# Patient Record
Sex: FEMALE | Race: WHITE | NOT HISPANIC OR LATINO | Employment: UNEMPLOYED
[De-identification: names, ages, dates, MRNs, and addresses within clinical notes are randomized per-mention and may not be internally consistent; named-entity substitution may affect disease eponyms.]

---

## 2017-01-03 ENCOUNTER — SURGERY (OUTPATIENT)
Age: 37
End: 2017-01-03

## 2017-01-03 ENCOUNTER — ANESTHESIA (OUTPATIENT)
Dept: SURGERY | Facility: HOSPITAL | Age: 37
End: 2017-01-03
Payer: OTHER GOVERNMENT

## 2017-01-03 ENCOUNTER — TELEPHONE (OUTPATIENT)
Dept: OBSTETRICS AND GYNECOLOGY | Facility: CLINIC | Age: 37
End: 2017-01-03

## 2017-01-03 PROBLEM — N92.0 MENORRHAGIA: Status: ACTIVE | Noted: 2017-01-03

## 2017-01-03 PROCEDURE — D9220A PRA ANESTHESIA: Mod: CRNA,,, | Performed by: NURSE ANESTHETIST, CERTIFIED REGISTERED

## 2017-01-03 PROCEDURE — 63600175 PHARM REV CODE 636 W HCPCS: Mod: PO | Performed by: NURSE ANESTHETIST, CERTIFIED REGISTERED

## 2017-01-03 PROCEDURE — 25000003 PHARM REV CODE 250: Mod: PO | Performed by: NURSE ANESTHETIST, CERTIFIED REGISTERED

## 2017-01-03 PROCEDURE — D9220A PRA ANESTHESIA: Mod: ANES,,, | Performed by: ANESTHESIOLOGY

## 2017-01-03 PROCEDURE — 63600175 PHARM REV CODE 636 W HCPCS: Mod: PO | Performed by: OBSTETRICS & GYNECOLOGY

## 2017-01-03 RX ORDER — MIDAZOLAM HYDROCHLORIDE 1 MG/ML
INJECTION, SOLUTION INTRAMUSCULAR; INTRAVENOUS
Status: DISCONTINUED | OUTPATIENT
Start: 2017-01-03 | End: 2017-01-03

## 2017-01-03 RX ORDER — PROPOFOL 10 MG/ML
VIAL (ML) INTRAVENOUS
Status: DISCONTINUED | OUTPATIENT
Start: 2017-01-03 | End: 2017-01-03

## 2017-01-03 RX ORDER — KETAMINE HYDROCHLORIDE 100 MG/ML
INJECTION, SOLUTION INTRAMUSCULAR; INTRAVENOUS
Status: DISCONTINUED | OUTPATIENT
Start: 2017-01-03 | End: 2017-01-03

## 2017-01-03 RX ORDER — ACETAMINOPHEN 10 MG/ML
INJECTION, SOLUTION INTRAVENOUS
Status: DISCONTINUED | OUTPATIENT
Start: 2017-01-03 | End: 2017-01-03

## 2017-01-03 RX ORDER — FENTANYL CITRATE 50 UG/ML
INJECTION, SOLUTION INTRAMUSCULAR; INTRAVENOUS
Status: DISCONTINUED | OUTPATIENT
Start: 2017-01-03 | End: 2017-01-03

## 2017-01-03 RX ORDER — ONDANSETRON 2 MG/ML
INJECTION INTRAMUSCULAR; INTRAVENOUS
Status: DISCONTINUED | OUTPATIENT
Start: 2017-01-03 | End: 2017-01-03

## 2017-01-03 RX ORDER — LIDOCAINE HCL/PF 100 MG/5ML
SYRINGE (ML) INTRAVENOUS
Status: DISCONTINUED | OUTPATIENT
Start: 2017-01-03 | End: 2017-01-03

## 2017-01-03 RX ADMIN — CEFAZOLIN SODIUM 2 G: 2 SOLUTION INTRAVENOUS at 07:01

## 2017-01-03 RX ADMIN — FENTANYL CITRATE 50 MCG: 50 INJECTION, SOLUTION INTRAMUSCULAR; INTRAVENOUS at 07:01

## 2017-01-03 RX ADMIN — LIDOCAINE HYDROCHLORIDE 50 MG: 20 INJECTION PARENTERAL at 07:01

## 2017-01-03 RX ADMIN — MIDAZOLAM 2 MG: 1 INJECTION INTRAMUSCULAR; INTRAVENOUS at 07:01

## 2017-01-03 RX ADMIN — KETAMINE HYDROCHLORIDE 25 MG: 100 INJECTION, SOLUTION, CONCENTRATE INTRAMUSCULAR; INTRAVENOUS at 07:01

## 2017-01-03 RX ADMIN — ONDANSETRON 4 MG: 2 INJECTION, SOLUTION INTRAMUSCULAR; INTRAVENOUS at 07:01

## 2017-01-03 RX ADMIN — ACETAMINOPHEN 1000 MG: 10 INJECTION, SOLUTION INTRAVENOUS at 07:01

## 2017-01-03 RX ADMIN — PROPOFOL 200 MG: 10 INJECTION, EMULSION INTRAVENOUS at 07:01

## 2017-01-03 NOTE — TELEPHONE ENCOUNTER
Patient was given a rx for hydrocodone today after surgery. She states she needs oxycodone because it works better for her. Please advise.

## 2017-01-03 NOTE — TELEPHONE ENCOUNTER
----- Message from Anuradha Frazier sent at 1/3/2017  2:32 PM CST -----  Contact: [facility] [phone]  Surgery Center needs to speak to office concerning this patient that he just operated on this morning. Office called in a prescription to [facility] for Hydrocodone. Patient needs a prescription for Oxycodone which she states works better.Please remit to:    [id number], [place] - [address] [address]  Phone: [phone] Fax: [phone]      . Placed calls to the pod. Please call back.

## 2017-01-17 ENCOUNTER — OFFICE VISIT (OUTPATIENT)
Dept: OBSTETRICS AND GYNECOLOGY | Facility: CLINIC | Age: 37
End: 2017-01-17
Payer: OTHER GOVERNMENT

## 2017-01-17 VITALS
DIASTOLIC BLOOD PRESSURE: 60 MMHG | SYSTOLIC BLOOD PRESSURE: 110 MMHG | HEIGHT: 62 IN | WEIGHT: 101.88 LBS | BODY MASS INDEX: 18.75 KG/M2

## 2017-01-17 DIAGNOSIS — Z09 POSTOP CHECK: Primary | ICD-10-CM

## 2017-01-17 PROCEDURE — 99999 PR PBB SHADOW E&M-EST. PATIENT-LVL II: CPT | Mod: PBBFAC,,, | Performed by: OBSTETRICS & GYNECOLOGY

## 2017-01-17 PROCEDURE — 99212 OFFICE O/P EST SF 10 MIN: CPT | Mod: PBBFAC,PO | Performed by: OBSTETRICS & GYNECOLOGY

## 2017-01-17 PROCEDURE — 99024 POSTOP FOLLOW-UP VISIT: CPT | Mod: ,,, | Performed by: OBSTETRICS & GYNECOLOGY

## 2017-01-17 RX ORDER — ELETRIPTAN HYDROBROMIDE 20 MG/1
20 TABLET, FILM COATED ORAL
COMMUNITY
Start: 2016-12-22 | End: 2022-11-11 | Stop reason: CLARIF

## 2017-01-17 NOTE — PROGRESS NOTES
History of Present Illness:   Pateint presents today  2 weeks postop, status post endometrial ablation, with complaint of none.    Pathology: FINAL PATHOLOGIC DIAGNOSIS  ENDOMETRIUM: FRAGMENTS OF PROLIFERATIVE ENDOMETRIUM; NO EVIDENCE OF ENDOMETRIAL  HYPERPLASIA OR MALIGNANCY.      Past medical and surgical history reviewed.   I have reviewed the patient's medical history in detail and updated the computerized patient record.    Physical exam:  Vital Signs: as above, reviewed.  Constitutional: She is oriented to person, place, and time. She appears well-developed and well-nourished. No distress.   HENT:   Head: Normocephalic and atraumatic.   Eyes: Conjunctivae and EOM are normal. No scleral icterus.   Neck: Normal range of motion. Neck supple. No tracheal deviation present.   Cardiovascular: Normal rate.    Pulmonary/Chest: Effort normal. No respiratory distress. She exhibits no tenderness.  Breasts: deferred  Abdominal: Soft. She exhibits no distension and no mass. There is no rebound and no guarding.   Genitourinary: Deferred  Musculoskeletal: Normal range of motion.   Lymphadenopathy: No inguinal adenopathy present.   Neurological: She is alert and oriented to person, place, and time. Coordination normal.   Skin: Skin is warm and dry. She is not diaphoretic.   Psychiatric: She has a normal mood and affect.    Assessment:  Normal 2 week postop pain    Plan:  Follow up  1 year  Resume normal activity.

## 2017-01-17 NOTE — MR AVS SNAPSHOT
[facility] - OB/GYN  [address] [place] [address]  Phone: [phone]                  Jaye Martinez   2017 1:00 PM   Office Visit    Description:  Female : 1980   Provider:  Emmanuel Diza MD   Department:  [facility] - OB/GYN           Reason for Visit     Post-op Evaluation                To Do List           Goals (5 Years of Data)     None      [facility]     [facility] On Call Nurse Care Line -  Assistance  Registered nurses in the [facility] On Call Center provide clinical advisement, health education, appointment booking, and other advisory services.  Call for this free service at [phone].             Medications           Message regarding Medications     Verify the changes and/or additions to your medication regime listed below are the same as discussed with your clinician today.  If any of these changes or additions are incorrect, please notify your healthcare provider.             Verify that the below list of medications is an accurate representation of the medications you are currently taking.  If none reported, the list may be blank. If incorrect, please contact your healthcare provider. Carry this list with you in case of emergency.           Current Medications     butalbital-acetaminophen-caffeine -40 mg (FIORICET, ESGIC) -40 mg per tablet Take 1 tablet by mouth 2 (two) times daily as needed.    chlorzoxazone (PARAFON FORTE DSC) 500 mg Tab Take 500 mg by mouth daily as needed.    hydrocodone-acetaminophen 10-325mg (NORCO)  mg Tab Take 1 tablet by mouth every 6 (six) hours as needed.    ibuprofen (ADVIL,MOTRIN) 600 MG tablet Take 1 tablet (600 mg total) by mouth every 6 (six) hours as needed for Pain.    oxycodone (ROXICODONE) 5 MG immediate release tablet Take 1 tablet (5 mg total) by mouth every 4 (four) hours as needed for Pain.    valacyclovir (VALTREX) 1000 MG tablet Take 1,000 mg by mouth every 12 (twelve) hours as  "needed.     zonisamide (ZONEGRAN) 100 MG Cap 100 mg once daily.     ondansetron (ZOFRAN-ODT) 4 MG TbDL Take 2 tablets (8 mg total) by mouth every 8 (eight) hours as needed.    polyethylene glycol (GLYCOLAX) 17 gram/dose powder Take 17 g by mouth once daily. In 8 oz of water    RELPAX 20 mg tablet            Clinical Reference Information           Vital Signs - Last Recorded  Most recent update: 1/17/2017  1:31 PM by Sara Kothari LPN    BP Ht Wt LMP BMI    110/60 5' 2" (1.575 m) 46.2 kg (101 lb 13.6 oz) 01/03/2017 18.63 kg/m2      Blood Pressure          Most Recent Value    BP  110/60      Allergies as of 1/17/2017     Lyrica [Pregabalin]    Bactrim [Sulfamethoxazole-trimethoprim]    Flexeril [Cyclobenzaprine]    Morphine    Toradol [Ketorolac]      Immunizations Administered on Date of Encounter - 1/17/2017     None      Smoking Cessation     If you would like to quit smoking:   You may be eligible for free services if you are a [place] resident and started smoking cigarettes before September 1, 1988.  Call the Smoking Cessation Trust (SCT) toll free at ([phone] 143-8834 or [phone] [phone].   Call 3-800-QUIT-NOW if you do not meet the above criteria.            "

## 2017-02-19 ENCOUNTER — HOSPITAL ENCOUNTER (EMERGENCY)
Facility: HOSPITAL | Age: 37
Discharge: HOME | End: 2017-02-19
Attending: EMERGENCY MEDICINE
Payer: OTHER GOVERNMENT

## 2017-02-19 VITALS
RESPIRATION RATE: 18 BRPM | DIASTOLIC BLOOD PRESSURE: 66 MMHG | OXYGEN SATURATION: 99 % | BODY MASS INDEX: 18.77 KG/M2 | TEMPERATURE: 98 F | HEART RATE: 70 BPM | HEIGHT: 62 IN | WEIGHT: 102 LBS | SYSTOLIC BLOOD PRESSURE: 118 MMHG

## 2017-02-19 DIAGNOSIS — M27.2 HARD PALATE ABSCESS: Primary | ICD-10-CM

## 2017-02-19 PROCEDURE — 99283 EMERGENCY DEPT VISIT LOW MDM: CPT | Mod: 25

## 2017-02-19 PROCEDURE — 42000 DRAINAGE MOUTH ROOF LESION: CPT

## 2017-02-19 PROCEDURE — 25000003 PHARM REV CODE 250: Performed by: NURSE PRACTITIONER

## 2017-02-19 RX ORDER — HYDROCODONE BITARTRATE AND ACETAMINOPHEN 5; 325 MG/1; MG/1
1 TABLET ORAL
Status: COMPLETED | OUTPATIENT
Start: 2017-02-19 | End: 2017-02-19

## 2017-02-19 RX ORDER — PENICILLIN V POTASSIUM 500 MG/1
500 TABLET, FILM COATED ORAL EVERY 6 HOURS
Qty: 27 TABLET | Refills: 0 | Status: SHIPPED | OUTPATIENT
Start: 2017-02-19 | End: 2017-02-26

## 2017-02-19 RX ORDER — PENICILLIN V POTASSIUM 500 MG/1
500 TABLET, FILM COATED ORAL
Status: COMPLETED | OUTPATIENT
Start: 2017-02-19 | End: 2017-02-19

## 2017-02-19 RX ADMIN — HYDROCODONE BITARTRATE AND ACETAMINOPHEN 1 TABLET: 5; 325 TABLET ORAL at 05:02

## 2017-02-19 RX ADMIN — PENICILLIN V POTASIUM 500 MG: 500 TABLET OROPHARYNGEAL at 05:02

## 2017-02-19 RX ADMIN — LIDOCAINE HYDROCHLORIDE 1 ML: 10; .005 INJECTION, SOLUTION EPIDURAL; INFILTRATION; INTRACAUDAL; PERINEURAL at 05:02

## 2017-02-19 NOTE — ED PROVIDER NOTES
Encounter Date: 2/19/2017       History     Chief Complaint   Patient presents with    Abscess     in roof of mouth     Review of patient's allergies indicates:   Allergen Reactions    Lyrica [pregabalin] Itching and Other (See Comments)     Bruising and headaches    Bactrim [sulfamethoxazole-trimethoprim] Rash    Flexeril [cyclobenzaprine] Rash    Morphine Rash    Toradol [ketorolac] Rash     HPI Comments: Jaye Martinez is a 36 year old female with pmh gum disease presenting to the ED with a one day history of pain and swelling to the roof of her mouth. She reports a history of dental abscesses. She has had no drainage from the area of swelling and denies any fever. She reports radiation of pain to the right side of her face.     The history is provided by the patient.     Past Medical History   Diagnosis Date    Abnormal Pap smear     Anxiety     Cancer      pre-cancer cells cervical     Cervical disc herniation     Chronic cough     Closed fracture 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) level with anterior cord syndrome      T 12 fracture compression     Fractures     Genital herpes     Hemorrhoid     History of colposcopy with cervical biopsy     Lumbar disc herniation      on Hydrocodone    Lupus (systemic lupus erythematosus)      chronic thrombocytopenia    Migraine headache     Patient is a currently breast-feeding mother July 2014    PONV (postoperative nausea and vomiting)      Past Medical History Pertinent Negatives   Diagnosis Date Noted    Anticoagulant long-term use 12/29/2016    Asthma 12/29/2016    CHF (congestive heart failure) 12/29/2016    COPD (chronic obstructive pulmonary disease) 12/29/2016    Coronary artery disease 12/29/2016    Diabetes mellitus 12/29/2016    Hypertension 12/29/2016    Malignant hyperthermia 12/29/2016    Seizures 12/29/2016    Stroke 12/29/2016    Thyroid disease 12/29/2016     Past Surgical History   Procedure Laterality Date    Cryotherapy       2002     Appendectomy      Cholecystectomy       section, classic       x2 , last 2014    Tonsillectomy      Adenoidectomy      Tubal ligation  2014    Ganglion cyst excision Left     Tympanostomy tube placement       x 7    Dilation and curettage of uterus  [id number]     Family History   Problem Relation Age of Onset    Heart disease Father     Diabetes Mother     Cancer Maternal Grandfather      colon    Cancer Paternal Grandfather      colon    Breast cancer Maternal Grandmother     Breast cancer Maternal Aunt     Breast cancer Maternal Aunt     Ovarian cancer Neg Hx      Social History   Substance Use Topics    Smoking status: Current Every Day Smoker     Packs/day: 0.25     Types: Cigarettes    Smokeless tobacco: Never Used    Alcohol use No      Comment: special occasions     Review of Systems   HENT: Positive for dental problem (pain and swelling to the roof of mouth). Negative for trouble swallowing.    Respiratory: Negative.    Cardiovascular: Negative.    Gastrointestinal: Negative.    Genitourinary: Negative.    Musculoskeletal: Negative.    Skin: Negative.    Neurological: Negative.        Physical Exam   Initial Vitals   BP Pulse Resp Temp SpO2   17 1630 17 1630 17 1630 17 1630 17 1630   110/59 83 18 98.1 °F (36.7 °C) 98 %     Physical Exam    Nursing note and vitals reviewed.  Constitutional: She appears well-developed and well-nourished. She is not diaphoretic. No distress.   HENT:   Head: Normocephalic and atraumatic.   Mouth/Throat: Oropharynx is clear and moist and mucous membranes are normal. No trismus in the jaw. No uvula swelling.       No facial swelling   Eyes: Conjunctivae are normal.   Neck: Normal range of motion.   Cardiovascular: Normal rate and regular rhythm.   Pulmonary/Chest: Breath sounds normal. No respiratory distress.   Musculoskeletal: Normal range of motion.   Neurological: She is alert and oriented to person, place, and time.    Skin: Skin is warm and dry.         ED Course   I & D - Incision and Drainage  Date/Time: 2/19/2017 7:56 PM  Performed by: ADAM PITTS  Authorized by: ZENOBIA TAPIA   Type: abscess  Body area: head/neck (Hard palate)  Anesthesia: local infiltration    Anesthesia:  Anesthesia: local infiltration  Local Anesthetic: lidocaine 1% with epinephrine   Anesthetic total: 1.5 mL  Incision type: Needle aspiration.  Complexity: complex  Drainage: purulent and  bloody  Drainage amount: moderate  Wound treatment: expression of material (Needle aspiration and drainage)  Patient tolerance: Patient tolerated the procedure well with no immediate complications        Labs Reviewed - No data to display                APC / Resident Notes:   Jaye Martinez is a 36 year old female presenting to the ED with an abscess to the hard palate of her mouth. I performed a needle aspiration of the abscess with moderate amount of purulent drainage removed. She tolerated the procedure well and had moderate relief of pain following procedure. I do not suspect any other abscess or Ludwigs angina. I prescribed penicillin and instructed the patient to follow up with her dentist. I also discussed specific return precautions and she verbalized understanding. She recently filled a prescription for Norco and was instructed to continue taking this medication as it had previously been prescribed. Based on my clinical evaluation, I do not appreciate any immediate, emergent, or life threatening condition or etiology that warrants additional workup today and feel that the patient can be discharged with close follow up care.            Attending Attestation:     Physician Attestation Statement for NP/PA:   I have conducted a face to face encounter with this patient in addition to the NP/PA, due to Medical Complexity    Other NP/PA Attestation Additions:      Medical Decision Making: I provided a face to face evaluation of this patient.  I discussed the  patient's care with Advanced Practice Clinician.  I reviewed their note and agree with the history, physical, assessment, diagnosis, treatment, and discharge plan provided by the Advanced Practice Clinician. My overall impression is oral abscess.  The patient has been instructed to follow up with their physician or the one provided as well as specific return precautions.                    ED Course     Clinical Impression:   The encounter diagnosis was Hard palate abscess.    Disposition:   Disposition: Discharged  Condition: Stable       Ara Barreto NP  02/19/17 1959       Ara Barreto NP  02/19/17 2000       Pawel Torres MD  02/19/17 1177

## 2017-02-19 NOTE — ED AVS SNAPSHOT
[facility] [address] [place] [address]               Jaye Martinez   2017  4:31 PM   ED    Description:  Female : 1980   Department:  [facility]           Your Care was Coordinated By:     Provider Role From To    Pawel Torres MD Attending Provider 17 1942 --    Ara Barreto NP Nurse Practitioner 17 [id number] --      Reason for Visit     Abscess           Diagnoses this Visit        Comments    Hard palate abscess    -  Primary       ED Disposition     None           To Do List           Follow-up Information     Follow up with [facility].    Specialty:  Emergency Medicine    Why:  As needed, If symptoms worsen    Contact information:    [address] [place] [address] [phone]        Follow up with Your dentist in 1-2 days.       These Medications        Disp Refills Start End    penicillin v potassium (VEETID) 500 MG tablet 27 tablet 0 2017    Take 1 tablet (500 mg total) by mouth every 6 (six) hours. - Oral    Pharmacy: [id number] - [place], [place] - [address] [place] [address] #: [phone]         [facility]     [facility] Nurse Care Line -  Assistance  Registered nurses in the [facility] On Call Center provide clinical advisement, health education, appointment booking, and other advisory services.  Call for this free service at [phone].             Medications           Message regarding Medications     Verify the changes and/or additions to your medication regime listed below are the same as discussed with your clinician today.  If any of these changes or additions are incorrect, please notify your healthcare provider.        START taking these NEW medications        Refills    penicillin v potassium (VEETID) 500 MG tablet 0    Sig: Take 1 tablet (500 mg total) by mouth every 6 (six) hours.    Class: Print    Route: Oral       These medications were administered today        Dose Freq    lidocaine-EPINEPHrine (PF) 1%-1:200,000 injection 1 mL 1 mL Once    Si mL by Other route once.    Class: Normal    Route: Other    hydrocodone-acetaminophen 5-325mg per tablet 1 tablet 1 tablet ED 1 Time    Sig: Take 1 tablet by mouth ED 1 Time.    Class: Normal    Route: Oral    Cosign for Ordering: Required by Pawel Torres MD    penicillin v potassium tablet 500 mg 500 mg ED 1 Time    Sig: Take 1 tablet (500 mg total) by mouth ED 1 Time.    Class: Normal    Route: Oral           Verify that the below list of medications is an accurate representation of the medications you are currently taking.  If none reported, the list may be blank. If incorrect, please contact your healthcare provider. Carry this list with you in case of emergency.           Current Medications     butalbital-acetaminophen-caffeine -40 mg (FIORICET, ESGIC) -40 mg per tablet Take 1 tablet by mouth 2 (two) times daily as needed.    chlorzoxazone (PARAFON FORTE DSC) 500 mg Tab Take 500 mg by mouth daily as needed.    hydrocodone-acetaminophen 10-325mg (NORCO)  mg Tab Take 1 tablet by mouth every 6 (six) hours as needed.    hydrocodone-acetaminophen 5-325mg per tablet 1 tablet Take 1 tablet by mouth ED 1 Time.    ibuprofen (ADVIL,MOTRIN) 600 MG tablet Take 1 tablet (600 mg total) by mouth every 6 (six) hours as needed for Pain.    ondansetron (ZOFRAN-ODT) 4 MG TbDL Take 2 tablets (8 mg total) by mouth every 8 (eight) hours as needed.    oxycodone (ROXICODONE) 5 MG immediate release tablet Take 1 tablet (5 mg total) by mouth every 4 (four) hours as needed for Pain.    penicillin v potassium (VEETID) 500 MG tablet Take 1 tablet (500 mg total) by mouth every 6 (six) hours.    penicillin v potassium tablet 500 mg Take 1 tablet (500 mg total) by mouth ED 1 Time.    polyethylene glycol (GLYCOLAX) 17 gram/dose powder Take 17 g by mouth once daily. In 8 oz of water  "   RELPAX 20 mg tablet     valacyclovir (VALTREX) 1000 MG tablet Take 1,000 mg by mouth every 12 (twelve) hours as needed.     zonisamide (ZONEGRAN) 100 MG Cap 100 mg once daily.            Clinical Reference Information           Your Vitals Were     BP Pulse Temp Resp Height Weight    110/59 (BP Location: Right arm, Patient Position: Sitting) 83 98.1 °F (36.7 °C) (Oral) 18 5' 2" (1.575 m) 46.3 kg (102 lb)    SpO2 BMI             98% 18.66 kg/m2         Allergies as of 2/19/2017        Reactions    Lyrica [Pregabalin] Itching, Other (See Comments)    Bruising and headaches    Bactrim [Sulfamethoxazole-trimethoprim] Rash    Flexeril [Cyclobenzaprine] Rash    Morphine Rash    Toradol [Ketorolac] Rash      Immunizations Administered on Date of Encounter - 2/19/2017     None      ED Micro, Lab, POCT     None      ED Imaging Orders     None      Discharge References/Attachments     ABSCESS, DENTAL (ENGLISH)      Smoking Cessation     If you would like to quit smoking:   You may be eligible for free services if you are a [place] resident and started smoking cigarettes before September 1, 1988.  Call the Smoking Cessation Trust (SCT) toll free at ([phone] 755-6906 or (882) 614-0236.   Call 6-800-QUIT-NOW if you do not meet the above criteria.             [facility] complies with applicable Federal civil rights laws and does not discriminate on the basis of race, color, national origin, age, disability, or sex.        Language Assistance Services     ATTENTION: Language assistance services are available, free of charge. Please call [phone].      ATENCIÓN: Si habla español, tiene a grider disposición servicios gratuitos de asistencia lingüística. Llame al [phone].     CHÚ Ý: N?u b?n nói Ti?ng Vi?t, có các d?ch v? h? tr? ngôn ng? mi?n phí dành cho b?n. G?i s? [phone].        "

## 2017-03-14 ENCOUNTER — HOSPITAL ENCOUNTER (OUTPATIENT)
Dept: RADIOLOGY | Facility: HOSPITAL | Age: 37
Discharge: HOME | End: 2017-03-14
Attending: FAMILY MEDICINE
Payer: OTHER GOVERNMENT

## 2017-03-14 ENCOUNTER — OFFICE VISIT (OUTPATIENT)
Dept: FAMILY MEDICINE | Facility: CLINIC | Age: 37
End: 2017-03-14
Payer: OTHER GOVERNMENT

## 2017-03-14 ENCOUNTER — TELEPHONE (OUTPATIENT)
Dept: FAMILY MEDICINE | Facility: CLINIC | Age: 37
End: 2017-03-14

## 2017-03-14 VITALS
WEIGHT: 100.5 LBS | BODY MASS INDEX: 18.5 KG/M2 | SYSTOLIC BLOOD PRESSURE: 96 MMHG | DIASTOLIC BLOOD PRESSURE: 60 MMHG | OXYGEN SATURATION: 98 % | HEIGHT: 62 IN | HEART RATE: 87 BPM

## 2017-03-14 DIAGNOSIS — M54.50 CHRONIC LOW BACK PAIN WITHOUT SCIATICA, UNSPECIFIED BACK PAIN LATERALITY: ICD-10-CM

## 2017-03-14 DIAGNOSIS — M32.9 SYSTEMIC LUPUS ERYTHEMATOSUS, UNSPECIFIED SLE TYPE, UNSPECIFIED ORGAN INVOLVEMENT STATUS: ICD-10-CM

## 2017-03-14 DIAGNOSIS — Z00.00 ROUTINE MEDICAL EXAM: Primary | ICD-10-CM

## 2017-03-14 DIAGNOSIS — G43.909 MIGRAINE WITHOUT STATUS MIGRAINOSUS, NOT INTRACTABLE, UNSPECIFIED MIGRAINE TYPE: ICD-10-CM

## 2017-03-14 DIAGNOSIS — M54.2 CHRONIC CERVICAL PAIN: ICD-10-CM

## 2017-03-14 DIAGNOSIS — J44.1 COPD EXACERBATION: ICD-10-CM

## 2017-03-14 DIAGNOSIS — Z72.0 TOBACCO ABUSE DISORDER: ICD-10-CM

## 2017-03-14 DIAGNOSIS — G89.29 CHRONIC CERVICAL PAIN: ICD-10-CM

## 2017-03-14 DIAGNOSIS — G89.29 CHRONIC LOW BACK PAIN WITHOUT SCIATICA, UNSPECIFIED BACK PAIN LATERALITY: ICD-10-CM

## 2017-03-14 PROCEDURE — 71020 XR CHEST PA AND LATERAL: CPT | Mod: 26,,, | Performed by: RADIOLOGY

## 2017-03-14 PROCEDURE — 99395 PREV VISIT EST AGE 18-39: CPT | Mod: S$PBB,,, | Performed by: FAMILY MEDICINE

## 2017-03-14 PROCEDURE — 99214 OFFICE O/P EST MOD 30 MIN: CPT | Mod: PBBFAC,PO,25 | Performed by: FAMILY MEDICINE

## 2017-03-14 PROCEDURE — 99999 PR PBB SHADOW E&M-EST. PATIENT-LVL IV: CPT | Mod: PBBFAC,,, | Performed by: FAMILY MEDICINE

## 2017-03-14 PROCEDURE — 71020 XR CHEST PA AND LATERAL: CPT | Mod: TC,PO

## 2017-03-14 RX ORDER — PREDNISONE 10 MG/1
TABLET ORAL
Qty: 20 TABLET | Refills: 0 | Status: SHIPPED | OUTPATIENT
Start: 2017-03-14 | End: 2017-06-14

## 2017-03-14 RX ORDER — DOXYCYCLINE 100 MG/1
100 CAPSULE ORAL 2 TIMES DAILY
Qty: 20 CAPSULE | Refills: 0 | Status: SHIPPED | OUTPATIENT
Start: 2017-03-14 | End: 2017-06-14

## 2017-03-14 RX ORDER — VARENICLINE TARTRATE 1 MG/1
1 TABLET, FILM COATED ORAL 2 TIMES DAILY
Qty: 180 TABLET | Refills: 0 | Status: SHIPPED | OUTPATIENT
Start: 2017-03-14 | End: 2017-04-13

## 2017-03-14 RX ORDER — ALBUTEROL SULFATE 90 UG/1
2 AEROSOL, METERED RESPIRATORY (INHALATION) EVERY 4 HOURS PRN
Qty: 18 G | Refills: 0 | Status: SHIPPED | OUTPATIENT
Start: 2017-03-14 | End: 2019-09-16 | Stop reason: SDUPTHER

## 2017-03-14 RX ORDER — VARENICLINE TARTRATE 0.5 (11)-1
KIT ORAL
Qty: 53 TABLET | Refills: 0 | Status: SHIPPED | OUTPATIENT
Start: 2017-03-14 | End: 2017-06-14

## 2017-03-14 NOTE — PROGRESS NOTES
Subjective:       Patient ID: Jaye Martinez is a 36 y.o. female.    Chief Complaint: Annual Exam    HPI     utd with pap and mmg.     Needs updated referral to Dr. Grayson Hogan, neurology pain management, for chronic lumbar and cervical pain. Takes norco and parafon.       Re migraines: gets migraines without 3 times per week. [facility] Dr[facility]. Takes relpax and fioricet for acute migraines.      C/o cough, postnasal drip and nasal congestion x 1 week.  Now, c/o right lower pleurisy and green phlegm cough x 2-3 days. Also, c/o low grade fever.     Has lupus.  C/o bilat shoulder and knee stiffness and achy pain x 2 months.  Was on plaquenil in past. Requests referral to rheumatology    Requesting med to stop smoking.      Review of Systems      Review of Systems   Constitutional: Negative for fever and chills.   HENT: Negative for hearing loss and neck stiffness.    Eyes: Negative for redness and itching.   Respiratory: Negative for cough and choking.    Cardiovascular: Negative for chest pain and leg swelling.  Abdomen: Negative for abdominal pain and blood in stool.   Genitourinary: Negative for dysuria and flank pain.   Musculoskeletal: Negative for gait problem.   Neurological: Negative for light-headedness   Hematological: Negative for adenopathy.   Psychiatric/Behavioral: Negative for behavioral problems.     Objective:      Physical Exam   Constitutional: She is oriented to person, place, and time. She appears well-developed. No distress.   HENT:   Head: Normocephalic and atraumatic.   Mouth/Throat: Oropharynx is clear and moist.   Eyes: Conjunctivae are normal. Pupils are equal, round, and reactive to light.   Neck: Normal range of motion. Neck supple. No thyromegaly present.   Cardiovascular: Normal rate, regular rhythm and normal heart sounds.  Exam reveals no friction rub.    No murmur heard.  Pulmonary/Chest: Effort normal. She has wheezes. She has no  rales.   Abdominal: Soft. Bowel sounds are normal. She exhibits no distension and no mass. There is no tenderness.   Musculoskeletal: Normal range of motion. She exhibits no edema or tenderness.   Lymphadenopathy:     She has no cervical adenopathy.   Neurological: She is alert and oriented to person, place, and time. She has normal reflexes. No cranial nerve deficit.   Skin: Skin is warm. No rash noted. No erythema.   Psychiatric: She has a normal mood and affect. Thought content normal.       Assessment:       1. Routine medical exam    2. Migraine without status migrainosus, not intractable, unspecified migraine type    3. Chronic low back pain without sciatica, unspecified back pain laterality    4. Chronic cervical pain    5. COPD exacerbation    6. Systemic lupus erythematosus, unspecified SLE type, unspecified organ involvement status    7. Tobacco abuse disorder        Plan:       Routine medical exam  -     Comprehensive metabolic panel; Future; Expected date: 6/14/17  -     Lipid panel; Future; Expected date: 6/14/17    Migraine without status migrainosus, not intractable, unspecified migraine type  -     Ambulatory referral to Neurology    Chronic low back pain without sciatica, unspecified back pain laterality  -     Ambulatory referral to Neurology    Chronic cervical pain  -     Ambulatory referral to Neurology    COPD exacerbation  -     X-Ray Chest PA And Lateral; Future; Expected date: 3/14/17    Systemic lupus erythematosus, unspecified SLE type, unspecified organ involvement status  -     Ambulatory referral to Rheumatology    Tobacco abuse disorder    Other orders  -     varenicline (CHANTIX CHRISTOS) 0.5 mg (11)- 1 mg (42) tablet; Take one 0.5mg tablet by mouth once daily for 3 days, then increase to one 0.5mg tablet twice daily for 4 days, then increase to one 1mg tablet twice daily x 3 weeks.  Dispense: 53 tablet; Refill: 0  -     varenicline (CHANTIX) 1 mg Tab; Take 1 tablet (1 mg total) by mouth  2 (two) times daily.  Dispense: 180 tablet; Refill: 0  -     predniSONE (DELTASONE) 10 MG tablet; Take 4 tabs daily for 2 days then Take 3 tabs daily for 2 days thenTake 2 tabs daily for 2 days then Take 1 tab daily for 2 days then stop  Dispense: 20 tablet; Refill: 0  -     doxycycline (MONODOX) 100 MG capsule; Take 1 capsule (100 mg total) by mouth 2 (two) times daily.  Dispense: 20 capsule; Refill: 0  -     albuterol (PROAIR HFA) 90 mcg/actuation inhaler; Inhale 2 puffs into the lungs every 4 (four) hours as needed for Wheezing.  Dispense: 18 g; Refill: 0      Plan:  Start doxy, proair and prednisone taper  Start chantix  See referrals  Cont all other meds

## 2017-03-14 NOTE — TELEPHONE ENCOUNTER
inform pt via phone that I reviewed the test results and note the following:    Your chest xray was normal.    Please schedule 3 month fasting cmp and lipids

## 2017-03-14 NOTE — MR AVS SNAPSHOT
[facility]  [address] [place] [address]  Phone: [phone]  Fax: [phone]                  Jaye Martinez   3/14/2017 10:20 AM   Office Visit    Description:  Female : 1980   Provider:  Nakul Garcia MD   Department:  [facility]           Reason for Visit     Annual Exam           Diagnoses this Visit        Comments    Routine medical exam    -  Primary     Migraine without status migrainosus, not intractable, unspecified migraine type         Chronic low back pain without sciatica, unspecified back pain laterality         Chronic cervical pain         COPD exacerbation         Systemic lupus erythematosus, unspecified SLE type, unspecified organ involvement status                To Do List           Future Appointments        Provider Department Dept Phone    3/14/2017 12:30 PM NS XR3 [facility] [phone]    2017 3:00 PM Moira Dobbs,  [place] - Rheumatology [phone]    2017 11:20 AM Nakul Garcia MD [facility] [phone]      Goals (5 Years of Data)     None      Follow-Up and Disposition     Return in about 3 months (around 2017).       These Medications        Disp Refills Start End    varenicline (CHANTIX CHRISTOS) 0.5 mg (11)- 1 mg (42) tablet 53 tablet 0 3/14/2017     Take one 0.5mg tablet by mouth once daily for 3 days, then increase to one 0.5mg tablet twice daily for 4 days, then increase to one 1mg tablet twice daily x 3 weeks.    Pharmacy: [id number] - [address] Ph #: [phone]       varenicline (CHANTIX) 1 mg Tab 180 tablet 0 3/14/2017 2017    Take 1 tablet (1 mg total) by mouth 2 (two) times daily. - Oral    Pharmacy: [facility] - [facility], [place] - [address] [place] [address] Ph #: [phone]       predniSONE (DELTASONE) 10 MG tablet 20 tablet 0 3/14/2017     Take 4 tabs daily for 2 days then Take 3 tabs  daily for 2 days thenTake 2 tabs daily for 2 days then Take 1 tab daily for 2 days then stop    Ph[facility]: [facility] [address] #: [phone]       doxycycline (MONODOX) 100 MG capsule 20 capsule 0 3/14/2017     Take 1 capsule (100 mg total) by mouth 2 (two) times daily. - Oral    Pharmacy: [facility] [address] #: [phone]       albuterol (PROAIR HFA) 90 mcg/actuation inhaler 18 g 0 3/14/2017 3/14/2018    Inhale 2 puffs into the lungs every 4 (four) hours as needed for Wheezing. - Inhalation    Pharmacy: [facility] [id number] #: [phone]         [facility]     [facility] Nurse Care Line - 24/7 Assistance  Registered nurses in the [facility] provide clinical advisement, health education, appointment booking, and other advisory services.  Call for this free service at [phone].             Medications           Message regarding Medications     Verify the changes and/or additions to your medication regime listed below are the same as discussed with your clinician today.  If any of these changes or additions are incorrect, please notify your healthcare provider.        START taking these NEW medications        Refills    varenicline (CHANTIX CHRISTOS) 0.5 mg (11)- 1 mg (42) tablet 0    Sig: Take one 0.5mg tablet by mouth once daily for 3 days, then increase to one 0.5mg tablet twice daily for 4 days, then increase to one 1mg tablet twice daily x 3 weeks.    Class: Print    varenicline (CHANTIX) 1 mg Tab 0    Sig: Take 1 tablet (1 mg total) by mouth 2 (two) times daily.    Class: Normal    Route: Oral    predniSONE (DELTASONE) 10 MG tablet 0    Sig: Take 4 tabs daily for 2 days then Take 3 tabs daily for 2 days thenTake 2 tabs daily for 2 days then Take 1 tab daily for 2 days then stop    Class: Normal    doxycycline (MONODOX) 100 MG capsule 0    Sig: Take 1 capsule (100 mg total) by mouth 2 (two) times  daily.    Class: Normal    Route: Oral    albuterol (PROAIR HFA) 90 mcg/actuation inhaler 0    Sig: Inhale 2 puffs into the lungs every 4 (four) hours as needed for Wheezing.    Class: Normal    Route: Inhalation      STOP taking these medications     ibuprofen (ADVIL,MOTRIN) 600 MG tablet Take 1 tablet (600 mg total) by mouth every 6 (six) hours as needed for Pain.    ondansetron (ZOFRAN-ODT) 4 MG TbDL Take 2 tablets (8 mg total) by mouth every 8 (eight) hours as needed.    oxycodone (ROXICODONE) 5 MG immediate release tablet Take 1 tablet (5 mg total) by mouth every 4 (four) hours as needed for Pain.    polyethylene glycol (GLYCOLAX) 17 gram/dose powder Take 17 g by mouth once daily. In 8 oz of water           Verify that the below list of medications is an accurate representation of the medications you are currently taking.  If none reported, the list may be blank. If incorrect, please contact your healthcare provider. Carry this list with you in case of emergency.           Current Medications     butalbital-acetaminophen-caffeine -40 mg (FIORICET, ESGIC) -40 mg per tablet Take 1 tablet by mouth 2 (two) times daily as needed.    chlorzoxazone (PARAFON FORTE DSC) 500 mg Tab Take 500 mg by mouth daily as needed.    hydrocodone-acetaminophen 10-325mg (NORCO)  mg Tab Take 1 tablet by mouth every 6 (six) hours as needed.    RELPAX 20 mg tablet     valacyclovir (VALTREX) 1000 MG tablet Take 1,000 mg by mouth every 12 (twelve) hours as needed.     zonisamide (ZONEGRAN) 100 MG Cap 100 mg once daily.     albuterol (PROAIR HFA) 90 mcg/actuation inhaler Inhale 2 puffs into the lungs every 4 (four) hours as needed for Wheezing.    doxycycline (MONODOX) 100 MG capsule Take 1 capsule (100 mg total) by mouth 2 (two) times daily.    predniSONE (DELTASONE) 10 MG tablet Take 4 tabs daily for 2 days then Take 3 tabs daily for 2 days thenTake 2 tabs daily for 2 days then Take 1 tab daily for 2 days then stop     "varenicline (CHANTIX CHRISTOS) 0.5 mg (11)- 1 mg (42) tablet Take one 0.5mg tablet by mouth once daily for 3 days, then increase to one 0.5mg tablet twice daily for 4 days, then increase to one 1mg tablet twice daily x 3 weeks.    varenicline (CHANTIX) 1 mg Tab Take 1 tablet (1 mg total) by mouth 2 (two) times daily.           Clinical Reference Information           Your Vitals Were     BP Pulse Height Weight SpO2 BMI    96/60 87 5' 2" (1.575 m) 45.6 kg (100 lb 8.5 oz) 98% 18.39 kg/m2      Blood Pressure          Most Recent Value    BP  96/60      Allergies as of 3/14/2017     Lyrica [Pregabalin]    Bactrim [Sulfamethoxazole-trimethoprim]    Flexeril [Cyclobenzaprine]    Morphine    Toradol [Ketorolac]      Immunizations Administered on Date of Encounter - 3/14/2017     None      Orders Placed During Today's Visit      Normal Orders This Visit    Ambulatory referral to Neurology     Ambulatory referral to Rheumatology     Future Labs/Procedures Expected by Expires    X-Ray Chest PA And Lateral  3/14/2017 6/12/2017      Smoking Cessation     If you would like to quit smoking:   You may be eligible for free services if you are a [place] resident and started smoking cigarettes before September 1, 1988.  Call the [employer]oking [employer]essation [employer] (S[employer]) toll free at [phone] 161-5443 or ([phone].   Call 5-698-QUIT-NOW if you do not meet the above criteria.            Language Assistance Services     ATTENTION: Language assistance services are available, free of charge. Please call [phone].      ATENCIÓN: Si habla español, tiene a grider disposición servicios gratuitos de asistencia lingüística. Llame al [phone].     [facility] Ý: N?u b?n nói Ti?ng Vi?t, có các d?ch v? h? tr? ngôn ng? mi?n phí dành cho b?n. G?i s? [phone].         [facility] complies with applicable Federal civil rights laws and does not discriminate on the basis of race, color, national origin, age, disability, or sex.        "

## 2017-03-15 NOTE — TELEPHONE ENCOUNTER
----- Message from Lakeisha Infante sent at 3/15/2017 10:13 AM CDT -----  Returning your call.  Please call [phone].

## 2017-03-22 ENCOUNTER — TELEPHONE (OUTPATIENT)
Dept: FAMILY MEDICINE | Facility: CLINIC | Age: 37
End: 2017-03-22

## 2017-03-22 NOTE — TELEPHONE ENCOUNTER
Patient insurance is calling and stating they cant approve the referral for dr johnson, called pt to let her know, and she said that this would be billed to her and they are questioning us about  These issues. The patient has seen this doctor since 2011

## 2017-03-22 NOTE — TELEPHONE ENCOUNTER
----- Message from Maryse Estrada sent at 3/21/2017  8:14 AM CDT -----  Contact: Geovanna Austin with [employer] - [phone] received a request for a referral to Dr Grayson Phan - Neurologist - back dated to 2015/Geovanna is asking if Dr Garcia referred patient to Dr Phan or did the patient go to this doctor on her own?/please advise

## 2017-06-12 ENCOUNTER — LAB VISIT (OUTPATIENT)
Dept: LAB | Facility: HOSPITAL | Age: 37
End: 2017-06-12
Attending: FAMILY MEDICINE
Payer: OTHER GOVERNMENT

## 2017-06-12 DIAGNOSIS — Z00.00 ROUTINE MEDICAL EXAM: ICD-10-CM

## 2017-06-12 LAB
ALBUMIN SERPL BCP-MCNC: 3.9 G/DL
ALP SERPL-CCNC: 73 U/L
ALT SERPL W/O P-5'-P-CCNC: 10 U/L
ANION GAP SERPL CALC-SCNC: 8 MMOL/L
AST SERPL-CCNC: 10 U/L
BILIRUB SERPL-MCNC: 0.2 MG/DL
BUN SERPL-MCNC: 11 MG/DL
CALCIUM SERPL-MCNC: 9.1 MG/DL
CHLORIDE SERPL-SCNC: 108 MMOL/L
CHOLEST/HDLC SERPL: 5.2 {RATIO}
CO2 SERPL-SCNC: 26 MMOL/L
CREAT SERPL-MCNC: 0.7 MG/DL
EST. GFR  (AFRICAN AMERICAN): >60 ML/MIN/1.73 M^2
EST. GFR  (NON AFRICAN AMERICAN): >60 ML/MIN/1.73 M^2
GLUCOSE SERPL-MCNC: 95 MG/DL
HDL/CHOLESTEROL RATIO: 19.4 %
HDLC SERPL-MCNC: 160 MG/DL
HDLC SERPL-MCNC: 31 MG/DL
LDLC SERPL CALC-MCNC: 114.4 MG/DL
NONHDLC SERPL-MCNC: 129 MG/DL
POTASSIUM SERPL-SCNC: 4.4 MMOL/L
PROT SERPL-MCNC: 7.1 G/DL
SODIUM SERPL-SCNC: 142 MMOL/L
TRIGL SERPL-MCNC: 73 MG/DL

## 2017-06-12 PROCEDURE — 36415 COLL VENOUS BLD VENIPUNCTURE: CPT | Mod: PO

## 2017-06-12 PROCEDURE — 80061 LIPID PANEL: CPT

## 2017-06-12 PROCEDURE — 80053 COMPREHEN METABOLIC PANEL: CPT

## 2017-06-14 ENCOUNTER — OFFICE VISIT (OUTPATIENT)
Dept: FAMILY MEDICINE | Facility: CLINIC | Age: 37
End: 2017-06-14
Payer: OTHER GOVERNMENT

## 2017-06-14 VITALS
BODY MASS INDEX: 19.02 KG/M2 | WEIGHT: 103.38 LBS | OXYGEN SATURATION: 97 % | HEART RATE: 78 BPM | HEIGHT: 62 IN | SYSTOLIC BLOOD PRESSURE: 92 MMHG | DIASTOLIC BLOOD PRESSURE: 62 MMHG

## 2017-06-14 DIAGNOSIS — M54.2 CHRONIC NECK PAIN: ICD-10-CM

## 2017-06-14 DIAGNOSIS — M43.6 NECK STIFFNESS: Primary | ICD-10-CM

## 2017-06-14 DIAGNOSIS — G89.29 CHRONIC LOW BACK PAIN WITHOUT SCIATICA, UNSPECIFIED BACK PAIN LATERALITY: ICD-10-CM

## 2017-06-14 DIAGNOSIS — M54.50 CHRONIC LOW BACK PAIN WITHOUT SCIATICA, UNSPECIFIED BACK PAIN LATERALITY: ICD-10-CM

## 2017-06-14 DIAGNOSIS — F17.200 TOBACCO USE DISORDER: ICD-10-CM

## 2017-06-14 DIAGNOSIS — G89.29 CHRONIC NECK PAIN: ICD-10-CM

## 2017-06-14 PROCEDURE — 99213 OFFICE O/P EST LOW 20 MIN: CPT | Mod: PBBFAC,PO,25 | Performed by: FAMILY MEDICINE

## 2017-06-14 PROCEDURE — 90472 IMMUNIZATION ADMIN EACH ADD: CPT | Mod: PBBFAC,PO

## 2017-06-14 PROCEDURE — 90471 IMMUNIZATION ADMIN: CPT | Mod: PBBFAC,PO

## 2017-06-14 PROCEDURE — 90715 TDAP VACCINE 7 YRS/> IM: CPT | Mod: PBBFAC,PO

## 2017-06-14 PROCEDURE — 99999 PR PBB SHADOW E&M-EST. PATIENT-LVL III: CPT | Mod: PBBFAC,,, | Performed by: FAMILY MEDICINE

## 2017-06-14 PROCEDURE — 99214 OFFICE O/P EST MOD 30 MIN: CPT | Mod: S$PBB,,, | Performed by: FAMILY MEDICINE

## 2017-06-14 RX ORDER — VARENICLINE TARTRATE 0.5 MG/1
0.5 TABLET, FILM COATED ORAL 2 TIMES DAILY
Qty: 180 TABLET | Refills: 0 | Status: SHIPPED | OUTPATIENT
Start: 2017-06-14 | End: 2017-12-04

## 2017-06-14 RX ORDER — VARENICLINE TARTRATE 0.5 MG/1
0.5 TABLET, FILM COATED ORAL 2 TIMES DAILY
Qty: 60 TABLET | Refills: 2 | Status: SHIPPED | OUTPATIENT
Start: 2017-06-14 | End: 2017-06-14 | Stop reason: SDUPTHER

## 2017-06-14 RX ORDER — METHYLPREDNISOLONE 4 MG/1
TABLET ORAL
Qty: 1 PACKAGE | Refills: 0 | Status: SHIPPED | OUTPATIENT
Start: 2017-06-14 | End: 2017-09-08

## 2017-06-14 NOTE — PROGRESS NOTES
Subjective:       Patient ID: Jaye Martinez is a 36 y.o. female.    Chief Complaint: Neck Pain; Nicotine Dependence (patient wants to quit ); and Tetnus Shot Needed (PNEU )    HPI     Sees pain management dr, Dr. Grayson Restrepo, for chronic neck and low back pain.  Receiving norco 10 and parafon from Dr. Restrepo.    C/o left sided neck stiffness x 5 days.      Reports that chantix 1 mg causes nausea but the 0.5 mg does not. Requesting the 1 mg dosage.        Review of Systems      Review of Systems   Constitutional: Negative for fever and chills.   HENT: Negative for hearing loss and neck stiffness.    Eyes: Negative for redness and itching.   Respiratory: Negative for cough and choking.    Cardiovascular: Negative for chest pain and leg swelling.  Abdomen: Negative for abdominal pain and blood in stool.   Genitourinary: Negative for dysuria and flank pain.   Neurological: Negative for light-headedness and headaches.   Hematological: Negative for adenopathy.   Psychiatric/Behavioral: Negative for behavioral problems.     Objective:      Physical Exam   Constitutional: She appears well-developed.   HENT:   Head: Normocephalic and atraumatic.   Eyes: Conjunctivae are normal. Pupils are equal, round, and reactive to light.   Neck: Normal range of motion.   Cardiovascular: Normal rate and regular rhythm.    No murmur heard.  Pulmonary/Chest: Effort normal and breath sounds normal. She has no wheezes.   Musculoskeletal:   Cervical spine: tend of left paravert area.  Dec rom with flex/ext.     Lymphadenopathy:     She has no cervical adenopathy.       Assessment:       1. Neck stiffness    2. Chronic neck pain    3. Tobacco use disorder    4. Chronic low back pain without sciatica, unspecified back pain laterality        Plan:       Neck stiffness  -     Ambulatory Referral to Physical/Occupational Therapy    Chronic neck pain    Tobacco use disorder    Chronic low back pain without sciatica, unspecified back  pain laterality    Other orders  -     methylPREDNISolone (MEDROL DOSEPACK) 4 mg tablet; use as directed  Dispense: 1 Package; Refill: 0  -     varenicline (CHANTIX) 0.5 MG Tab; Take 1 tablet (0.5 mg total) by mouth 2 (two) times daily.  Dispense: 180 tablet; Refill: 0  -     Tdap Vaccine  -     Pneumococcal Polysaccharide Vaccine (23 Valent) (SQ/IM)      Plan:  Start medrol dosepack  Start chantix  Vaccines today  Cont all other meds

## 2017-07-07 ENCOUNTER — PATIENT MESSAGE (OUTPATIENT)
Dept: FAMILY MEDICINE | Facility: CLINIC | Age: 37
End: 2017-07-07

## 2017-07-20 ENCOUNTER — PATIENT MESSAGE (OUTPATIENT)
Dept: FAMILY MEDICINE | Facility: CLINIC | Age: 37
End: 2017-07-20

## 2017-07-20 NOTE — TELEPHONE ENCOUNTER
Meds last filled December 2013 Written by Dr Dave Yeager for Plaquenil 200mg by mouth twice daily #60 at [facility] [place]

## 2017-07-21 RX ORDER — HYDROXYCHLOROQUINE SULFATE 200 MG/1
200 TABLET, FILM COATED ORAL 2 TIMES DAILY
Qty: 60 TABLET | Refills: 2 | Status: SHIPPED | OUTPATIENT
Start: 2017-07-21 | End: 2019-10-24 | Stop reason: SDUPTHER

## 2017-07-29 ENCOUNTER — PATIENT MESSAGE (OUTPATIENT)
Dept: FAMILY MEDICINE | Facility: CLINIC | Age: 37
End: 2017-07-29

## 2017-09-06 ENCOUNTER — PATIENT MESSAGE (OUTPATIENT)
Dept: FAMILY MEDICINE | Facility: CLINIC | Age: 37
End: 2017-09-06

## 2017-09-08 ENCOUNTER — HOSPITAL ENCOUNTER (OUTPATIENT)
Dept: RADIOLOGY | Facility: HOSPITAL | Age: 37
Discharge: HOME | End: 2017-09-08
Attending: FAMILY MEDICINE
Payer: OTHER GOVERNMENT

## 2017-09-08 ENCOUNTER — TELEPHONE (OUTPATIENT)
Dept: FAMILY MEDICINE | Facility: CLINIC | Age: 37
End: 2017-09-08

## 2017-09-08 ENCOUNTER — OFFICE VISIT (OUTPATIENT)
Dept: FAMILY MEDICINE | Facility: CLINIC | Age: 37
End: 2017-09-08
Payer: OTHER GOVERNMENT

## 2017-09-08 VITALS
WEIGHT: 101.88 LBS | HEART RATE: 78 BPM | OXYGEN SATURATION: 97 % | BODY MASS INDEX: 18.75 KG/M2 | HEIGHT: 62 IN | SYSTOLIC BLOOD PRESSURE: 96 MMHG | DIASTOLIC BLOOD PRESSURE: 62 MMHG | TEMPERATURE: 98 F

## 2017-09-08 DIAGNOSIS — J44.1 COPD EXACERBATION: Primary | ICD-10-CM

## 2017-09-08 DIAGNOSIS — J40 BRONCHITIS: ICD-10-CM

## 2017-09-08 DIAGNOSIS — G43.709 CHRONIC MIGRAINE WITHOUT AURA WITHOUT STATUS MIGRAINOSUS, NOT INTRACTABLE: ICD-10-CM

## 2017-09-08 DIAGNOSIS — M54.12 CERVICAL RADICULOPATHY: ICD-10-CM

## 2017-09-08 DIAGNOSIS — F17.200 SMOKER: ICD-10-CM

## 2017-09-08 PROCEDURE — 71020 XR CHEST PA AND LATERAL: CPT | Mod: TC,PO

## 2017-09-08 PROCEDURE — 99999 PR PBB SHADOW E&M-EST. PATIENT-LVL III: CPT | Mod: PBBFAC,,, | Performed by: FAMILY MEDICINE

## 2017-09-08 PROCEDURE — 71020 XR CHEST PA AND LATERAL: CPT | Mod: 26,,, | Performed by: RADIOLOGY

## 2017-09-08 PROCEDURE — 99213 OFFICE O/P EST LOW 20 MIN: CPT | Mod: PBBFAC,25,PO | Performed by: FAMILY MEDICINE

## 2017-09-08 PROCEDURE — 99214 OFFICE O/P EST MOD 30 MIN: CPT | Mod: S$PBB,,, | Performed by: FAMILY MEDICINE

## 2017-09-08 PROCEDURE — 3008F BODY MASS INDEX DOCD: CPT | Mod: ,,, | Performed by: FAMILY MEDICINE

## 2017-09-08 RX ORDER — AZITHROMYCIN 250 MG/1
TABLET, FILM COATED ORAL
Qty: 6 TABLET | Refills: 0 | Status: SHIPPED | OUTPATIENT
Start: 2017-09-08 | End: 2017-12-04 | Stop reason: ALTCHOICE

## 2017-09-08 RX ORDER — PREDNISONE 10 MG/1
TABLET ORAL
Qty: 12 TABLET | Refills: 0 | Status: SHIPPED | OUTPATIENT
Start: 2017-09-08 | End: 2017-12-04 | Stop reason: ALTCHOICE

## 2017-09-08 RX ORDER — AMOXICILLIN 500 MG/1
500 CAPSULE ORAL 2 TIMES DAILY
COMMUNITY
End: 2017-12-04 | Stop reason: ALTCHOICE

## 2017-09-08 NOTE — PROGRESS NOTES
Subjective:       Patient ID: Jaye Martinez is a 37 y.o. female.    Chief Complaint: Cough    HPI     Went to [facility] ER about 8 days ago for bronchitis and uti.  Started on amoxil approx 5-6 days ago.  Still coughing up green phlegm. Mild wheezing. No fever. Smoker.    Seeing Dr. Grayson Montiel, neurologist, for management of right cervical radiculopathy and chronic migraines.  Pt requesting that I order the mri of c spine and brain since Dr. Montiel is out of network.         Review of Systems      Review of Systems   Constitutional: Negative for fever and chills.   HENT: Negative for hearing loss and neck stiffness.    Eyes: Negative for redness and itching.   Respiratory: Negative for choking.    Cardiovascular: Negative for chest pain and leg swelling.  Abdomen: Negative for abdominal pain and blood in stool.   Genitourinary: Negative for dysuria and flank pain.   Musculoskeletal: Negative for back pain and gait problem.   Neurological: Negative for light-headedness and headaches.   Hematological: Negative for adenopathy.   Psychiatric/Behavioral: Negative for behavioral problems.     Objective:      Physical Exam   Constitutional: She appears well-developed.   HENT:   Head: Normocephalic and atraumatic.   Eyes: Conjunctivae are normal. Pupils are equal, round, and reactive to light.   Neck: Normal range of motion.   Cardiovascular: Normal rate and regular rhythm.    No murmur heard.  Pulmonary/Chest: Effort normal. She has wheezes.   Lymphadenopathy:     She has no cervical adenopathy.       Assessment:       1. COPD exacerbation    2. Smoker    3. Chronic migraine without aura without status migrainosus, not intractable    4. Cervical radiculopathy        Plan:       COPD exacerbation  -     X-Ray Chest PA And Lateral; Future; Expected date: 09/08/2017    Smoker    Chronic migraine without aura without status migrainosus, not intractable  -     MRI Brain Without Contrast; Future; Expected date:  09/08/2017    Cervical radiculopathy  -     MRI Cervical Spine Without Contrast; Future; Expected date: 09/08/2017    Other orders  -     azithromycin (ZITHROMAX Z-CHRISTOS) 250 MG tablet; Take 2 tabs daily for first day, then 1 tab daily for next 4 days.  Dispense: 6 tablet; Refill: 0  -     predniSONE (DELTASONE) 10 MG tablet; Take 3 tabs daily for 2 days thenTake 2 tabs daily for 2 days then Take 1 tab daily for 2 days then stop  Dispense: 12 tablet; Refill: 0      Plan:  Cont amoxil. Start zpak and prednisone taper  See orders.       Medication List with Changes/Refills   New Medications    AZITHROMYCIN (ZITHROMAX Z-CHRISTOS) 250 MG TABLET    Take 2 tabs daily for first day, then 1 tab daily for next 4 days.    PREDNISONE (DELTASONE) 10 MG TABLET    Take 3 tabs daily for 2 days thenTake 2 tabs daily for 2 days then Take 1 tab daily for 2 days then stop   Current Medications    ALBUTEROL (PROAIR HFA) 90 MCG/ACTUATION INHALER    Inhale 2 puffs into the lungs every 4 (four) hours as needed for Wheezing.    AMOXICILLIN (AMOXIL) 500 MG CAPSULE    Take 500 mg by mouth 2 (two) times daily.    BUTALBITAL-ACETAMINOPHEN-CAFFEINE -40 MG (FIORICET, ESGIC) -40 MG PER TABLET    Take 1 tablet by mouth 2 (two) times daily as needed.    CHLORZOXAZONE (PARAFON FORTE DSC) 500 MG TAB    Take 500 mg by mouth daily as needed.    HYDROCODONE-ACETAMINOPHEN 10-325MG (NORCO)  MG TAB    Take 1 tablet by mouth every 6 (six) hours as needed.    HYDROXYCHLOROQUINE (PLAQUENIL) 200 MG TABLET    Take 1 tablet (200 mg total) by mouth 2 (two) times daily.    RELPAX 20 MG TABLET        VALACYCLOVIR (VALTREX) 1000 MG TABLET    Take 1,000 mg by mouth every 12 (twelve) hours as needed.     VARENICLINE (CHANTIX) 0.5 MG TAB    Take 1 tablet (0.5 mg total) by mouth 2 (two) times daily.   Discontinued Medications    METHYLPREDNISOLONE (MEDROL DOSEPACK) 4 MG TABLET    use as directed    ZONISAMIDE (ZONEGRAN) 100 MG CAP    100 mg once daily.

## 2017-09-08 NOTE — TELEPHONE ENCOUNTER
inform pt via phone that I reviewed the test results and note the following:    Your chest xray was negative for pneumonia.     Regardless, complete course of medications.

## 2017-09-13 NOTE — TELEPHONE ENCOUNTER
Patient verbally understood results- finished her RX  - stated she still has ear pressure & throat congestion

## 2017-09-16 ENCOUNTER — HOSPITAL ENCOUNTER (OUTPATIENT)
Dept: RADIOLOGY | Facility: HOSPITAL | Age: 37
Discharge: HOME | End: 2017-09-16
Attending: FAMILY MEDICINE
Payer: OTHER GOVERNMENT

## 2017-09-16 DIAGNOSIS — G43.709 CHRONIC MIGRAINE WITHOUT AURA WITHOUT STATUS MIGRAINOSUS, NOT INTRACTABLE: ICD-10-CM

## 2017-09-16 DIAGNOSIS — M54.12 CERVICAL RADICULOPATHY: ICD-10-CM

## 2017-09-16 PROCEDURE — 70551 MRI BRAIN STEM W/O DYE: CPT | Mod: TC,PO

## 2017-09-16 PROCEDURE — 72141 MRI NECK SPINE W/O DYE: CPT | Mod: TC,PO

## 2017-09-16 PROCEDURE — 72141 MRI NECK SPINE W/O DYE: CPT | Mod: 26,,, | Performed by: RADIOLOGY

## 2017-09-16 PROCEDURE — 70551 MRI BRAIN STEM W/O DYE: CPT | Mod: 26,,, | Performed by: RADIOLOGY

## 2017-09-18 ENCOUNTER — PATIENT MESSAGE (OUTPATIENT)
Dept: FAMILY MEDICINE | Facility: CLINIC | Age: 37
End: 2017-09-18

## 2017-09-18 DIAGNOSIS — M54.12 CERVICAL RADICULOPATHY: Primary | ICD-10-CM

## 2017-09-18 NOTE — TELEPHONE ENCOUNTER
I was unable to get an answer from the insurance company after speaking to several depts.  Per pre-service clearance:  Hi, I guess you guys would need to re-order the exams and put in another referral and scheduled the patient. Once in we can initiate an authorization and see what the insurance says

## 2017-09-18 NOTE — TELEPHONE ENCOUNTER
Her mri of c spine image quality was degraded by patient motion on all imaging sequences.    Please call pt's insurance.  Find out if we can redo this mri and furthermore get it done at main campus with sedation.

## 2017-11-19 ENCOUNTER — PATIENT MESSAGE (OUTPATIENT)
Dept: FAMILY MEDICINE | Facility: CLINIC | Age: 37
End: 2017-11-19

## 2017-11-19 DIAGNOSIS — H92.09 OTALGIA, UNSPECIFIED LATERALITY: Primary | ICD-10-CM

## 2017-12-04 ENCOUNTER — OFFICE VISIT (OUTPATIENT)
Dept: OTOLARYNGOLOGY | Facility: CLINIC | Age: 37
End: 2017-12-04
Payer: OTHER GOVERNMENT

## 2017-12-04 ENCOUNTER — CLINICAL SUPPORT (OUTPATIENT)
Dept: AUDIOLOGY | Facility: CLINIC | Age: 37
End: 2017-12-04
Payer: OTHER GOVERNMENT

## 2017-12-04 VITALS
HEIGHT: 62 IN | HEART RATE: 89 BPM | DIASTOLIC BLOOD PRESSURE: 67 MMHG | BODY MASS INDEX: 18.3 KG/M2 | SYSTOLIC BLOOD PRESSURE: 101 MMHG | WEIGHT: 99.44 LBS

## 2017-12-04 DIAGNOSIS — M26.659 TMJ ARTHROPATHY: ICD-10-CM

## 2017-12-04 DIAGNOSIS — J34.89 LESION OF NASAL SEPTUM: ICD-10-CM

## 2017-12-04 DIAGNOSIS — H73.813 ATROPHIC FLACCID TYMPANIC MEMBRANE OF BOTH EARS: Primary | ICD-10-CM

## 2017-12-04 DIAGNOSIS — H92.03 OTALGIA, BILATERAL: Primary | ICD-10-CM

## 2017-12-04 DIAGNOSIS — K21.9 LPRD (LARYNGOPHARYNGEAL REFLUX DISEASE): ICD-10-CM

## 2017-12-04 DIAGNOSIS — H92.03 REFERRED OTALGIA, BILATERAL: ICD-10-CM

## 2017-12-04 DIAGNOSIS — Z72.0 TOBACCO ABUSE: ICD-10-CM

## 2017-12-04 PROCEDURE — 31575 DIAGNOSTIC LARYNGOSCOPY: CPT | Mod: PBBFAC,PO | Performed by: NURSE PRACTITIONER

## 2017-12-04 PROCEDURE — 92567 TYMPANOMETRY: CPT | Mod: PBBFAC,PO | Performed by: AUDIOLOGIST

## 2017-12-04 PROCEDURE — 31575 DIAGNOSTIC LARYNGOSCOPY: CPT | Mod: S$PBB,,, | Performed by: NURSE PRACTITIONER

## 2017-12-04 PROCEDURE — 99204 OFFICE O/P NEW MOD 45 MIN: CPT | Mod: 25,S$PBB,, | Performed by: NURSE PRACTITIONER

## 2017-12-04 PROCEDURE — 99211 OFF/OP EST MAY X REQ PHY/QHP: CPT | Mod: PBBFAC,27,PO

## 2017-12-04 PROCEDURE — 99999 PR PBB SHADOW E&M-EST. PATIENT-LVL I: CPT | Mod: PBBFAC,,,

## 2017-12-04 PROCEDURE — 99999 PR PBB SHADOW E&M-EST. PATIENT-LVL V: CPT | Mod: PBBFAC,,, | Performed by: NURSE PRACTITIONER

## 2017-12-04 PROCEDURE — 99215 OFFICE O/P EST HI 40 MIN: CPT | Mod: PBBFAC,PO,25 | Performed by: NURSE PRACTITIONER

## 2017-12-04 NOTE — LETTER
December 4, 2017      Nakul Garcia MD  [address] [place] [address]           [place] [facility]  [address] [place] [address]  Phone: [phone]  Fax: [phone]          Patient: Jaye Martinez   MR Number: [id number]   YOB: 1980   Date of Visit: 12/4/2017       Dear Dr. Nakul Garcia:    Thank you for referring Jaye Martinez to me for evaluation. Attached you will find relevant portions of my assessment and plan of care.    If you have questions, please do not hesitate to call me. I look forward to following Jaye Martinez along with you.    Sincerely,    Gale Varela, JOSEPH    Enclosure  CC:  No Recipients    If you would like to receive this communication electronically, please contact [email or web address] or [phone] to request more information on [facility] [employer] access.    For providers and/or their staff who would like to refer a patient to [facility], please contact us through our one-stop-shop provider referral line, [facility], at [phone].    If you feel you have received this communication in error or would no longer like to receive these types of communications, please e-mail [email or web address]

## 2017-12-04 NOTE — PATIENT INSTRUCTIONS
TMJ SYNDROME     This is a condition with chronic or recurrent pain in the joint of the jaw (in front of the ear). The pain may cause limited motion of the jaw, a locking or catching sensation, clicking, popping or grinding sounds from the joint with movement. It may also lead to headache, earache or neck pain. It is sometimes caused by inflammation in the joint, injury or wear-and-tear of the cartilage in the joint, involuntary grinding of the teeth or poorly fitting dentures. Emotional stress and tension are often a factor. Most cases resolve completely within a few months with proper treatment.     HOME CARE:   1) Rest the jaw by avoiding crunchy or hard foods to chew. Do not eat hard or sticky candies. Soft foods and liquids are easier on the jaw. Protect your jaw while yawning.   2) Hot packs (small towel soaked in hot water) applied to the jaw may give relief by reducing muscle spasm. You may use a heating pad or a towel soaked in hot water. Some people get relief with cold packs, so try both and see which one works best for you.   3) You may use acetaminophen (Tylenol) or ibuprofen (Motrin, Advil) to control pain, unless another medicine was prescribed. [ NOTE : If you have chronic liver or kidney disease or ever had a stomach ulcer or GI bleeding, talk with your doctor before using these medicines.]   4) If you suspect emotional stress is related to your condition.   a) Try to identify the sources of stress in your life. It may not be obvious! These may include:   -- Daily hassles of life that pile up (traffic jams, missed appointments, car troubles)   -- Major life changes, both good (new baby, job promotion) and bad (loss of job, loss of loved one)   -- Overload: feeling that you have too many responsibilities and can't take care of everything at once   -- Helplessness: feeling like your problems are more than you can solve   b) When possible, do something about the source of your stress: avoid hassles,  "limit the amount of change that is happening in your life at one time and take a break when you feel overloaded.   c) Unfortunately, many stressful situations cannot be avoided. Therefore, it is necessary to learn HOW TO MANAGE STRESS better. There are many proven methods that work and will reduce your anxiety. These include simple things like exercise, good nutrition and adequate rest. Also, there are certain techniques that are helpful: relaxation and breathing exercises, visualization, biofeedback, meditation or simply taking some time-out to clear your mind. For more information about this, consult your doctor or go to a local bookstore and review the many books and tapes available on this subject.     FOLLOW-UP as directed with a dentist or oral surgeon. Further testing and additional treatment may be required. If you grind your teeth at night, a custom-made "bite guard" may help you. If stress is an important factor and does not respond to the above simple measures, talk to your doctor about a referral for stress management.     GET PROMPT MEDICAL ATTENTION if any of the following occur:   -- Your face becomes swollen or red   -- Pain worsens   -- 100.0°F (37.8°C)  -- Increasing neck, mouth, tooth or throat pain    Call us if your ear pain continues, and we will obtain CT scan or MRI.       Kicking the Smoking Habit  If you smoke, quitting is one of the best changes you can make for your heart and your overall health. Your risk of heart attack goes down within one day of putting out that last cigarette. As you go longer without smoking, your risk goes down even more. Quitting isnt easy, but millions of people have done it. You can, too. Its never too late to quit.  Getting started  Boost your chances of success by deciding on your quit plan. Your health care provider and cardiac rehab team can help you develop this plan. Even if youve already quit, its easy to slip back into smoking.  Your plan can help " you avoid and recover from relapse.  In any case, start by setting a date to quit within a month, and do it.    Keys to your quit plan  · Talk to your healthcare provider about prescription medicines and nicotine replacement products that help stop the urge to smoke.   · Join a support group or quit smoking program. Talking with others about the challenges of quitting can help you get through them.  · Ask other smokers in your household to quit with you.  · Look for the cues in your life that you associate with smoking and avoid them.  Track your triggers  What gives you that T-wihz-e-cigarette feeling? List all the situations that make you want a cigarette. Then think of other ways to deal with these situations. Here are some examples:  Situation How I'll handle it   Finishing a meal Get up from the table and take a walk   Having an argument Find a quiet place and breathe deeply   Feeling lonely or bored Call a friend to talk         Tips for quitting successfully  · List the benefits of quitting such as reducing heart risks and saving money. Keep this list and review it whenever you feel like smoking.  · Get support. Let your friends know you may call them to chat when you have an urge to smoke.  · If youve tried to quit before without success, this time avoid the triggers that may cause the relapse.  · Make the most of slip-ups. Try to learn from them, and then get back on track.  · Be accountable to your friends and your calendar so that you stay on track.  For family and friends  · Be supportive and patient. Quitting smoking can be difficult and stressful.  · If you smoke, nows a great time to quit. Even if you dont quit, never smoke around your loved one. Secondhand smoke is dangerous to his or her heart.  · The best goals are accomplished in teams. Remember that when your loved one states he or she wants to stop smoking.  Date Last Reviewed: 7/1/2016  © 3466-0653 [employer]. [address] [place] [address]  "[place] [address]. All rights reserved. This information is not intended as a substitute for professional medical care. Always follow your healthcare professional's instructions.        How Acid Reflux Affects Your Throat    Do you have to clear your throat or cough often? Are you hoarse? Do you have trouble swallowing? If you have these or other throat symptoms, you may have acid reflux. This occurs when stomach acid flows back up and irritates your throat.  Why you have throat symptoms  There are muscles (esophageal sphincters) at both ends of the tube that carries food to your stomach (the esophagus). These muscles relax to let food pass. Then they tighten to keep stomach acid down. When the lower esophageal sphincter (LES) doesnt tighten enough, acid can flow back (reflux) from your stomach into your esophagus. This may cause heartburn. In some cases the upper esophageal sphincter (UES) also doesnt work well. Then acid can travel higher and enter your throat (pharynx). In many cases, this causes throat symptoms.  Common throat symptoms  · Need to clear your throat often  · Feeling like youre choking  · Long-term (chronic) cough  · Hoarseness  · Trouble swallowing  · Feel like you have a lump in your throat  · Sour or acid taste  · Sore throat that keeps coming back     LARYNGOPHARYNGEAL REFLUX  (SILENT OR ATYPICAL REFLUX)    If you have any of the following symptoms you may have laryngopharyngeal reflux (LPR):  hoarseness, thick or too much mucus, chronic throat pain/irritation, chronic throat clearing, chronic cough, especially cough that wake you up from sleep, chronic "postnasal drip" without the need to blow your nose.     Many people with LPR do not have symptoms of heartburn. Compared to the esophagus, the voice box and the back of the throat are significantly more sensitive to the effects of acid on surrounding tissue. Acid passing quickly through the esophagus does not have a chance to " "irritate the area for too long.  However acid that pools in the throat or voice box can cause prolonged irritation resulting in the symptoms of LPR.    The symptoms of LPR can consist of a dry cough and the sensation of something being stuck in the throat.  Some people will complain of heartburn while others may have intermittent hoarseness or loss of voice.  Another major symptom of LPR is "postnasal drip."  Patients are often told symptoms are due to abnormal nasal drainage or sinus infection; however this is rarely the cause of chronic throat irritation. For post nasal drip to cause the complaints described, signs and symptoms of an active nasal infection should be present.     Treatments for LPR include:  postural changes, weight reduction, diet modification, medication to reduce stomach acid and promote normal motility, and surgery to prevent reflux. Most patients will begin to notice some relief in her symptoms about 2 weeks after starting the medication; however it is generally recommended the medication should be continued for 2 months. If the symptoms completely resolve, the medication can then be tapered.  Some people will remain symptom free while others may have relapses which required treatment again.    Things you can do to prevent reflux include:  Do not smoke.  Smoking will cause reflux.  Avoid tight fitting clothes or belts around the waist.  Avoid eating at least 2 hours prior to bedtime.  In fact avoid eating your largest meal at night.  Weight loss.  For patient's with recent weight gain, shedding a few pounds is all that is required to improve reflux.  Avoid caffeine, cola beverages, citrus beverages, mints, alcoholic beverages, particularly at night, cheese, fried foods, spicy foods, eggs, and chocolate.  Sleep with the head of bed elevated at least 6 inches.    Recommendations:    Take Nexium / Prilosec (PPI) every morning on an empty stomach (30-60 minutes before eating) 40 MG.   At bedtime " take Zantac (H2-blocker) 150-300 mg.    After 4-8 weeks, with significant symptomatic improvement, you may begin weaning your reflux medications down:  Nexium / Prilosec 40 mg --> to 20 mg (over-the-counter strength)  Zantac 300 mg --> to 150 mg (over-the-counter stength)  See a Gastro doctor (GI) for refractory symptoms and continued management.

## 2017-12-04 NOTE — PROGRESS NOTES
"Subjective:       Patient ID: Jaye Martinez is a 37 y.o. female.    Chief Complaint: No chief complaint on file.    HPI   Patient is referred by Dr. Garcia for consultation for otalgia. Patient states otalgia AU, intermittent X 2 months, AD>AS. It seemed to start during "viral illness" 2 months ago treated with prednisone and azithromycin. Otalgia improved during prednisone and azithromycin, but then returned after those medications were completed. She was then treated for an upper respiratory tract infection two weeks ago with Augmentin and prednisone. While URTI symptoms improved, right otalgia remained. She describes otalgia as deep in ear, not external. She reports h/o 5 sets of tubes in childhood. She has been a smoker X 20 years. Tried Chantix unsuccessfully. Willing to quit if she can find a treatment she can tolerate.   For the past two weeks, she has experienced epistaxis from her right nares every time she blows.     Review of Systems   Constitutional: Negative.    HENT: Positive for ear pain and nosebleeds (right x 2 weeks).    Eyes: Negative.    Respiratory: Negative.    Cardiovascular: Negative.    Gastrointestinal: Negative.    Musculoskeletal: Negative.    Skin: Negative.    Neurological: Negative.    Hematological: Negative.    Psychiatric/Behavioral: Negative.        Objective:      Physical Exam   Constitutional: She is oriented to person, place, and time. Vital signs are normal. She appears well-developed and well-nourished. She is cooperative. She does not appear ill. No distress.   HENT:   Head: Normocephalic and atraumatic.   Right Ear: Hearing, tympanic membrane, external ear and ear canal normal. Tympanic membrane is not erythematous. No middle ear effusion.   Left Ear: Hearing, tympanic membrane, external ear and ear canal normal. Tympanic membrane is not erythematous.  No middle ear effusion.   Nose: Nose normal. No mucosal edema or rhinorrhea. Right sinus exhibits no maxillary " "sinus tenderness and no frontal sinus tenderness. Left sinus exhibits no maxillary sinus tenderness and no frontal sinus tenderness.   Mouth/Throat: Uvula is midline, oropharynx is clear and moist and mucous membranes are normal. Mucous membranes are not pale, not dry and not cyanotic. No oral lesions. No oropharyngeal exudate, posterior oropharyngeal edema or posterior oropharyngeal erythema.   Type "Ad" tympanogram consistent with well-pneumatized mesotympanum and absence of middle ear effusions AU.  Pain and crepitus noted bilaterally during palpation within auditory meatus while patient opened/closed jaw.   Eyes: Conjunctivae, EOM and lids are normal. Pupils are equal, round, and reactive to light. Right eye exhibits no discharge. Left eye exhibits no discharge. No scleral icterus.   Neck: Trachea normal and normal range of motion. Neck supple. No tracheal deviation present. No thyroid mass and no thyromegaly present.   Cardiovascular: Normal rate.    Pulmonary/Chest: Effort normal. No stridor. No respiratory distress. She has no wheezes.   Musculoskeletal: Normal range of motion.   Lymphadenopathy:        Head (right side): No submental, no submandibular, no tonsillar, no preauricular and no posterior auricular adenopathy present.        Head (left side): No submental, no submandibular, no tonsillar, no preauricular and no posterior auricular adenopathy present.     She has no cervical adenopathy.        Right cervical: No superficial cervical and no posterior cervical adenopathy present.       Left cervical: No superficial cervical and no posterior cervical adenopathy present.   Neurological: She is alert and oriented to person, place, and time. She has normal strength. Coordination and gait normal.   Skin: Skin is warm, dry and intact. No lesion and no rash noted. She is not diaphoretic. No cyanosis. No pallor.   Psychiatric: She has a normal mood and affect. Her speech is normal and behavior is normal. " Judgment and thought content normal. Cognition and memory are normal.   Nursing note and vitals reviewed.      Procedure: Flexible laryngoscopy    In order to fully examine the upper aerodigestive tract, including the larynx, in a patient with a hyperactive gag reflex, and suboptimal visualization with indirect mirror exam,  flexible endoscopy is required.   After explaining the procedure and obtaining verbal consent, a timeout was performed with the patient's participation according to the universal protocol. Both nasal cavities were anesthetized with 4% Xylocaine spray mixed with Ezekiel-Synephrine. The flexible laryngoscope  was inserted into the nasal cavity and advanced to visualize the nasal cavity, nasopharynx, the posterior oropharynx, hypopharynx, and the endolarynx with the  findings noted. The scope was removed and the procedure terminated. The patient tolerated this procedure well without apparent complication.     OVERALL FINDINGS  Nasopharynx - the torus is clear. There are no lesions of the posterior wall.   Oropharynx - no lesions of the tongue base. There is no obvious fullness or asymmetry.  Hypopharynx - there are no lesions of the pyriform sinuses or postcricoid region   Larynx - there are no lesions of the supraglottic or glottic larynx.  Vocal fold mobility is normal.     SPECIFIC FINDINGS  Adenoid tissue - normal   Nasopharynx & eustachian tube orifices - normal   Posterior pharyngeal wall - normal   Base of tongue - normal   Epiglottis - normal   Valleculae - normal   Pyriform sinuses - normal   False vocal cords - normal   True vocal cords - normal  Arytenoids - normal   Interarytenoid space - erythema, edema   Right anterior superior nasal papilloma    Right ET orifice and torus tubarius    Left ET orifice & torus tubarius    Right Base of Tongue    Left Base of Tongue    Larynx (marked pachyderma posteriorly/reflux changes)    Larynx (phonation)    Assessment:     Right septal papilloma,  anterior-superior    Tobacco abuse  Otalgia AD, referred, not otogenic, most likely musculoskeletal etiology  LPRD/GERD  Right intranasal lesion, hemorrhagic, biopsy needed  Plan:     No unilateral STACY or abnormal middle ear pressure (flaccid TMs due to h/o 5 sets of tubes in childhood). Otalgia not likely otogenic but rather referred for most likely musculoskeletal etiology. TMJ handouts given and discussed. See dentist.     However if otalgia persists, will obtain CT temporal bone w/o contrast. Patient encouarged to call/return.   Patient education handouts were given and the patient was counseled on the dangers of tobacco use, strategies for maximize success, including gaining support from family and friends, medications, etc for 3-5 minutes.  Laryngoscope photos given and reviewed in detail with patient. Handouts given on LPRD and GERD, and discussed at length with patient. Recommend omeprazole QAM on an empty stomach, ranitidine QHS, and follow up with GI for refractory symptoms and continued management.   Schedule excision of intranasal lesion per Dr. Connors in outpatient surgery.

## 2017-12-21 ENCOUNTER — TELEPHONE (OUTPATIENT)
Dept: SURGERY | Facility: HOSPITAL | Age: 37
End: 2017-12-21

## 2017-12-21 ENCOUNTER — OFFICE VISIT (OUTPATIENT)
Dept: OTOLARYNGOLOGY | Facility: CLINIC | Age: 37
End: 2017-12-21
Payer: OTHER GOVERNMENT

## 2017-12-21 VITALS — TEMPERATURE: 99 F | HEIGHT: 62 IN | WEIGHT: 96.88 LBS | BODY MASS INDEX: 17.83 KG/M2

## 2017-12-21 DIAGNOSIS — J34.89 LESION OF NASAL SEPTUM: Primary | ICD-10-CM

## 2017-12-21 PROCEDURE — 99213 OFFICE O/P EST LOW 20 MIN: CPT | Mod: PBBFAC,PO | Performed by: OTOLARYNGOLOGY

## 2017-12-21 PROCEDURE — 99999 PR PBB SHADOW E&M-EST. PATIENT-LVL III: CPT | Mod: PBBFAC,,, | Performed by: OTOLARYNGOLOGY

## 2017-12-21 PROCEDURE — 99213 OFFICE O/P EST LOW 20 MIN: CPT | Mod: S$PBB,,, | Performed by: OTOLARYNGOLOGY

## 2017-12-21 NOTE — TELEPHONE ENCOUNTER
Please check with Dr. Connors about Jaye Martinez scheduled tomorrow She is on antibiotics and had abcess in mouth lanced and is not feeling well. Let me know how to proceed. Milly [id number]

## 2017-12-22 NOTE — PROGRESS NOTES
Subjective:       Patient ID: Jaye Martinez is a 37 y.o. female.    Chief Complaint: Other (check lesion in mouth)    Jaye is here for follow-up of Nasal lesion, right sided papilloma. She was scheduled for removal. Has developed an abscess in the roof of her mouth. She has ah history of this. It has since drained but she feels some persistent fluid and pain in the area. She uses tobacco.    She has a history of warts in the past.     Review of Systems   Constitutional: Negative for activity change and appetite change.   Respiratory: Negative for difficulty breathing and wheezing   Cardiovascular: Negative for chest pain.      Objective:        Constitutional:   Vital signs are normal. She appears well-developed and well-nourished.     Nose:  No septal deviation or nasal septal hematoma.   Right sessile septal papilloma involving superior septum adjacent to junction of upper lateral cartilage, > 1 cm. Left side uninvolved.    Mouth/Throat  Abnormal dentition (poor). Dental caries present. No dental abscesses.   Resolving abscess of hard palate near incisive foramen. Boggy but no overlying erythema. Minimal pain to palp.          Tests / Results:  None    Assessment:       1. Lesion of nasal septum          Plan:         Based on location. It would be difficult to completely excise without the risk of scarring of internal nasal valve. I discussed the risk of recurrence regardless of technique given papilloma. Will need to biopsy as well to rule out malignancy although low suspicion. Will plan for laser excision in the OR .     Continue Augmentin for oral abscess. Clinically improving. Will return if not improving.

## 2017-12-26 NOTE — TELEPHONE ENCOUNTER
----- Message from Lencho Connors MD sent at 12/26/2017  9:03 AM CST -----  New case request is in.

## 2017-12-29 ENCOUNTER — TELEPHONE (OUTPATIENT)
Dept: OTOLARYNGOLOGY | Facility: CLINIC | Age: 37
End: 2017-12-29

## 2018-02-15 ENCOUNTER — ANESTHESIA EVENT (OUTPATIENT)
Dept: SURGERY | Facility: HOSPITAL | Age: 38
End: 2018-02-15
Payer: OTHER GOVERNMENT

## 2018-02-16 ENCOUNTER — HOSPITAL ENCOUNTER (OUTPATIENT)
Facility: HOSPITAL | Age: 38
Discharge: HOME | End: 2018-02-16
Attending: OTOLARYNGOLOGY | Admitting: ORTHOPAEDIC SURGERY
Payer: OTHER GOVERNMENT

## 2018-02-16 ENCOUNTER — SURGERY (OUTPATIENT)
Age: 38
End: 2018-02-16

## 2018-02-16 ENCOUNTER — ANESTHESIA (OUTPATIENT)
Dept: SURGERY | Facility: HOSPITAL | Age: 38
End: 2018-02-16
Payer: OTHER GOVERNMENT

## 2018-02-16 VITALS
TEMPERATURE: 97 F | HEART RATE: 59 BPM | SYSTOLIC BLOOD PRESSURE: 107 MMHG | WEIGHT: 102 LBS | BODY MASS INDEX: 18.77 KG/M2 | DIASTOLIC BLOOD PRESSURE: 67 MMHG | OXYGEN SATURATION: 99 % | HEIGHT: 62 IN | RESPIRATION RATE: 16 BRPM

## 2018-02-16 DIAGNOSIS — J34.89 NASAL LESION: ICD-10-CM

## 2018-02-16 DIAGNOSIS — J34.89 LESION OF NASAL SEPTUM: Primary | ICD-10-CM

## 2018-02-16 LAB
B-HCG UR QL: NEGATIVE
CTP QC/QA: YES

## 2018-02-16 PROCEDURE — 25000003 PHARM REV CODE 250: Mod: PO | Performed by: ANESTHESIOLOGY

## 2018-02-16 PROCEDURE — 27200697 HC TUBE, ENDO-LASER: Mod: PO | Performed by: NURSE ANESTHETIST, CERTIFIED REGISTERED

## 2018-02-16 PROCEDURE — 88305 TISSUE EXAM BY PATHOLOGIST: CPT | Performed by: PATHOLOGY

## 2018-02-16 PROCEDURE — 37000008 HC ANESTHESIA 1ST 15 MINUTES: Mod: PO | Performed by: OTOLARYNGOLOGY

## 2018-02-16 PROCEDURE — 63600175 PHARM REV CODE 636 W HCPCS: Mod: PO | Performed by: ANESTHESIOLOGY

## 2018-02-16 PROCEDURE — 25000003 PHARM REV CODE 250: Mod: PO | Performed by: OTOLARYNGOLOGY

## 2018-02-16 PROCEDURE — 00160 ANES PX NOSE&SINUS NOS: CPT | Mod: PO | Performed by: OTOLARYNGOLOGY

## 2018-02-16 PROCEDURE — 88305 TISSUE EXAM BY PATHOLOGIST: CPT | Mod: 26,,, | Performed by: PATHOLOGY

## 2018-02-16 PROCEDURE — 63600175 PHARM REV CODE 636 W HCPCS: Mod: PO | Performed by: OTOLARYNGOLOGY

## 2018-02-16 PROCEDURE — D9220A PRA ANESTHESIA: Mod: CRNA,,, | Performed by: NURSE ANESTHETIST, CERTIFIED REGISTERED

## 2018-02-16 PROCEDURE — 71000033 HC RECOVERY, INTIAL HOUR: Mod: PO | Performed by: OTOLARYNGOLOGY

## 2018-02-16 PROCEDURE — D9220A PRA ANESTHESIA: Mod: ANES,,, | Performed by: ANESTHESIOLOGY

## 2018-02-16 PROCEDURE — 63600175 PHARM REV CODE 636 W HCPCS: Mod: PO | Performed by: NURSE ANESTHETIST, CERTIFIED REGISTERED

## 2018-02-16 PROCEDURE — 37000009 HC ANESTHESIA EA ADD 15 MINS: Mod: PO | Performed by: OTOLARYNGOLOGY

## 2018-02-16 PROCEDURE — 36000707: Mod: PO | Performed by: OTOLARYNGOLOGY

## 2018-02-16 PROCEDURE — 25000003 PHARM REV CODE 250: Mod: PO | Performed by: NURSE ANESTHETIST, CERTIFIED REGISTERED

## 2018-02-16 PROCEDURE — 27201423 OPTIME MED/SURG SUP & DEVICES STERILE SUPPLY: Mod: PO | Performed by: OTOLARYNGOLOGY

## 2018-02-16 PROCEDURE — 36000706: Mod: PO | Performed by: OTOLARYNGOLOGY

## 2018-02-16 PROCEDURE — 30117 REMOVAL OF INTRANASAL LESION: CPT | Mod: ,,, | Performed by: OTOLARYNGOLOGY

## 2018-02-16 PROCEDURE — 81025 URINE PREGNANCY TEST: CPT | Mod: PO | Performed by: OTOLARYNGOLOGY

## 2018-02-16 RX ORDER — SODIUM CHLORIDE, SODIUM LACTATE, POTASSIUM CHLORIDE, CALCIUM CHLORIDE 600; 310; 30; 20 MG/100ML; MG/100ML; MG/100ML; MG/100ML
INJECTION, SOLUTION INTRAVENOUS CONTINUOUS
Status: DISCONTINUED | OUTPATIENT
Start: 2018-02-16 | End: 2018-02-16 | Stop reason: HOSPADM

## 2018-02-16 RX ORDER — NEOSTIGMINE METHYLSULFATE 1 MG/ML
INJECTION, SOLUTION INTRAVENOUS
Status: DISCONTINUED | OUTPATIENT
Start: 2018-02-16 | End: 2018-02-16

## 2018-02-16 RX ORDER — DEXAMETHASONE SODIUM PHOSPHATE 4 MG/ML
INJECTION, SOLUTION INTRA-ARTICULAR; INTRALESIONAL; INTRAMUSCULAR; INTRAVENOUS; SOFT TISSUE
Status: DISCONTINUED | OUTPATIENT
Start: 2018-02-16 | End: 2018-02-16

## 2018-02-16 RX ORDER — KETAMINE HYDROCHLORIDE 100 MG/ML
INJECTION, SOLUTION INTRAMUSCULAR; INTRAVENOUS
Status: DISCONTINUED | OUTPATIENT
Start: 2018-02-16 | End: 2018-02-16

## 2018-02-16 RX ORDER — PROPOFOL 10 MG/ML
VIAL (ML) INTRAVENOUS
Status: DISCONTINUED | OUTPATIENT
Start: 2018-02-16 | End: 2018-02-16

## 2018-02-16 RX ORDER — LIDOCAINE HCL/PF 100 MG/5ML
SYRINGE (ML) INTRAVENOUS
Status: DISCONTINUED | OUTPATIENT
Start: 2018-02-16 | End: 2018-02-16

## 2018-02-16 RX ORDER — MUPIROCIN 20 MG/G
OINTMENT TOPICAL
Status: DISCONTINUED | OUTPATIENT
Start: 2018-02-16 | End: 2018-02-16 | Stop reason: HOSPADM

## 2018-02-16 RX ORDER — OXYCODONE AND ACETAMINOPHEN 5; 325 MG/1; MG/1
1 TABLET ORAL EVERY 4 HOURS PRN
Qty: 5 TABLET | Refills: 0 | Status: SHIPPED | OUTPATIENT
Start: 2018-02-16 | End: 2018-03-08 | Stop reason: ALTCHOICE

## 2018-02-16 RX ORDER — OXYCODONE HYDROCHLORIDE 5 MG/1
10 TABLET ORAL ONCE AS NEEDED
Status: COMPLETED | OUTPATIENT
Start: 2018-02-16 | End: 2018-02-16

## 2018-02-16 RX ORDER — SODIUM CHLORIDE 0.9 G/100ML
IRRIGANT IRRIGATION
Status: DISCONTINUED | OUTPATIENT
Start: 2018-02-16 | End: 2018-02-16 | Stop reason: HOSPADM

## 2018-02-16 RX ORDER — LIDOCAINE HYDROCHLORIDE 10 MG/ML
1 INJECTION, SOLUTION EPIDURAL; INFILTRATION; INTRACAUDAL; PERINEURAL ONCE
Status: COMPLETED | OUTPATIENT
Start: 2018-02-16 | End: 2018-02-16

## 2018-02-16 RX ORDER — ROCURONIUM BROMIDE 10 MG/ML
INJECTION, SOLUTION INTRAVENOUS
Status: DISCONTINUED | OUTPATIENT
Start: 2018-02-16 | End: 2018-02-16

## 2018-02-16 RX ORDER — MIDAZOLAM HYDROCHLORIDE 1 MG/ML
INJECTION, SOLUTION INTRAMUSCULAR; INTRAVENOUS
Status: DISCONTINUED | OUTPATIENT
Start: 2018-02-16 | End: 2018-02-16

## 2018-02-16 RX ORDER — ONDANSETRON 2 MG/ML
INJECTION INTRAMUSCULAR; INTRAVENOUS
Status: DISCONTINUED | OUTPATIENT
Start: 2018-02-16 | End: 2018-02-16

## 2018-02-16 RX ORDER — LIDOCAINE HYDROCHLORIDE AND EPINEPHRINE 10; 10 MG/ML; UG/ML
INJECTION, SOLUTION INFILTRATION; PERINEURAL
Status: DISCONTINUED | OUTPATIENT
Start: 2018-02-16 | End: 2018-02-16 | Stop reason: HOSPADM

## 2018-02-16 RX ORDER — FENTANYL CITRATE 50 UG/ML
INJECTION, SOLUTION INTRAMUSCULAR; INTRAVENOUS
Status: DISCONTINUED | OUTPATIENT
Start: 2018-02-16 | End: 2018-02-16

## 2018-02-16 RX ORDER — FENTANYL CITRATE 50 UG/ML
25 INJECTION, SOLUTION INTRAMUSCULAR; INTRAVENOUS EVERY 5 MIN PRN
Status: COMPLETED | OUTPATIENT
Start: 2018-02-16 | End: 2018-02-16

## 2018-02-16 RX ORDER — GLYCOPYRROLATE 0.2 MG/ML
INJECTION INTRAMUSCULAR; INTRAVENOUS
Status: DISCONTINUED | OUTPATIENT
Start: 2018-02-16 | End: 2018-02-16

## 2018-02-16 RX ORDER — SODIUM CHLORIDE 0.9 % (FLUSH) 0.9 %
3 SYRINGE (ML) INJECTION
Status: DISCONTINUED | OUTPATIENT
Start: 2018-02-16 | End: 2018-02-16 | Stop reason: HOSPADM

## 2018-02-16 RX ORDER — CEFAZOLIN SODIUM 1 G/3ML
2 INJECTION, POWDER, FOR SOLUTION INTRAMUSCULAR; INTRAVENOUS
Status: DISCONTINUED | OUTPATIENT
Start: 2018-02-16 | End: 2018-02-16 | Stop reason: HOSPADM

## 2018-02-16 RX ORDER — OXYMETAZOLINE HCL 0.05 %
SPRAY, NON-AEROSOL (ML) NASAL
Status: DISCONTINUED | OUTPATIENT
Start: 2018-02-16 | End: 2018-02-16 | Stop reason: HOSPADM

## 2018-02-16 RX ADMIN — LIDOCAINE HYDROCHLORIDE 100 MG: 20 INJECTION PARENTERAL at 09:02

## 2018-02-16 RX ADMIN — FENTANYL CITRATE 25 MCG: 50 INJECTION, SOLUTION INTRAMUSCULAR; INTRAVENOUS at 10:02

## 2018-02-16 RX ADMIN — GLYCOPYRROLATE 0.4 MG: 0.2 INJECTION, SOLUTION INTRAMUSCULAR; INTRAVENOUS at 10:02

## 2018-02-16 RX ADMIN — SODIUM CHLORIDE, SODIUM LACTATE, POTASSIUM CHLORIDE, CALCIUM CHLORIDE: 600; 310; 30; 20 INJECTION, SOLUTION INTRAVENOUS at 09:02

## 2018-02-16 RX ADMIN — DEXAMETHASONE SODIUM PHOSPHATE 8 MG: 4 INJECTION, SOLUTION INTRAMUSCULAR; INTRAVENOUS at 09:02

## 2018-02-16 RX ADMIN — CEFAZOLIN 2 G: 1 INJECTION, POWDER, FOR SOLUTION INTRAVENOUS at 09:02

## 2018-02-16 RX ADMIN — LIDOCAINE HYDROCHLORIDE AND EPINEPHRINE 5 ML: 10; 10 INJECTION, SOLUTION INFILTRATION; PERINEURAL at 09:02

## 2018-02-16 RX ADMIN — MUPIROCIN 1 TUBE: 20 OINTMENT TOPICAL at 10:02

## 2018-02-16 RX ADMIN — FENTANYL CITRATE 50 MCG: 50 INJECTION, SOLUTION INTRAMUSCULAR; INTRAVENOUS at 09:02

## 2018-02-16 RX ADMIN — KETAMINE HYDROCHLORIDE 25 MG: 100 INJECTION, SOLUTION, CONCENTRATE INTRAMUSCULAR; INTRAVENOUS at 09:02

## 2018-02-16 RX ADMIN — OXYMETAZOLINE HYDROCHLORIDE 15 ML: 5 SPRAY NASAL at 09:02

## 2018-02-16 RX ADMIN — MIDAZOLAM HYDROCHLORIDE 2 MG: 1 INJECTION, SOLUTION INTRAMUSCULAR; INTRAVENOUS at 09:02

## 2018-02-16 RX ADMIN — SODIUM CHLORIDE 500 ML: 0.9 IRRIGANT IRRIGATION at 09:02

## 2018-02-16 RX ADMIN — OXYCODONE HYDROCHLORIDE 10 MG: 5 TABLET ORAL at 10:02

## 2018-02-16 RX ADMIN — PROPOFOL 175 MG: 10 INJECTION, EMULSION INTRAVENOUS at 09:02

## 2018-02-16 RX ADMIN — NEOSTIGMINE METHYLSULFATE 4 MG: 1 INJECTION INTRAVENOUS at 10:02

## 2018-02-16 RX ADMIN — ONDANSETRON 4 MG: 2 INJECTION, SOLUTION INTRAMUSCULAR; INTRAVENOUS at 10:02

## 2018-02-16 RX ADMIN — LIDOCAINE HYDROCHLORIDE 10 MG: 10 INJECTION, SOLUTION EPIDURAL; INFILTRATION; INTRACAUDAL; PERINEURAL at 09:02

## 2018-02-16 RX ADMIN — ROCURONIUM BROMIDE 30 MG: 10 INJECTION, SOLUTION INTRAVENOUS at 09:02

## 2018-02-16 NOTE — H&P
Subjective:       Patient ID: Jaye Martinez is a 37 y.o. female.     Chief Complaint: Other (check lesion in mouth)     Jaye is here for follow-up of Nasal lesion, right sided papilloma. She was scheduled for removal. Has developed an abscess in the roof of her mouth. She has ah history of this. It has since drained but she feels some persistent fluid and pain in the area. She uses tobacco.     She has a history of warts in the past.      Review of Systems   Constitutional: Negative for activity change and appetite change.   Respiratory: Negative for difficulty breathing and wheezing   Cardiovascular: Negative for chest pain.       Objective:     Constitutional:   Vital signs are normal. She appears well-developed and well-nourished.      Nose:  No septal deviation or nasal septal hematoma.   Right sessile septal papilloma involving superior septum adjacent to junction of upper lateral cartilage, > 1 cm. Left side uninvolved.     Mouth/Throat  Abnormal dentition (poor). Dental caries present. No dental abscesses.   Resolving abscess of hard palate near incisive foramen. Boggy but no overlying erythema. Minimal pain to palp.           Tests / Results:  None     Assessment:       1. Lesion of nasal septum           Plan:          Based on location. It would be difficult to completely excise without the risk of scarring of internal nasal valve. I discussed the risk of recurrence regardless of technique given papilloma. Will need to biopsy as well to rule out malignancy although low suspicion. Will plan for laser excision in the OR .

## 2018-02-16 NOTE — DISCHARGE INSTRUCTIONS
Post-op Laser Excision of Nasal Lesion  Lencho Connors MD  Department of Otolaryngology, Head and Neck Surgery  [facility]      PHONE NUMBERS:    Office number: [phone]  EMERGENCY: call 911  URGENT ISSUES or after hours: [phone]  My cell phone: [phone]    WHAT TO DO    1. You should follow-up on:  2 weeks  Please call [phone] to set up a specific time or to change dates.  Call me ASAP if you are experiencing any unexpected problems.  2. Use the recommended medications / treatments as described  3. Familiarize yourself with the information in this pamphlet  4. Call me with questions. Also, please call me the day after surgery to let me know how you are doing.  5. Cell phone: [phone], please call me with any concerns or questions. I am not always available to answer my cell phone, so for any urgent or important issues, use the other numbers provided above.    WHAT TO EXPECT    Nasal Discharge & Bleeding  · It is common to have mild bleeding and nasal drainage after nasal  · This will clear up in a few days  · You will have a scab over the area. Do not pick this off. It will fall off on it's own. Use the ointment to help loosen it up.    Pain & Pressure  · It is common to experience mild-to- moderate pain on the area. You may also have a headache  · The pain usually is worst the first day after surgery.  · Use your medications as described below.    Nasal Congestion & Crusting  · It is common to experience nasal congestion after surgery. This is due to swelling and scabs. DO NOT BLOW YOUR NOSE until I say it is OK.  · Crusts are essentially scabs and mucus that build up in the nasal passages after surgery.  · Nasal saline helps to loosen the scab. I recommend using a saline nasal spray in the right nostril two times daily. This is called Ocean Spray and can be bought over the counter.    Fatigue  · It is common to feel mild to moderate fatigue for 7-10 days  after surgery.    ACTIVITY    · First 1-2 days after surgery  · Plan to rest. You may start light activities as tolerated.  · You may resume your normal activity after this    NUTRITION    · Day of surgery  · Drink plenty of water / fluids  · Eat light snacks as tolerated  · It is common for people to have less of an appetite the day of surgery    · Day after surgery  · Advance your diet as tolerated    MEDICATIONS    · The pain is usually controlled with Ibuprofen and Tylenol. You can take these as needed. I will also send a few stronger pain pills if needed.  · Percocet (oxycodone): take one or two every 4-6 hours as needed for pain.  · [place] law now prohibits prescription of more than 1 week of narcotic pain medication. You should wean from narcotic pain medication by that point, but it not, you will need to come to have a refill at that point approved by your doctor.  · Oral antibiotics: None  · Mupirocin ointment: place a pea-sized amount in your nose three times per day for 5 days.  · NOTE: please take an over the counter stool softener while you are on pain medications - they may cause constipation.    MEDICATION SAFETY TIPS    · Use medications only as directed in the amounts specified  · Avoid aspirin, ibuprofen, naproxen, and related pain medications for 10 days. Avoid fish oil (omega acids) and vitamin E for 1 week.  · As your pain lessens, you may Replace doses of prescription pain medications with acetaminophen (Tylenol). However, Do Not take doses of prescription pain medications at the same time as Tylenol because this could cause an overdose of Tylenol.  · Do Not drive or operate machinery if taking prescription pain medications  · Read each medication label carefully, ask your pharmacist if you have any questions regarding warnings on the label  · Do not measure liquid with a kitchen spoon. There are pediatric measuring devices available at the pharmacy. Ask for one when you get your  prescription filled.  · Store all mediations out of reach of children.  · Call the [facility] if your child takes too much of any medicine or if your child  · consumes your medication [phone] or [phone].    Call my office if you experience any of the following:    · Fever higher than 101 F  · Clear, watery nasal discharge  · Any visual changes or marked swelling around the eyes  · Severe headache or neck stiffness  · Brisk bleeding

## 2018-02-16 NOTE — TRANSFER OF CARE
"Anesthesia Transfer of Care Note    Patient: Jaye Martinez    Procedure(s) Performed: Procedure(s) (LRB):  EXCISION-LESION-NASAL SINUS - WITH LASER (N/A)    Patient location: PACU    Anesthesia Type: general    Transport from OR: Transported from OR on room air with adequate spontaneous ventilation    Post pain: adequate analgesia    Post assessment: no apparent anesthetic complications    Post vital signs: stable    Level of consciousness: awake    Nausea/Vomiting: no nausea/vomiting    Complications: none    Transfer of care protocol was followed      Last vitals:   Visit Vitals  BP 93/63 (BP Location: Right arm, Patient Position: Lying)   Pulse 69   Temp 37.1 °C (98.8 °F) (Skin)   Resp 18   Ht 5' 2" (1.575 m)   Wt 46.3 kg (102 lb)   Breastfeeding? No   BMI 18.66 kg/m²     "

## 2018-02-16 NOTE — ANESTHESIA POSTPROCEDURE EVALUATION
"Anesthesia Post Evaluation    Patient: Jaye Martinez    Procedure(s) Performed: Procedure(s) (LRB):  EXCISION-LESION-NASAL SINUS - WITH LASER (N/A)    Final Anesthesia Type: general  Patient location during evaluation: PACU  Patient participation: Yes- Able to Participate  Level of consciousness: awake and alert  Post-procedure vital signs: reviewed and stable  Pain management: adequate  Airway patency: patent  PONV status at discharge: No PONV  Anesthetic complications: no      Cardiovascular status: hemodynamically stable and blood pressure returned to baseline  Respiratory status: unassisted, spontaneous ventilation and room air  Hydration status: euvolemic  Follow-up not needed.        Visit Vitals  /67 (BP Location: Left arm, Patient Position: Lying)   Pulse (!) 59   Temp 36.2 °C (97.2 °F) (Skin)   Resp 16   Ht 5' 2" (1.575 m)   Wt 46.3 kg (102 lb)   SpO2 99%   Breastfeeding? No   BMI 18.66 kg/m²       Pain/Shiva Score: Pain Assessment Performed: Yes (2/16/2018 10:45 AM)  Presence of Pain: complains of pain/discomfort (2/16/2018 10:45 AM)  Pain Rating Prior to Med Admin: 2 (2/16/2018 10:45 AM)  Pain Rating Post Med Admin: 2 (2/16/2018 10:45 AM)  Shiva Score: 10 (2/16/2018 10:45 AM)      "

## 2018-02-16 NOTE — OP NOTE
02/16/2018     Jaye Martinez  [id number]  1980    PRE-OPERATIVE DIAGNOSIS  1. Lesion of nasal septum    2. Nasal lesion         POST-OPERATIVE DIAGNOSIS  1. Lesion of nasal septum    2. Nasal lesion         PROCEDURES PERFORMED  1. Excision of Nasal Septum Lesion  2. Use of KTP laser for ablation    SURGEON: Lencho Connors MD    ASSISTANT: None    ANESTHESIA: General    COMPLICATIONS: None    BLOOD LOSS: minimal    SPECIMENS  1. Right nasal septum lesion    DISPOSITION  PACU    OPERATIVE FINDINGS  1. 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 Involving septum only. Biopsy following by KTP laser ablation    INDICATIONS  Jaye Martinez is a 37 y.o. female who was seen in clinic for evaluation of the above diagnosis. Based on clinical assessment, the following procedures were discussed as an option for treatment. After risks, benefits, and alternatives were discussed the patient decided to proceed.     PROCEDURE IN DETAIL  The patient was seen in the pre-operative holding area, and consent was signed. They were wheeled into the operating room and placed supine on the table. Anesthesia was induced via laser safety tube.     The patient was prepped and draped for nasal surgery. Lidocaine 1% with 1:100,000 epinephrine was injected into the septum for hemostasis. Afrin pledgets were placed. Photodocumentation was obtained. The papillomatous lesion was on the right anterior nasal septum and, > 1cm in size and extended from the mucocutaneous junction of the septum to near the internal nasal valve. It did not involve the valve itself and only involved the septum. Due to the anterior and superior extent, I used an angled 30 degree telescope and a nasal speculum to visualize the lesion. I was able to see it in entirety. I biopsied the central portion and sent for permanent pathology. The patient was then prepped and protected for  laser use. A separate laser timeout was performed and FiO2 was lowered. I used the KTP laser at a setting of 40 W, 15 ms pulse width, 2 pulses / second for a total of 622 J. I ablated the lesion in it's entirety down to the base. Cartilage was not exposed. Hemostasis was good. I examined bilateral nasal cavities and the nasopharynx and no other lesions were noted.     This marked the end of the procedure. The patient was turned back over to the Anesthesia team.  They were awoken and transported back to the PACU in stable condition. Counts were correct. I performed the entire procedure.

## 2018-02-16 NOTE — ANESTHESIA PREPROCEDURE EVALUATION
02/16/2018  Jaye Martinez is a 37 y.o., female.    Anesthesia Evaluation      I have reviewed the Medications.     Review of Systems  Anesthesia Hx:  No problems with previous Anesthesia   Social:  Smoker    Cardiovascular:  Cardiovascular Normal     Pulmonary:  Pulmonary Normal    Renal/:  Renal/ Normal     Hepatic/GI:  Hepatic/GI Normal    Musculoskeletal:  Spine Disorders: cervical and lumbar Disc disease    Neurological:   Chronic Pain Syndrome (Norco 10/325, fioricet, parafon forte, plaquenil)   Endocrine:  Endocrine Normal    Dermatological:   SLE   Psych:   anxiety          Physical Exam  General:  Well nourished    Airway/Jaw/Neck:  Airway Findings: Mouth Opening: Normal Tongue: Normal  General Airway Assessment: Adult, Average  Mallampati: II  Jaw/Neck Findings:  Neck ROM: Normal ROM      Dental:  Dental Findings: Periodontal disease, Severe    Chest/Lungs:  Chest/Lungs Findings: Clear to auscultation, Normal Respiratory Rate     Heart/Vascular:  Heart Findings: Rate: Normal  Rhythm: Regular Rhythm  Sounds: Normal  Heart murmur: negative       Mental Status:  Mental Status Findings:  Cooperative, Alert and Oriented         Anesthesia Plan  Type of Anesthesia, risks & benefits discussed:  Anesthesia Type:  general  Patient's Preference:   Intra-op Monitoring Plan:   Intra-op Monitoring Plan Comments:   Post Op Pain Control Plan:   Post Op Pain Control Plan Comments:   Induction:   IV  Beta Blocker:  Patient is not currently on a Beta-Blocker (No further documentation required).       Informed Consent: Patient understands risks and agrees with Anesthesia plan.  Questions answered. Anesthesia consent signed with patient.  ASA Score: 2     Day of Surgery Review of History & Physical:            Ready For Surgery From Anesthesia Perspective.

## 2018-02-16 NOTE — BRIEF OP NOTE
[facility]  Brief Operative Note     SUMMARY     Surgery Date: 2/16/2018     Surgeon(s) and Role:     * Lencho Connors MD - Primary    Assisting Surgeon: None    Pre-op Diagnosis:  Lesion of nasal septum [J34.89]    Post-op Diagnosis:  Post-Op Diagnosis Codes:     * Lesion of nasal septum [J34.89]    Procedure(s) (LRB):  EXCISION-LESION-NASAL SINUS - WITH LASER (N/A)    Anesthesia: General    Description of the findings of the procedure: Biopsy and laser excision of lesion    Findings/Key Components: laser excision    Estimated Blood Loss: * No values recorded between 2/16/2018  9:27 AM and 2/16/2018 10:11 AM *         Specimens:   Specimen (12h ago through future)    Start     Ordered    02/16/18 0944  Specimen to Pathology - Surgery  Once     Comments:  Pre-op Diagnosis: Lesion of nasal septum [J34.89]Post-op Diagnosis: UNKNOWNProcedure(s):EXCISION-LESION-NASAL SINUS - WITH LASER Number of specimens: 1Name of specimens: 1.RIGHT ANTERIOR NASAL SEPTUM      02/16/18 0944          Discharge Note    SUMMARY     Admit Date: 2/16/2018    Discharge Date and Time:  02/16/2018 10:11 AM    Hospital Course (synopsis of major diagnoses, care, treatment, and services provided during the course of the hospital stay): Did well following surgery and was discharged uneventfully     Final Diagnosis: Post-Op Diagnosis Codes:     * Lesion of nasal septum [J34.89]    Disposition: Home or Self Care    Follow Up/Patient Instructions: Regular diet, Follow-up 2 weeks. Activity light    Medications:  Reconciled Home Medications:   Current Discharge Medication List      CONTINUE these medications which have NOT CHANGED    Details   albuterol (PROAIR HFA) 90 mcg/actuation inhaler Inhale 2 puffs into the lungs every 4 (four) hours as needed for Wheezing.  Qty: 18 g, Refills: 0      butalbital-acetaminophen-caffeine -40 mg (FIORICET, ESGIC) -40 mg per tablet Take 1 tablet by mouth 2 (two) times daily as  needed.  Qty: 30 tablet, Refills: 0    Associated Diagnoses: Migraine      chlorzoxazone (PARAFON FORTE DSC) 500 mg Tab Take 500 mg by mouth daily as needed.      hydrocodone-acetaminophen 10-325mg (NORCO)  mg Tab Take 1 tablet by mouth every 6 (six) hours as needed.  Qty: 120 tablet, Refills: 0      hydroxychloroquine (PLAQUENIL) 200 mg tablet Take 1 tablet (200 mg total) by mouth 2 (two) times daily.  Qty: 60 tablet, Refills: 2      RELPAX 20 mg tablet       valacyclovir (VALTREX) 1000 MG tablet Take 1,000 mg by mouth every 12 (twelve) hours as needed.            No discharge procedures on file.

## 2018-02-21 ENCOUNTER — TELEPHONE (OUTPATIENT)
Dept: OTOLARYNGOLOGY | Facility: CLINIC | Age: 38
End: 2018-02-21

## 2018-02-22 ENCOUNTER — PATIENT MESSAGE (OUTPATIENT)
Dept: OTOLARYNGOLOGY | Facility: CLINIC | Age: 38
End: 2018-02-22

## 2018-03-08 ENCOUNTER — OFFICE VISIT (OUTPATIENT)
Dept: FAMILY MEDICINE | Facility: CLINIC | Age: 38
End: 2018-03-08
Payer: OTHER GOVERNMENT

## 2018-03-08 VITALS
DIASTOLIC BLOOD PRESSURE: 62 MMHG | BODY MASS INDEX: 18.54 KG/M2 | HEART RATE: 86 BPM | SYSTOLIC BLOOD PRESSURE: 100 MMHG | HEIGHT: 62 IN | RESPIRATION RATE: 16 BRPM | OXYGEN SATURATION: 97 % | WEIGHT: 100.75 LBS

## 2018-03-08 DIAGNOSIS — R10.13 DYSPEPSIA: Primary | ICD-10-CM

## 2018-03-08 DIAGNOSIS — G43.909 MIGRAINE WITHOUT STATUS MIGRAINOSUS, NOT INTRACTABLE, UNSPECIFIED MIGRAINE TYPE: ICD-10-CM

## 2018-03-08 DIAGNOSIS — M54.2 CHRONIC CERVICAL PAIN: ICD-10-CM

## 2018-03-08 DIAGNOSIS — L93.0 LUPUS ERYTHEMATOSUS, UNSPECIFIED FORM: ICD-10-CM

## 2018-03-08 DIAGNOSIS — G89.29 CHRONIC CERVICAL PAIN: ICD-10-CM

## 2018-03-08 DIAGNOSIS — J44.9 CHRONIC OBSTRUCTIVE PULMONARY DISEASE, UNSPECIFIED COPD TYPE: ICD-10-CM

## 2018-03-08 PROCEDURE — 99999 PR PBB SHADOW E&M-EST. PATIENT-LVL III: CPT | Mod: PBBFAC,,, | Performed by: FAMILY MEDICINE

## 2018-03-08 PROCEDURE — 99214 OFFICE O/P EST MOD 30 MIN: CPT | Mod: S$PBB,,, | Performed by: FAMILY MEDICINE

## 2018-03-08 PROCEDURE — 99213 OFFICE O/P EST LOW 20 MIN: CPT | Mod: PBBFAC,PO | Performed by: FAMILY MEDICINE

## 2018-03-08 RX ORDER — OMEPRAZOLE 40 MG/1
40 CAPSULE, DELAYED RELEASE ORAL DAILY
Qty: 30 CAPSULE | Refills: 11 | Status: SHIPPED | OUTPATIENT
Start: 2018-03-08 | End: 2019-09-16 | Stop reason: SDUPTHER

## 2018-03-08 NOTE — PROGRESS NOTES
Subjective:       Patient ID: Jaye Martinez is a 37 y.o. female.    Chief Complaint: Neck Pain and Bloated    HPI     Here for a f/u.    C/o belching, bloating and upper abdominal discomfort x 2-3 days. Occurs after eating.     Seeing Dr. Grayson Montiel, neurologist, for management of right cervical radiculopathy and chronic migraines.      Re copd: no worsening cough or wheeze.     Takes plaquenil prn for lupus flare ups.       Review of Systems      Review of Systems   Constitutional: Negative for fever and chills.   HENT: Negative for hearing loss and neck stiffness.    Eyes: Negative for redness and itching.   Respiratory: Negative for choking.    Cardiovascular: Negative for chest pain and leg swelling.  Abdomen: Negative for blood in stool.   Genitourinary: Negative for dysuria and flank pain.   Musculoskeletal: Negative for back pain and gait problem.   Neurological: Negative for light-headedness and headaches.   Hematological: Negative for adenopathy.   Psychiatric/Behavioral: Negative for behavioral problems.     Objective:      Physical Exam   Constitutional: She appears well-developed.   HENT:   Head: Normocephalic and atraumatic.   Eyes: Conjunctivae are normal. Pupils are equal, round, and reactive to light.   Neck: Normal range of motion.   Cardiovascular: Normal rate and regular rhythm.    No murmur heard.  Pulmonary/Chest: Effort normal and breath sounds normal. She has no wheezes.   Lymphadenopathy:     She has no cervical adenopathy.       Assessment:       1. Dyspepsia    2. Lupus erythematosus, unspecified form    3. Chronic cervical pain    4. Migraine without status migrainosus, not intractable, unspecified migraine type    5. Chronic obstructive pulmonary disease, unspecified COPD type        Plan:       Dyspepsia    Lupus erythematosus, unspecified form  -     Ambulatory referral to Rheumatology    Chronic cervical pain    Migraine without status migrainosus, not intractable,  unspecified migraine type    Chronic obstructive pulmonary disease, unspecified COPD type    Other orders  -     omeprazole (PRILOSEC) 40 MG capsule; Take 1 capsule (40 mg total) by mouth once daily.  Dispense: 30 capsule; Refill: 11            Plan:  Start prilosec  See referral  Cont all other meds      Medication List with Changes/Refills   New Medications    OMEPRAZOLE (PRILOSEC) 40 MG CAPSULE    Take 1 capsule (40 mg total) by mouth once daily.   Current Medications    ALBUTEROL (PROAIR HFA) 90 MCG/ACTUATION INHALER    Inhale 2 puffs into the lungs every 4 (four) hours as needed for Wheezing.    BUTALBITAL-ACETAMINOPHEN-CAFFEINE -40 MG (FIORICET, ESGIC) -40 MG PER TABLET    Take 1 tablet by mouth 2 (two) times daily as needed.    CHLORZOXAZONE (PARAFON FORTE DSC) 500 MG TAB    Take 500 mg by mouth daily as needed.    HYDROCODONE-ACETAMINOPHEN 10-325MG (NORCO)  MG TAB    Take 1 tablet by mouth every 6 (six) hours as needed.    HYDROXYCHLOROQUINE (PLAQUENIL) 200 MG TABLET    Take 1 tablet (200 mg total) by mouth 2 (two) times daily.    RELPAX 20 MG TABLET    Take 20 mg by mouth as needed.     VALACYCLOVIR (VALTREX) 1000 MG TABLET    Take 1,000 mg by mouth every 12 (twelve) hours as needed.    Discontinued Medications    OXYCODONE-ACETAMINOPHEN (PERCOCET) 5-325 MG PER TABLET    Take 1 tablet by mouth every 4 (four) hours as needed for Pain.           Medication List with Changes/Refills   New Medications    OMEPRAZOLE (PRILOSEC) 40 MG CAPSULE    Take 1 capsule (40 mg total) by mouth once daily.   Current Medications    ALBUTEROL (PROAIR HFA) 90 MCG/ACTUATION INHALER    Inhale 2 puffs into the lungs every 4 (four) hours as needed for Wheezing.    BUTALBITAL-ACETAMINOPHEN-CAFFEINE -40 MG (FIORICET, ESGIC) -40 MG PER TABLET    Take 1 tablet by mouth 2 (two) times daily as needed.    CHLORZOXAZONE (PARAFON FORTE DSC) 500 MG TAB    Take 500 mg by mouth daily as needed.     HYDROCODONE-ACETAMINOPHEN 10-325MG (NORCO)  MG TAB    Take 1 tablet by mouth every 6 (six) hours as needed.    HYDROXYCHLOROQUINE (PLAQUENIL) 200 MG TABLET    Take 1 tablet (200 mg total) by mouth 2 (two) times daily.    RELPAX 20 MG TABLET    Take 20 mg by mouth as needed.     VALACYCLOVIR (VALTREX) 1000 MG TABLET    Take 1,000 mg by mouth every 12 (twelve) hours as needed.    Discontinued Medications    OXYCODONE-ACETAMINOPHEN (PERCOCET) 5-325 MG PER TABLET    Take 1 tablet by mouth every 4 (four) hours as needed for Pain.

## 2018-04-04 ENCOUNTER — TELEPHONE (OUTPATIENT)
Dept: FAMILY MEDICINE | Facility: CLINIC | Age: 38
End: 2018-04-04

## 2018-04-04 ENCOUNTER — PATIENT MESSAGE (OUTPATIENT)
Dept: FAMILY MEDICINE | Facility: CLINIC | Age: 38
End: 2018-04-04

## 2018-04-04 NOTE — TELEPHONE ENCOUNTER
----- Message from Love Burr sent at 4/4/2018  4:43 PM CDT -----  Contact: patient  Patient returning timothy's call. Requesting a call back at [phone]. Thanks,

## 2018-04-17 ENCOUNTER — HOSPITAL ENCOUNTER (OUTPATIENT)
Dept: RADIOLOGY | Facility: HOSPITAL | Age: 38
Discharge: HOME | End: 2018-04-17
Attending: FAMILY MEDICINE
Payer: OTHER GOVERNMENT

## 2018-04-17 DIAGNOSIS — M54.12 CERVICAL RADICULOPATHY: ICD-10-CM

## 2018-04-17 PROCEDURE — 72141 MRI NECK SPINE W/O DYE: CPT | Mod: TC,PO

## 2018-04-17 PROCEDURE — 72141 MRI NECK SPINE W/O DYE: CPT | Mod: 26,,, | Performed by: RADIOLOGY

## 2018-09-20 ENCOUNTER — PATIENT MESSAGE (OUTPATIENT)
Dept: FAMILY MEDICINE | Facility: CLINIC | Age: 38
End: 2018-09-20

## 2018-09-20 DIAGNOSIS — M54.12 CERVICAL RADICULOPATHY: Primary | ICD-10-CM

## 2018-09-20 NOTE — TELEPHONE ENCOUNTER
Pt requesting referral to Dr. Phan for muscle spasms and headaches, and for trigger injection.  Pended, Please advise.

## 2018-10-01 ENCOUNTER — OFFICE VISIT (OUTPATIENT)
Dept: OBSTETRICS AND GYNECOLOGY | Facility: CLINIC | Age: 38
End: 2018-10-01
Payer: OTHER GOVERNMENT

## 2018-10-01 VITALS — SYSTOLIC BLOOD PRESSURE: 98 MMHG | DIASTOLIC BLOOD PRESSURE: 60 MMHG | WEIGHT: 101.44 LBS | BODY MASS INDEX: 18.55 KG/M2

## 2018-10-01 DIAGNOSIS — R87.618 PAP SMEAR ABNORMALITY OF CERVIX/HUMAN PAPILLOMAVIRUS (HPV) POSITIVE: ICD-10-CM

## 2018-10-01 DIAGNOSIS — Z01.419 WELL WOMAN EXAM WITH ROUTINE GYNECOLOGICAL EXAM: Primary | ICD-10-CM

## 2018-10-01 PROCEDURE — 88175 CYTOPATH C/V AUTO FLUID REDO: CPT

## 2018-10-01 PROCEDURE — 99213 OFFICE O/P EST LOW 20 MIN: CPT | Mod: PBBFAC,PN | Performed by: OBSTETRICS & GYNECOLOGY

## 2018-10-01 PROCEDURE — 99395 PREV VISIT EST AGE 18-39: CPT | Mod: S$PBB,,, | Performed by: OBSTETRICS & GYNECOLOGY

## 2018-10-01 PROCEDURE — 87624 HPV HI-RISK TYP POOLED RSLT: CPT

## 2018-10-01 PROCEDURE — 99999 PR PBB SHADOW E&M-EST. PATIENT-LVL III: CPT | Mod: PBBFAC,,, | Performed by: OBSTETRICS & GYNECOLOGY

## 2018-10-01 RX ORDER — TIZANIDINE 4 MG/1
4 TABLET ORAL EVERY 6 HOURS PRN
COMMUNITY

## 2018-10-01 NOTE — PROGRESS NOTES
Chief Complaint   Patient presents with    Pap 6 month F/U       History and Physical:  No LMP recorded. Patient has had an ablation.       Jaye Martinez is a 38 y.o.  female who presents today for her routine annual GYN exam. The patient has no Gynecology complaints today. No bowel or bladder complaints. No menses since endometrial ablation,       Allergies:   Review of patient's allergies indicates:   Allergen Reactions    Lyrica [pregabalin] Itching and Other (See Comments)     Bruising and headaches    Bactrim [sulfamethoxazole-trimethoprim] Rash    Flexeril [cyclobenzaprine] Rash    Morphine Rash    Toradol [ketorolac] Rash       Past Medical History:   Diagnosis Date    Abnormal Pap smear     Anxiety     Cancer     pre-cancer cells cervical     Cervical disc herniation     Chronic cough     Closed fracture 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) level with anterior cord syndrome     T 12 fracture compression     Fractures     Genital herpes     Hemorrhoid     History of colposcopy with cervical biopsy     Lumbar disc herniation     on Hydrocodone    Lupus (systemic lupus erythematosus)     chronic thrombocytopenia    Migraine headache     Patient is a currently breast-feeding mother 2014    PONV (postoperative nausea and vomiting)        Past Surgical History:   Procedure Laterality Date    ABLATION ENDOMETRIAL THERMAL - NOVASURE N/A 1/3/2017    [facility]    ABLATION-LASER N/A 2018    [facility] OR    ADENOIDECTOMY      APPENDECTOMY       SECTION, CLASSIC      x2 , last 2014    CHOLECYSTECTOMY      COLONOSCOPY N/A 2013    [facility] Jules [facility] La Cruz [facility] at [facility] ENDO    CRYOTHERAPY          DILATION AND CURETTAGE OF UTERUS  [id number]    ENDOMETRIAL ABLATION      EXCISION-LESION-NASAL SINUS - WITH LASER N/A 2018    [facility]    GANGLION CYST EXCISION Left      [id number] N/A 1/3/2017    [facility] OR    [id number] N/A 2014    Performed [facility]vind Mata MD at [facility] OR    TONSILLECTOMY      TUBAL LIGATION  2014    TYMPANOSTOMY TUBE PLACEMENT      x 7       MEDS:   Current Outpatient Medications on File Prior to Visit   Medication Sig Dispense Refill    butalbital-acetaminophen-caffeine -40 mg (FIORICET, ESGIC) -40 mg per tablet Take 1 tablet by mouth 2 (two) times daily as needed. 30 tablet 0    hydrocodone-acetaminophen 10-325mg (NORCO)  mg Tab Take 1 tablet by mouth every 6 (six) hours as needed. 120 tablet 0    RELPAX 20 mg tablet Take 20 mg by mouth as needed.       tiZANidine (ZANAFLEX) 4 MG tablet Take 4 mg by mouth every 6 (six) hours as needed.      valacyclovir (VALTREX) 1000 MG tablet Take 1,000 mg by mouth every 12 (twelve) hours as needed.       albuterol (PROAIR HFA) 90 mcg/actuation inhaler Inhale 2 puffs into the lungs every 4 (four) hours as needed for Wheezing. 18 g 0    hydroxychloroquine (PLAQUENIL) 200 mg tablet Take 1 tablet (200 mg total) by mouth 2 (two) times daily. 60 tablet 2    omeprazole (PRILOSEC) 40 MG capsule Take 1 capsule (40 mg total) by mouth once daily. 30 capsule 11    [DISCONTINUED] chlorzoxazone (PARAFON FORTE DSC) 500 mg Tab Take 500 mg by mouth daily as needed.       No current facility-administered medications on file prior to visit.        OB History      Para Term  AB Living    5 4       5    SAB TAB Ectopic Multiple Live Births            5          Social History     Socioeconomic History    Marital status:      Spouse name: Not on file    Number of children: Not on file    Years of education: Not on file    Highest education level: Not on file   Social Needs    Financial resource strain: Not on file    Food insecurity - worry: Not on file    Food insecurity - inability: Not on file     Transportation needs - medical: Not on file    Transportation needs - non-medical: Not on file   Occupational History    Not on file   Tobacco Use    Smoking status: Current Every Day Smoker     Packs/day: 0.25     Types: Cigarettes    Smokeless tobacco: Never Used   Substance and Sexual Activity    Alcohol use: No     Comment: special occasions    Drug use: Yes     Types: Hydrocodone    Sexual activity: Yes     Partners: Male     Birth control/protection: None   Other Topics Concern    Not on file   Social History Narrative    Not on file       Family History   Problem Relation Age of Onset    Heart disease Father     Diabetes Mother     Cancer Maternal Grandfather         colon    Cancer Paternal Grandfather         colon    Breast cancer Maternal Grandmother     Breast cancer Maternal Aunt     Breast cancer Maternal Aunt     Ovarian cancer Neg Hx          Past medical and surgical history reviewed.   I have reviewed the patient's medical history in detail and updated the computerized patient record.        Review of System:   General: no chills, fever, night sweats, weight gain or weight loss  Psychological: no depression or suicidal ideation  Breasts: no new or changing breast lumps, nipple discharge or masses.  Respiratory: no cough, shortness of breath, or wheezing  Cardiovascular: no chest pain or dyspnea on exertion  Gastrointestinal: no abdominal pain, change in bowel habits, or black or bloody stools  Genito-Urinary: no incontinence, urinary frequency/urgency or vulvar/vaginal symptoms, pelvic pain or abnormal vaginal bleeding.  Musculoskeletal: no gait disturbance or muscular weakness      Physical Exam:   BP 98/60   Wt 46 kg (101 lb 6.6 oz)   BMI 18.55 kg/m²   Constitutional: She is oriented to person, place, and time. She appears well-developed and well-nourished. No distress.   HENT:   Head: Normocephalic and atraumatic.   Eyes: Conjunctivae and EOM are normal. No scleral  icterus.   Neck: Normal range of motion. Neck supple. No tracheal deviation present.   Cardiovascular: Normal rate.    Pulmonary/Chest: Effort normal. No respiratory distress. She exhibits no tenderness.  Breasts: are symmetrical. Fibrocystic breast changes LEft > Right.    Right breast exhibits no inverted nipple, no mass, no nipple discharge, no skin change and no tenderness.   Left breast exhibits no inverted nipple, no mass, no nipple discharge, no skin change and no tenderness.  Abdominal: Soft. She exhibits no distension and no mass. There is no tenderness. There is no rebound and no guarding.   Genitourinary:    External rectal exam shows no thrombosed external hemorrhoids.    Pelvic exam was performed with patient supine.   No labial fusion.   There is no rash, lesion or injury on the right labia.   There is no rash, lesion or injury on the left labia.   No bleeding and no signs of injury around the vaginal introitus, urethra is without lesions and well supported. The cervix is visualized with no discharge, lesions or friability.   No vaginal discharge found.    No significant Cystocele, Enterocele or rectocele, and uterus well supported.   Bimanual exam:   The urethra is normal to palpation and there are no palpable vaginal wall masses.   Uterus is not deviated, not enlarged, not fixed, normal shape and not tender.   Cervix exhibits no motion tenderness.    Right adnexum displays no mass and no tenderness.   Left adnexum displays no mass and no tenderness.  Musculoskeletal: Normal range of motion.   Lymphadenopathy: No inguinal adenopathy present.   Neurological: She is alert and oriented to person, place, and time. Coordination normal.   Skin: Skin is warm and dry. She is not diaphoretic.   Psychiatric: She has a normal mood and affect.      Assessment:   Normal annual GYN exam  1. Well woman exam with routine gynecological exam     2. Pap smear abnormality of cervix/human papillomavirus (HPV) positive   Liquid-based pap smear, screening    HPV High Risk Genotypes, PCR   fibrocystic breasts    Plan:   PAP  Mammogram at 41  Follow up in 1 year.  Patient informed will be contacted with results within 2 weeks. Encouraged to please call back or email if she has not heard from us by then.

## 2018-10-05 LAB
HPV HR 12 DNA CVX QL NAA+PROBE: NEGATIVE
HPV16 AG SPEC QL: NEGATIVE
HPV18 DNA SPEC QL NAA+PROBE: NEGATIVE

## 2019-08-19 ENCOUNTER — PATIENT MESSAGE (OUTPATIENT)
Dept: FAMILY MEDICINE | Facility: CLINIC | Age: 39
End: 2019-08-19

## 2019-08-19 DIAGNOSIS — G43.809 OTHER MIGRAINE WITHOUT STATUS MIGRAINOSUS, NOT INTRACTABLE: ICD-10-CM

## 2019-08-19 DIAGNOSIS — M54.12 CERVICAL RADICULOPATHY: Primary | ICD-10-CM

## 2019-09-10 ENCOUNTER — TELEPHONE (OUTPATIENT)
Dept: FAMILY MEDICINE | Facility: CLINIC | Age: 39
End: 2019-09-10

## 2019-09-16 ENCOUNTER — OFFICE VISIT (OUTPATIENT)
Dept: FAMILY MEDICINE | Facility: CLINIC | Age: 39
End: 2019-09-16
Payer: OTHER GOVERNMENT

## 2019-09-16 ENCOUNTER — PATIENT MESSAGE (OUTPATIENT)
Dept: FAMILY MEDICINE | Facility: CLINIC | Age: 39
End: 2019-09-16

## 2019-09-16 VITALS
SYSTOLIC BLOOD PRESSURE: 108 MMHG | OXYGEN SATURATION: 99 % | HEIGHT: 62 IN | TEMPERATURE: 98 F | DIASTOLIC BLOOD PRESSURE: 68 MMHG | BODY MASS INDEX: 19.44 KG/M2 | WEIGHT: 105.63 LBS | HEART RATE: 73 BPM

## 2019-09-16 DIAGNOSIS — R19.8 ALTERNATING CONSTIPATION AND DIARRHEA: Primary | ICD-10-CM

## 2019-09-16 DIAGNOSIS — J22 LOWER RESPIRATORY INFECTION: ICD-10-CM

## 2019-09-16 DIAGNOSIS — K62.1 RECTAL POLYP: ICD-10-CM

## 2019-09-16 DIAGNOSIS — F17.200 SMOKER: ICD-10-CM

## 2019-09-16 DIAGNOSIS — L93.0 LUPUS ERYTHEMATOSUS, UNSPECIFIED FORM: ICD-10-CM

## 2019-09-16 PROCEDURE — 99214 PR OFFICE/OUTPT VISIT, EST, LEVL IV, 30-39 MIN: ICD-10-PCS | Mod: S$PBB,,, | Performed by: NURSE PRACTITIONER

## 2019-09-16 PROCEDURE — 99999 PR PBB SHADOW E&M-EST. PATIENT-LVL IV: CPT | Mod: PBBFAC,,, | Performed by: NURSE PRACTITIONER

## 2019-09-16 PROCEDURE — 99214 OFFICE O/P EST MOD 30 MIN: CPT | Mod: S$PBB,,, | Performed by: NURSE PRACTITIONER

## 2019-09-16 PROCEDURE — 99999 PR PBB SHADOW E&M-EST. PATIENT-LVL IV: ICD-10-PCS | Mod: PBBFAC,,, | Performed by: NURSE PRACTITIONER

## 2019-09-16 PROCEDURE — 99214 OFFICE O/P EST MOD 30 MIN: CPT | Mod: PBBFAC,PO | Performed by: NURSE PRACTITIONER

## 2019-09-16 RX ORDER — METHYLPREDNISOLONE 4 MG/1
TABLET ORAL
Qty: 21 TABLET | Refills: 0 | Status: SHIPPED | OUTPATIENT
Start: 2019-09-17 | End: 2019-10-24

## 2019-09-16 RX ORDER — LACTULOSE 10 G/15ML
SOLUTION ORAL; RECTAL
COMMUNITY
Start: 2019-08-22 | End: 2022-11-11 | Stop reason: CLARIF

## 2019-09-16 RX ORDER — ONDANSETRON 4 MG/1
4 TABLET, FILM COATED ORAL EVERY 8 HOURS PRN
COMMUNITY
End: 2022-11-11 | Stop reason: CLARIF

## 2019-09-16 RX ORDER — ERENUMAB-AOOE 140 MG/ML
INJECTION, SOLUTION SUBCUTANEOUS
COMMUNITY
Start: 2019-08-19 | End: 2020-03-05

## 2019-09-16 RX ORDER — DOXYCYCLINE HYCLATE 100 MG
100 TABLET ORAL EVERY 12 HOURS
Qty: 14 TABLET | Refills: 0 | Status: SHIPPED | OUTPATIENT
Start: 2019-09-16 | End: 2019-09-23

## 2019-09-16 RX ORDER — DICYCLOMINE HYDROCHLORIDE 10 MG/1
10 CAPSULE ORAL
Qty: 120 CAPSULE | Refills: 0 | Status: SHIPPED | OUTPATIENT
Start: 2019-09-16 | End: 2019-10-16

## 2019-09-16 RX ORDER — FLUCONAZOLE 150 MG/1
150 TABLET ORAL DAILY
Qty: 1 TABLET | Refills: 0 | Status: SHIPPED | OUTPATIENT
Start: 2019-09-16 | End: 2019-09-17

## 2019-09-16 RX ORDER — OMEPRAZOLE 40 MG/1
40 CAPSULE, DELAYED RELEASE ORAL DAILY
Qty: 30 CAPSULE | Refills: 5 | Status: SHIPPED | OUTPATIENT
Start: 2019-09-16 | End: 2019-10-21 | Stop reason: SDUPTHER

## 2019-09-16 RX ORDER — ALBUTEROL SULFATE 90 UG/1
2 AEROSOL, METERED RESPIRATORY (INHALATION) EVERY 4 HOURS PRN
Qty: 18 G | Refills: 0 | Status: SHIPPED | OUTPATIENT
Start: 2019-09-16 | End: 2019-10-21 | Stop reason: SDUPTHER

## 2019-09-16 NOTE — PROGRESS NOTES
Subjective:       Patient ID: Jaye Martinez is a 39 y.o. female.    Chief Complaint: GI Problem    HPI   Ms. Martinez is a new patient to me. Reports chronic alternating constipation/diarrhea. Hx includes opioid use for chronic pain and lupus. Colonoscopy 2013 showed rectal polyp--due for repeat colonoscopy. She takes lactulose to relieve constipation and then suffers from diarrhea. Reports spasms--was given valium in the past which seemed to help.     Cough: current everyday smoker, discolored sputum with wheezing gradually worsening. Requesting refill on rescue inhaler and omeprazole  Vitals:    09/16/19 1135   BP: 108/68   Pulse: 73   Temp: 98.3 °F (36.8 °C)     Review of Systems   Constitutional: Negative for diaphoresis and fever.   HENT: Negative for facial swelling and trouble swallowing.    Eyes: Negative for discharge and redness.   Respiratory: Positive for cough and wheezing. Negative for shortness of breath.    Cardiovascular: Negative for chest pain and palpitations.   Gastrointestinal: Positive for constipation and diarrhea.   Genitourinary: Negative for difficulty urinating.   Musculoskeletal: Negative for gait problem.   Skin: Negative for pallor and rash.   Neurological: Negative for facial asymmetry and speech difficulty.   Psychiatric/Behavioral: Negative for confusion. The patient is not nervous/anxious.        Past Medical History:   Diagnosis Date    Abnormal Pap smear     Anxiety     Cancer     pre-cancer cells cervical     Cervical disc herniation     Chronic cough     Closed fracture 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) level with anterior cord syndrome     T 12 fracture compression     Fractures     Genital herpes     Hemorrhoid     History of colposcopy with cervical biopsy     Lumbar disc herniation     on Hydrocodone    Lupus (systemic lupus erythematosus)     chronic thrombocytopenia    Migraine headache     Patient is a currently breast-feeding mother July 2014    PONV (postoperative nausea and  vomiting)      Objective:      Physical Exam   Constitutional: She is oriented to person, place, and time. She does not have a sickly appearance. No distress.   HENT:   Head: Normocephalic.   Right Ear: Hearing normal.   Left Ear: Hearing normal.   Nose: Nose normal.   Eyes: Conjunctivae and lids are normal.   Neck: No JVD present. No tracheal deviation present.   Cardiovascular: Normal rate and normal heart sounds.   Pulmonary/Chest: Effort normal. She has wheezes in the right lower field. She has rhonchi in the right lower field.   Abdominal: Normal appearance. She exhibits no distension.   Musculoskeletal: She exhibits no deformity.   Neurological: She is alert and oriented to person, place, and time.   Skin: She is not diaphoretic. No pallor.   Psychiatric: She has a normal mood and affect. Her speech is normal and behavior is normal. Judgment and thought content normal. Cognition and memory are normal.   Nursing note and vitals reviewed.      Assessment:       1. Alternating constipation and diarrhea    2. Rectal polyp    3. Lower respiratory infection    4. Smoker    5. Lupus erythematosus, unspecified form        Plan:       Alternating constipation and diarrhea  -     Case request GI: COLONOSCOPY  -     Ambulatory referral to Gastroenterology  -     dicyclomine (BENTYL) 10 MG capsule; Take 1 capsule (10 mg total) by mouth 4 (four) times daily before meals and nightly.  Dispense: 120 capsule; Refill: 0    Rectal polyp  -     Case request GI: COLONOSCOPY  -     Ambulatory referral to Gastroenterology    Lower respiratory infection  -     doxycycline (VIBRA-TABS) 100 MG tablet; Take 1 tablet (100 mg total) by mouth every 12 (twelve) hours. for 7 days  Dispense: 14 tablet; Refill: 0  -     methylPREDNISolone (MEDROL DOSEPACK) 4 mg tablet; use as directed  Dispense: 21 tablet; Refill: 0    Smoker    Lupus erythematosus, unspecified form    Other orders  -     omeprazole (PRILOSEC) 40 MG capsule; Take 1 capsule  (40 mg total) by mouth once daily.  Dispense: 30 capsule; Refill: 5  -     albuterol (PROAIR HFA) 90 mcg/actuation inhaler; Inhale 2 puffs into the lungs every 4 (four) hours as needed for Wheezing.  Dispense: 18 g; Refill: 0  -     fluconazole (DIFLUCAN) 150 MG Tab; Take 1 tablet (150 mg total) by mouth once daily. for 1 day  Dispense: 1 tablet; Refill: 0    Increase water intake  Fiber supplement  Try to avoid using lactulose     FU with GI eval and routinely with PCP, with me as needed  Follow up for further evaluation if s/s worsen, fail to improve, or new symptoms arise.    Medication List with Changes/Refills   New Medications    DICYCLOMINE (BENTYL) 10 MG CAPSULE    Take 1 capsule (10 mg total) by mouth 4 (four) times daily before meals and nightly.    DOXYCYCLINE (VIBRA-TABS) 100 MG TABLET    Take 1 tablet (100 mg total) by mouth every 12 (twelve) hours. for 7 days    FLUCONAZOLE (DIFLUCAN) 150 MG TAB    Take 1 tablet (150 mg total) by mouth once daily. for 1 day    METHYLPREDNISOLONE (MEDROL DOSEPACK) 4 MG TABLET    use as directed   Current Medications    AIMOVIG AUTOINJECTOR 140 MG/ML ATIN        BUTALBITAL-ACETAMINOPHEN-CAFFEINE -40 MG (FIORICET, ESGIC) -40 MG PER TABLET    Take 1 tablet by mouth 2 (two) times daily as needed.    HYDROCODONE-ACETAMINOPHEN 10-325MG (NORCO)  MG TAB    Take 1 tablet by mouth every 6 (six) hours as needed.    HYDROXYCHLOROQUINE (PLAQUENIL) 200 MG TABLET    Take 1 tablet (200 mg total) by mouth 2 (two) times daily.    LACTULOSE (CHRONULAC) 10 GRAM/15 ML SOLUTION        ONDANSETRON (ZOFRAN) 4 MG TABLET    Take 4 mg by mouth every 8 (eight) hours as needed.    RELPAX 20 MG TABLET    Take 20 mg by mouth as needed.     TIZANIDINE (ZANAFLEX) 4 MG TABLET    Take 4 mg by mouth every 6 (six) hours as needed.    VALACYCLOVIR (VALTREX) 1000 MG TABLET    Take 1,000 mg by mouth every 12 (twelve) hours as needed.    Changed and/or Refilled Medications    Modified  Medication Previous Medication    ALBUTEROL (PROAIR HFA) 90 MCG/ACTUATION INHALER albuterol (PROAIR HFA) 90 mcg/actuation inhaler       Inhale 2 puffs into the lungs every 4 (four) hours as needed for Wheezing.    Inhale 2 puffs into the lungs every 4 (four) hours as needed for Wheezing.    OMEPRAZOLE (PRILOSEC) 40 MG CAPSULE omeprazole (PRILOSEC) 40 MG capsule       Take 1 capsule (40 mg total) by mouth once daily.    Take 1 capsule (40 mg total) by mouth once daily.

## 2019-09-19 ENCOUNTER — TELEPHONE (OUTPATIENT)
Dept: GASTROENTEROLOGY | Facility: CLINIC | Age: 39
End: 2019-09-19

## 2019-09-19 NOTE — TELEPHONE ENCOUNTER
Attempted to contact pt. No answer unable to leave message  Will try to call again later. Pt does not need appointment today. Only needs to schedule c-scope.

## 2019-10-10 DIAGNOSIS — R19.8 ALTERNATING CONSTIPATION AND DIARRHEA: ICD-10-CM

## 2019-10-10 RX ORDER — DICYCLOMINE HYDROCHLORIDE 10 MG/1
10 CAPSULE ORAL
Qty: 120 CAPSULE | Refills: 0 | Status: CANCELLED | OUTPATIENT
Start: 2019-10-10 | End: 2019-11-09

## 2019-10-10 RX ORDER — OMEPRAZOLE 40 MG/1
40 CAPSULE, DELAYED RELEASE ORAL DAILY
Qty: 30 CAPSULE | Refills: 5 | Status: CANCELLED | OUTPATIENT
Start: 2019-10-10

## 2019-10-10 RX ORDER — ALBUTEROL SULFATE 90 UG/1
2 AEROSOL, METERED RESPIRATORY (INHALATION) EVERY 4 HOURS PRN
Qty: 18 G | Refills: 0 | Status: CANCELLED | OUTPATIENT
Start: 2019-10-10 | End: 2020-10-09

## 2019-10-10 NOTE — TELEPHONE ENCOUNTER
She no showed for GI. She needs appt with them.  Is she taking bentyl--is it helping?     I sent 6 months prilosec last month    Is she out of her inhaler? If so she is using it way too much and needs evaluation

## 2019-10-21 NOTE — TELEPHONE ENCOUNTER
----- Message from Anjel Yeh sent at 10/21/2019 11:38 AM CDT -----  Contact: Kelli with [facility]   Type: Needs Medical Advice    Who Called:  Kelli Ndiaye Call Back Number: [phone]   Fax: [phone]  Additional Information: Needs to discuss the refill request for albuterol (PROAIR HFA) 90 mcg/actuation inhaler, omeprazole (PRILOSEC) 40 MG capsule, and dicyclomine 10 mg. Please call to advise.

## 2019-10-22 ENCOUNTER — PATIENT MESSAGE (OUTPATIENT)
Dept: FAMILY MEDICINE | Facility: CLINIC | Age: 39
End: 2019-10-22

## 2019-10-22 DIAGNOSIS — M54.12 CHRONIC CERVICAL RADICULOPATHY: Primary | ICD-10-CM

## 2019-10-22 RX ORDER — OMEPRAZOLE 40 MG/1
40 CAPSULE, DELAYED RELEASE ORAL DAILY
Qty: 90 CAPSULE | Refills: 0 | Status: SHIPPED | OUTPATIENT
Start: 2019-10-22 | End: 2020-01-01

## 2019-10-22 NOTE — PROGRESS NOTES
Refill Authorization Note     is requesting a refill authorization.    Brief assessment and rationale for refill: DEFER: not previously prescribed by you (dicyclomine); ROUTE: op (albuterol) APPROVE: needs appt (omeprazole)  Name and strength of medication: dicyclomine (BENTYL) 10 MG capsule // omeprazole (PRILOSEC) 40 MG capsule // albuterol (PROAIR HFA) 90 mcg/actuation inhaler  Medication-related problems identified: Requires appointment    Medication Therapy Plan: LCO(9/19); Dicyclomine not previously prescribed by you; Albuterol prescribed for wheezing, no diagnosis of COPD or asthma, indication outside of protocol; Omeprazole ordered by JALEN Juarez 9/19 for dyspepsia, now requesting med sent to alternate pharmacy, needs appt(Annual); Approve 3 more omeprazole; route albuterol to you; defer dicyclomine to you    Medication reconciliation completed: No              How patient will take medication: t1c po qid with meals and at night // t1t po qd // inhale 2 puffs q4h          Comments:     Appointments past 12m or future 3m    Date Provider   Last Visit   3/8/2018 Nakul Garcia MD   Next Visit   Visit date not found Nakul Garcia MD

## 2019-10-22 NOTE — PROGRESS NOTES
Refill Authorization Note     is requesting a refill authorization.    Brief assessment and rationale for refill: DEFER: not commentd on recently / need appt(Annual)  Name and strength of medication: dicyclomine (BENTYL) 10 MG capsule // omeprazole (PRILOSEC) 40 MG capsule // albuterol (PROAIR HFA) 90 mcg/actuation inhaler  Medication-related problems identified: Requires appointment    Medication Therapy Plan: NCO recently; need appt(Annual); defer to you    Medication reconciliation completed: No              How patient will take medication: t1c po qid with meals and at night // t1t po qd // inhale 2 puffs q4h          Comments:  Appointments past 12m or future 3m    Date Provider   Last Visit   3/8/2018 Nakul Garcia MD   Next Visit   Visit date not found Nakul Garcia MD

## 2019-10-22 NOTE — TELEPHONE ENCOUNTER
Please see the following assessment. This refill request still requires some action on your part. Albuterol for wheezing without diagnosis of asthma or COPD is outside of our protocol. Dicyclomine is also outside of our protocol and not previously prescribed by you. Defer to you. Thank you.

## 2019-10-23 RX ORDER — ALBUTEROL SULFATE 90 UG/1
2 AEROSOL, METERED RESPIRATORY (INHALATION) EVERY 4 HOURS PRN
Qty: 25.5 G | Refills: 0 | Status: SHIPPED | OUTPATIENT
Start: 2019-10-23 | End: 2024-01-19

## 2019-10-23 RX ORDER — DICYCLOMINE HYDROCHLORIDE 10 MG/1
10 CAPSULE ORAL
Qty: 360 CAPSULE | Refills: 0 | Status: SHIPPED | OUTPATIENT
Start: 2019-10-23 | End: 2019-10-24

## 2019-10-24 ENCOUNTER — OFFICE VISIT (OUTPATIENT)
Dept: FAMILY MEDICINE | Facility: CLINIC | Age: 39
End: 2019-10-24
Payer: OTHER GOVERNMENT

## 2019-10-24 ENCOUNTER — TELEPHONE (OUTPATIENT)
Dept: GASTROENTEROLOGY | Facility: CLINIC | Age: 39
End: 2019-10-24

## 2019-10-24 VITALS
DIASTOLIC BLOOD PRESSURE: 64 MMHG | OXYGEN SATURATION: 95 % | BODY MASS INDEX: 19.1 KG/M2 | HEART RATE: 95 BPM | WEIGHT: 103.81 LBS | HEIGHT: 62 IN | SYSTOLIC BLOOD PRESSURE: 108 MMHG

## 2019-10-24 DIAGNOSIS — G43.909 MIGRAINE WITHOUT STATUS MIGRAINOSUS, NOT INTRACTABLE, UNSPECIFIED MIGRAINE TYPE: ICD-10-CM

## 2019-10-24 DIAGNOSIS — F41.9 ANXIETY: ICD-10-CM

## 2019-10-24 DIAGNOSIS — F17.200 SMOKER: ICD-10-CM

## 2019-10-24 DIAGNOSIS — J44.1 COPD EXACERBATION: Primary | ICD-10-CM

## 2019-10-24 DIAGNOSIS — R19.8 ALTERNATING CONSTIPATION AND DIARRHEA: ICD-10-CM

## 2019-10-24 DIAGNOSIS — M54.12 CERVICAL RADICULOPATHY: ICD-10-CM

## 2019-10-24 DIAGNOSIS — L93.0 LUPUS ERYTHEMATOSUS, UNSPECIFIED FORM: ICD-10-CM

## 2019-10-24 PROCEDURE — 99213 OFFICE O/P EST LOW 20 MIN: CPT | Mod: PBBFAC,PO | Performed by: FAMILY MEDICINE

## 2019-10-24 PROCEDURE — 99999 PR PBB SHADOW E&M-EST. PATIENT-LVL III: ICD-10-PCS | Mod: PBBFAC,,, | Performed by: FAMILY MEDICINE

## 2019-10-24 PROCEDURE — 99999 PR PBB SHADOW E&M-EST. PATIENT-LVL III: CPT | Mod: PBBFAC,,, | Performed by: FAMILY MEDICINE

## 2019-10-24 PROCEDURE — 99214 OFFICE O/P EST MOD 30 MIN: CPT | Mod: S$PBB,,, | Performed by: FAMILY MEDICINE

## 2019-10-24 PROCEDURE — 99214 PR OFFICE/OUTPT VISIT, EST, LEVL IV, 30-39 MIN: ICD-10-PCS | Mod: S$PBB,,, | Performed by: FAMILY MEDICINE

## 2019-10-24 RX ORDER — PREDNISONE 10 MG/1
TABLET ORAL
Qty: 20 TABLET | Refills: 0 | Status: SHIPPED | OUTPATIENT
Start: 2019-10-24 | End: 2020-03-05 | Stop reason: SDUPTHER

## 2019-10-24 RX ORDER — LEVOFLOXACIN 500 MG/1
500 TABLET, FILM COATED ORAL DAILY
Qty: 10 TABLET | Refills: 0 | Status: SHIPPED | OUTPATIENT
Start: 2019-10-24 | End: 2019-11-03

## 2019-10-24 RX ORDER — HYDROXYCHLOROQUINE SULFATE 200 MG/1
200 TABLET, FILM COATED ORAL 2 TIMES DAILY
Qty: 60 TABLET | Refills: 5 | Status: SHIPPED | OUTPATIENT
Start: 2019-10-24 | End: 2020-03-05 | Stop reason: SDUPTHER

## 2019-10-24 RX ORDER — FLUCONAZOLE 150 MG/1
150 TABLET ORAL ONCE
Qty: 1 TABLET | Refills: 2 | Status: SHIPPED | OUTPATIENT
Start: 2019-10-24 | End: 2019-10-24

## 2019-10-24 RX ORDER — BUSPIRONE HYDROCHLORIDE 10 MG/1
10 TABLET ORAL 2 TIMES DAILY
Qty: 60 TABLET | Refills: 11 | Status: SHIPPED | OUTPATIENT
Start: 2019-10-24 | End: 2021-08-06 | Stop reason: SDUPTHER

## 2019-10-24 NOTE — TELEPHONE ENCOUNTER
Spoke with pt. Scheduled ordered c-scope. Pt verbalized understanding to date. Prep instructions sent to pt via [employer].

## 2019-10-24 NOTE — TELEPHONE ENCOUNTER
----- Message from Mayra Esteban MA sent at 10/24/2019 12:22 PM CDT -----  Type: Needs Medical Advice    Who Called: Patient    Best Call Back Number: [phone] (home)     Additional Information: Patient has a referral from Dr. Garcia to schedule colonoscopy. Please contact patient to schedule thank you

## 2019-10-24 NOTE — PROGRESS NOTES
Subjective:       Patient ID: Jaye Martinez is a 39 y.o. female.    Chief Complaint: COPD    HPI     Here for a f/u.     Seeing Dr. Grayson Montiel, neurologist, for management of right cervical radiculopathy and chronic migraines.       Re copd: coughing up green phlegm and wheezing more x 2 weeks. Smoker.      Takes plaquenil daily for lupus.    Reports alternating bms with constipation more so than diarrhea for more than 5 years.   No relief with bentyl in past.  otc mag citrate works.     Review of Systems      Review of Systems   Constitutional: Negative for fever and chills.   HENT: Negative for hearing loss and neck stiffness.    Eyes: Negative for redness and itching.   Respiratory: Negative for choking.    Cardiovascular: Negative for chest pain and leg swelling.  Abdomen: Negative for abdominal pain and blood in stool.   Genitourinary: Negative for dysuria and flank pain.   Musculoskeletal: Negative for back pain and gait problem.   Neurological: Negative for light-headedness and headaches.   Hematological: Negative for adenopathy.   Psychiatric/Behavioral: Negative for behavioral problems.     Objective:      Physical Exam   Constitutional: She appears well-developed.   HENT:   Head: Normocephalic and atraumatic.   Eyes: Pupils are equal, round, and reactive to light. Conjunctivae are normal.   Neck: Normal range of motion.   Cardiovascular: Normal rate and regular rhythm.   No murmur heard.  Pulmonary/Chest: Effort normal and breath sounds normal.   Lymphadenopathy:     She has no cervical adenopathy.       Assessment:       1. COPD exacerbation    2. Alternating constipation and diarrhea    3. Smoker    4. Lupus erythematosus, unspecified form    5. Cervical radiculopathy    6. Migraine without status migrainosus, not intractable, unspecified migraine type    7. Anxiety        Plan:       COPD exacerbation  Comments:  rx levaquin and prednisone taper. order cxray  Orders:  -     X-Ray Chest PA And  Lateral; Future; Expected date: 10/24/2019    Alternating constipation and diarrhea  Comments:  call to get a colonoscopy    Smoker    Lupus erythematosus, unspecified form  Comments:  cont taking plaquenil    Cervical radiculopathy  Comments:  f/u ravi Restrepo, neurology    Migraine without status migrainosus, not intractable, unspecified migraine type  Comments:  f/u ravi Restrepo, neurology    Anxiety  Comments:  start buspar    Other orders  -     hydroxychloroquine (PLAQUENIL) 200 mg tablet; Take 1 tablet (200 mg total) by mouth 2 (two) times daily.  Dispense: 60 tablet; Refill: 5  -     busPIRone (BUSPAR) 10 MG tablet; Take 1 tablet (10 mg total) by mouth 2 (two) times daily.  Dispense: 60 tablet; Refill: 11  -     levoFLOXacin (LEVAQUIN) 500 MG tablet; Take 1 tablet (500 mg total) by mouth once daily. for 10 days  Dispense: 10 tablet; Refill: 0  -     predniSONE (DELTASONE) 10 MG tablet; Take 4 tabs daily for 2 days then Take 3 tabs daily for 2 days thenTake 2 tabs daily for 2 days then Take 1 tab daily for 2 days then stop  Dispense: 20 tablet; Refill: 0  -     fluconazole (DIFLUCAN) 150 MG Tab; Take 1 tablet (150 mg total) by mouth once. for 1 dose  Dispense: 1 tablet; Refill: 2            rx diflucan in case of vaginal yeast infection from taking levaquin              Medication List with Changes/Refills   New Medications    BUSPIRONE (BUSPAR) 10 MG TABLET    Take 1 tablet (10 mg total) by mouth 2 (two) times daily.    FLUCONAZOLE (DIFLUCAN) 150 MG TAB    Take 1 tablet (150 mg total) by mouth once. for 1 dose    LEVOFLOXACIN (LEVAQUIN) 500 MG TABLET    Take 1 tablet (500 mg total) by mouth once daily. for 10 days    PREDNISONE (DELTASONE) 10 MG TABLET    Take 4 tabs daily for 2 days then Take 3 tabs daily for 2 days thenTake 2 tabs daily for 2 days then Take 1 tab daily for 2 days then stop   Current Medications    AIMOVIG AUTOINJECTOR 140 MG/ML ATIN        ALBUTEROL (PROAIR HFA) 90  MCG/ACTUATION INHALER    Inhale 2 puffs into the lungs every 4 (four) hours as needed for Wheezing.    BUTALBITAL-ACETAMINOPHEN-CAFFEINE -40 MG (FIORICET, ESGIC) -40 MG PER TABLET    Take 1 tablet by mouth 2 (two) times daily as needed.    HYDROCODONE-ACETAMINOPHEN 10-325MG (NORCO)  MG TAB    Take 1 tablet by mouth every 6 (six) hours as needed.    LACTULOSE (CHRONULAC) 10 GRAM/15 ML SOLUTION        OMEPRAZOLE (PRILOSEC) 40 MG CAPSULE    Take 1 capsule (40 mg total) by mouth once daily.    ONDANSETRON (ZOFRAN) 4 MG TABLET    Take 4 mg by mouth every 8 (eight) hours as needed.    RELPAX 20 MG TABLET    Take 20 mg by mouth as needed.     TIZANIDINE (ZANAFLEX) 4 MG TABLET    Take 4 mg by mouth every 6 (six) hours as needed.    VALACYCLOVIR (VALTREX) 1000 MG TABLET    Take 1,000 mg by mouth every 12 (twelve) hours as needed.    Changed and/or Refilled Medications    Modified Medication Previous Medication    HYDROXYCHLOROQUINE (PLAQUENIL) 200 MG TABLET hydroxychloroquine (PLAQUENIL) 200 mg tablet       Take 1 tablet (200 mg total) by mouth 2 (two) times daily.    Take 1 tablet (200 mg total) by mouth 2 (two) times daily.   Discontinued Medications    DICYCLOMINE (BENTYL) 10 MG CAPSULE    Take 1 capsule (10 mg total) by mouth 4 (four) times daily before meals and nightly.    METHYLPREDNISOLONE (MEDROL DOSEPACK) 4 MG TABLET    use as directed

## 2019-10-25 ENCOUNTER — TELEPHONE (OUTPATIENT)
Dept: FAMILY MEDICINE | Facility: CLINIC | Age: 39
End: 2019-10-25

## 2019-10-25 NOTE — TELEPHONE ENCOUNTER
----- Message from Maribeth Ovalles sent at 10/25/2019 11:38 AM CDT -----  Contact: [facility]  Type: Needs Medical Advice    Who Called:  Dinorah Ndiaye Call Back Number:   Additional Information: [facility] said the referral was sent wrong and she needs to speak to someone so she can tell them how to send it please call her back to advise

## 2019-10-29 ENCOUNTER — ANESTHESIA (OUTPATIENT)
Dept: ENDOSCOPY | Facility: HOSPITAL | Age: 39
End: 2019-10-29
Payer: OTHER GOVERNMENT

## 2019-10-29 ENCOUNTER — HOSPITAL ENCOUNTER (OUTPATIENT)
Facility: HOSPITAL | Age: 39
Discharge: HOME | End: 2019-10-29
Attending: INTERNAL MEDICINE | Admitting: INTERNAL MEDICINE
Payer: OTHER GOVERNMENT

## 2019-10-29 ENCOUNTER — ANESTHESIA EVENT (OUTPATIENT)
Dept: ENDOSCOPY | Facility: HOSPITAL | Age: 39
End: 2019-10-29
Payer: OTHER GOVERNMENT

## 2019-10-29 DIAGNOSIS — Z86.010 HX OF COLONIC POLYPS: ICD-10-CM

## 2019-10-29 PROBLEM — Z86.0100 HX OF COLONIC POLYPS: Status: ACTIVE | Noted: 2019-10-29

## 2019-10-29 LAB
B-HCG UR QL: NEGATIVE
CTP QC/QA: YES

## 2019-10-29 PROCEDURE — 63600175 PHARM REV CODE 636 W HCPCS: Mod: PO | Performed by: INTERNAL MEDICINE

## 2019-10-29 PROCEDURE — 88305 TISSUE EXAM BY PATHOLOGIST: CPT | Performed by: PATHOLOGY

## 2019-10-29 PROCEDURE — D9220A PRA ANESTHESIA: Mod: PT,ANES,, | Performed by: ANESTHESIOLOGY

## 2019-10-29 PROCEDURE — 81025 URINE PREGNANCY TEST: CPT | Mod: PO | Performed by: INTERNAL MEDICINE

## 2019-10-29 PROCEDURE — 45385 COLONOSCOPY W/LESION REMOVAL: CPT | Mod: PT,,, | Performed by: INTERNAL MEDICINE

## 2019-10-29 PROCEDURE — 00811 ANES LWR INTST NDSC NOS: CPT | Mod: PO | Performed by: INTERNAL MEDICINE

## 2019-10-29 PROCEDURE — 27201089 HC SNARE, DISP (ANY): Mod: PO | Performed by: INTERNAL MEDICINE

## 2019-10-29 PROCEDURE — 88305 TISSUE SPECIMEN TO PATHOLOGY - SURGERY: ICD-10-PCS | Mod: 26,,, | Performed by: PATHOLOGY

## 2019-10-29 PROCEDURE — D9220A PRA ANESTHESIA: Mod: PT,CRNA,, | Performed by: NURSE ANESTHETIST, CERTIFIED REGISTERED

## 2019-10-29 PROCEDURE — 37000009 HC ANESTHESIA EA ADD 15 MINS: Mod: PO | Performed by: INTERNAL MEDICINE

## 2019-10-29 PROCEDURE — 45385 COLONOSCOPY W/LESION REMOVAL: CPT | Mod: PO | Performed by: INTERNAL MEDICINE

## 2019-10-29 PROCEDURE — 45385 PR COLONOSCOPY,REMV LESN,SNARE: ICD-10-PCS | Mod: PT,,, | Performed by: INTERNAL MEDICINE

## 2019-10-29 PROCEDURE — 37000008 HC ANESTHESIA 1ST 15 MINUTES: Mod: PO | Performed by: INTERNAL MEDICINE

## 2019-10-29 PROCEDURE — D9220A PRA ANESTHESIA: ICD-10-PCS | Mod: PT,CRNA,, | Performed by: NURSE ANESTHETIST, CERTIFIED REGISTERED

## 2019-10-29 PROCEDURE — 63600175 PHARM REV CODE 636 W HCPCS: Mod: PO | Performed by: NURSE ANESTHETIST, CERTIFIED REGISTERED

## 2019-10-29 PROCEDURE — D9220A PRA ANESTHESIA: ICD-10-PCS | Mod: PT,ANES,, | Performed by: ANESTHESIOLOGY

## 2019-10-29 RX ORDER — SODIUM CHLORIDE, SODIUM LACTATE, POTASSIUM CHLORIDE, CALCIUM CHLORIDE 600; 310; 30; 20 MG/100ML; MG/100ML; MG/100ML; MG/100ML
INJECTION, SOLUTION INTRAVENOUS CONTINUOUS
Status: DISCONTINUED | OUTPATIENT
Start: 2019-10-29 | End: 2019-10-29 | Stop reason: HOSPADM

## 2019-10-29 RX ORDER — PROPOFOL 10 MG/ML
VIAL (ML) INTRAVENOUS
Status: DISCONTINUED | OUTPATIENT
Start: 2019-10-29 | End: 2019-10-29

## 2019-10-29 RX ORDER — SODIUM CHLORIDE 0.9 % (FLUSH) 0.9 %
10 SYRINGE (ML) INJECTION
Status: DISCONTINUED | OUTPATIENT
Start: 2019-10-29 | End: 2019-10-29 | Stop reason: HOSPADM

## 2019-10-29 RX ORDER — LIDOCAINE HCL/PF 100 MG/5ML
SYRINGE (ML) INTRAVENOUS
Status: DISCONTINUED | OUTPATIENT
Start: 2019-10-29 | End: 2019-10-29

## 2019-10-29 RX ADMIN — PROPOFOL 120 MG: 10 INJECTION, EMULSION INTRAVENOUS at 07:10

## 2019-10-29 RX ADMIN — PROPOFOL 30 MG: 10 INJECTION, EMULSION INTRAVENOUS at 07:10

## 2019-10-29 RX ADMIN — LIDOCAINE HYDROCHLORIDE 75 MG: 20 INJECTION, SOLUTION INTRAVENOUS at 07:10

## 2019-10-29 RX ADMIN — SODIUM CHLORIDE, SODIUM LACTATE, POTASSIUM CHLORIDE, AND CALCIUM CHLORIDE: .6; .31; .03; .02 INJECTION, SOLUTION INTRAVENOUS at 06:10

## 2019-10-29 NOTE — H&P
History & Physical - Short Stay  Gastroenterology      SUBJECTIVE:     Procedure: Colonoscopy    Chief Complaint/Indication for Procedure: Previous Polyps    PTA Medications   Medication Sig    albuterol (PROAIR HFA) 90 mcg/actuation inhaler Inhale 2 puffs into the lungs every 4 (four) hours as needed for Wheezing.    butalbital-acetaminophen-caffeine -40 mg (FIORICET, ESGIC) -40 mg per tablet Take 1 tablet by mouth 2 (two) times daily as needed.    hydrocodone-acetaminophen 10-325mg (NORCO)  mg Tab Take 1 tablet by mouth every 6 (six) hours as needed.    hydroxychloroquine (PLAQUENIL) 200 mg tablet Take 1 tablet (200 mg total) by mouth 2 (two) times daily.    lactulose (CHRONULAC) 10 gram/15 mL solution     levoFLOXacin (LEVAQUIN) 500 MG tablet Take 1 tablet (500 mg total) by mouth once daily. for 10 days    omeprazole (PRILOSEC) 40 MG capsule Take 1 capsule (40 mg total) by mouth once daily.    ondansetron (ZOFRAN) 4 MG tablet Take 4 mg by mouth every 8 (eight) hours as needed.    predniSONE (DELTASONE) 10 MG tablet Take 4 tabs daily for 2 days then Take 3 tabs daily for 2 days thenTake 2 tabs daily for 2 days then Take 1 tab daily for 2 days then stop    RELPAX 20 mg tablet Take 20 mg by mouth as needed.     tiZANidine (ZANAFLEX) 4 MG tablet Take 4 mg by mouth every 6 (six) hours as needed.    valacyclovir (VALTREX) 1000 MG tablet Take 1,000 mg by mouth every 12 (twelve) hours as needed.     AIMOVIG AUTOINJECTOR 140 mg/mL AtIn     busPIRone (BUSPAR) 10 MG tablet Take 1 tablet (10 mg total) by mouth 2 (two) times daily.       Review of patient's allergies indicates:   Allergen Reactions    Pregabalin Itching, Other (See Comments) and Hives     Bruising and headaches    Sulfa (sulfonamide antibiotics) Nausea And Vomiting    Flexeril [cyclobenzaprine] Rash    Morphine Rash and Hives    Sulfamethoxazole-trimethoprim Rash    Toradol [ketorolac] Rash        Past Medical History:    Diagnosis Date    Abnormal Pap smear     Anxiety     Cancer     pre-cancer cells cervical     Cervical disc herniation     Chronic cough     Closed fracture 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) level with anterior cord syndrome     T 12 fracture compression     Fractures     Genital herpes     GERD (gastroesophageal reflux disease)     Hemorrhoid     History of colposcopy with cervical biopsy     Lumbar disc herniation     on Hydrocodone    Lupus (systemic lupus erythematosus)     chronic thrombocytopenia    Migraine headache     PONV (postoperative nausea and vomiting)      Past Surgical History:   Procedure Laterality Date    ADENOIDECTOMY      APPENDECTOMY       SECTION, CLASSIC      x2 , last 2014    CHOLECYSTECTOMY      CRYOTHERAPY          DILATION AND CURETTAGE OF UTERUS  [id number]    ENDOMETRIAL ABLATION      GANGLION CYST EXCISION Left     TONSILLECTOMY      TUBAL LIGATION  2014    TYMPANOSTOMY TUBE PLACEMENT      x 7     Family History   Problem Relation Age of Onset    Heart disease Father     Diabetes Mother     Cancer Maternal Grandfather         colon    Cancer Paternal Grandfather         colon    Breast cancer Maternal Grandmother     Breast cancer Maternal Aunt     Breast cancer Maternal Aunt     Ovarian cancer Neg Hx      Social History     Tobacco Use    Smoking status: Current Every Day Smoker     Packs/day: 0.25     Types: Cigarettes    Smokeless tobacco: Never Used   Substance Use Topics    Alcohol use: No     Comment: special occasions    Drug use: Yes     Types: Hydrocodone         OBJECTIVE:     Vital Signs (Most Recent)  Temp: 97.7 °F (36.5 °C) (10/29/19 0645)  Pulse: 74 (10/29/19 0645)  Resp: 16 (10/29/19 0645)  BP: 101/68 (10/29/19 0645)  SpO2: 98 % (10/29/19 0645)    Physical Exam:                                                       GENERAL:  Comfortable, in no acute distress.                                 HEENT EXAM:  Nonicteric.  No adenopathy.   Oropharynx is clear.               NECK:  Supple.                                                               LUNGS:  Clear.                                                               CARDIAC:  Regular rate and rhythm.  S1, S2.  No murmur.                      ABDOMEN:  Soft, positive bowel sounds, nontender.  No hepatosplenomegaly or masses.  No rebound or guarding.                                             EXTREMITIES:  No edema.     MENTAL STATUS:  Normal, alert and oriented.      ASSESSMENT/PLAN:     Assessment: Previous Polyps    Plan: Colonoscopy    Anesthesia Plan: General    ASA Grade: ASA 2 - Patient with mild systemic disease with no functional limitations    MALLAMPATI SCORE:  I (soft palate, uvula, fauces, and tonsillar pillars visible)

## 2019-10-29 NOTE — DISCHARGE SUMMARY
Discharge Note  Short Stay      SUMMARY     Admit Date: 10/29/2019    Attending Physician: Jules De La Cruz MD     Discharge Physician: Jules De La Cruz MD    Discharge Date: 10/29/2019 7:21 AM    Final Diagnosis: Rectal polyp [K62.1]  Abnormal bowel movement [R19.8]    Disposition: HOME OR SELF CARE    Patient Instructions:   Current Discharge Medication List      CONTINUE these medications which have NOT CHANGED    Details   albuterol (PROAIR HFA) 90 mcg/actuation inhaler Inhale 2 puffs into the lungs every 4 (four) hours as needed for Wheezing.  Qty: 25.5 g, Refills: 0      butalbital-acetaminophen-caffeine -40 mg (FIORICET, ESGIC) -40 mg per tablet Take 1 tablet by mouth 2 (two) times daily as needed.  Qty: 30 tablet, Refills: 0    Associated Diagnoses: Migraine      hydrocodone-acetaminophen 10-325mg (NORCO)  mg Tab Take 1 tablet by mouth every 6 (six) hours as needed.  Qty: 120 tablet, Refills: 0      hydroxychloroquine (PLAQUENIL) 200 mg tablet Take 1 tablet (200 mg total) by mouth 2 (two) times daily.  Qty: 60 tablet, Refills: 5      lactulose (CHRONULAC) 10 gram/15 mL solution       levoFLOXacin (LEVAQUIN) 500 MG tablet Take 1 tablet (500 mg total) by mouth once daily. for 10 days  Qty: 10 tablet, Refills: 0      omeprazole (PRILOSEC) 40 MG capsule Take 1 capsule (40 mg total) by mouth once daily.  Qty: 90 capsule, Refills: 0      ondansetron (ZOFRAN) 4 MG tablet Take 4 mg by mouth every 8 (eight) hours as needed.      predniSONE (DELTASONE) 10 MG tablet Take 4 tabs daily for 2 days then Take 3 tabs daily for 2 days thenTake 2 tabs daily for 2 days then Take 1 tab daily for 2 days then stop  Qty: 20 tablet, Refills: 0      RELPAX 20 mg tablet Take 20 mg by mouth as needed.       tiZANidine (ZANAFLEX) 4 MG tablet Take 4 mg by mouth every 6 (six) hours as needed.      valacyclovir (VALTREX) 1000 MG tablet Take 1,000 mg by mouth every 12 (twelve) hours as needed.       AIMOVIG  AUTOINJECTOR 140 mg/mL AtIn       busPIRone (BUSPAR) 10 MG tablet Take 1 tablet (10 mg total) by mouth 2 (two) times daily.  Qty: 60 tablet, Refills: 11             Discharge Procedure Orders (must include Diet, Follow-up, Activity)    Follow Up:  Follow up with PCP as previously scheduled  Resume routine diet.  Activity as tolerated.    No driving day of procedure.

## 2019-10-29 NOTE — ANESTHESIA PREPROCEDURE EVALUATION
10/29/2019  Jaye Martinez is a 39 y.o., female.    Pre-op Assessment    I have reviewed the Patient Summary Reports.     I have reviewed the Nursing Notes.   I have reviewed the Medications.     Review of Systems  Anesthesia Hx:  No problems with previous Anesthesia  Denies Family Hx of Anesthesia complications.  Personal Hx of Anesthesia complications, Post-Operative Nausea/Vomiting, in the past, but not with recent anesthetics / prophylaxis   Social:  Smoker    Cardiovascular:  Cardiovascular Normal     Pulmonary:  Pulmonary Normal    Renal/:  Renal/ Normal     Hepatic/GI:   Bowel Prep. GERD    Musculoskeletal:  Spine Disorders: cervical and lumbar Disc disease    Neurological:   Headaches   Chronic Pain Syndrome (Norco 10/325, fioricet, parafon forte, plaquenil)   Endocrine:  Endocrine Normal    Dermatological:   SLE   Psych:   anxiety          Physical Exam  General:  Well nourished    Airway/Jaw/Neck:  Airway Findings: Mouth Opening: Normal Tongue: Normal  General Airway Assessment: Adult, Average  Mallampati: II  Jaw/Neck Findings:  Neck ROM: Normal ROM      Dental:  Dental Findings: Periodontal disease, Severe    Chest/Lungs:  Chest/Lungs Findings: Clear to auscultation, Normal Respiratory Rate     Heart/Vascular:  Heart Findings: Rate: Normal  Rhythm: Regular Rhythm  Sounds: Normal  Heart murmur: negative       Mental Status:  Mental Status Findings:  Cooperative, Alert and Oriented         Anesthesia Plan  Type of Anesthesia, risks & benefits discussed:  Anesthesia Type:  general  Patient's Preference:   Intra-op Monitoring Plan: standard ASA monitors  Intra-op Monitoring Plan Comments:   Post Op Pain Control Plan:   Post Op Pain Control Plan Comments:   Induction:   IV  Beta Blocker:  Patient is not currently on a Beta-Blocker (No further documentation required).       Informed Consent: Patient  understands risks and agrees with Anesthesia plan.  Questions answered. Anesthesia consent signed with patient.  ASA Score: 2     Day of Surgery Review of History & Physical:    H&P update referred to the provider.         Ready For Surgery From Anesthesia Perspective.

## 2019-10-29 NOTE — TRANSFER OF CARE
Anesthesia Transfer of Care Note    Patient: Jaye Martinez    Procedure(s) Performed: Procedure(s) (LRB):  COLONOSCOPY (N/A)    Patient location: PACU    Anesthesia Type: general    Transport from OR: Transported from OR on room air with adequate spontaneous ventilation    Post pain: adequate analgesia    Post assessment: no apparent anesthetic complications    Post vital signs: stable    Level of consciousness: awake and sedated    Nausea/Vomiting: no nausea/vomiting    Complications: none    Transfer of care protocol was followed      Last vitals:   Visit Vitals  /68 (BP Location: Right arm, Patient Position: Lying)   Pulse 74   Temp 36.5 °C (97.7 °F) (Skin)   Resp 16   SpO2 98%   Breastfeeding? No

## 2019-10-29 NOTE — ANESTHESIA POSTPROCEDURE EVALUATION
Anesthesia Post Evaluation    Patient: Jaye Martinez    Procedure(s) Performed: Procedure(s) (LRB):  COLONOSCOPY (N/A)    Final Anesthesia Type: general  Patient location during evaluation: PACU  Patient participation: Yes- Able to Participate  Level of consciousness: awake and alert  Post-procedure vital signs: reviewed and stable  Pain management: adequate  Airway patency: patent  PONV status at discharge: No PONV  Anesthetic complications: no      Cardiovascular status: blood pressure returned to baseline  Respiratory status: unassisted  Hydration status: euvolemic  Follow-up not needed.          Vitals Value Taken Time   BP 94/66 10/29/2019  7:40 AM   Temp 36.1 °C (97 °F) 10/29/2019  7:20 AM   Pulse 62 10/29/2019  7:40 AM   Resp 18 10/29/2019  7:40 AM   SpO2 100 % 10/29/2019  7:40 AM         Event Time     Out of Recovery 07:47:44          Pain/Shiva Score: Shiva Score: 10 (10/29/2019  7:30 AM)

## 2019-10-29 NOTE — DISCHARGE INSTRUCTIONS

## 2019-10-29 NOTE — [employer] PATIENT INSTRUCTIONS
Discharge Summary/Instructions after an Endoscopic Procedure  Patient Name: Jaye Martinez  Patient MRN: [id number]  Patient YOB: 1980 Tuesday, October 29, 2019  Jules De La Cruz MD  RESTRICTIONS:  During your procedure today, you received medications for sedation.  These   medications may affect your judgment, balance and coordination.  Therefore,   for 24 hours, you have the following restrictions:   - DO NOT drive a car, operate machinery, make legal/financial decisions,   sign important papers or drink alcohol.    ACTIVITY:  Today: no heavy lifting, straining or running due to procedural   sedation/anesthesia.  The following day: return to full activity including work.  DIET:  Eat and drink normally unless instructed otherwise.     TREATMENT FOR COMMON SIDE EFFECTS:  - Mild abdominal pain, nausea, belching, bloating or excessive gas:  rest,   eat lightly and use a heating pad.  - Sore Throat: treat with throat lozenges and/or gargle with warm salt   water.  - Because air was used during the procedure, expelling large amounts of air   from your rectum or belching is normal.  - If a bowel prep was taken, you may not have a bowel movement for 1-3 days.    This is normal.  SYMPTOMS TO WATCH FOR AND REPORT TO YOUR PHYSICIAN:  1. Abdominal pain or bloating, other than gas cramps.  2. Chest pain.  3. Back pain.  4. Signs of infection such as: chills or fever occurring within 24 hours   after the procedure.  5. Rectal bleeding, which would show as bright red, maroon, or black stools.   (A tablespoon of blood from the rectum is not serious, especially if   hemorrhoids are present.)  6. Vomiting.  7. Weakness or dizziness.  GO DIRECTLY TO THE NEAREST EMERGENCY ROOM IF YOU HAVE ANY OF THE FOLLOWING:      Difficulty breathing              Chills and/or fever over 101 F   Persistent vomiting and/or vomiting blood   Severe abdominal pain   Severe chest pain   Black, tarry stools   Bleeding- more than one  tablespoon   Any other symptom or condition that you feel may need urgent attention  Your doctor recommends these additional instructions:  If any biopsies were taken, your doctors clinic will contact you in 1 to 2   weeks with any results.  We are waiting for your pathology results.   Your physician has recommended a repeat colonoscopy in five years for   surveillance based on pathology results.   You are being discharged to home.  For questions, problems or results please call your physician - Jules De La Cruz MD at Work:  [phone].  EMERGENCY PHONE NUMBER: [phone], LAB RESULTS: [phone]  IF A COMPLICATION OR EMERGENCY SITUATION ARISES AND YOU ARE UNABLE TO REACH   YOUR PHYSICIAN - GO DIRECTLY TO THE EMERGENCY ROOM.  ___________________________________________  Nurse Signature  ___________________________________________  Patient/Designated Responsible Party Signature  Jules De La Cruz MD  10/29/2019 7:20:42 AM  This report has been verified and signed electronically.  [employer]

## 2019-10-30 VITALS
TEMPERATURE: 97 F | RESPIRATION RATE: 18 BRPM | HEART RATE: 62 BPM | OXYGEN SATURATION: 100 % | SYSTOLIC BLOOD PRESSURE: 94 MMHG | DIASTOLIC BLOOD PRESSURE: 66 MMHG | HEIGHT: 62 IN | WEIGHT: 103 LBS | BODY MASS INDEX: 18.95 KG/M2

## 2019-10-31 ENCOUNTER — TELEPHONE (OUTPATIENT)
Dept: FAMILY MEDICINE | Facility: CLINIC | Age: 39
End: 2019-10-31

## 2019-10-31 ENCOUNTER — PATIENT MESSAGE (OUTPATIENT)
Dept: FAMILY MEDICINE | Facility: CLINIC | Age: 39
End: 2019-10-31

## 2019-10-31 NOTE — TELEPHONE ENCOUNTER
----- Message from Kristy Skelton sent at 10/31/2019 12:39 PM CDT -----  Contact: Jaye  Type: Needs Medical Advice    Who Called:  patient  Best Call Back Number: [phone]  Additional Information: patient has an appt with Dr. Restrepo on 11/5--the office has the referral that was faxed but the referral needs to be entered in the [facility] site so it can be approved--if this is not done, the patient will have to pay 100s of dollars at the time of the appointment for not having the proper approval--please advise--thank you

## 2019-11-11 ENCOUNTER — TELEPHONE (OUTPATIENT)
Dept: FAMILY MEDICINE | Facility: CLINIC | Age: 39
End: 2019-11-11

## 2019-11-11 ENCOUNTER — PATIENT MESSAGE (OUTPATIENT)
Dept: FAMILY MEDICINE | Facility: CLINIC | Age: 39
End: 2019-11-11

## 2019-11-11 NOTE — TELEPHONE ENCOUNTER
----- Message from Princess MEJIA Rebollar sent at 11/11/2019  9:28 AM CST -----  Contact: Patient  Type: Needs Medical Advice    Who Called:  Patient  Best Call Back Number:   Additional Information: Requesting a call back in regards to getting a updated referral to be sent to [facility] to be processed so patient can see pain management.

## 2020-01-01 RX ORDER — OMEPRAZOLE 40 MG/1
CAPSULE, DELAYED RELEASE ORAL
Qty: 90 CAPSULE | Refills: 4 | Status: SHIPPED | OUTPATIENT
Start: 2020-01-01 | End: 2022-11-11 | Stop reason: CLARIF

## 2020-01-01 RX ORDER — DICYCLOMINE HYDROCHLORIDE 10 MG/1
CAPSULE ORAL
Qty: 360 CAPSULE | Refills: 4 | Status: SHIPPED | OUTPATIENT
Start: 2020-01-01 | End: 2021-06-26

## 2020-01-02 RX ORDER — FLUCONAZOLE 150 MG/1
TABLET ORAL
Qty: 1 TABLET | Refills: 0 | OUTPATIENT
Start: 2020-01-02

## 2020-03-05 ENCOUNTER — OFFICE VISIT (OUTPATIENT)
Dept: FAMILY MEDICINE | Facility: CLINIC | Age: 40
End: 2020-03-05
Payer: OTHER GOVERNMENT

## 2020-03-05 ENCOUNTER — HOSPITAL ENCOUNTER (OUTPATIENT)
Dept: RADIOLOGY | Facility: HOSPITAL | Age: 40
Discharge: HOME | End: 2020-03-05
Attending: FAMILY MEDICINE
Payer: OTHER GOVERNMENT

## 2020-03-05 VITALS
DIASTOLIC BLOOD PRESSURE: 78 MMHG | WEIGHT: 108.94 LBS | BODY MASS INDEX: 20.05 KG/M2 | SYSTOLIC BLOOD PRESSURE: 102 MMHG | HEART RATE: 83 BPM | OXYGEN SATURATION: 97 % | HEIGHT: 62 IN

## 2020-03-05 DIAGNOSIS — J44.9 CHRONIC OBSTRUCTIVE PULMONARY DISEASE, UNSPECIFIED COPD TYPE: ICD-10-CM

## 2020-03-05 DIAGNOSIS — F17.200 TOBACCO USE DISORDER: ICD-10-CM

## 2020-03-05 DIAGNOSIS — R07.9 CHEST PAIN, UNSPECIFIED TYPE: Primary | ICD-10-CM

## 2020-03-05 DIAGNOSIS — M25.561 RIGHT KNEE PAIN, UNSPECIFIED CHRONICITY: ICD-10-CM

## 2020-03-05 DIAGNOSIS — M54.12 CHRONIC CERVICAL RADICULOPATHY: ICD-10-CM

## 2020-03-05 PROCEDURE — 73564 X-RAY EXAM KNEE 4 OR MORE: CPT | Mod: 26,RT,, | Performed by: RADIOLOGY

## 2020-03-05 PROCEDURE — 73562 X-RAY EXAM OF KNEE 3: CPT | Mod: TC,FY,PO,LT,59

## 2020-03-05 PROCEDURE — 73564 XR KNEE ORTHO RIGHT WITH FLEXION: ICD-10-PCS | Mod: 26,RT,, | Performed by: RADIOLOGY

## 2020-03-05 PROCEDURE — 73562 X-RAY EXAM OF KNEE 3: CPT | Mod: 26,LT,, | Performed by: RADIOLOGY

## 2020-03-05 PROCEDURE — 73562 XR KNEE ORTHO RIGHT WITH FLEXION: ICD-10-PCS | Mod: 26,LT,, | Performed by: RADIOLOGY

## 2020-03-05 PROCEDURE — 99999 PR PBB SHADOW E&M-EST. PATIENT-LVL IV: ICD-10-PCS | Mod: PBBFAC,,, | Performed by: FAMILY MEDICINE

## 2020-03-05 PROCEDURE — 99999 PR PBB SHADOW E&M-EST. PATIENT-LVL IV: CPT | Mod: PBBFAC,,, | Performed by: FAMILY MEDICINE

## 2020-03-05 PROCEDURE — 99214 PR OFFICE/OUTPT VISIT, EST, LEVL IV, 30-39 MIN: ICD-10-PCS | Mod: S$PBB,,, | Performed by: FAMILY MEDICINE

## 2020-03-05 PROCEDURE — 99214 OFFICE O/P EST MOD 30 MIN: CPT | Mod: PBBFAC,PO,25 | Performed by: FAMILY MEDICINE

## 2020-03-05 PROCEDURE — 99214 OFFICE O/P EST MOD 30 MIN: CPT | Mod: S$PBB,,, | Performed by: FAMILY MEDICINE

## 2020-03-05 RX ORDER — HYDROXYCHLOROQUINE SULFATE 200 MG/1
200 TABLET, FILM COATED ORAL 2 TIMES DAILY
Qty: 60 TABLET | Refills: 5 | Status: SHIPPED | OUTPATIENT
Start: 2020-03-05 | End: 2021-08-06 | Stop reason: SDUPTHER

## 2020-03-05 RX ORDER — PREDNISONE 10 MG/1
TABLET ORAL
Qty: 20 TABLET | Refills: 0 | Status: SHIPPED | OUTPATIENT
Start: 2020-03-05 | End: 2022-11-11 | Stop reason: CLARIF

## 2020-03-05 RX ORDER — GALCANEZUMAB 120 MG/ML
1 INJECTION, SOLUTION SUBCUTANEOUS
COMMUNITY
Start: 2020-01-13

## 2020-03-05 NOTE — PROGRESS NOTES
Subjective:       Patient ID: Jaye Martinez is a 39 y.o. female.    Chief Complaint: COPD    HPI     Here for a f/u.    Recall that patient went to [facility] ER last month for chest pain. Neg troponins. Was told to f/u with cardiology. Since then, no more chest pain. Father  of heart disease in late 40's.      Seeing Dr. Grayson Montiel, neurologist, for management of right cervical radiculopathy and chronic migraines.       Re copd: no worsening cough and wheeze. Smoker.      Takes plaquenil daily for lupus.     C/o right medial knee pain with some swelling x 3 weeks. Worse at end of day.       Review of Systems      Review of Systems   Constitutional: Negative for fever and chills.   HENT: Negative for hearing loss and neck stiffness.    Eyes: Negative for redness and itching.   Respiratory: Negative for  choking.    Cardiovascular: Negative for chest pain and leg swelling.  Abdomen: Negative for abdominal pain and blood in stool.   Genitourinary: Negative for dysuria and flank pain.   Neurological: Negative for light-headedness   Hematological: Negative for adenopathy.       Objective:      Physical Exam   Constitutional: She appears well-developed.   HENT:   Head: Normocephalic and atraumatic.   Eyes: Pupils are equal, round, and reactive to light. Conjunctivae are normal.   Neck: Normal range of motion.   Cardiovascular: Normal rate and regular rhythm.   No murmur heard.  Pulmonary/Chest: Effort normal and breath sounds normal.   Musculoskeletal:   Right knee: mild medial effusion noted. creps and pain with flex > 90 deg   Lymphadenopathy:     She has no cervical adenopathy.       Assessment:       1. Chest pain, unspecified type    2. Tobacco use disorder    3. Right knee pain, unspecified chronicity    4. Chronic cervical radiculopathy    5. Chronic migraine    6. Chronic obstructive pulmonary disease, unspecified COPD type        Plan:       Chest pain, unspecified type  -     Ambulatory referral/consult  to Cardiology; Future; Expected date: 03/12/2020    Tobacco use disorder  -     Ambulatory referral/consult to Cardiology; Future; Expected date: 03/12/2020    Right knee pain, unspecified chronicity  -     X-ray Knee Ortho Right with Flexion; Future; Expected date: 03/05/2020    Chronic cervical radiculopathy  -     Ambulatory referral/consult to Neurology; Future    Chronic migraine  -     Ambulatory referral/consult to Neurology; Future    Chronic obstructive pulmonary disease, unspecified COPD type    Other orders  -     hydroxychloroquine (PLAQUENIL) 200 mg tablet; Take 1 tablet (200 mg total) by mouth 2 (two) times daily.  Dispense: 60 tablet; Refill: 5  -     predniSONE (DELTASONE) 10 MG tablet; Take 4 tabs daily for 2 days then Take 3 tabs daily for 2 days thenTake 2 tabs daily for 2 days then Take 1 tab daily for 2 days then stop  Dispense: 20 tablet; Refill: 0            Plan:  See referral  Xray right knee. Start prednisone taper  Cont all other meds        Medication List with Changes/Refills   Current Medications    ALBUTEROL (PROAIR HFA) 90 MCG/ACTUATION INHALER    Inhale 2 puffs into the lungs every 4 (four) hours as needed for Wheezing.    BUSPIRONE (BUSPAR) 10 MG TABLET    Take 1 tablet (10 mg total) by mouth 2 (two) times daily.    BUTALBITAL-ACETAMINOPHEN-CAFFEINE -40 MG (FIORICET, ESGIC) -40 MG PER TABLET    Take 1 tablet by mouth 2 (two) times daily as needed.    DICYCLOMINE (BENTYL) 10 MG CAPSULE    TAKE 1 CAPSULE FOUR TIMES A DAY BEFORE MEALS AND NIGHTLY    EMGALITY  MG/ML PNIJ        HYDROCODONE-ACETAMINOPHEN 10-325MG (NORCO)  MG TAB    Take 1 tablet by mouth every 6 (six) hours as needed.    LACTULOSE (CHRONULAC) 10 GRAM/15 ML SOLUTION        OMEPRAZOLE (PRILOSEC) 40 MG CAPSULE    TAKE 1 CAPSULE DAILY    ONDANSETRON (ZOFRAN) 4 MG TABLET    Take 4 mg by mouth every 8 (eight) hours as needed.    RELPAX 20 MG TABLET    Take 20 mg by mouth as needed.     TIZANIDINE  (ZANAFLEX) 4 MG TABLET    Take 4 mg by mouth every 6 (six) hours as needed.    VALACYCLOVIR (VALTREX) 1000 MG TABLET    Take 1,000 mg by mouth every 12 (twelve) hours as needed.    Changed and/or Refilled Medications    Modified Medication Previous Medication    HYDROXYCHLOROQUINE (PLAQUENIL) 200 MG TABLET hydroxychloroquine (PLAQUENIL) 200 mg tablet       Take 1 tablet (200 mg total) by mouth 2 (two) times daily.    Take 1 tablet (200 mg total) by mouth 2 (two) times daily.    PREDNISONE (DELTASONE) 10 MG TABLET predniSONE (DELTASONE) 10 MG tablet       Take 4 tabs daily for 2 days then Take 3 tabs daily for 2 days thenTake 2 tabs daily for 2 days then Take 1 tab daily for 2 days then stop    Take 4 tabs daily for 2 days then Take 3 tabs daily for 2 days thenTake 2 tabs daily for 2 days then Take 1 tab daily for 2 days then stop   Discontinued Medications    AIMOVIG AUTOINJECTOR 140 MG/ML ATIN

## 2020-03-10 ENCOUNTER — PATIENT MESSAGE (OUTPATIENT)
Dept: FAMILY MEDICINE | Facility: CLINIC | Age: 40
End: 2020-03-10

## 2020-03-10 DIAGNOSIS — K64.9 HEMORRHOIDS, UNSPECIFIED HEMORRHOID TYPE: Primary | ICD-10-CM

## 2020-03-19 ENCOUNTER — PATIENT MESSAGE (OUTPATIENT)
Dept: FAMILY MEDICINE | Facility: CLINIC | Age: 40
End: 2020-03-19

## 2020-04-01 ENCOUNTER — PATIENT OUTREACH (OUTPATIENT)
Dept: ADMINISTRATIVE | Facility: OTHER | Age: 40
End: 2020-04-01

## 2020-04-01 ENCOUNTER — TELEPHONE (OUTPATIENT)
Dept: SURGERY | Facility: CLINIC | Age: 40
End: 2020-04-01

## 2020-04-01 ENCOUNTER — PATIENT MESSAGE (OUTPATIENT)
Dept: SURGERY | Facility: CLINIC | Age: 40
End: 2020-04-01

## 2020-05-04 ENCOUNTER — PATIENT MESSAGE (OUTPATIENT)
Dept: FAMILY MEDICINE | Facility: CLINIC | Age: 40
End: 2020-05-04

## 2020-05-04 DIAGNOSIS — M54.12 CERVICAL RADICULOPATHY: ICD-10-CM

## 2020-05-11 ENCOUNTER — PATIENT MESSAGE (OUTPATIENT)
Dept: FAMILY MEDICINE | Facility: CLINIC | Age: 40
End: 2020-05-11

## 2021-04-06 ENCOUNTER — PATIENT MESSAGE (OUTPATIENT)
Dept: ADMINISTRATIVE | Facility: HOSPITAL | Age: 41
End: 2021-04-06

## 2021-05-06 ENCOUNTER — PATIENT MESSAGE (OUTPATIENT)
Dept: RESEARCH | Facility: HOSPITAL | Age: 41
End: 2021-05-06

## 2021-06-26 RX ORDER — DICYCLOMINE HYDROCHLORIDE 10 MG/1
CAPSULE ORAL
Qty: 360 CAPSULE | Refills: 3 | Status: SHIPPED | OUTPATIENT
Start: 2021-06-26 | End: 2022-11-11 | Stop reason: CLARIF

## 2021-07-07 ENCOUNTER — PATIENT MESSAGE (OUTPATIENT)
Dept: ADMINISTRATIVE | Facility: HOSPITAL | Age: 41
End: 2021-07-07

## 2021-07-22 ENCOUNTER — PATIENT OUTREACH (OUTPATIENT)
Dept: ADMINISTRATIVE | Facility: OTHER | Age: 41
End: 2021-07-22

## 2021-07-22 ENCOUNTER — PATIENT MESSAGE (OUTPATIENT)
Dept: ADMINISTRATIVE | Facility: OTHER | Age: 41
End: 2021-07-22

## 2021-07-22 DIAGNOSIS — Z12.31 ENCOUNTER FOR SCREENING MAMMOGRAM FOR MALIGNANT NEOPLASM OF BREAST: Primary | ICD-10-CM

## 2021-07-26 ENCOUNTER — HOSPITAL ENCOUNTER (OUTPATIENT)
Dept: RADIOLOGY | Facility: HOSPITAL | Age: 41
Discharge: HOME | End: 2021-07-26
Attending: FAMILY MEDICINE
Payer: OTHER GOVERNMENT

## 2021-07-26 VITALS — BODY MASS INDEX: 19.88 KG/M2 | HEIGHT: 62 IN | WEIGHT: 108 LBS

## 2021-07-26 DIAGNOSIS — Z12.31 ENCOUNTER FOR SCREENING MAMMOGRAM FOR MALIGNANT NEOPLASM OF BREAST: ICD-10-CM

## 2021-07-26 PROCEDURE — 77067 SCR MAMMO BI INCL CAD: CPT | Mod: TC,PO

## 2021-07-26 PROCEDURE — 77063 MAMMO DIGITAL SCREENING BILAT WITH TOMO: ICD-10-PCS | Mod: 26,,, | Performed by: RADIOLOGY

## 2021-07-26 PROCEDURE — 77067 MAMMO DIGITAL SCREENING BILAT WITH TOMO: ICD-10-PCS | Mod: 26,,, | Performed by: RADIOLOGY

## 2021-07-26 PROCEDURE — 77063 BREAST TOMOSYNTHESIS BI: CPT | Mod: 26,,, | Performed by: RADIOLOGY

## 2021-07-26 PROCEDURE — 77067 SCR MAMMO BI INCL CAD: CPT | Mod: 26,,, | Performed by: RADIOLOGY

## 2021-08-06 ENCOUNTER — OFFICE VISIT (OUTPATIENT)
Dept: FAMILY MEDICINE | Facility: CLINIC | Age: 41
End: 2021-08-06
Payer: OTHER GOVERNMENT

## 2021-08-06 VITALS
HEART RATE: 74 BPM | TEMPERATURE: 98 F | HEIGHT: 62 IN | SYSTOLIC BLOOD PRESSURE: 100 MMHG | OXYGEN SATURATION: 96 % | BODY MASS INDEX: 19.07 KG/M2 | DIASTOLIC BLOOD PRESSURE: 58 MMHG | WEIGHT: 103.63 LBS

## 2021-08-06 DIAGNOSIS — L93.0 LUPUS ERYTHEMATOSUS, UNSPECIFIED FORM: ICD-10-CM

## 2021-08-06 DIAGNOSIS — F17.200 TOBACCO USE DISORDER: ICD-10-CM

## 2021-08-06 DIAGNOSIS — J44.9 CHRONIC OBSTRUCTIVE PULMONARY DISEASE, UNSPECIFIED COPD TYPE: ICD-10-CM

## 2021-08-06 DIAGNOSIS — M54.12 CERVICAL RADICULOPATHY: Primary | ICD-10-CM

## 2021-08-06 PROCEDURE — 99215 OFFICE O/P EST HI 40 MIN: CPT | Mod: PBBFAC,PO | Performed by: FAMILY MEDICINE

## 2021-08-06 PROCEDURE — 99214 OFFICE O/P EST MOD 30 MIN: CPT | Mod: S$PBB,,, | Performed by: FAMILY MEDICINE

## 2021-08-06 PROCEDURE — 99999 PR PBB SHADOW E&M-EST. PATIENT-LVL V: CPT | Mod: PBBFAC,,, | Performed by: FAMILY MEDICINE

## 2021-08-06 PROCEDURE — 99999 PR PBB SHADOW E&M-EST. PATIENT-LVL V: ICD-10-PCS | Mod: PBBFAC,,, | Performed by: FAMILY MEDICINE

## 2021-08-06 PROCEDURE — 99214 PR OFFICE/OUTPT VISIT, EST, LEVL IV, 30-39 MIN: ICD-10-PCS | Mod: S$PBB,,, | Performed by: FAMILY MEDICINE

## 2021-08-06 RX ORDER — BUSPIRONE HYDROCHLORIDE 10 MG/1
10 TABLET ORAL 2 TIMES DAILY
Qty: 60 TABLET | Refills: 11 | Status: ON HOLD | OUTPATIENT
Start: 2021-08-06 | End: 2022-11-18 | Stop reason: HOSPADM

## 2021-08-06 RX ORDER — VALACYCLOVIR HYDROCHLORIDE 1 G/1
1000 TABLET, FILM COATED ORAL EVERY 12 HOURS PRN
Qty: 14 TABLET | Refills: 5 | Status: SHIPPED | OUTPATIENT
Start: 2021-08-06 | End: 2022-11-16

## 2021-08-06 RX ORDER — DICLOFENAC SODIUM 10 MG/G
GEL TOPICAL 4 TIMES DAILY
COMMUNITY
Start: 2021-07-30 | End: 2022-11-11 | Stop reason: CLARIF

## 2021-08-06 RX ORDER — HYDROXYCHLOROQUINE SULFATE 200 MG/1
200 TABLET, FILM COATED ORAL 2 TIMES DAILY
Qty: 180 TABLET | Refills: 3 | Status: SHIPPED | OUTPATIENT
Start: 2021-08-06 | End: 2023-06-08

## 2021-08-06 RX ORDER — METHYLPREDNISOLONE 4 MG/1
TABLET ORAL
COMMUNITY
Start: 2021-08-04 | End: 2022-11-11 | Stop reason: CLARIF

## 2021-08-06 RX ORDER — UBROGEPANT 100 MG/1
100 TABLET ORAL
COMMUNITY
Start: 2021-06-25

## 2022-08-03 DIAGNOSIS — Z12.31 OTHER SCREENING MAMMOGRAM: ICD-10-CM

## 2022-08-08 ENCOUNTER — PATIENT MESSAGE (OUTPATIENT)
Dept: ADMINISTRATIVE | Facility: HOSPITAL | Age: 42
End: 2022-08-08
Payer: OTHER GOVERNMENT

## 2022-08-08 ENCOUNTER — PATIENT OUTREACH (OUTPATIENT)
Dept: ADMINISTRATIVE | Facility: HOSPITAL | Age: 42
End: 2022-08-08
Payer: OTHER GOVERNMENT

## 2022-10-10 ENCOUNTER — PATIENT MESSAGE (OUTPATIENT)
Dept: ADMINISTRATIVE | Facility: HOSPITAL | Age: 42
End: 2022-10-10
Payer: OTHER GOVERNMENT

## 2022-11-11 ENCOUNTER — HOSPITAL ENCOUNTER (EMERGENCY)
Facility: HOSPITAL | Age: 42
Discharge: HOME | End: 2022-11-11
Attending: EMERGENCY MEDICINE
Payer: OTHER GOVERNMENT

## 2022-11-11 VITALS
RESPIRATION RATE: 16 BRPM | BODY MASS INDEX: 18.65 KG/M2 | WEIGHT: 101.94 LBS | HEART RATE: 101 BPM | OXYGEN SATURATION: 99 % | DIASTOLIC BLOOD PRESSURE: 82 MMHG | TEMPERATURE: 98 F | SYSTOLIC BLOOD PRESSURE: 118 MMHG

## 2022-11-11 DIAGNOSIS — S20.211A BRUISED RIBS, RIGHT, INITIAL ENCOUNTER: Primary | ICD-10-CM
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
## 2022-11-11 LAB
HCV AB SERPL QL IA: NORMAL
HIV 1+2 AB+HIV1 P24 AG SERPL QL IA: NORMAL

## 2022-11-11 PROCEDURE — 36415 COLL VENOUS BLD VENIPUNCTURE: CPT | Performed by: EMERGENCY MEDICINE

## 2022-11-11 PROCEDURE — 86803 HEPATITIS C AB TEST: CPT | Performed by: EMERGENCY MEDICINE

## 2022-11-11 PROCEDURE — 87389 HIV-1 AG W/HIV-1&-2 AB AG IA: CPT | Performed by: EMERGENCY MEDICINE

## 2022-11-11 PROCEDURE — 99284 EMERGENCY DEPT VISIT MOD MDM: CPT | Mod: 25

## 2022-11-11 NOTE — ED PROVIDER NOTES
Encounter Date: 11/11/2022       History     Chief Complaint   Patient presents with    Fall     Fall around 4 am on Wednesday. Seen at [facility] and now having pain in right chest wall with popping sensation     42-year-old female past medical history of chronic neck and back pain who presents today with right upper chest pain after fall.  Patient was at a bar Wednesday night 2 nights ago when fall occurred.  She reports she had taken a couple shots and was testing out a newly installed stripper pole when they asked her to test it out.  She reports the pole gave away and she fell onto her right face and right upper chest.  She reports positive LOC and some epistaxis.  She reports initially she was mostly complaining of pain over right face and nose but now her right upper and lateral chest are more significant. Patient was seen yesterday at [facility] where she received CT face, CT head and chest x-ray which showed acute right nasal fracture otherwise unremarkable.  Denies any vomiting since the incident.  Patient denies any new bleeding.  She does report some bruising and swelling over the face as well as in bruising to right upper chest just below the clavicle.  Pain is worse with movements.  Denies any shortness of breath but she says it hurts when she takes deep breaths.  Patient also reports it hurts when she moves her right arm.  Denies any slurred speech, vision changes, facial asymmetry, unilateral numbness or difficulty ambulating.  Denies fevers, chills, cough, CP, N/V/D, abdominal pain, dysuria, hematuria or any other complaints.        The history is provided by the patient. No  was used.   Review of patient's allergies indicates:   Allergen Reactions    Pregabalin Itching, Other (See Comments) and Hives     Bruising and headaches    Sulfa (sulfonamide antibiotics) Nausea And Vomiting    Flexeril [cyclobenzaprine] Rash    Morphine Rash and Hives     Sulfamethoxazole-trimethoprim Rash    Toradol [ketorolac] Rash     Past Medical History:   Diagnosis Date    Abnormal Pap smear     Anxiety     Cancer     pre-cancer cells cervical     Cervical disc herniation     Chronic cough     Closed fracture 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) level with anterior cord syndrome     T 12 fracture compression     Fractures     Genital herpes     GERD (gastroesophageal reflux disease)     Hemorrhoid     History of colposcopy with cervical biopsy     Lumbar disc herniation     on Hydrocodone    Lupus (systemic lupus erythematosus)     chronic thrombocytopenia    Migraine headache     PONV (postoperative nausea and vomiting)      Past Surgical History:   Procedure Laterality Date    ADENOIDECTOMY      APPENDECTOMY       SECTION, CLASSIC      x2 , last 2014    CHOLECYSTECTOMY      COLONOSCOPY N/A 10/29/2019    Procedure: COLONOSCOPY;  Surgeon: Jules De La Cruz MD;  Location: [facility];  Service: Endoscopy;  Laterality: N/A;    CRYOTHERAPY          DILATION AND CURETTAGE OF UTERUS  [id number]    ENDOMETRIAL ABLATION      GANGLION CYST EXCISION Left     TONSILLECTOMY      TUBAL LIGATION  2014    TYMPANOSTOMY TUBE PLACEMENT      x 7     Family History   Problem Relation Age of Onset    Heart disease Father     Diabetes Mother     Cancer Maternal Grandfather         colon    Cancer Paternal Grandfather         colon    Breast cancer Maternal Grandmother     Breast cancer Maternal Aunt     Breast cancer Maternal Aunt     Ovarian cancer Neg Hx      Social History     Tobacco Use    Smoking status: Every Day     Packs/day: 0.25     Types: Cigarettes    Smokeless tobacco: Never   Substance Use Topics    Alcohol use: Yes     Comment: special occasions    Drug use: Yes     Types: Marijuana     Comment: prescription     Review of Systems   Constitutional:  Negative for activity change, diaphoresis and fever.   HENT:  Positive for facial swelling. Negative for ear pain, rhinorrhea, sore throat,  trouble swallowing and voice change.    Eyes:  Negative for pain and visual disturbance.   Respiratory:  Negative for cough, shortness of breath and stridor.    Cardiovascular:  Positive for chest pain (right upper and right lateral chest).   Gastrointestinal:  Negative for abdominal pain, blood in stool, diarrhea, nausea and vomiting.   Genitourinary:  Negative for dysuria, hematuria, vaginal bleeding and vaginal discharge.   Musculoskeletal:  Negative for gait problem.   Skin:  Negative for rash and wound.   Neurological:  Positive for syncope. Negative for dizziness, tremors, seizures, facial asymmetry, speech difficulty, weakness, light-headedness, numbness and headaches.   Psychiatric/Behavioral:  Negative for hallucinations and suicidal ideas.      Physical Exam     Initial Vitals [11/11/22 0558]   BP Pulse Resp Temp SpO2   118/82 101 16 98.3 °F (36.8 °C) 98 %      MAP       --         Physical Exam    Nursing note and vitals reviewed.  Constitutional: She appears well-developed. She is not diaphoretic. No distress.   HENT:   Head: Normocephalic.   Nose: Nose normal.   Tenderness to right face and infraorbital area. Edema to right face and nose. No septal hematoma.  Healing wounds to nose and face. No laceration   Eyes: EOM are normal. No scleral icterus.   Neck: Neck supple.   Normal range of motion.  Cardiovascular:  Normal rate, regular rhythm, normal heart sounds and intact distal pulses.     Exam reveals no gallop and no friction rub.       No murmur heard.  Pulmonary/Chest: Breath sounds normal. No stridor. No respiratory distress. She has no wheezes. She has no rhonchi. She has no rales.   Ecchymosis just inferior to mid right clavicle and tenderness to right upper anterior and right lateral chest wall. Respirations even and unlabored. Clear breath sounds bilaterally with good aeration   Abdominal: Abdomen is soft. Bowel sounds are normal. She exhibits no distension. There is no abdominal tenderness.  There is no rebound and no guarding.   Musculoskeletal:         General: Normal range of motion.      Cervical back: Normal range of motion and neck supple.      Comments: No CT or L-spine tenderness no step-offs or deformities.     Neurological: She is alert and oriented to person, place, and time. She has normal strength. No cranial nerve deficit or sensory deficit.   Cranial nerves II through XII grossly intact. Finger to nose normal. Tone normal. Sens intact to light touch. No drift. Strength 5/5 bilaterally upper and lower. Normal gait. No Romberg. Speech and cognition is normal.  No focal neurologic deficit.     Skin: Skin is warm and dry. Capillary refill takes less than 2 seconds. No rash noted.   Psychiatric: She has a normal mood and affect.       ED Course   Procedures  Labs Reviewed   HIV 1 / 2 ANTIBODY    Narrative:     Release to patient->Immediate   HEPATITIS C ANTIBODY    Narrative:     Release to patient->Immediate          Imaging Results              X-Ray Ribs 2 View Right (Final result)  Result time 11/11/22 08:17:36      Final result by Kartik Montes MD (11/11/22 08:17:36)                   Impression:      No evidence for right rib fracture.      Electronically signed by: Kartik Montes MD  Date:    11/11/2022  Time:    08:17               Narrative:    EXAMINATION:  XR RIBS 2 VIEW RIGHT    CLINICAL HISTORY:  Unspecified fall, initial encounter    TECHNIQUE:  Two views of the right ribs were performed.    COMPARISON:  None    FINDINGS:  No right rib fracture is identified.  There is no right pleural effusion or pneumothorax.  Surgical clips are present in the right lower quadrant.  There are old calcified granulomas in the mediastinum and right lower lung zone.                                       X-Ray Chest PA And Lateral (Final result)  Result time 11/11/22 08:16:47      Final result by Kartik Montes MD (11/11/22 08:16:47)                   Impression:      No active  cardiopulmonary process.  No significant change.      Electronically signed by: Kartik Montes MD  Date:    11/11/2022  Time:    08:16               Narrative:    EXAMINATION:  XR CHEST PA AND LATERAL    CLINICAL HISTORY:  Unspecified fall, initial encounter    TECHNIQUE:  PA and lateral views of the chest were performed.    COMPARISON:  09/08/2017    FINDINGS:  The cardiomediastinal silhouette is within normal limits.  The lungs are well expanded without consolidation or pleural effusion.  There is no pneumothorax.  There is an old calcified granuloma in the right lower lung zone.                                       Medications - No data to display              ED Course as of 11/12/22 1315   Fri Nov 11, 2022   0707 Assumed care patient from Dr. Mccartney.  Briefly patient fell down several days ago while performing on a stripper pole at a bar.  She is been evaluated outside facility with head CT and other imaging.  She complains of right-sided and anterior chest wall tenderness and pain.  X-rays are reviewed and showed no evidence of pneumothorax or obvious rib fractures.  Awaiting radiology read, will plan to discharge home.  Patient on chronic narcotic pain medicine. [AS]      ED Course User Index  [AS] Davon Blake MD                 Clinical Impression:   Final diagnoses:  [W19.XXXA] Fall  [S20.211A] Bruised ribs, right, initial encounter (Primary)        ED Disposition Condition    Discharge Stable          ED Prescriptions    None       Follow-up Information       Follow up With Specialties Details Why Contact Info    Nakul Garcia MD Family Medicine  As needed [address] [place] [address]  [phone]      [facility] Emergency Medicine  If symptoms worsen [address] [place] [id number] [phone]             Russell Nelson MD  11/12/22 7985

## 2022-11-11 NOTE — DISCHARGE INSTRUCTIONS
You may take ibuprofen or naproxen if you do not have a low allergies to these medications in addition to the Norco 10 mg tablets for additional pain control.

## 2022-11-16 ENCOUNTER — LAB VISIT (OUTPATIENT)
Dept: LAB | Facility: HOSPITAL | Age: 42
End: 2022-11-16
Payer: OTHER GOVERNMENT

## 2022-11-16 ENCOUNTER — TELEPHONE (OUTPATIENT)
Dept: NEUROLOGY | Facility: CLINIC | Age: 42
End: 2022-11-16
Payer: OTHER GOVERNMENT

## 2022-11-16 ENCOUNTER — OFFICE VISIT (OUTPATIENT)
Dept: FAMILY MEDICINE | Facility: CLINIC | Age: 42
End: 2022-11-16
Payer: OTHER GOVERNMENT

## 2022-11-16 ENCOUNTER — OFFICE VISIT (OUTPATIENT)
Dept: NEUROLOGY | Facility: CLINIC | Age: 42
End: 2022-11-16
Payer: OTHER GOVERNMENT

## 2022-11-16 VITALS
DIASTOLIC BLOOD PRESSURE: 88 MMHG | WEIGHT: 104.5 LBS | HEART RATE: 92 BPM | BODY MASS INDEX: 19.23 KG/M2 | SYSTOLIC BLOOD PRESSURE: 130 MMHG | HEIGHT: 62 IN

## 2022-11-16 VITALS
WEIGHT: 103.63 LBS | HEIGHT: 62 IN | HEART RATE: 88 BPM | BODY MASS INDEX: 19.07 KG/M2 | SYSTOLIC BLOOD PRESSURE: 100 MMHG | DIASTOLIC BLOOD PRESSURE: 60 MMHG | OXYGEN SATURATION: 97 %

## 2022-11-16 DIAGNOSIS — S06.0X1D CONCUSSION WITH LOSS OF CONSCIOUSNESS OF 30 MINUTES OR LESS, SUBSEQUENT ENCOUNTER: ICD-10-CM

## 2022-11-16 DIAGNOSIS — L93.0 LUPUS ERYTHEMATOSUS, UNSPECIFIED FORM: ICD-10-CM

## 2022-11-16 DIAGNOSIS — S02.2XXS CLOSED FRACTURE OF NASAL BONE, SEQUELA: ICD-10-CM

## 2022-11-16 DIAGNOSIS — S20.211D CONTUSION OF RIB ON RIGHT SIDE, SUBSEQUENT ENCOUNTER: Primary | ICD-10-CM

## 2022-11-16 PROBLEM — S06.0X1A CONCUSSION WTH LOSS OF CONSCIOUSNESS OF 30 MINUTES OR LESS: Status: ACTIVE | Noted: 2022-11-16

## 2022-11-16 LAB
ALBUMIN SERPL BCP-MCNC: 4.3 G/DL (ref 3.5–5.2)
ALP SERPL-CCNC: 98 U/L (ref 55–135)
ALT SERPL W/O P-5'-P-CCNC: 9 U/L (ref 10–44)
ANION GAP SERPL CALC-SCNC: 8 MMOL/L (ref 8–16)
AST SERPL-CCNC: 10 U/L (ref 10–40)
BASOPHILS # BLD AUTO: 0.06 K/UL (ref 0–0.2)
BASOPHILS NFR BLD: 0.5 % (ref 0–1.9)
BILIRUB SERPL-MCNC: 0.3 MG/DL (ref 0.1–1)
BUN SERPL-MCNC: 10 MG/DL (ref 6–20)
CALCIUM SERPL-MCNC: 10.4 MG/DL (ref 8.7–10.5)
CHLORIDE SERPL-SCNC: 101 MMOL/L (ref 95–110)
CO2 SERPL-SCNC: 30 MMOL/L (ref 23–29)
CREAT SERPL-MCNC: 0.8 MG/DL (ref 0.5–1.4)
CRP SERPL-MCNC: 6.4 MG/L (ref 0–8.2)
DIFFERENTIAL METHOD: ABNORMAL
EOSINOPHIL # BLD AUTO: 0.1 K/UL (ref 0–0.5)
EOSINOPHIL NFR BLD: 1.2 % (ref 0–8)
ERYTHROCYTE [DISTWIDTH] IN BLOOD BY AUTOMATED COUNT: 13.2 % (ref 11.5–14.5)
ERYTHROCYTE [SEDIMENTATION RATE] IN BLOOD BY PHOTOMETRIC METHOD: <2 MM/HR (ref 0–36)
EST. GFR  (NO RACE VARIABLE): >60 ML/MIN/1.73 M^2
GLUCOSE SERPL-MCNC: 92 MG/DL (ref 70–110)
HCT VFR BLD AUTO: 47 % (ref 37–48.5)
HGB BLD-MCNC: 15.1 G/DL (ref 12–16)
IMM GRANULOCYTES # BLD AUTO: 0.04 K/UL (ref 0–0.04)
IMM GRANULOCYTES NFR BLD AUTO: 0.3 % (ref 0–0.5)
LYMPHOCYTES # BLD AUTO: 3.6 K/UL (ref 1–4.8)
LYMPHOCYTES NFR BLD: 31.6 % (ref 18–48)
MCH RBC QN AUTO: 33 PG (ref 27–31)
MCHC RBC AUTO-ENTMCNC: 32.1 G/DL (ref 32–36)
MCV RBC AUTO: 103 FL (ref 82–98)
MONOCYTES # BLD AUTO: 0.8 K/UL (ref 0.3–1)
MONOCYTES NFR BLD: 6.8 % (ref 4–15)
NEUTROPHILS # BLD AUTO: 6.8 K/UL (ref 1.8–7.7)
NEUTROPHILS NFR BLD: 59.6 % (ref 38–73)
NRBC BLD-RTO: 0 /100 WBC
PLATELET # BLD AUTO: 242 K/UL (ref 150–450)
PMV BLD AUTO: 12.3 FL (ref 9.2–12.9)
POTASSIUM SERPL-SCNC: 5.4 MMOL/L (ref 3.5–5.1)
PROT SERPL-MCNC: 7.9 G/DL (ref 6–8.4)
RBC # BLD AUTO: 4.58 M/UL (ref 4–5.4)
SODIUM SERPL-SCNC: 139 MMOL/L (ref 136–145)
WBC # BLD AUTO: 11.45 K/UL (ref 3.9–12.7)

## 2022-11-16 PROCEDURE — 99214 PR OFFICE/OUTPT VISIT, EST, LEVL IV, 30-39 MIN: ICD-10-PCS | Mod: S$PBB,,, | Performed by: FAMILY MEDICINE

## 2022-11-16 PROCEDURE — 99215 OFFICE O/P EST HI 40 MIN: CPT | Mod: PBBFAC,27,PO | Performed by: NURSE PRACTITIONER

## 2022-11-16 PROCEDURE — 99204 PR OFFICE/OUTPT VISIT, NEW, LEVL IV, 45-59 MIN: ICD-10-PCS | Mod: S$PBB,,, | Performed by: NURSE PRACTITIONER

## 2022-11-16 PROCEDURE — 99999 PR PBB SHADOW E&M-EST. PATIENT-LVL V: ICD-10-PCS | Mod: PBBFAC,,, | Performed by: FAMILY MEDICINE

## 2022-11-16 PROCEDURE — 99214 OFFICE O/P EST MOD 30 MIN: CPT | Mod: S$PBB,,, | Performed by: FAMILY MEDICINE

## 2022-11-16 PROCEDURE — 85652 RBC SED RATE AUTOMATED: CPT | Performed by: FAMILY MEDICINE

## 2022-11-16 PROCEDURE — 99215 OFFICE O/P EST HI 40 MIN: CPT | Mod: PBBFAC,PO | Performed by: FAMILY MEDICINE

## 2022-11-16 PROCEDURE — 99204 OFFICE O/P NEW MOD 45 MIN: CPT | Mod: S$PBB,,, | Performed by: NURSE PRACTITIONER

## 2022-11-16 PROCEDURE — 99999 PR PBB SHADOW E&M-EST. PATIENT-LVL V: CPT | Mod: PBBFAC,,, | Performed by: NURSE PRACTITIONER

## 2022-11-16 PROCEDURE — 36415 COLL VENOUS BLD VENIPUNCTURE: CPT | Mod: PO | Performed by: FAMILY MEDICINE

## 2022-11-16 PROCEDURE — 86140 C-REACTIVE PROTEIN: CPT | Performed by: FAMILY MEDICINE

## 2022-11-16 PROCEDURE — 99999 PR PBB SHADOW E&M-EST. PATIENT-LVL V: ICD-10-PCS | Mod: PBBFAC,,, | Performed by: NURSE PRACTITIONER

## 2022-11-16 PROCEDURE — 80053 COMPREHEN METABOLIC PANEL: CPT | Performed by: FAMILY MEDICINE

## 2022-11-16 PROCEDURE — 85025 COMPLETE CBC W/AUTO DIFF WBC: CPT | Performed by: FAMILY MEDICINE

## 2022-11-16 PROCEDURE — 99999 PR PBB SHADOW E&M-EST. PATIENT-LVL V: CPT | Mod: PBBFAC,,, | Performed by: FAMILY MEDICINE

## 2022-11-16 RX ORDER — DIAZEPAM 5 MG/1
5 TABLET ORAL ONCE AS NEEDED
Qty: 1 TABLET | Refills: 0 | Status: SHIPPED | OUTPATIENT
Start: 2022-11-16 | End: 2023-03-29

## 2022-11-16 RX ORDER — MELOXICAM 15 MG/1
15 TABLET ORAL DAILY
Qty: 30 TABLET | Refills: 2 | Status: SHIPPED | OUTPATIENT
Start: 2022-11-16 | End: 2023-03-19

## 2022-11-16 NOTE — PATIENT INSTRUCTIONS
Postconcussion syndrome (PCS) refers to a common constellation of symptoms reported by patients after mild traumatic brain injury (TBI). The most common complaints include headache, dizziness, cognitive impairment, and psychological symptoms.   ?Females and older patients appear to be more susceptible to the development of postconcussion symptoms. The severity of the brain injury does not clearly correlate with the risk of developing PCS or the prognosis for recovery.   ?Theories of the pathogenesis of the syndrome include both structural and biochemical brain injury as well as psychogenic mechanisms. These may play different etiologic roles at different times in the course of the disorder.   ?In patients who did not have magnetic resonance imaging (MRI) as part of their acute injury evaluation, a brain MRI should be performed if there are persistent and disabling complaints to exclude other causes; reassurance should also be provided. Because of the nonspecific nature of the results, neuropsychological testing should be reserved for selected patients.   ?In the absence of a defined specific treatment for this disorder, management is generally symptomatic and may include medication for headache, psychological counseling, and/or psychotropic medications as dictated by patient complaints and disability. Education and reassurance shortly after the injury are also helpful.   ?Most patients recover quickly, within several weeks. A minority have prolonged disability. Litigation and comorbidity may play a role in these patients.

## 2022-11-16 NOTE — PROGRESS NOTES
"NEUROLOGY  Outpatient CONSULT    [facility]  [address], [place] [address]  [phone] (office) / [phone] (fax)    Patient Name:  Jaye Martinez  :  1980  MR #:  [id number]  Acct #:  [id number]    Date of Neurology Consult: 2022  Name of Provider: AD Dolan    Other Physicians:  Nakul Garcia MD (Primary Care Physician); Steve Gonzalez MD (Referring)      Chief Complaint: Loss of Consciousness      History of Present Illness (HPI):  Jaye Martinez is a 42 y.o. female with a PMHX of migraines, lumbar/cervical herniation, anxiety, cancers  Patient is here today for post concussion. She sustained a fall off of a  poll last week. The pole gave way and she fell on the right side of her face and shoulder. She reports losing consciousness and was awoken by smelling salt.  She went to [place] for evaluation and found to have a displaced nasal fracture and right zygomatic fracture. Since then she has been experiencing headaches, dizziness, brain fog, nausea, and imbalance.   She does see another neurologist/pain management MD but states they are out of network and do not specialize in concussion. She is taking pain medication, meloxicam and migraine for her symptoms.       ED report 2022:  "42-year-old female past medical history of chronic neck and back pain who presents today with right upper chest pain after fall.  Patient was at a bar Wed night 2 nights ago when fall occurred.  She reports she had taken a couple shots and was testing out a newly installed stripper pole when they asked her to test it out.  She reports the pole gave away and she fell onto her right face and right upper chest.  She reports positive LOC and some epistaxis.  She reports initially she was mostly complaining of pain over right face and nose but now her right upper and lateral chest are more significant. Patient was seen yesterday at [facility] where she received CT face, CT head and chest x-ray which showed acute right nasal fracture otherwise unremarkable.  Denies any vomiting since the incident.  Patient denies any new bleeding.  She does report some bruising and swelling over the face as well as in bruising to right upper chest just below the clavicle.  Pain is worse with movements.  Denies any shortness of breath but she says it hurts when she takes deep breaths.  Patient also reports it hurts when she moves her right arm.  Denies any slurred speech, vision changes, facial asymmetry, unilateral numbness or difficulty ambulating.  Denies fevers, chills, cough, CP, N/V/D, abdominal pain, dysuria, hematuria or any other complaints."                   Past Medical, Surgical, Family & Social History:   Past Medical History:   Diagnosis Date    Abnormal Pap smear     Anxiety     Cancer     pre-cancer cells cervical     Cervical disc herniation     Chronic cough     Closed fracture 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) level with anterior cord syndrome     T 12 fracture compression     Fractures     Genital herpes     GERD (gastroesophageal reflux disease)     Hemorrhoid     History of colposcopy with cervical biopsy     Lumbar disc herniation     on Hydrocodone    Lupus (systemic lupus erythematosus)     chronic thrombocytopenia    Migraine headache     PONV (postoperative nausea and vomiting)      Past Surgical History:   Procedure Laterality Date    ADENOIDECTOMY      APPENDECTOMY       SECTION, CLASSIC      x2 , last 2014    CHOLECYSTECTOMY      COLONOSCOPY N/A 10/29/2019    Procedure: COLONOSCOPY;  Surgeon: Jules De La Cruz MD;  Location: [facility];  Service: Endoscopy;  Laterality: N/A;    CRYOTHERAPY          DILATION AND CURETTAGE OF UTERUS  [id number]    ENDOMETRIAL ABLATION      GANGLION CYST EXCISION Left     TONSILLECTOMY      TUBAL LIGATION  2014    TYMPANOSTOMY TUBE PLACEMENT      x 7     Family History   Problem Relation Age of Onset    " Heart disease Father     Diabetes Mother     Cancer Maternal Grandfather         colon    Cancer Paternal Grandfather         colon    Breast cancer Maternal Grandmother     Breast cancer Maternal Aunt     Breast cancer Maternal Aunt     Ovarian cancer Neg Hx      Alcohol use:  reports current alcohol use.   (Of note, 0.6 oz = 1 beer or 6 oz = 10 beers).  Tobacco use:  reports that she has been smoking cigarettes. She has been smoking an average of .25 packs per day. She has never used smokeless tobacco.  Street drug use:  reports current drug use. Drug: Marijuana.  Allergies: Pregabalin, Sulfa (sulfonamide antibiotics), Flexeril [cyclobenzaprine], Morphine, Sulfamethoxazole-trimethoprim, and Toradol [ketorolac].    Home Medications:     Current Outpatient Medications:     butalbital-acetaminophen-caffeine -40 mg (FIORICET, ESGIC) -40 mg per tablet, Take 1 tablet by mouth 2 (two) times daily as needed., Disp: 30 tablet, Rfl: 0    EMGALITY  mg/mL PnIj, , Disp: , Rfl:     hydrocodone-acetaminophen 10-325mg (NORCO)  mg Tab, Take 1 tablet by mouth every 6 (six) hours as needed., Disp: 120 tablet, Rfl: 0    hydrOXYchloroQUINE (PLAQUENIL) 200 mg tablet, Take 1 tablet (200 mg total) by mouth 2 (two) times daily., Disp: 180 tablet, Rfl: 3    meloxicam (MOBIC) 15 MG tablet, Take 1 tablet (15 mg total) by mouth once daily., Disp: 30 tablet, Rfl: 2    tiZANidine (ZANAFLEX) 4 MG tablet, Take 4 mg by mouth every 6 (six) hours as needed., Disp: , Rfl:     UBROGEPANT 100 mg tablet, , Disp: , Rfl:     albuterol (PROAIR HFA) 90 mcg/actuation inhaler, Inhale 2 puffs into the lungs every 4 (four) hours as needed for Wheezing., Disp: 25.5 g, Rfl: 0    busPIRone (BUSPAR) 10 MG tablet, Take 1 tablet (10 mg total) by mouth 2 (two) times daily., Disp: 60 tablet, Rfl: 11    diazePAM (VALIUM) 5 MG tablet, Take 1 tablet (5 mg total) by mouth 1 (one) time if needed for Anxiety. Take 15 mins prior to MRI, Disp: 1  "tablet, Rfl: 0    Physical Examination:  /88 (BP Location: Right arm, Patient Position: Sitting, BP Method: Medium (Automatic))   Pulse 92   Ht 5' 2" (1.575 m)   Wt 47.4 kg (104 lb 8 oz)   BMI 19.11 kg/m²     GENERAL:  General appearance: Well, non-toxic appearing.  No apparent distress.  Neck: supple.  .    MENTAL STATUS:  Alertness, attention span & concentration: normal.  Language: normal.  Orientation to self, place & time:  did not know day of week, date or month  Memory, recent & remote: normal.  Fund of knowledge: normal.  MMSE: unable to complete. Pt became very tearful on exam    SPEECH:  Clear and fluent.  Follows complex commands.      CRANIAL NERVES:  Cranial Nerves II-XII were examined.  II - Visual fields: normal.  III, IV, VI: PERRL, EOMI, No ptosis, No nystagmus.  V - Facial sensation: normal.  VII - Face symmetry & mobility: normal.  VIII - Hearing: normal  IX, X - Palate: mobile & midline.  XI - Shoulder shrug: normal.  XII - Tongue protrusion: normal.        GROSS MOTOR:  Gait & station: non focal  Tone: normal.  Abnormal movements: none.  Finger-nose: normal.  Rapid alternating movements: normal.  Pronator drift: normal      MUSCLE STRENGTH:   Hand grasp:   - right:5/5   - left:5/5    RUE very painful with ROM    Fascics Atrophy RIGHT    LEFT Atrophy Fascics     5 Deltoids 5       5 Biceps 5       5 Triceps 5       5 Forearm.Pr. 5                4+ Iliopsoas flex    5       5 Hip Abduct 5       5 Hip Adduct 5       5 Quads 5       5 Hams 5       5 Dorsiflex 5       5 Plantar Flex 5                    REFLEXES:    RIGHT Reflex   LEFT   2+ Biceps 2+   2+ Brachiorad. 2+        2+ Patellar 2+     SENSORY:  Light touch: Normal throughout.            Diagnostic Data Reviewed:       [facility] 11/10/2022 (outside database):  FINDINGS: The ventricles are normal in size and position. There is no evidence of acute intracranial hemorrhage or infarct. There is no evidence of mass, mass effect, or midline " shift. No intra-axial or extra axial fluid collections. No focal gray-white matter abnormality. Basal cisterns are patent.  The visualized orbits are normal in appearance. There is trace right maxillary sinus mucosal disease. Paranasal sinuses are otherwise well-aerated.  There is an acute appearing minimally depressed right nasal fracture. Chronic deformity of the right zygomatic arch. Right malar soft tissue swelling.       IMPRESSION:    1.  No acute intracranial abnormality.      2.  Acute appearing minimally displaced right nasal fracture with right facial soft tissue swelling.        CT Maxillofacial 11/10/2022:    FINDINGS: Chronic deformity of the right zygoma. There is a minimally depressed right nasal fracture. This appears acute. No other fracture is demonstrated.  Trace left maxillary sinus mucosal thickening. There is no fluid in the paranasal sinus   cavities.  The orbits are normal in appearance. There is soft tissue swelling over the nasal bridge and right nasolabial fold.     IMPRESSION:    1.  Acute minimally displaced right nasal fracture with regional soft tissue swelling.    2.  Old right zygomatic fracture.       Assessment and Plan:  Jaye Martinez is a 42 y.o. female.    Problem List Items Addressed This Visit          Neuro    Concussion wth loss of consciousness of 30 minutes or less    Current Assessment & Plan     Patient is a 41 y/o female that presents following a fall and LOC after a  poll gave way. She landed on her right shoulder and the right side of her face causing a displaced nasal fracture and zygomatic fracture.   CTH neg acute  Since then she has experience post concussive symptoms including dizziness, imbalance, headaches, brain fog and nausea.   She was unable to complete a MMSE today as she was very tearful.   Pain limiting ROM on neuro exam but no significant deficits.   Obtain MRI brain scan   Informed pt that her symptoms may persist for days, weeks or even  months. Management consists of symptomatic control including NSAIDs, Tylenol and warm compresses.    - I informed pt that I will not be prescribing opiates for pain control as she asked for this.   Pt requesting referral to concussion specialist on the [place].  Consider Neuro psych testing  Agree on holding on driving for now.  Pt to f/u with her previous neurologist for all other complaints                 Important to note, also  has a past medical history of Abnormal Pap smear, Anxiety, Cancer, Cervical disc herniation, Chronic cough, Closed fracture 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) level with anterior cord syndrome, Fractures, Genital herpes, GERD (gastroesophageal reflux disease), Hemorrhoid, History of colposcopy with cervical biopsy, Lumbar disc herniation, Lupus (systemic lupus erythematosus), Migraine headache, and PONV (postoperative nausea and vomiting).            The patient will return to clinic as needed        All questions were answered and patient is comfortable with the plan.       Thank you very much for the opportunity to assist in this patient's care.    If you have any questions or concerns, please do not hesitate to contact me at any time.    Sincerely,     AD Dolan  [facility] [place]         I spent a total of 50 minutes on the day of the visit.This includes face to face time and non-face to face time preparing to see the patient (eg, review of tests), Obtaining and/or reviewing separately obtained history, Documenting clinical information in the electronic or other health record, Independently interpreting resultsand communicating results to the patient/family/caregiver, or Care coordination.

## 2022-11-16 NOTE — TELEPHONE ENCOUNTER
Attempted to call patient to confirm reasoning for appointment today November 16 at 2 pm ,(what cause the loss of consciousness, was this a seizure, does she have hx of seizures, while looking in the chart I saw that PCP documented that he is referring her to Neuro for concussion, if she is coming for concussion Kinga Marx, NP can work her up for it but our concussion specialist are down in the [place]).

## 2022-11-16 NOTE — PROGRESS NOTES
"Subjective:       Patient ID: Jaye Martinez is a 42 y.o. female.    Chief Complaint: ER follow up  (Right shoulder pain )    HPI:  Pleasant 42-year-old patient who presents today after falling from a dance floor on a dance full 6 days ago.  She states it was loss of consciousness perhaps for about 15 minutes.  She was evaluated [place] CT scan of the head negative, chest x-ray looked fine without evidence of major fracture, the CT scan did show minimally displaced nasal bone fracture.  She is having significant pain on the right side of her body over her ribs.  Pulse oximetry looks good vital signs are stable.  Recommend adding meloxicam 15 mg daily and do not take other anti-inflammatories while taking that.  Also recommend referral to ENT for the nasal fracture and Neurology for the concussion.  Also physical therapy should help her much with the right side body motion issues.  Review of Systems   Constitutional: Negative.    HENT: Negative.     Eyes: Negative.    Respiratory: Negative.     Cardiovascular:  Positive for chest pain.        Atypical chest discomfort due to rib   Gastrointestinal: Negative.    Endocrine: Negative.    Genitourinary: Negative.    Musculoskeletal: Negative.    Skin: Negative.    Allergic/Immunologic: Negative.    Neurological:  Positive for dizziness.   Hematological: Negative.    Psychiatric/Behavioral: Negative.       Objective:      Vitals:    11/16/22 0910   BP: 100/60   BP Location: Left arm   Patient Position: Sitting   BP Method: Small (Manual)   Pulse: 88   SpO2: 97%   Weight: 47 kg (103 lb 9.9 oz)   Height: 5' 2" (1.575 m)      Physical Exam  Vitals and nursing note reviewed.   Constitutional:       Appearance: Normal appearance. She is normal weight.   HENT:      Head: Normocephalic and atraumatic.      Nose: Nose normal.      Mouth/Throat:      Mouth: Mucous membranes are moist.      Pharynx: Oropharynx is clear.   Eyes:      Extraocular Movements: Extraocular " movements intact.      Conjunctiva/sclera: Conjunctivae normal.      Pupils: Pupils are equal, round, and reactive to light.   Cardiovascular:      Rate and Rhythm: Normal rate and regular rhythm.      Pulses: Normal pulses.      Heart sounds: Normal heart sounds.   Pulmonary:      Effort: Pulmonary effort is normal.      Breath sounds: Normal breath sounds.   Abdominal:      General: Abdomen is flat. Bowel sounds are normal.      Palpations: Abdomen is soft.   Musculoskeletal:         General: Normal range of motion.      Cervical back: Normal range of motion and neck supple.   Skin:     General: Skin is warm and dry.      Capillary Refill: Capillary refill takes less than 2 seconds.   Neurological:      General: No focal deficit present.      Mental Status: She is alert and oriented to person, place, and time. Mental status is at baseline.   Psychiatric:         Mood and Affect: Mood normal.         Behavior: Behavior normal.         Thought Content: Thought content normal.         Judgment: Judgment normal.       Results for orders placed or performed during the hospital encounter of 11/11/22   HIV 1/2 Ag/Ab (4th Gen)   Result Value Ref Range    HIV 1/2 Ag/Ab Non-reactive Non-reactive   Hepatitis C Antibody   Result Value Ref Range    Hepatitis C Ab Non-reactive Non-reactive      Assessment:       1. Contusion of rib on right side, subsequent encounter    2. Closed fracture of nasal bone, sequela    3. Concussion with loss of consciousness of 30 minutes or less, subsequent encounter    4. Lupus erythematosus, unspecified form        Plan:       Contusion of rib on right side, subsequent encounter  -     Ambulatory referral/consult to Physical/Occupational Therapy; Future; Expected date: 11/23/2022  -     meloxicam (MOBIC) 15 MG tablet; Take 1 tablet (15 mg total) by mouth once daily.  Dispense: 30 tablet; Refill: 2    Closed fracture of nasal bone, sequela  -     Ambulatory referral/consult to ENT; Future;  Expected date: 11/23/2022    Concussion with loss of consciousness of 30 minutes or less, subsequent encounter  -     Ambulatory referral/consult to Neurology; Future; Expected date: 11/23/2022    Lupus erythematosus, unspecified form  -     Ambulatory referral/consult to Rheumatology; Future; Expected date: 11/23/2022  -     CBC Auto Differential; Future; Expected date: 11/16/2022  -     Comprehensive Metabolic Panel; Future; Expected date: 11/16/2022  -     C-Reactive Protein; Future; Expected date: 11/16/2022  -     Sedimentation rate; Future; Expected date: 11/16/2022        Medication List with Changes/Refills   New Medications    MELOXICAM (MOBIC) 15 MG TABLET    Take 1 tablet (15 mg total) by mouth once daily.   Current Medications    ALBUTEROL (PROAIR HFA) 90 MCG/ACTUATION INHALER    Inhale 2 puffs into the lungs every 4 (four) hours as needed for Wheezing.    BUTALBITAL-ACETAMINOPHEN-CAFFEINE -40 MG (FIORICET, ESGIC) -40 MG PER TABLET    Take 1 tablet by mouth 2 (two) times daily as needed.    EMGALITY  MG/ML PNIJ        HYDROCODONE-ACETAMINOPHEN 10-325MG (NORCO)  MG TAB    Take 1 tablet by mouth every 6 (six) hours as needed.    HYDROXYCHLOROQUINE (PLAQUENIL) 200 MG TABLET    Take 1 tablet (200 mg total) by mouth 2 (two) times daily.    TIZANIDINE (ZANAFLEX) 4 MG TABLET    Take 4 mg by mouth every 6 (six) hours as needed.    UBROGEPANT 100 MG TABLET       Discontinued Medications    BUSPIRONE (BUSPAR) 10 MG TABLET    Take 1 tablet (10 mg total) by mouth 2 (two) times daily.    VALACYCLOVIR (VALTREX) 1000 MG TABLET    Take 1 tablet (1,000 mg total) by mouth every 12 (twelve) hours as needed.

## 2022-11-16 NOTE — ASSESSMENT & PLAN NOTE
Patient is a 43 y/o female that presents following a fall and LOC after a  poll gave way. She landed on her right shoulder and the right side of her face causing a displaced nasal fracture and zygomatic fracture.   CTH neg acute  Since then she has experience post concussive symptoms including dizziness, imbalance, headaches, brain fog and nausea.   She was unable to complete a MMSE today as she was very tearful.   Pain limiting ROM on neuro exam but no significant deficits.   Obtain MRI brain scan   Informed pt that her symptoms may persist for days, weeks or even months. Management consists of symptomatic control including NSAIDs, Tylenol and warm compresses.    - I informed pt that I will not be prescribing opiates for pain control as she asked for this.   Pt requesting referral to concussion specialist on the [place].  Consider Neuro psych testing  Agree on holding on driving for now.  Pt to f/u with her previous neurologist for all other complaints

## 2022-11-17 ENCOUNTER — ANESTHESIA EVENT (OUTPATIENT)
Dept: SURGERY | Facility: HOSPITAL | Age: 42
End: 2022-11-17
Payer: OTHER GOVERNMENT

## 2022-11-17 ENCOUNTER — OFFICE VISIT (OUTPATIENT)
Dept: OTOLARYNGOLOGY | Facility: CLINIC | Age: 42
End: 2022-11-17
Payer: OTHER GOVERNMENT

## 2022-11-17 VITALS — HEIGHT: 62 IN | WEIGHT: 102.75 LBS | BODY MASS INDEX: 18.91 KG/M2

## 2022-11-17 DIAGNOSIS — S02.2XXS CLOSED FRACTURE OF NASAL BONE, SEQUELA: ICD-10-CM

## 2022-11-17 PROCEDURE — 99203 PR OFFICE/OUTPT VISIT, NEW, LEVL III, 30-44 MIN: ICD-10-PCS | Mod: S$PBB,,, | Performed by: STUDENT IN AN ORGANIZED HEALTH CARE EDUCATION/TRAINING PROGRAM

## 2022-11-17 PROCEDURE — 99999 PR PBB SHADOW E&M-EST. PATIENT-LVL IV: ICD-10-PCS | Mod: PBBFAC,,, | Performed by: STUDENT IN AN ORGANIZED HEALTH CARE EDUCATION/TRAINING PROGRAM

## 2022-11-17 PROCEDURE — 99203 OFFICE O/P NEW LOW 30 MIN: CPT | Mod: S$PBB,,, | Performed by: STUDENT IN AN ORGANIZED HEALTH CARE EDUCATION/TRAINING PROGRAM

## 2022-11-17 PROCEDURE — 99999 PR PBB SHADOW E&M-EST. PATIENT-LVL IV: CPT | Mod: PBBFAC,,, | Performed by: STUDENT IN AN ORGANIZED HEALTH CARE EDUCATION/TRAINING PROGRAM

## 2022-11-17 PROCEDURE — 99214 OFFICE O/P EST MOD 30 MIN: CPT | Mod: PBBFAC,PO | Performed by: STUDENT IN AN ORGANIZED HEALTH CARE EDUCATION/TRAINING PROGRAM

## 2022-11-17 NOTE — PROGRESS NOTES
Otolaryngology Clinic Note    Subjective:       Patient ID: Jaye Martinez is a 42 y.o. female.    Chief Complaint: fracture nose      History of Present Illness: Jaye Martinez is a 42 y.o. female presenting with facial trauma 11/10/22 in the am. Presented to [place]. She fell from stripper pole and hit right side of face. Rafter broke, hit floor, was unconscious. Bleeding from in nose and from cut on nose. Has stopped bleeding. Still coughing up some old blood. Pain is still present worse over right nasal bone.     Smokes 5 cig/day. Has some wheezing at baseline. Has pro-air at baseline.   No blood thinners. No cardiac issues.   She has old right zygoma fracture.   Has lupus and COPD.  Take norco 10 daily.  On meloxicam.        CT report: CT Maxillofacial WO Contrast    Result Date: 11/10/2022  REASON FOR EXAM: Facial trauma, blunt TECHNICAL FACTORS: Multiple contiguous axial CT images were obtained of the facial bones without administration of intravenous contrast. 2D reformatted images were performed. COMPARISON: 2011 FINDINGS: Chronic deformity of the right zygoma. There is a minimally depressed right nasal fracture. This appears acute. No other fracture is demonstrated. Trace left maxillary sinus mucosal thickening. There is no fluid in the paranasal sinus cavities. The orbits are normal in appearance. There is soft tissue swelling over the nasal bridge and right nasolabial fold. IMPRESSION: 1. Acute minimally displaced right nasal fracture with regional soft tissue swelling. 2. Old right zygomatic fracture. Electronically signed by Yosef Chacon MD on 11/10/2022 11:36 AM     Wet Read Results   CT Head WO Contrast   Final Result   1. No acute intracranial abnormality.   2. Acute appearing minimally displaced right nasal fracture with right facial soft tissue swelling.       Past Surgical History:   Procedure Laterality Date    ADENOIDECTOMY      APPENDECTOMY       SECTION,  CLASSIC      x2 , last 4/2014    CHOLECYSTECTOMY      COLONOSCOPY N/A 10/29/2019    Procedure: COLONOSCOPY;  Surgeon: Jules De La Cruz MD;  Location: [facility];  Service: Endoscopy;  Laterality: N/A;    CRYOTHERAPY      2002    DILATION AND CURETTAGE OF UTERUS  [id number]    ENDOMETRIAL ABLATION      GANGLION CYST EXCISION Left     TONSILLECTOMY      TUBAL LIGATION  4/2014    TYMPANOSTOMY TUBE PLACEMENT      x 7     Past Medical History:   Diagnosis Date    Abnormal Pap smear     Anxiety     Cancer     pre-cancer cells cervical     Cervical disc herniation     Chronic cough     Closed fracture 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) level with anterior cord syndrome     T 12 fracture compression     Fractures     Genital herpes     GERD (gastroesophageal reflux disease)     Hemorrhoid     History of colposcopy with cervical biopsy     Lumbar disc herniation     on Hydrocodone    Lupus (systemic lupus erythematosus)     chronic thrombocytopenia    Migraine headache     PONV (postoperative nausea and vomiting)      Social Determinants of Health     Tobacco Use: High Risk    Smoking Tobacco Use: Every Day    Smokeless Tobacco Use: Never    Passive Exposure: Not on file   Alcohol Use: Not on file   Financial Resource Strain: Not on file   Food Insecurity: Not on file   Transportation Needs: Not on file   Physical Activity: Not on file   Stress: Not on file   Social Connections: Not on file   Housing Stability: Not on file   Depression: Not on file     Review of patient's allergies indicates:   Allergen Reactions    Pregabalin Itching, Other (See Comments) and Hives     Bruising and headaches    Sulfa (sulfonamide antibiotics) Nausea And Vomiting    Flexeril [cyclobenzaprine] Rash    Morphine Rash and Hives    Sulfamethoxazole-trimethoprim Rash    Toradol [ketorolac] Rash     Current Outpatient Medications   Medication Instructions    albuterol (PROAIR HFA) 90 mcg/actuation inhaler 2 puffs, Inhalation, Every 4 hours PRN    busPIRone (BUSPAR) 10  mg, Oral, 2 times daily    butalbital-acetaminophen-caffeine -40 mg (FIORICET, ESGIC) -40 mg per tablet 1 tablet, Oral, 2 times daily PRN    diazePAM (VALIUM) 5 mg, Oral, Once as needed, Take 15 mins prior to MRI    EMGALITY  mg/mL PnIj No dose, route, or frequency recorded.    hydrocodone-acetaminophen 10-325mg (NORCO)  mg Tab 1 tablet, Oral, Every 6 hours PRN    hydrOXYchloroQUINE (PLAQUENIL) 200 mg, Oral, 2 times daily    meloxicam (MOBIC) 15 mg, Oral, Daily    tiZANidine (ZANAFLEX) 4 mg, Oral, Every 6 hours PRN    UBROGEPANT 100 mg tablet No dose, route, or frequency recorded.       ENT ROS negative except as stated above.               Objective:      There were no vitals filed for this visit.    General: NAD, well appearing  Eyes: Normal conjunctiva and lids  Face: symmetric, nerve intact  Nose: The nose has C shaped deformity and laceration on right tip. The nasal mucosa is inflamed with near complete blockage of the nostrils. The septum is midline. There is no evidence of septal hematoma. The turbinates are swollen.   Ears: The ears are with normal-appearing pinna. Examination of the canals is normal appearing bilaterally. There is no drainage or erythema noted. The tympanic membranes are normal appearing with pearly color, normal-appearing landmarks and normal light reflex. Hearing is grossly intact.  Mouth: No obvious abnormalities to the lips. The teeth are unremarkable. The gingivae are without any obvious evidence of infection or lesion. The oral mucosa is moist and pink. There are no obvious masses to the hard or soft palate.   Oropharynx: The uvula is midline.  The tongue is midline. The posterior pharynx is without erythema or exudate. The tonsils are normal appearing.  Salivary glands: The salivary glands are symmetric and not enlarged, no masses  Neck: No lymphadenopathy, trachea midline, thryoid not enlarged.  Psych: Normal mood and affect.   Neuro: Grossly intact  Speech:  fluent       Assessment and Plan:       1. Closed fracture of nasal bone, sequela        Offered closed nasal reduction. Discussed splint for 7 days. Risks pain, bleeding, infection, poor cosmesis, bruising, nasal obstruction.   Wants to proceed.     RTC: 7 days post op    Plan of care was discussed in detail with the patient, who agreed with the plan as above. All questions were answered in detail.     Mary Carlton MD  Otolaryngology

## 2022-11-17 NOTE — H&P (VIEW-ONLY)
Otolaryngology Clinic Note    Subjective:       Patient ID: Jaye Martinez is a 42 y.o. female.    Chief Complaint: fracture nose      History of Present Illness: Jaye Martinez is a 42 y.o. female presenting with facial trauma 11/10/22 in the am. Presented to [place]. She fell from stripper pole and hit right side of face. Rafter broke, hit floor, was unconscious. Bleeding from in nose and from cut on nose. Has stopped bleeding. Still coughing up some old blood. Pain is still present worse over right nasal bone.     Smokes 5 cig/day. Has some wheezing at baseline. Has pro-air at baseline.   No blood thinners. No cardiac issues.   She has old right zygoma fracture.   Has lupus and COPD.  Take norco 10 daily.  On meloxicam.        CT report: CT Maxillofacial WO Contrast    Result Date: 11/10/2022  REASON FOR EXAM: Facial trauma, blunt TECHNICAL FACTORS: Multiple contiguous axial CT images were obtained of the facial bones without administration of intravenous contrast. 2D reformatted images were performed. COMPARISON: 2011 FINDINGS: Chronic deformity of the right zygoma. There is a minimally depressed right nasal fracture. This appears acute. No other fracture is demonstrated. Trace left maxillary sinus mucosal thickening. There is no fluid in the paranasal sinus cavities. The orbits are normal in appearance. There is soft tissue swelling over the nasal bridge and right nasolabial fold. IMPRESSION: 1. Acute minimally displaced right nasal fracture with regional soft tissue swelling. 2. Old right zygomatic fracture. Electronically signed by Yosef Chacon MD on 11/10/2022 11:36 AM     Wet Read Results   CT Head WO Contrast   Final Result   1. No acute intracranial abnormality.   2. Acute appearing minimally displaced right nasal fracture with right facial soft tissue swelling.       Past Surgical History:   Procedure Laterality Date    ADENOIDECTOMY      APPENDECTOMY       SECTION,  CLASSIC      x2 , last 4/2014    CHOLECYSTECTOMY      COLONOSCOPY N/A 10/29/2019    Procedure: COLONOSCOPY;  Surgeon: Jules De La Cruz MD;  Location: [facility];  Service: Endoscopy;  Laterality: N/A;    CRYOTHERAPY      2002    DILATION AND CURETTAGE OF UTERUS  [id number]    ENDOMETRIAL ABLATION      GANGLION CYST EXCISION Left     TONSILLECTOMY      TUBAL LIGATION  4/2014    TYMPANOSTOMY TUBE PLACEMENT      x 7     Past Medical History:   Diagnosis Date    Abnormal Pap smear     Anxiety     Cancer     pre-cancer cells cervical     Cervical disc herniation     Chronic cough     Closed fracture 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) level with anterior cord syndrome     T 12 fracture compression     Fractures     Genital herpes     GERD (gastroesophageal reflux disease)     Hemorrhoid     History of colposcopy with cervical biopsy     Lumbar disc herniation     on Hydrocodone    Lupus (systemic lupus erythematosus)     chronic thrombocytopenia    Migraine headache     PONV (postoperative nausea and vomiting)      Social Determinants of Health     Tobacco Use: High Risk    Smoking Tobacco Use: Every Day    Smokeless Tobacco Use: Never    Passive Exposure: Not on file   Alcohol Use: Not on file   Financial Resource Strain: Not on file   Food Insecurity: Not on file   Transportation Needs: Not on file   Physical Activity: Not on file   Stress: Not on file   Social Connections: Not on file   Housing Stability: Not on file   Depression: Not on file     Review of patient's allergies indicates:   Allergen Reactions    Pregabalin Itching, Other (See Comments) and Hives     Bruising and headaches    Sulfa (sulfonamide antibiotics) Nausea And Vomiting    Flexeril [cyclobenzaprine] Rash    Morphine Rash and Hives    Sulfamethoxazole-trimethoprim Rash    Toradol [ketorolac] Rash     Current Outpatient Medications   Medication Instructions    albuterol (PROAIR HFA) 90 mcg/actuation inhaler 2 puffs, Inhalation, Every 4 hours PRN    busPIRone (BUSPAR) 10  mg, Oral, 2 times daily    butalbital-acetaminophen-caffeine -40 mg (FIORICET, ESGIC) -40 mg per tablet 1 tablet, Oral, 2 times daily PRN    diazePAM (VALIUM) 5 mg, Oral, Once as needed, Take 15 mins prior to MRI    EMGALITY  mg/mL PnIj No dose, route, or frequency recorded.    hydrocodone-acetaminophen 10-325mg (NORCO)  mg Tab 1 tablet, Oral, Every 6 hours PRN    hydrOXYchloroQUINE (PLAQUENIL) 200 mg, Oral, 2 times daily    meloxicam (MOBIC) 15 mg, Oral, Daily    tiZANidine (ZANAFLEX) 4 mg, Oral, Every 6 hours PRN    UBROGEPANT 100 mg tablet No dose, route, or frequency recorded.       ENT ROS negative except as stated above.               Objective:      There were no vitals filed for this visit.    General: NAD, well appearing  Eyes: Normal conjunctiva and lids  Face: symmetric, nerve intact  Nose: The nose has C shaped deformity and laceration on right tip. The nasal mucosa is inflamed with near complete blockage of the nostrils. The septum is midline. There is no evidence of septal hematoma. The turbinates are swollen.   Ears: The ears are with normal-appearing pinna. Examination of the canals is normal appearing bilaterally. There is no drainage or erythema noted. The tympanic membranes are normal appearing with pearly color, normal-appearing landmarks and normal light reflex. Hearing is grossly intact.  Mouth: No obvious abnormalities to the lips. The teeth are unremarkable. The gingivae are without any obvious evidence of infection or lesion. The oral mucosa is moist and pink. There are no obvious masses to the hard or soft palate.   Oropharynx: The uvula is midline.  The tongue is midline. The posterior pharynx is without erythema or exudate. The tonsils are normal appearing.  Salivary glands: The salivary glands are symmetric and not enlarged, no masses  Neck: No lymphadenopathy, trachea midline, thryoid not enlarged.  Psych: Normal mood and affect.   Neuro: Grossly intact  Speech:  fluent       Assessment and Plan:       1. Closed fracture of nasal bone, sequela        Offered closed nasal reduction. Discussed splint for 7 days. Risks pain, bleeding, infection, poor cosmesis, bruising, nasal obstruction.   Wants to proceed.     RTC: 7 days post op    Plan of care was discussed in detail with the patient, who agreed with the plan as above. All questions were answered in detail.     Mary Carlton MD  Otolaryngology

## 2022-11-18 ENCOUNTER — HOSPITAL ENCOUNTER (OUTPATIENT)
Facility: HOSPITAL | Age: 42
Discharge: HOME | End: 2022-11-18
Attending: STUDENT IN AN ORGANIZED HEALTH CARE EDUCATION/TRAINING PROGRAM | Admitting: STUDENT IN AN ORGANIZED HEALTH CARE EDUCATION/TRAINING PROGRAM
Payer: OTHER GOVERNMENT

## 2022-11-18 ENCOUNTER — ANESTHESIA (OUTPATIENT)
Dept: SURGERY | Facility: HOSPITAL | Age: 42
End: 2022-11-18
Payer: OTHER GOVERNMENT

## 2022-11-18 VITALS
HEART RATE: 82 BPM | DIASTOLIC BLOOD PRESSURE: 83 MMHG | OXYGEN SATURATION: 98 % | SYSTOLIC BLOOD PRESSURE: 121 MMHG | WEIGHT: 99 LBS | BODY MASS INDEX: 18.22 KG/M2 | HEIGHT: 62 IN | RESPIRATION RATE: 16 BRPM | TEMPERATURE: 98 F

## 2022-11-18 DIAGNOSIS — S02.2XXS CLOSED FRACTURE OF NASAL BONE, SEQUELA: Primary | ICD-10-CM

## 2022-11-18 DIAGNOSIS — S02.2XXA NASAL BONE FRACTURE: ICD-10-CM

## 2022-11-18 LAB
B-HCG UR QL: NEGATIVE
CTP QC/QA: YES

## 2022-11-18 PROCEDURE — 25000003 PHARM REV CODE 250: Mod: PO | Performed by: STUDENT IN AN ORGANIZED HEALTH CARE EDUCATION/TRAINING PROGRAM

## 2022-11-18 PROCEDURE — 21320 PR TREATMENT, NASAL BONE FRACTURE, CLOSED W/MANIP, W/STABILIZ: ICD-10-PCS | Mod: ,,, | Performed by: STUDENT IN AN ORGANIZED HEALTH CARE EDUCATION/TRAINING PROGRAM

## 2022-11-18 PROCEDURE — D9220A PRA ANESTHESIA: ICD-10-PCS | Mod: ANES,,, | Performed by: ANESTHESIOLOGY

## 2022-11-18 PROCEDURE — 36000705 HC OR TIME LEV I EA ADD 15 MIN: Mod: PO | Performed by: STUDENT IN AN ORGANIZED HEALTH CARE EDUCATION/TRAINING PROGRAM

## 2022-11-18 PROCEDURE — 25000003 PHARM REV CODE 250: Mod: PO | Performed by: NURSE ANESTHETIST, CERTIFIED REGISTERED

## 2022-11-18 PROCEDURE — 27200651 HC AIRWAY, LMA: Mod: PO | Performed by: NURSE ANESTHETIST, CERTIFIED REGISTERED

## 2022-11-18 PROCEDURE — 00160 ANES PX NOSE&SINUS NOS: CPT | Mod: PO | Performed by: STUDENT IN AN ORGANIZED HEALTH CARE EDUCATION/TRAINING PROGRAM

## 2022-11-18 PROCEDURE — 81025 URINE PREGNANCY TEST: CPT | Mod: PO | Performed by: STUDENT IN AN ORGANIZED HEALTH CARE EDUCATION/TRAINING PROGRAM

## 2022-11-18 PROCEDURE — D9220A PRA ANESTHESIA: Mod: CRNA,,, | Performed by: NURSE ANESTHETIST, CERTIFIED REGISTERED

## 2022-11-18 PROCEDURE — 71000033 HC RECOVERY, INTIAL HOUR: Mod: PO | Performed by: STUDENT IN AN ORGANIZED HEALTH CARE EDUCATION/TRAINING PROGRAM

## 2022-11-18 PROCEDURE — 36000704 HC OR TIME LEV I 1ST 15 MIN: Mod: PO | Performed by: STUDENT IN AN ORGANIZED HEALTH CARE EDUCATION/TRAINING PROGRAM

## 2022-11-18 PROCEDURE — 63600175 PHARM REV CODE 636 W HCPCS: Mod: PO | Performed by: NURSE ANESTHETIST, CERTIFIED REGISTERED

## 2022-11-18 PROCEDURE — 21320 CLSD TX NSL FX W/MNPJ&STABLJ: CPT | Mod: ,,, | Performed by: STUDENT IN AN ORGANIZED HEALTH CARE EDUCATION/TRAINING PROGRAM

## 2022-11-18 PROCEDURE — 71000015 HC POSTOP RECOV 1ST HR: Mod: PO | Performed by: STUDENT IN AN ORGANIZED HEALTH CARE EDUCATION/TRAINING PROGRAM

## 2022-11-18 PROCEDURE — 37000008 HC ANESTHESIA 1ST 15 MINUTES: Mod: PO | Performed by: STUDENT IN AN ORGANIZED HEALTH CARE EDUCATION/TRAINING PROGRAM

## 2022-11-18 PROCEDURE — 63600175 PHARM REV CODE 636 W HCPCS: Mod: PO | Performed by: ANESTHESIOLOGY

## 2022-11-18 PROCEDURE — D9220A PRA ANESTHESIA: ICD-10-PCS | Mod: CRNA,,, | Performed by: NURSE ANESTHETIST, CERTIFIED REGISTERED

## 2022-11-18 PROCEDURE — 63600175 PHARM REV CODE 636 W HCPCS: Mod: PO | Performed by: STUDENT IN AN ORGANIZED HEALTH CARE EDUCATION/TRAINING PROGRAM

## 2022-11-18 PROCEDURE — 37000009 HC ANESTHESIA EA ADD 15 MINS: Mod: PO | Performed by: STUDENT IN AN ORGANIZED HEALTH CARE EDUCATION/TRAINING PROGRAM

## 2022-11-18 PROCEDURE — D9220A PRA ANESTHESIA: Mod: ANES,,, | Performed by: ANESTHESIOLOGY

## 2022-11-18 RX ORDER — HYDROMORPHONE HYDROCHLORIDE 2 MG/ML
0.2 INJECTION, SOLUTION INTRAMUSCULAR; INTRAVENOUS; SUBCUTANEOUS EVERY 5 MIN PRN
Status: DISCONTINUED | OUTPATIENT
Start: 2022-11-18 | End: 2022-11-18 | Stop reason: HOSPADM

## 2022-11-18 RX ORDER — FENTANYL CITRATE 50 UG/ML
INJECTION, SOLUTION INTRAMUSCULAR; INTRAVENOUS
Status: DISCONTINUED | OUTPATIENT
Start: 2022-11-18 | End: 2022-11-18

## 2022-11-18 RX ORDER — DEXAMETHASONE SODIUM PHOSPHATE 4 MG/ML
INJECTION, SOLUTION INTRA-ARTICULAR; INTRALESIONAL; INTRAMUSCULAR; INTRAVENOUS; SOFT TISSUE
Status: DISCONTINUED | OUTPATIENT
Start: 2022-11-18 | End: 2022-11-18

## 2022-11-18 RX ORDER — OXYCODONE HYDROCHLORIDE 5 MG/1
5 TABLET ORAL
Status: DISCONTINUED | OUTPATIENT
Start: 2022-11-18 | End: 2022-11-18 | Stop reason: HOSPADM

## 2022-11-18 RX ORDER — FENTANYL CITRATE 50 UG/ML
25 INJECTION, SOLUTION INTRAMUSCULAR; INTRAVENOUS EVERY 5 MIN PRN
Status: DISCONTINUED | OUTPATIENT
Start: 2022-11-18 | End: 2022-11-18 | Stop reason: HOSPADM

## 2022-11-18 RX ORDER — PROPOFOL 10 MG/ML
VIAL (ML) INTRAVENOUS
Status: DISCONTINUED | OUTPATIENT
Start: 2022-11-18 | End: 2022-11-18

## 2022-11-18 RX ORDER — LIDOCAINE HYDROCHLORIDE 20 MG/ML
INJECTION INTRAVENOUS
Status: DISCONTINUED | OUTPATIENT
Start: 2022-11-18 | End: 2022-11-18

## 2022-11-18 RX ORDER — MIDAZOLAM HYDROCHLORIDE 1 MG/ML
INJECTION, SOLUTION INTRAMUSCULAR; INTRAVENOUS
Status: DISCONTINUED | OUTPATIENT
Start: 2022-11-18 | End: 2022-11-18

## 2022-11-18 RX ORDER — OXYMETAZOLINE HCL 0.05 %
SPRAY, NON-AEROSOL (ML) NASAL
Status: DISCONTINUED | OUTPATIENT
Start: 2022-11-18 | End: 2022-11-18 | Stop reason: HOSPADM

## 2022-11-18 RX ORDER — SODIUM CHLORIDE, SODIUM LACTATE, POTASSIUM CHLORIDE, CALCIUM CHLORIDE 600; 310; 30; 20 MG/100ML; MG/100ML; MG/100ML; MG/100ML
INJECTION, SOLUTION INTRAVENOUS CONTINUOUS
Status: DISCONTINUED | OUTPATIENT
Start: 2022-11-18 | End: 2022-11-18 | Stop reason: HOSPADM

## 2022-11-18 RX ORDER — ONDANSETRON 2 MG/ML
INJECTION INTRAMUSCULAR; INTRAVENOUS
Status: DISCONTINUED | OUTPATIENT
Start: 2022-11-18 | End: 2022-11-18

## 2022-11-18 RX ORDER — LIDOCAINE HYDROCHLORIDE 10 MG/ML
1 INJECTION, SOLUTION EPIDURAL; INFILTRATION; INTRACAUDAL; PERINEURAL ONCE
Status: DISCONTINUED | OUTPATIENT
Start: 2022-11-18 | End: 2022-11-18 | Stop reason: HOSPADM

## 2022-11-18 RX ADMIN — PROMETHAZINE HYDROCHLORIDE 6.25 MG: 25 INJECTION INTRAMUSCULAR; INTRAVENOUS at 12:11

## 2022-11-18 RX ADMIN — ONDANSETRON 4 MG: 2 INJECTION, SOLUTION INTRAMUSCULAR; INTRAVENOUS at 11:11

## 2022-11-18 RX ADMIN — FENTANYL CITRATE 25 MCG: 50 INJECTION, SOLUTION INTRAMUSCULAR; INTRAVENOUS at 11:11

## 2022-11-18 RX ADMIN — FENTANYL CITRATE 50 MCG: 50 INJECTION, SOLUTION INTRAMUSCULAR; INTRAVENOUS at 11:11

## 2022-11-18 RX ADMIN — LIDOCAINE HYDROCHLORIDE 75 MG: 20 INJECTION INTRAVENOUS at 11:11

## 2022-11-18 RX ADMIN — PROPOFOL 150 MG: 10 INJECTION, EMULSION INTRAVENOUS at 11:11

## 2022-11-18 RX ADMIN — SODIUM CHLORIDE, SODIUM LACTATE, POTASSIUM CHLORIDE, AND CALCIUM CHLORIDE: .6; .31; .03; .02 INJECTION, SOLUTION INTRAVENOUS at 11:11

## 2022-11-18 RX ADMIN — FENTANYL CITRATE 25 MCG: 0.05 INJECTION, SOLUTION INTRAMUSCULAR; INTRAVENOUS at 12:11

## 2022-11-18 RX ADMIN — MIDAZOLAM HYDROCHLORIDE 2 MG: 1 INJECTION, SOLUTION INTRAMUSCULAR; INTRAVENOUS at 11:11

## 2022-11-18 RX ADMIN — DEXAMETHASONE SODIUM PHOSPHATE 10 MG: 4 INJECTION, SOLUTION INTRAMUSCULAR; INTRAVENOUS at 11:11

## 2022-11-18 NOTE — PATIENT INSTRUCTIONS
Post-op Nasal Bone Reduction  Mary Carlton MD  Otolaryngology - [facility] - [phone]    After Surgery  You have had surgery to reduce nasal bone fracture. It is usual for some mild nose discomfort for up to a few days. Most people are back to feeling themselves after 1-2 days    Pain and Activity  Expect some mild nasal pain for 2-3 days  No contact sports anything that risks hitting the nose forcefully for 2 weeks  Ok to shower in 24 hours, avoid getting splint on nose directly wet.   May return to work 2-3 days  May advance activity as tolerated  Try to leave splint on for 7 days or until follow up. There is tape under this, wet this and take off in shower gently when ready to remove splint.     Diet  Make sure you get enough fluids and nutrients. Food and drink guidelines include:  Give lots of fluids. Good choices are water, popsicles, and mild juices. Hydration is the MOST IMPORTANT factor in your nutrition during the healing process.  No diet restrictions.    Medication  Give only medications approved by your doctor. Follow directions closely when giving your medications.  The best pain medications following this procedure are Motrin (ibuprofen) and Tylenol . Use according to the bottle instructions and can alternate medication as needed.  Take home pain medications as needed. Ok to resart mobic after surgery. I cannot fill any more based on what the state narcotic registry has documented.   Afrin nasal spray as needed for bleeding, 1 spray each nostril. No limit.

## 2022-11-18 NOTE — ANESTHESIA POSTPROCEDURE EVALUATION
Anesthesia Post Evaluation    Patient: Jaye Martinez    Procedure(s) Performed: Procedure(s) (LRB):  CLOSED REDUCTION, FRACTURE, NASAL BONE (Bilateral)    Final Anesthesia Type: general      Patient location during evaluation: PACU  Patient participation: Yes- Able to Participate  Level of consciousness: awake and alert  Post-procedure vital signs: reviewed and stable  Pain management: adequate  Airway patency: patent    PONV status at discharge: No PONV  Anesthetic complications: no      Cardiovascular status: blood pressure returned to baseline  Respiratory status: unassisted and room air  Hydration status: euvolemic  Follow-up not needed.          Vitals Value Taken Time   /83 11/18/22 1239   Temp 36.4 °C (97.6 °F) 11/18/22 1239   Pulse 82 11/18/22 1239   Resp 16 11/18/22 1239   SpO2 98 % 11/18/22 1239         No case tracking events are documented in the log.      Pain/Shiva Score: Pain Rating Prior to Med Admin: 8 (11/18/2022 12:13 PM)  Shiva Score: 10 (11/18/2022 12:16 PM)

## 2022-11-18 NOTE — ANESTHESIA PREPROCEDURE EVALUATION
11/18/2022  Jaye Martinez is a 42 y.o., female.    Pre-op Assessment    I have reviewed the Patient Summary Reports.    I have reviewed the Nursing Notes.    I have reviewed the Medications.     Review of Systems  Anesthesia Hx:  No problems with previous Anesthesia  Denies Family Hx of Anesthesia complications.  Personal Hx of Anesthesia complications, Post-Operative Nausea/Vomiting, in the past, but not with recent anesthetics / prophylaxis   Social:  Smoker    Cardiovascular:  Cardiovascular Normal     Pulmonary:  Pulmonary Normal    Renal/:  Renal/ Normal     Hepatic/GI:   Bowel Prep. GERD    Musculoskeletal:  Spine Disorders: cervical and lumbar Disc disease    Neurological:   Headaches   Chronic Pain Syndrome (Norco 10/325, fioricet, parafon forte, plaquenil)   Endocrine:  Endocrine Normal    Dermatological:   SLE   Psych:   anxiety          Physical Exam  General:  Well nourished      Airway/Jaw/Neck:  Airway Findings: Mouth Opening: Normal   Tongue: Normal   General Airway Assessment: Adult, Average Mallampati: II  Jaw/Neck Findings:  Neck ROM: Normal ROM       Dental:  Dental Findings: Periodontal disease, Severe      Chest/Lungs:  Chest/Lungs Findings: Clear to auscultation, Normal Respiratory Rate      Heart/Vascular:  Heart Findings: Rate: Normal  Rhythm: Regular Rhythm  Sounds: Normal  Heart murmur: negative       Mental Status:  Mental Status Findings:  Cooperative, Alert and Oriented         Anesthesia Plan  Type of Anesthesia, risks & benefits discussed:  Anesthesia Type:  Gen Supraglottic Airway    Patient's Preference:   Plan Factors:          Intra-op Monitoring Plan: Standard ASA Monitors  Intra-op Monitoring Plan Comments:   Post Op Pain Control Plan: multimodal analgesia and IV/PO Opioids PRN  Post Op Pain Control Plan Comments:     Induction:   IV  Beta Blocker:  Patient is not  currently on a Beta-Blocker (No further documentation required).       Informed Consent: Informed consent signed with the Patient and all parties understand the risks and agree with anesthesia plan.  All questions answered.  Anesthesia consent signed with patient.  ASA Score: 2     Day of Surgery Review of History & Physical:    H&P Update referred to the surgeon/provider.          Ready For Surgery From Anesthesia Perspective.           Physical Exam  General: Well nourished    Airway:  Mallampati: II   Mouth Opening: Normal  Tongue: Normal  Neck ROM: Normal ROM    Dental:  Periodontal disease, Severe    Chest/Lungs:  Clear to auscultation, Normal Respiratory Rate    Heart:  Rate: Normal  Rhythm: Regular Rhythm  Sounds: Normal          Anesthesia Plan  Type of Anesthesia, risks & benefits discussed:    Anesthesia Type: Gen Supraglottic Airway  Intra-op Monitoring Plan: Standard ASA Monitors  Post Op Pain Control Plan: multimodal analgesia and IV/PO Opioids PRN  Induction:  IV  Informed Consent: Informed consent signed with the Patient and all parties understand the risks and agree with anesthesia plan.  All questions answered.   ASA Score: 2  Day of Surgery Review of History & Physical: H&P Update referred to the surgeon/provider.    Ready For Surgery From Anesthesia Perspective.       .

## 2022-11-18 NOTE — OP NOTE
Otolaryngology- Head & Neck Surgery  Operative Report    Jaye Martinez  [id number]  1980    Date of surgery: 11/18/2022    Preoperative Diagnosis:   Closed fracture of nasal bone, sequela [S02.2XXS]  Nasal bone fracture [S02.2XXA]    Postoperative Diagnosis:      Procedure:   1. Closed reduction of nasal bone with external splinting    Attending:  Mary Carlton MD    Assist: none    Anesthesia: General     EBL: < 5 ml    Complications: None    Findings: Right nasal bone lateralized from injury, was medialized with force. Septum did not appear deviated. Splint applied.     Specimen: none    Disposition: Stable, to PACU    Preoperative Indication:   Jaye Martinez is a 42 y.o. female who has been noted to have nasal boen fracture that occurred 8 days ago, here for reduction.       Description of Procedure:  Patient was brought to the operating room and placed on the table in supine position.  Anesthesia was obtained via LMA.  The eyes were taped shut and a timeout was performed.     Afrin pledgets were placed in nose. Force was used to medialize right nasal bone using micah to raise anteriorly. Appeared well reduced. Left side normal, nose inspected without septal hematoma or deviation. Mastisol, steri strips and thermoplastic splint placed over nose to maintain bone position.     At the end of the procedure, the patient was awakened from anesthesia and transferred to the PACU in good condition.      Mary Carlton MD  Otolaryngology Attending

## 2022-11-18 NOTE — PLAN OF CARE
"Pt alert, calm, cooperative, denies nausea. States "pain is much better thank you", 4-5/10 to nose. VSS. RA sat @ 96%    "

## 2022-11-18 NOTE — TRANSFER OF CARE
"Anesthesia Transfer of Care Note    Patient: Jaye Martinez    Procedure(s) Performed: Procedure(s) (LRB):  CLOSED REDUCTION, FRACTURE, NASAL BONE (Bilateral)    Patient location: PACU    Anesthesia Type: general    Transport from OR: Transported from OR on room air with adequate spontaneous ventilation    Post pain: adequate analgesia    Post assessment: no apparent anesthetic complications and tolerated procedure well    Post vital signs: stable    Level of consciousness: awake and alert    Nausea/Vomiting: no nausea/vomiting    Complications: none    Transfer of care protocol was followed      Last vitals:   Visit Vitals  /83 (BP Location: Right arm, Patient Position: Sitting)   Pulse 82   Temp 36.6 °C (97.9 °F) (Skin)   Resp 16   Ht 5' 2" (1.575 m)   Wt 44.9 kg (99 lb)   SpO2 97%   Breastfeeding No   BMI 18.11 kg/m²     "

## 2022-11-18 NOTE — ANESTHESIA PROCEDURE NOTES
Intubation    Date/Time: 11/18/2022 11:16 AM  Performed by: Jules Parsons CRNA  Authorized by: Carissa Porter MD     Intubation:     Induction:  Intravenous    Intubated:  Postinduction    Mask Ventilation:  Easy mask    Attempts:  1    Attempted By:  CRNA    Difficult Airway Encountered?: No      Complications:  None    Airway Device:  Supraglottic airway/LMA    Airway Device Size:  4.0    Style/Cuff Inflation:  Cuffed    Inflation Amount (mL):  28    Secured at:  The lips    Placement Verified By:  Capnometry    Complicating Factors:  None    Findings Post-Intubation:  BS equal bilateral and atraumatic/condition of teeth unchanged

## 2022-11-20 ENCOUNTER — PATIENT MESSAGE (OUTPATIENT)
Dept: FAMILY MEDICINE | Facility: CLINIC | Age: 42
End: 2022-11-20
Payer: OTHER GOVERNMENT

## 2022-11-21 NOTE — DISCHARGE SUMMARY
[facility] Surgery  Otorhinolaryngology-Head & Neck Surgery  Discharge Summary      Patient Name: Jaye Martinez  MRN: [id number]  Admission Date: 11/18/2022  Hospital Length of Stay: 0 days  Discharge Date and Time:  11/21/2022 7:31 AM  Attending Physician: No att. providers found   Discharging Provider: Mary Carlton MD  Primary Care Provider: Nakul Garcia MD     HPI: 42 y.o. female with history of nasal trauma    Procedure(s) (LRB):  CLOSED REDUCTION, FRACTURE, NASAL BONE (Bilateral)     Hospital Course: patient underwent closed nasal reduction in operating room without complication and was discharged post op.       Significant Diagnostic Studies: None    Pending Diagnostic Studies:       None          There are no hospital problems to display for this patient.     Discharged Condition: good    Disposition: Home or Self Care    Follow Up:    Patient Instructions:   No discharge procedures on file.  Medications:  Reconciled Home Medications:      Medication List        CONTINUE taking these medications      albuterol 90 mcg/actuation inhaler  Commonly known as: PROAIR HFA  Inhale 2 puffs into the lungs every 4 (four) hours as needed for Wheezing.     butalbital-acetaminophen-caffeine -40 mg -40 mg per tablet  Commonly known as: FIORICET, ESGIC  Take 1 tablet by mouth 2 (two) times daily as needed.     diazePAM 5 MG tablet  Commonly known as: VALIUM  Take 1 tablet (5 mg total) by mouth 1 (one) time if needed for Anxiety. Take 15 mins prior to MRI     EMGALITY  mg/mL Pnij  Generic drug: galcanezumab-gnlm     HYDROcodone-acetaminophen  mg per tablet  Commonly known as: NORCO  Take 1 tablet by mouth every 6 (six) hours as needed.     hydrOXYchloroQUINE 200 mg tablet  Commonly known as: PLAQUENIL  Take 1 tablet (200 mg total) by mouth 2 (two) times daily.     meloxicam 15 MG tablet  Commonly known as: MOBIC  Take 1 tablet (15 mg total) by mouth once daily.     tiZANidine 4 MG  tablet  Commonly known as: ZANAFLEX  Take 4 mg by mouth every 6 (six) hours as needed.     UBRELVY 100 mg tablet  Generic drug: ubrogepant            STOP taking these medications      busPIRone 10 MG tablet  Commonly known as: ROGER Carlton MD  Otorhinolaryngology-Head & Neck Surgery  [facility]

## 2022-12-02 ENCOUNTER — HOSPITAL ENCOUNTER (OUTPATIENT)
Dept: RADIOLOGY | Facility: HOSPITAL | Age: 42
Discharge: HOME | End: 2022-12-02
Attending: NURSE PRACTITIONER
Payer: OTHER GOVERNMENT

## 2022-12-02 DIAGNOSIS — S06.0X1D CONCUSSION WITH LOSS OF CONSCIOUSNESS OF 30 MINUTES OR LESS, SUBSEQUENT ENCOUNTER: ICD-10-CM

## 2022-12-02 PROCEDURE — 70553 MRI BRAIN STEM W/O & W/DYE: CPT | Mod: TC,PO

## 2022-12-02 PROCEDURE — A9585 GADOBUTROL INJECTION: HCPCS | Mod: PO | Performed by: NURSE PRACTITIONER

## 2022-12-02 PROCEDURE — 70553 MRI BRAIN STEM W/O & W/DYE: CPT | Mod: 26,,, | Performed by: RADIOLOGY

## 2022-12-02 PROCEDURE — 25500020 PHARM REV CODE 255: Mod: PO | Performed by: NURSE PRACTITIONER

## 2022-12-02 PROCEDURE — 70553 MRI BRAIN W WO CONTRAST: ICD-10-PCS | Mod: 26,,, | Performed by: RADIOLOGY

## 2022-12-02 RX ORDER — GADOBUTROL 604.72 MG/ML
7.5 INJECTION INTRAVENOUS
Status: COMPLETED | OUTPATIENT
Start: 2022-12-02 | End: 2022-12-02

## 2022-12-02 RX ADMIN — GADOBUTROL 4 ML: 604.72 INJECTION INTRAVENOUS at 03:12

## 2023-01-17 ENCOUNTER — PATIENT MESSAGE (OUTPATIENT)
Dept: ADMINISTRATIVE | Facility: HOSPITAL | Age: 43
End: 2023-01-17
Payer: OTHER GOVERNMENT

## 2023-03-29 ENCOUNTER — OFFICE VISIT (OUTPATIENT)
Dept: FAMILY MEDICINE | Facility: CLINIC | Age: 43
End: 2023-03-29
Payer: OTHER GOVERNMENT

## 2023-03-29 ENCOUNTER — HOSPITAL ENCOUNTER (OUTPATIENT)
Dept: RADIOLOGY | Facility: HOSPITAL | Age: 43
Discharge: HOME | End: 2023-03-29
Attending: FAMILY MEDICINE
Payer: OTHER GOVERNMENT

## 2023-03-29 VITALS
OXYGEN SATURATION: 98 % | SYSTOLIC BLOOD PRESSURE: 112 MMHG | BODY MASS INDEX: 18.31 KG/M2 | DIASTOLIC BLOOD PRESSURE: 70 MMHG | WEIGHT: 100.06 LBS | HEART RATE: 77 BPM

## 2023-03-29 DIAGNOSIS — Q51.9 UTERINE ANOMALY: ICD-10-CM

## 2023-03-29 DIAGNOSIS — L93.0 LUPUS ERYTHEMATOSUS, UNSPECIFIED FORM: ICD-10-CM

## 2023-03-29 DIAGNOSIS — G44.209 TENSION HEADACHE: ICD-10-CM

## 2023-03-29 DIAGNOSIS — Q51.9 UTERINE ANOMALY: Primary | ICD-10-CM

## 2023-03-29 DIAGNOSIS — Z00.00 WELLNESS EXAMINATION: ICD-10-CM

## 2023-03-29 PROCEDURE — 76856 US EXAM PELVIC COMPLETE: CPT | Mod: TC,PO

## 2023-03-29 PROCEDURE — 77067 SCR MAMMO BI INCL CAD: CPT | Mod: 26,,, | Performed by: RADIOLOGY

## 2023-03-29 PROCEDURE — 99999 PR PBB SHADOW E&M-EST. PATIENT-LVL V: CPT | Mod: PBBFAC,,, | Performed by: FAMILY MEDICINE

## 2023-03-29 PROCEDURE — 77067 MAMMO DIGITAL SCREENING BILAT WITH TOMO: ICD-10-PCS | Mod: 26,,, | Performed by: RADIOLOGY

## 2023-03-29 PROCEDURE — 77063 MAMMO DIGITAL SCREENING BILAT WITH TOMO: ICD-10-PCS | Mod: 26,,, | Performed by: RADIOLOGY

## 2023-03-29 PROCEDURE — 77063 BREAST TOMOSYNTHESIS BI: CPT | Mod: 26,,, | Performed by: RADIOLOGY

## 2023-03-29 PROCEDURE — 99999 PR PBB SHADOW E&M-EST. PATIENT-LVL V: ICD-10-PCS | Mod: PBBFAC,,, | Performed by: FAMILY MEDICINE

## 2023-03-29 PROCEDURE — 77067 SCR MAMMO BI INCL CAD: CPT | Mod: TC,PO

## 2023-03-29 PROCEDURE — 99215 OFFICE O/P EST HI 40 MIN: CPT | Mod: PBBFAC,PO,25 | Performed by: FAMILY MEDICINE

## 2023-03-29 PROCEDURE — 99214 PR OFFICE/OUTPT VISIT, EST, LEVL IV, 30-39 MIN: ICD-10-PCS | Mod: S$PBB,,, | Performed by: FAMILY MEDICINE

## 2023-03-29 PROCEDURE — 76856 US EXAM PELVIC COMPLETE: CPT | Mod: 26,,, | Performed by: RADIOLOGY

## 2023-03-29 PROCEDURE — 99214 OFFICE O/P EST MOD 30 MIN: CPT | Mod: S$PBB,,, | Performed by: FAMILY MEDICINE

## 2023-03-29 PROCEDURE — 76856 US PELVIS COMP WITH TRANSVAG NON-OB (XPD): ICD-10-PCS | Mod: 26,,, | Performed by: RADIOLOGY

## 2023-03-29 PROCEDURE — 76830 US PELVIS COMP WITH TRANSVAG NON-OB (XPD): ICD-10-PCS | Mod: 26,,, | Performed by: RADIOLOGY

## 2023-03-29 PROCEDURE — 76830 TRANSVAGINAL US NON-OB: CPT | Mod: 26,,, | Performed by: RADIOLOGY

## 2023-03-29 NOTE — PROGRESS NOTES
Subjective:       Patient ID: Jaye Martinez is a 42 y.o. female.    Chief Complaint: Referral (GYN & rheumatologist)    HPI:  Pleasant 42-year-old patient with a need for hospital follow-up.  She was hospitalized for UTI and early pyelo and early sepsis last Friday and picking in [place].  She is here with her body guard Bennie.  She has completed antibiotics and has recovered from that except for her overall level of energy.      The patient has systemic lupus erythematosus and will need rheumatology follow-up.  She is on a host of medication for that now seems to be tolerating it.      The patient was told that she has lesions on her uterus.  Was unclear whether was fibroids.  Also Ms. Placed right ovary.  Recommend gyn evaluation ultrasound pelvis has been ordered.    She would like to transfer her care to me as her primary since she is having accessibility problems to her current primary.      Review of Systems   Constitutional: Negative.    HENT: Negative.     Eyes: Negative.    Respiratory: Negative.     Cardiovascular: Negative.    Gastrointestinal: Negative.    Endocrine: Negative.    Genitourinary: Negative.    Musculoskeletal: Negative.    Skin: Negative.    Allergic/Immunologic: Negative.    Neurological: Negative.    Hematological: Negative.    Psychiatric/Behavioral: Negative.       Objective:      Vitals:    03/29/23 0857   BP: 112/70   BP Location: Left arm   Patient Position: Sitting   Pulse: 77   SpO2: 98%   Weight: 45.4 kg (100 lb 1.4 oz)      Physical Exam  Vitals and nursing note reviewed.   Constitutional:       Appearance: Normal appearance. She is normal weight.   HENT:      Head: Normocephalic and atraumatic.      Nose: Nose normal.      Mouth/Throat:      Mouth: Mucous membranes are moist.      Pharynx: Oropharynx is clear.   Eyes:      Extraocular Movements: Extraocular movements intact.      Conjunctiva/sclera: Conjunctivae normal.      Pupils: Pupils are equal, round, and reactive  to light.   Cardiovascular:      Rate and Rhythm: Normal rate and regular rhythm.      Pulses: Normal pulses.      Heart sounds: Normal heart sounds.   Pulmonary:      Effort: Pulmonary effort is normal.      Breath sounds: Normal breath sounds.   Abdominal:      General: Abdomen is flat. Bowel sounds are normal.      Palpations: Abdomen is soft.   Musculoskeletal:         General: Normal range of motion.      Cervical back: Normal range of motion and neck supple.   Skin:     General: Skin is warm and dry.      Capillary Refill: Capillary refill takes less than 2 seconds.   Neurological:      General: No focal deficit present.      Mental Status: She is alert and oriented to person, place, and time. Mental status is at baseline.   Psychiatric:         Mood and Affect: Mood normal.         Behavior: Behavior normal.         Thought Content: Thought content normal.         Judgment: Judgment normal.       Results for orders placed or performed during the hospital encounter of 11/18/22   POCT urine pregnancy   Result Value Ref Range    POC Preg Test, Ur Negative Negative     Acceptable Yes       Assessment:       1. Uterine anomaly    2. Lupus erythematosus, unspecified form    3. Tension headache    4. Wellness examination        Plan:       Uterine anomaly  -     Ambulatory referral/consult to Gynecology; Future; Expected date: 04/05/2023  -     US Pelvis Complete Non OB; Future; Expected date: 03/29/2023    Lupus erythematosus, unspecified form  -     Ambulatory referral/consult to Rheumatology; Future; Expected date: 04/05/2023    Tension headache  Comments:  Followed by pain management to do her Neurology pain issues.    Wellness examination  -     Mammo Digital Screening Bilat w/ Saleem; Future; Expected date: 03/29/2023          Medication List with Changes/Refills   Current Medications    ALBUTEROL (PROAIR HFA) 90 MCG/ACTUATION INHALER    Inhale 2 puffs into the lungs every 4 (four) hours as needed  for Wheezing.    BUTALBITAL-ACETAMINOPHEN-CAFFEINE -40 MG (FIORICET, ESGIC) -40 MG PER TABLET    Take 1 tablet by mouth 2 (two) times daily as needed.    EMGALITY  MG/ML PNIJ        HYDROCODONE-ACETAMINOPHEN 10-325MG (NORCO)  MG TAB    Take 1 tablet by mouth every 6 (six) hours as needed.    HYDROXYCHLOROQUINE (PLAQUENIL) 200 MG TABLET    Take 1 tablet (200 mg total) by mouth 2 (two) times daily.    MELOXICAM (MOBIC) 15 MG TABLET    TAKE 1 TABLET BY MOUTH EVERY DAY    TIZANIDINE (ZANAFLEX) 4 MG TABLET    Take 4 mg by mouth every 6 (six) hours as needed.    UBROGEPANT 100 MG TABLET       Discontinued Medications    DIAZEPAM (VALIUM) 5 MG TABLET    Take 1 tablet (5 mg total) by mouth 1 (one) time if needed for Anxiety. Take 15 mins prior to MRI

## 2023-03-31 ENCOUNTER — TELEPHONE (OUTPATIENT)
Dept: RADIOLOGY | Facility: HOSPITAL | Age: 43
End: 2023-03-31
Payer: OTHER GOVERNMENT

## 2023-03-31 NOTE — TELEPHONE ENCOUNTER
I left a message for patient to call back so that additional right breast imaging could be scheduled.

## 2023-04-05 ENCOUNTER — PATIENT MESSAGE (OUTPATIENT)
Dept: RHEUMATOLOGY | Facility: CLINIC | Age: 43
End: 2023-04-05
Payer: OTHER GOVERNMENT

## 2023-04-12 ENCOUNTER — HOSPITAL ENCOUNTER (OUTPATIENT)
Dept: RADIOLOGY | Facility: HOSPITAL | Age: 43
Discharge: HOME | End: 2023-04-12
Attending: FAMILY MEDICINE
Payer: OTHER GOVERNMENT

## 2023-04-12 DIAGNOSIS — R92.8 ABNORMAL MAMMOGRAM OF RIGHT BREAST: ICD-10-CM

## 2023-04-12 PROCEDURE — 76642 ULTRASOUND BREAST LIMITED: CPT | Mod: TC,PO,RT

## 2023-04-12 PROCEDURE — 77065 MAMMO DIGITAL DIAGNOSTIC RIGHT WITH TOMO: ICD-10-PCS | Mod: 26,RT,, | Performed by: RADIOLOGY

## 2023-04-12 PROCEDURE — 77061 BREAST TOMOSYNTHESIS UNI: CPT | Mod: TC,PO,RT

## 2023-04-12 PROCEDURE — 77061 MAMMO DIGITAL DIAGNOSTIC RIGHT WITH TOMO: ICD-10-PCS | Mod: 26,RT,, | Performed by: RADIOLOGY

## 2023-04-12 PROCEDURE — 76642 ULTRASOUND BREAST LIMITED: CPT | Mod: 26,RT,, | Performed by: RADIOLOGY

## 2023-04-12 PROCEDURE — 77061 BREAST TOMOSYNTHESIS UNI: CPT | Mod: 26,RT,, | Performed by: RADIOLOGY

## 2023-04-12 PROCEDURE — 77065 DX MAMMO INCL CAD UNI: CPT | Mod: 26,RT,, | Performed by: RADIOLOGY

## 2023-04-12 PROCEDURE — 76642 US BREAST RIGHT LIMITED: ICD-10-PCS | Mod: 26,RT,, | Performed by: RADIOLOGY

## 2023-06-02 ENCOUNTER — HOSPITAL ENCOUNTER (EMERGENCY)
Facility: HOSPITAL | Age: 43
Discharge: HOME | End: 2023-06-03
Attending: EMERGENCY MEDICINE
Payer: OTHER GOVERNMENT

## 2023-06-02 DIAGNOSIS — K70.30 ALCOHOLIC CIRRHOSIS, UNSPECIFIED WHETHER ASCITES PRESENT: ICD-10-CM

## 2023-06-02 DIAGNOSIS — R10.84 GENERALIZED ABDOMINAL PAIN: Primary | ICD-10-CM

## 2023-06-02 LAB
ALBUMIN SERPL BCP-MCNC: 5.1 G/DL (ref 3.5–5.2)
ALP SERPL-CCNC: 108 U/L (ref 55–135)
ALT SERPL W/O P-5'-P-CCNC: 17 U/L (ref 10–44)
ANION GAP SERPL CALC-SCNC: 10 MMOL/L (ref 8–16)
AST SERPL-CCNC: 15 U/L (ref 10–40)
BASOPHILS # BLD AUTO: 0.06 K/UL (ref 0–0.2)
BASOPHILS NFR BLD: 0.7 % (ref 0–1.9)
BILIRUB SERPL-MCNC: 0.6 MG/DL (ref 0.1–1)
BILIRUB UR QL STRIP: NEGATIVE
BUN SERPL-MCNC: 7 MG/DL (ref 6–20)
CALCIUM SERPL-MCNC: 9.3 MG/DL (ref 8.7–10.5)
CHLORIDE SERPL-SCNC: 102 MMOL/L (ref 95–110)
CLARITY UR: ABNORMAL
CO2 SERPL-SCNC: 28 MMOL/L (ref 23–29)
COLOR UR: YELLOW
CREAT SERPL-MCNC: 0.6 MG/DL (ref 0.5–1.4)
DIFFERENTIAL METHOD: ABNORMAL
EOSINOPHIL # BLD AUTO: 0.1 K/UL (ref 0–0.5)
EOSINOPHIL NFR BLD: 1.4 % (ref 0–8)
ERYTHROCYTE [DISTWIDTH] IN BLOOD BY AUTOMATED COUNT: 14 % (ref 11.5–14.5)
EST. GFR  (NO RACE VARIABLE): >60 ML/MIN/1.73 M^2
GLUCOSE SERPL-MCNC: 100 MG/DL (ref 70–110)
GLUCOSE UR QL STRIP: NEGATIVE
HCT VFR BLD AUTO: 45.2 % (ref 37–48.5)
HGB BLD-MCNC: 15.2 G/DL (ref 12–16)
HGB UR QL STRIP: ABNORMAL
IMM GRANULOCYTES # BLD AUTO: 0.03 K/UL (ref 0–0.04)
IMM GRANULOCYTES NFR BLD AUTO: 0.3 % (ref 0–0.5)
KETONES UR QL STRIP: NEGATIVE
LEUKOCYTE ESTERASE UR QL STRIP: NEGATIVE
LIPASE SERPL-CCNC: 24 U/L (ref 4–60)
LYMPHOCYTES # BLD AUTO: 3.2 K/UL (ref 1–4.8)
LYMPHOCYTES NFR BLD: 35.2 % (ref 18–48)
MAGNESIUM SERPL-MCNC: 2 MG/DL (ref 1.6–2.6)
MCH RBC QN AUTO: 33.6 PG (ref 27–31)
MCHC RBC AUTO-ENTMCNC: 33.6 G/DL (ref 32–36)
MCV RBC AUTO: 100 FL (ref 82–98)
MONOCYTES # BLD AUTO: 0.6 K/UL (ref 0.3–1)
MONOCYTES NFR BLD: 6.6 % (ref 4–15)
NEUTROPHILS # BLD AUTO: 5.1 K/UL (ref 1.8–7.7)
NEUTROPHILS NFR BLD: 55.8 % (ref 38–73)
NITRITE UR QL STRIP: NEGATIVE
NRBC BLD-RTO: 0 /100 WBC
PH UR STRIP: 7 [PH] (ref 5–8)
PLATELET # BLD AUTO: 242 K/UL (ref 150–450)
PMV BLD AUTO: 12.1 FL (ref 9.2–12.9)
POTASSIUM SERPL-SCNC: 3.3 MMOL/L (ref 3.5–5.1)
PROT SERPL-MCNC: 8.4 G/DL (ref 6–8.4)
PROT UR QL STRIP: ABNORMAL
RBC # BLD AUTO: 4.53 M/UL (ref 4–5.4)
SODIUM SERPL-SCNC: 140 MMOL/L (ref 136–145)
SP GR UR STRIP: 1.02 (ref 1–1.03)
URN SPEC COLLECT METH UR: ABNORMAL
UROBILINOGEN UR STRIP-ACNC: ABNORMAL EU/DL
WBC # BLD AUTO: 9.18 K/UL (ref 3.9–12.7)

## 2023-06-02 PROCEDURE — 83735 ASSAY OF MAGNESIUM: CPT

## 2023-06-02 PROCEDURE — 83690 ASSAY OF LIPASE: CPT

## 2023-06-02 PROCEDURE — 81003 URINALYSIS AUTO W/O SCOPE: CPT

## 2023-06-02 PROCEDURE — 25500020 PHARM REV CODE 255: Performed by: EMERGENCY MEDICINE

## 2023-06-02 PROCEDURE — 96361 HYDRATE IV INFUSION ADD-ON: CPT

## 2023-06-02 PROCEDURE — 63600175 PHARM REV CODE 636 W HCPCS: Performed by: EMERGENCY MEDICINE

## 2023-06-02 PROCEDURE — 36415 COLL VENOUS BLD VENIPUNCTURE: CPT

## 2023-06-02 PROCEDURE — 80053 COMPREHEN METABOLIC PANEL: CPT

## 2023-06-02 PROCEDURE — 96374 THER/PROPH/DIAG INJ IV PUSH: CPT | Mod: 59

## 2023-06-02 PROCEDURE — 99285 EMERGENCY DEPT VISIT HI MDM: CPT | Mod: 25

## 2023-06-02 PROCEDURE — 96375 TX/PRO/DX INJ NEW DRUG ADDON: CPT

## 2023-06-02 PROCEDURE — 25000003 PHARM REV CODE 250: Performed by: EMERGENCY MEDICINE

## 2023-06-02 PROCEDURE — 85025 COMPLETE CBC W/AUTO DIFF WBC: CPT

## 2023-06-02 RX ORDER — ONDANSETRON 2 MG/ML
4 INJECTION INTRAMUSCULAR; INTRAVENOUS
Status: COMPLETED | OUTPATIENT
Start: 2023-06-02 | End: 2023-06-02

## 2023-06-02 RX ORDER — DROPERIDOL 2.5 MG/ML
1.25 INJECTION, SOLUTION INTRAMUSCULAR; INTRAVENOUS ONCE
Status: COMPLETED | OUTPATIENT
Start: 2023-06-02 | End: 2023-06-02

## 2023-06-02 RX ADMIN — SODIUM CHLORIDE 1000 ML: 0.9 INJECTION, SOLUTION INTRAVENOUS at 11:06

## 2023-06-02 RX ADMIN — ONDANSETRON HYDROCHLORIDE 4 MG: 2 INJECTION, SOLUTION INTRAMUSCULAR; INTRAVENOUS at 11:06

## 2023-06-02 RX ADMIN — IOHEXOL 100 ML: 350 INJECTION, SOLUTION INTRAVENOUS at 11:06

## 2023-06-02 RX ADMIN — DROPERIDOL 1.25 MG: 2.5 INJECTION, SOLUTION INTRAMUSCULAR; INTRAVENOUS at 11:06

## 2023-06-02 RX ADMIN — POTASSIUM BICARBONATE 25 MEQ: 977.5 TABLET, EFFERVESCENT ORAL at 11:06

## 2023-06-03 VITALS
HEIGHT: 62 IN | RESPIRATION RATE: 20 BRPM | TEMPERATURE: 98 F | HEART RATE: 75 BPM | WEIGHT: 96 LBS | SYSTOLIC BLOOD PRESSURE: 110 MMHG | DIASTOLIC BLOOD PRESSURE: 64 MMHG | OXYGEN SATURATION: 99 % | BODY MASS INDEX: 17.66 KG/M2

## 2023-06-03 RX ORDER — HYDROXYZINE PAMOATE 50 MG/1
50 CAPSULE ORAL 4 TIMES DAILY
Qty: 120 CAPSULE | Refills: 0 | Status: SHIPPED | OUTPATIENT
Start: 2023-06-03 | End: 2023-07-03

## 2023-06-03 NOTE — ED PROVIDER NOTES
Encounter Date: 2023       History     Chief Complaint   Patient presents with    Abdominal Pain    Diarrhea    Vomiting     X 3days     42-year-old female with past medical history significant of GERD, anxiety, hemorrhoids presents secondary to abdominal pain with nausea vomiting.  Patient states he was diagnosed with liver disease previously as well but reports having excessive nausea vomiting and skin pruritus over the past 2 days.  She denies any fevers, chills, sweats, chest pain shortness of breath associated.  Patient is otherwise stable and has no other complaints.    Review of patient's allergies indicates:   Allergen Reactions    Pregabalin Itching, Other (See Comments) and Hives     Bruising and headaches    Sulfa (sulfonamide antibiotics) Nausea And Vomiting    Flexeril [cyclobenzaprine] Rash    Morphine Rash and Hives    Sulfamethoxazole-trimethoprim Rash    Toradol [ketorolac] Rash     Past Medical History:   Diagnosis Date    Abnormal Pap smear     Anxiety     Cancer     pre-cancer cells cervical     Cervical disc herniation     Chronic cough     Closed fracture 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) level with anterior cord syndrome     T 12 fracture compression     Fractures     Genital herpes     GERD (gastroesophageal reflux disease)     Hemorrhoid     History of colposcopy with cervical biopsy     Lumbar disc herniation     on Hydrocodone    Lupus (systemic lupus erythematosus)     chronic thrombocytopenia    Migraine headache     PONV (postoperative nausea and vomiting)      Past Surgical History:   Procedure Laterality Date    ADENOIDECTOMY      APPENDECTOMY       SECTION, CLASSIC      x2 , last 2014    CHOLECYSTECTOMY      CLOSED REDUCTION OF FRACTURE OF NASAL BONE Bilateral 2022    Procedure: CLOSED REDUCTION, FRACTURE, NASAL BONE;  Surgeon: Mary Carlton MD;  Location: [facility] OR;  Service: ENT;  Laterality: Bilateral;    COLONOSCOPY N/A 10/29/2019    Procedure: COLONOSCOPY;  Surgeon: Jules HYMAN  MD Dorothy;  Location: [facility] ENDO;  Service: Endoscopy;  Laterality: N/A;    CRYOTHERAPY      2002    DILATION AND CURETTAGE OF UTERUS  [id number]    ENDOMETRIAL ABLATION      GANGLION CYST EXCISION Left     TONSILLECTOMY      TUBAL LIGATION  4/2014    TYMPANOSTOMY TUBE PLACEMENT      x 7     Family History   Problem Relation Age of Onset    Heart disease Father     Diabetes Mother     Cancer Maternal Grandfather         colon    Cancer Paternal Grandfather         colon    Breast cancer Maternal Grandmother     Breast cancer Maternal Aunt     Breast cancer Maternal Aunt     Ovarian cancer Neg Hx      Social History     Tobacco Use    Smoking status: Every Day     Packs/day: 0.25     Types: Cigarettes    Smokeless tobacco: Never   Substance Use Topics    Alcohol use: Yes     Comment: special occasions    Drug use: Yes     Types: Marijuana     Comment: prescription     Review of Systems   Gastrointestinal:  Positive for abdominal pain, nausea and vomiting.   Skin:  Positive for rash.   All other systems reviewed and are negative.    Physical Exam     Initial Vitals [06/02/23 2008]   BP Pulse Resp Temp SpO2   110/71 92 18 98.3 °F (36.8 °C) 97 %      MAP       --         Physical Exam    Nursing note and vitals reviewed.  Constitutional: She appears well-developed and well-nourished. She appears distressed.   HENT:   Head: Normocephalic and atraumatic.   Mouth/Throat: Oropharynx is clear and moist.   Eyes: Conjunctivae and EOM are normal. Pupils are equal, round, and reactive to light. Scleral icterus is present.   Neck: No tracheal deviation present. No JVD present.   Normal range of motion.  Cardiovascular:  Normal rate, regular rhythm, normal heart sounds and intact distal pulses.     Exam reveals no gallop and no friction rub.       No murmur heard.  Pulmonary/Chest: Breath sounds normal. No respiratory distress. She has no wheezes. She exhibits no tenderness.   Abdominal: Abdomen is soft. Bowel sounds are normal.  She exhibits no distension. There is no abdominal tenderness. There is no rebound and no guarding.   Musculoskeletal:         General: No tenderness or edema. Normal range of motion.      Cervical back: Normal range of motion.     Neurological: She is alert and oriented to person, place, and time. She has normal strength. No cranial nerve deficit or sensory deficit.   Skin: Skin is warm and dry. Capillary refill takes less than 2 seconds. Purpura and rash noted. No erythema.   Psychiatric: She has a normal mood and affect.       ED Course   Procedures  Labs Reviewed   CBC W/ AUTO DIFFERENTIAL - Abnormal; Notable for the following components:       Result Value     (*)     MCH 33.6 (*)     All other components within normal limits    Narrative:     For upper or mid abdominal pain.   COMPREHENSIVE METABOLIC PANEL - Abnormal; Notable for the following components:    Potassium 3.3 (*)     All other components within normal limits    Narrative:     For upper or mid abdominal pain.   URINALYSIS, REFLEX TO URINE CULTURE - Abnormal; Notable for the following components:    Appearance, UA Hazy (*)     Protein, UA Trace (*)     Occult Blood UA Trace (*)     Urobilinogen, UA 4.0-6.0 (*)     All other components within normal limits    Narrative:     In and Out Cath as needed it patient unable to void  Specimen Source->Urine   LIPASE    Narrative:     For upper or mid abdominal pain.   MAGNESIUM   MAGNESIUM          Imaging Results              CT Abdomen Pelvis With Contrast (Final result)  Result time 06/02/23 23:37:52      Final result by Patricia Lin MD (06/02/23 23:37:52)                   Narrative:    EXAM DESCRIPTION:  CT ABDOMEN PELVIS WITH IV CONTRAST    CLINICAL HISTORY:  Nausea/vomiting; Abdominal pain, acute, nonlocalized. Abdominal pain, diarrhea, vomiting x3 days.    TECHNIQUE:  Multiple axial images were obtained through the abdomen and pelvis with 100 mL Omni 350 intravenous contrast. Coronal and  sagittal images were reconstructed.  All CT scans at this facility use dose modulation, iterative reconstruction, and/or weight based dosing when appropriate to reduce radiation dose to as low as reasonably achievable.    COMPARISON: Previous CT renal stone images dated 9/2/2016. The prior CT report is not available at this time.    FINDINGS:  Minimal hypoventilatory change within the dependent lung bases.    ABDOMEN:    Stomach: Within normal limits.    Liver: No focal lesions. Hepatomegaly, with a craniocaudal liver length of approximately 22.4 cm.    Gallbladder: Surgically absent. Mild intra and extrahepatic biliary ductal dilatation, with the common bile duct measuring approximately 8 mm in diameter, tapering toward the ampulla. This in retrospect was also present on the previous examination, compatible with a chronic finding, likely secondary to previous cholecystectomy. If there is further clinical concern, correlation with liver enzyme levels could be done.    Pancreas: Within normal limits.    Spleen: Small low-attenuation lesion along the posterior spleen measuring approximately 1.2 cm in diameter and another along the anterior spleen measuring approximately 0.5 cm in diameter, most likely small cysts, doubtful clinical significance. Calcified granuloma within the spleen, compatible with prior healed granulomatous disease, as on prior.    Adrenal glands: Within normal limits.    Right kidney: No hydronephrosis. No focal lesion.  Left kidney: No hydronephrosis. No focal lesion.    Vascular structures: Vascular calcifications.    Nodes: No lymphadenopathy by size criteria.    PELVIS:    Small bowel: No significant distention.    Appendix: Not seen, possibly surgically absent, as a surgical clip is seen in the expected location along the cecum.    Colon: Wall thickening along the cecum. Additional possible wall thickening along portions of the transverse and descending colon, which could be artifactual  related to nondistention in these regions. However, colitis is not excluded.    Bladder: Unremarkable.    No pelvic mass or adenopathy.    No free intraperitoneal fluid or air.    Bones: No acute bone findings.    IMPRESSION:  1.  Wall thickening along the cecum. Additional possible wall thickening along portions of the transverse and descending colon, which could be artifactual related to nondistention in these regions. However, colitis is not excluded.  2.  Hepatomegaly.  3.  Mild intra and extrahepatic biliary ductal dilatation, in retrospect also present on the previous examination, compatible with a chronic finding, likely secondary to previous cholecystectomy. If there is further clinical concern, correlation with liver enzyme levels could be done.    Electronically signed by:  Patricia Lin MD  6/2/2023 11:37 PM CDT Workstation: [id number]                                     Medications   sodium chloride 0.9% bolus 1,000 mL 1,000 mL (1,000 mLs Intravenous New Bag 6/2/23 2334)   ondansetron injection 4 mg (4 mg Intravenous Given 6/2/23 2333)   droPERidol injection 1.25 mg (1.25 mg Intravenous Given 6/2/23 2334)   iohexoL (OMNIPAQUE 350) injection 100 mL (100 mLs Intravenous Given 6/2/23 2304)   potassium bicarbonate disintegrating tablet 25 mEq (25 mEq Oral Given 6/2/23 2333)     Medical Decision Making:   Initial Assessment:   42-year-old female initial assessment in mild to moderate distress secondary to abdominal pain with nausea vomiting.  Patient is alert oriented x3, neurologically and neurovascular intact no focal deficits.  She is nontoxic appearing vitals stable at this time.  Differential Diagnosis:   Viral gastroenteritis, cholelithiasis, UTI  Clinical Tests:   Lab Tests: Reviewed and Ordered  The following lab test(s) were unremarkable: CBC, CMP, Urinalysis, UPT and Lipase  Radiological Study: Ordered and Reviewed  ED Management:  Patient has been reassessed noted to have no acute changes in  her condition.  Labs images unremarkable for any acute pathology requiring further hospital admission or treatment this time.  Patient given IV fluids and adequate pain control with symptomatic improvement.  She is been able tolerate p.o. without difficulties.  Patient has remained stable while in the ED and discharged home stable condition with PCP follow-up as needed.  Ms. Martinez is aware of the plan and in agreement with discharge.    Pt's plan and diagnosis was discussed. All questions were answered and patient was comfortable with the plan. This patient was personally seen and personally examined by me and I personally performed the services described in this documentation.   Complexity of the visit is established by the note or I have spent at least the amount of time discussing findings, exam and/or radiographs or imaging studies.     MD uses EPIC and voice recognition software prone to occasional and minor errors that may persist in the medical record.                          Clinical Impression:   Final diagnoses:  [R10.84] Generalized abdominal pain (Primary)  [K70.30] Alcoholic cirrhosis, unspecified whether ascites present        ED Disposition Condition    Discharge Stable          ED Prescriptions       Medication Sig Dispense Start Date End Date Auth. Provider    hydrOXYzine pamoate (VISTARIL) 50 MG Cap Take 1 capsule (50 mg total) by mouth 4 (four) times daily. 120 capsule 6/3/2023 7/3/2023 Jameel Yeh MD          Follow-up Information       Follow up With Specialties Details Why Contact Info Additional Information    [facility] - Emergency Dept Emergency Medicine  As needed, If symptoms worsen [address] [facility] [id number] [phone] 1st floor    Steve Gonzalez MD Family Medicine Schedule an appointment as soon as possible for a visit  As needed, If symptoms worsen [address] [place] [address]  [phone]                Jameel Yeh,  MD  06/03/23 0029

## 2023-06-08 ENCOUNTER — LAB VISIT (OUTPATIENT)
Dept: LAB | Facility: HOSPITAL | Age: 43
End: 2023-06-08
Attending: FAMILY MEDICINE
Payer: OTHER GOVERNMENT

## 2023-06-08 ENCOUNTER — OFFICE VISIT (OUTPATIENT)
Dept: RHEUMATOLOGY | Facility: CLINIC | Age: 43
End: 2023-06-08
Payer: OTHER GOVERNMENT

## 2023-06-08 VITALS
WEIGHT: 96.31 LBS | HEART RATE: 84 BPM | SYSTOLIC BLOOD PRESSURE: 110 MMHG | BODY MASS INDEX: 17.72 KG/M2 | DIASTOLIC BLOOD PRESSURE: 79 MMHG | HEIGHT: 62 IN

## 2023-06-08 DIAGNOSIS — N39.0 RECURRENT UTI: ICD-10-CM

## 2023-06-08 DIAGNOSIS — R39.15 URINARY URGENCY: ICD-10-CM

## 2023-06-08 DIAGNOSIS — R19.8 ALTERED BOWEL FUNCTION: ICD-10-CM

## 2023-06-08 DIAGNOSIS — M32.9 HISTORY OF SYSTEMIC LUPUS ERYTHEMATOSUS: ICD-10-CM

## 2023-06-08 DIAGNOSIS — M32.9 HISTORY OF SYSTEMIC LUPUS ERYTHEMATOSUS: Primary | ICD-10-CM

## 2023-06-08 DIAGNOSIS — E87.6 HYPOKALEMIA: ICD-10-CM

## 2023-06-08 LAB
25(OH)D3+25(OH)D2 SERPL-MCNC: 33 NG/ML (ref 30–96)
CRP SERPL-MCNC: 0.8 MG/L (ref 0–8.2)
ERYTHROCYTE [SEDIMENTATION RATE] IN BLOOD BY PHOTOMETRIC METHOD: 6 MM/HR (ref 0–36)
IGA SERPL-MCNC: 358 MG/DL (ref 40–350)
IGG SERPL-MCNC: 1173 MG/DL (ref 650–1600)
IGM SERPL-MCNC: 67 MG/DL (ref 50–300)
RHEUMATOID FACT SERPL-ACNC: <13 IU/ML (ref 0–15)

## 2023-06-08 PROCEDURE — 82306 VITAMIN D 25 HYDROXY: CPT | Performed by: INTERNAL MEDICINE

## 2023-06-08 PROCEDURE — 86038 ANTINUCLEAR ANTIBODIES: CPT | Performed by: INTERNAL MEDICINE

## 2023-06-08 PROCEDURE — 99999 PR PBB SHADOW E&M-EST. PATIENT-LVL V: CPT | Mod: PBBFAC,,, | Performed by: INTERNAL MEDICINE

## 2023-06-08 PROCEDURE — 99215 OFFICE O/P EST HI 40 MIN: CPT | Mod: PBBFAC | Performed by: INTERNAL MEDICINE

## 2023-06-08 PROCEDURE — 99205 OFFICE O/P NEW HI 60 MIN: CPT | Mod: S$PBB,,, | Performed by: INTERNAL MEDICINE

## 2023-06-08 PROCEDURE — 86200 CCP ANTIBODY: CPT | Performed by: INTERNAL MEDICINE

## 2023-06-08 PROCEDURE — 99999 PR PBB SHADOW E&M-EST. PATIENT-LVL V: ICD-10-PCS | Mod: PBBFAC,,, | Performed by: INTERNAL MEDICINE

## 2023-06-08 PROCEDURE — 86140 C-REACTIVE PROTEIN: CPT | Performed by: INTERNAL MEDICINE

## 2023-06-08 PROCEDURE — 82784 ASSAY IGA/IGD/IGG/IGM EACH: CPT | Performed by: INTERNAL MEDICINE

## 2023-06-08 PROCEDURE — 84165 PROTEIN E-PHORESIS SERUM: CPT | Performed by: INTERNAL MEDICINE

## 2023-06-08 PROCEDURE — 99205 PR OFFICE/OUTPT VISIT, NEW, LEVL V, 60-74 MIN: ICD-10-PCS | Mod: S$PBB,,, | Performed by: INTERNAL MEDICINE

## 2023-06-08 PROCEDURE — 86431 RHEUMATOID FACTOR QUANT: CPT | Performed by: INTERNAL MEDICINE

## 2023-06-08 PROCEDURE — 86334 IMMUNOFIX E-PHORESIS SERUM: CPT | Mod: 26,,, | Performed by: PATHOLOGY

## 2023-06-08 PROCEDURE — 36415 COLL VENOUS BLD VENIPUNCTURE: CPT | Performed by: INTERNAL MEDICINE

## 2023-06-08 PROCEDURE — 86334 PATHOLOGIST INTERPRETATION IFE: ICD-10-PCS | Mod: 26,,, | Performed by: PATHOLOGY

## 2023-06-08 PROCEDURE — 86334 IMMUNOFIX E-PHORESIS SERUM: CPT | Performed by: INTERNAL MEDICINE

## 2023-06-08 PROCEDURE — 84165 PATHOLOGIST INTERPRETATION SPE: ICD-10-PCS | Mod: 26,,, | Performed by: PATHOLOGY

## 2023-06-08 PROCEDURE — 84165 PROTEIN E-PHORESIS SERUM: CPT | Mod: 26,,, | Performed by: PATHOLOGY

## 2023-06-08 PROCEDURE — 85652 RBC SED RATE AUTOMATED: CPT | Performed by: INTERNAL MEDICINE

## 2023-06-08 RX ORDER — PREDNISONE 5 MG/1
5 TABLET ORAL DAILY PRN
Qty: 90 TABLET | Refills: 0 | Status: SHIPPED | OUTPATIENT
Start: 2023-06-08 | End: 2023-09-18

## 2023-06-08 NOTE — PROGRESS NOTES
RHEUMATOLOGY CLINIC INITIAL VISIT    Reason for consult:-  Worsening symptoms of lupus  Chief complaints, HPI, ROS, EXAM, Assessment & Plans:-  Jaye Guerrier a 42 y.o. pleasant female comes in with worsening symptoms of lupus.  She was diagnosed with lupus by her pediatrician 25 years ago.  Her lupus was diagnosed when she had blood count abnormalities and joint symptoms.  Her other symptoms includes malaise, fatigue and intermittent rash.  She was on Plaquenil until last year.  Recently the Plaquenil seem to increase her symptoms due to which she discontinued Plaquenil.  Symptoms have been progressively worsening.  Recurrent urinary tract infection in the past couple of years.  Chronic recurrent abdominal pain associated with altered bowel movements including constipation and diarrhea.  Rheumatological review of system negative otherwise.  Physical examination shows no synovitis, dactylitis, enthesitis, sclerodactyly, telangiectasia.  Multiple soft tissue tender points.    1. History of systemic lupus erythematosus    2. Hypokalemia    3. Recurrent UTI    4. Urinary urgency    5. Altered bowel function      Problem List Items Addressed This Visit       History of systemic lupus erythematosus - Primary    Relevant Medications    predniSONE (DELTASONE) 5 MG tablet    Other Relevant Orders    LYSSA Screen w/Reflex    Cyclic Citrullinated Peptide Antibody, IgG    Rheumatoid Factor    Sedimentation rate    C-Reactive Protein    Ambulatory referral/consult to Urology    Protein Electrophoresis, Serum    Immunofixation Electrophoresis    Immunoglobulins (IgG, IgA, IgM) Quantitative    Vitamin D    Hypokalemia    Recurrent UTI    Urinary urgency    Altered bowel function    Relevant Orders    Ambulatory referral/consult to Gastroenterology      Latest Reference Range & Units Most Recent   WBC 3.90 - 12.70 K/uL 9.18  6/2/23 20:38   RBC 4.00 - 5.40 M/uL 4.53  6/2/23 20:38   Hemoglobin 12.0 - 16.0 g/dL 15.2  6/2/23 20:38    Hematocrit 37.0 - 48.5 % 45.2  6/2/23 20:38   MCV 82 - 98 fL 100 (H)  6/2/23 20:38   MCH 27.0 - 31.0 pg 33.6 (H)  6/2/23 20:38   MCHC 32.0 - 36.0 g/dL 33.6  6/2/23 20:38   RDW 11.5 - 14.5 % 14.0  6/2/23 20:38   Platelets 150 - 450 K/uL 242  6/2/23 20:38   MPV 9.2 - 12.9 fL 12.1  6/2/23 20:38   Platelet Estimate  Decreased !  7/28/14 16:12   Gran % 38.0 - 73.0 % 55.8  6/2/23 20:38   Lymph % 18.0 - 48.0 % 35.2  6/2/23 20:38   Mono % 4.0 - 15.0 % 6.6  6/2/23 20:38   Eosinophil % 0.0 - 8.0 % 1.4  6/2/23 20:38   Basophil % 0.0 - 1.9 % 0.7  6/2/23 20:38   Immature Granulocytes 0.0 - 0.5 % 0.3  6/2/23 20:38   Gran # (ANC) 1.8 - 7.7 K/uL 5.1  6/2/23 20:38   Lymph # 1.0 - 4.8 K/uL 3.2  6/2/23 20:38   Mono # 0.3 - 1.0 K/uL 0.6  6/2/23 20:38   Eos # 0.0 - 0.5 K/uL 0.1  6/2/23 20:38   Baso # 0.00 - 0.20 K/uL 0.06  6/2/23 20:38   Immature Grans (Abs) 0.00 - 0.04 K/uL 0.03  6/2/23 20:38   Bands % 4.0  1/21/14 11:55   nRBC 0 /100 WBC 0  6/2/23 20:38   Differential Method  Automated  6/2/23 20:38   Ovalocytes  Occasional  9/6/13 16:34   Poikilocytosis  Slight  9/6/13 16:34   Giant Platelets  Present  7/28/14 16:12   Sage/Echinocytes  Occasional  9/6/13 16:34   Sed Rate 0 - 36 mm/Hr <2  11/16/22 14:52   Sodium 136 - 145 mmol/L 140  6/2/23 20:38   Potassium 3.5 - 5.1 mmol/L 3.3 (L)  6/2/23 20:38   Chloride 95 - 110 mmol/L 102  6/2/23 20:38   CO2 23 - 29 mmol/L 28  6/2/23 20:38   Anion Gap 8 - 16 mmol/L 10  6/2/23 20:38   BUN 6 - 20 mg/dL 7  6/2/23 20:38   Creatinine 0.5 - 1.4 mg/dL 0.6  6/2/23 20:38   eGFR >60 mL/min/1.73 m^2 >60.0  6/2/23 20:38   eGFR if non African American >60 mL/min/1.73 m^2 >60.0  6/12/17 08:24   eGFR if African American >60 mL/min/1.73 m^2 >60.0  6/12/17 08:24   Glucose 70 - 110 mg/dL 100  6/2/23 20:38   Calcium 8.7 - 10.5 mg/dL 9.3  6/2/23 20:38   Magnesium 1.6 - 2.6 mg/dL 2.0  6/2/23 20:38   Alkaline Phosphatase 55 - 135 U/L 108  6/2/23 20:38   PROTEIN TOTAL 6.0 - 8.4 g/dL 8.4  6/2/23 20:38   Albumin 3.5 -  5.2 g/dL 5.1  6/2/23 20:38   BILIRUBIN TOTAL 0.1 - 1.0 mg/dL 0.6  6/2/23 20:38   AST 10 - 40 U/L 15  6/2/23 20:38   ALT 10 - 44 U/L 17  6/2/23 20:38   Lipase 4 - 60 U/L 24  6/2/23 20:38   CRP 0.0 - 8.2 mg/L 0.8  6/8/23 15:39   Cholesterol 120 - 199 mg/dL 160  6/12/17 08:24   HDL 40 - 75 mg/dL 31 (L)  6/12/17 08:24   HDL/Cholesterol Ratio 20.0 - 50.0 % 19.4 (L)  6/12/17 08:24   LDL Cholesterol External 63.0 - 159.0 mg/dL 114.4  6/12/17 08:24   Non-HDL Cholesterol mg/dL 129  6/12/17 08:24   Total Cholesterol/HDL Ratio 2.0 - 5.0  5.2 (H)  6/12/17 08:24   Triglycerides 30 - 150 mg/dL 73  6/12/17 08:24   OB Glucose Screen 70 - 140 mg/dL 121  1/22/14 09:48   TSH 0.400 - 4.000 uIU/mL 0.549  11/17/16 15:03   Free T4 0.71 - 1.51 ng/dL 1.04  11/17/16 15:03   AFP Interpretation  SEE BELOW  11/5/13 16:36   Alpha Fetoprotein Maternal ng/mL 46.3  11/5/13 16:36   Date of Birth  [id number] 11/5/13 16:36   Ethnic Origin  WHITE  11/5/13 16:36   Inhibin A pg/mL 440.00  11/5/13 16:36   Insulin Depend. Diabetes  NO  11/5/13 16:36   M.O.M. Alpha Fetoprotein  1.10  11/5/13 16:36   M.O.M. HCG  0.67  11/5/13 16:36   M.O.M. Inhibin A  2.29  11/5/13 16:36   M.O.M. Unconj. Estriol  0.65  11/5/13 16:36   Maternal Age at TANESHA (Yrs)  32  11/5/13 16:36   Maternal Weight (lbs)  106  11/5/13 16:36   Multiple Gestations  NO  11/5/13 16:36   Neural Tube Risk(<2.20)  N/A  11/5/13 16:36   Unconjugated Estriol ng/mL 0.67  11/5/13 16:36   Gestational Age (Weeks)  16  11/5/13 16:36   Gestational Age (Days)  4  11/5/13 16:36   Quad Screen NEGATIVE  NEGATIVE  11/5/13 16:36   Down Risk (<1:270)  1:750  11/5/13 16:36   HCG IU/mL 20.7  11/5/13 16:36   HCG Quant See Text mIU/mL <1.2  12/29/16 13:57   Preg Test, Ur Negative  Negative  11/18/22 10:32   FSH See Text mIU/mL 13.20  11/17/16 15:03   Prolactin 5.2 - 26.5 ng/mL 25.6  11/17/16 15:03   LYSSA Screen Negative <1:160  Negative <1:160  9/6/13 16:34   CCP Antibodies <5.0 U/mL <0.5  9/6/13 16:34   Complement (C-3)  50 - 180 mg/dL 148  9/6/13 16:34   Complement (C-4) 11 - 44 mg/dL 27  9/6/13 16:34   Cystic Fibrosis Mutation PNL  NEGATIVE  9/10/13 15:18   Rheumatoid Factor 0.0 - 15.0 IU/mL <10.0  9/9/13 12:16   Group & Rh  O POS  9/10/13 15:18   INDIRECT PRANAY  NEG  9/10/13 15:18   Hep B Core Total Ab  Positive !  9/6/13 16:34   Hep B S Ab  Positive !  9/6/13 16:34   Hepatitis B Surface Ag  Negative  9/10/13 15:18   Hepatitis C Ab Non-reactive  Non-reactive  11/11/22 06:22   HPV High Risk type 16, PCR Negative  Negative  10/1/18 11:07   HPV High Risk type 18, PCR Negative  Negative  10/1/18 11:07   HPV other High Risk types, PCR Negative  Negative  10/1/18 11:07   HSV PCR Specimen Source  See Below (C)  4/2/15 16:00   HSV1 PCR Result Not detected  Not detected  4/2/15 16:00   HSV2 PCR Result Not detected  DETECTED !  4/2/15 16:00   HIV 1/2 Ag/Ab Non-reactive  Non-reactive  11/11/22 06:22   Chlamydia, Amplified DNA Not detected  Not Detected  7/10/13 16:30   N gonorrhoeae, amplified DNA Not detected  Not Detected  7/10/13 16:30   RPR Non-reactive  Non-reactive  9/10/13 15:18   Rubella IgG Antibodies 0.0 - 4.9 IU/mL 41.2 (H)  9/10/13 15:19   Rubella Immune Status  Reactive !  9/10/13 15:19   Specimen UA  Urine, Clean Catch  6/2/23 21:39   Color, UA Yellow, Straw, Fidelia  Yellow  6/2/23 21:39   Appearance, UA Clear  Hazy !  6/2/23 21:39   Specific Gravity, UA 1.005 - 1.030  1.020  6/2/23 21:39   pH, UA 5.0 - 8.0  7.0  6/2/23 21:39   Protein, UA Negative  Trace !  6/2/23 21:39   Glucose, UA Negative  Negative  6/2/23 21:39   Ketones, UA Negative  Negative  6/2/23 21:39   Occult Blood UA Negative  Trace !  6/2/23 21:39   NITRITE UA Negative  Negative  6/2/23 21:39   UROBILINOGEN UA Negative EU/dL 4.0-6.0 !  6/2/23 21:39   Bilirubin (UA) Negative  Negative  6/2/23 21:39   Leukocytes, UA Negative  Negative  6/2/23 21:39   RBC, UA 0 - 4 /hpf 4  9/6/13 14:23   WBC, UA 0 - 5 /hpf 80 (H)  9/6/13 14:23   Bacteria, UA None-Occ /hpf Many  !  9/6/13 14:23   Squam Epithel, UA /hpf 15  9/6/13 14:23   Microscopic Comment  see below  9/6/13 14:23   (H): Data is abnormally high  !: Data is abnormal  (L): Data is abnormally low  (C): Corrected      Longstanding history of lupus but exam shows no evidence of active lupus today.  Multiple myofascial tender points raising possibility of underlying fibromyalgia complicated with chronic fatigue syndrome.  Check serologies, inflammatory markers for any evidence of recurrence of lupus.  Check immunoglobulin levels for recurrent infections.  Consult urologist for history of recurrent UTI associated with urinary urgency and frequency.  Consult gastroenterologist for chronic abdominal pain associated with altered bowel movements raising possibility of irritable bowel syndrome.  Advised to follow-up with primary care physician for chronic hypokalemia.  I have explained all of the above in detail and the patient understands all of the current recommendation(s). I have answered all questions to the best of my ability and to their complete satisfaction.     Total time spent 60 minutes including time needed to review the records, the   patient evaluation, documentation, face-to-face discussion with the patient,   more than 50% of the time was spent on coordination of care and counseling.    Level V visit.      # Follow up in about 7 months (around 1/8/2024).      Past Medical History:   Diagnosis Date    Abnormal Pap smear     Anxiety     Cancer     pre-cancer cells cervical     Cervical disc herniation     Chronic cough     Closed fracture 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) level with anterior cord syndrome     T 12 fracture compression     Fractures     Genital herpes     GERD (gastroesophageal reflux disease)     Hemorrhoid     History of colposcopy with cervical biopsy     Lumbar disc herniation     on Hydrocodone    Lupus (systemic lupus erythematosus)     chronic thrombocytopenia    Migraine headache     PONV (postoperative nausea and  vomiting)        Past Surgical History:   Procedure Laterality Date    ADENOIDECTOMY      APPENDECTOMY       SECTION, CLASSIC      x2 , last 2014    CHOLECYSTECTOMY      CLOSED REDUCTION OF FRACTURE OF NASAL BONE Bilateral 2022    Procedure: CLOSED REDUCTION, FRACTURE, NASAL BONE;  Surgeon: Mary Carlton MD;  Location: [facility] OR;  Service: ENT;  Laterality: Bilateral;    COLONOSCOPY N/A 10/29/2019    Procedure: COLONOSCOPY;  Surgeon: Jules De La Cruz MD;  Location: [facility] ENDO;  Service: Endoscopy;  Laterality: N/A;    CRYOTHERAPY          DILATION AND CURETTAGE OF UTERUS  [id number]    ENDOMETRIAL ABLATION      GANGLION CYST EXCISION Left     TONSILLECTOMY      TUBAL LIGATION  2014    TYMPANOSTOMY TUBE PLACEMENT      x 7        Social History     Tobacco Use    Smoking status: Every Day     Packs/day: 0.25     Types: Cigarettes    Smokeless tobacco: Never   Substance Use Topics    Alcohol use: Yes     Comment: special occasions    Drug use: Yes     Types: Marijuana     Comment: prescription       Family History   Problem Relation Age of Onset    Heart disease Father     Diabetes Mother     Cancer Maternal Grandfather         colon    Cancer Paternal Grandfather         colon    Breast cancer Maternal Grandmother     Breast cancer Maternal Aunt     Breast cancer Maternal Aunt     Ovarian cancer Neg Hx        Review of patient's allergies indicates:   Allergen Reactions    Pregabalin Itching, Other (See Comments) and Hives     Bruising and headaches    Sulfa (sulfonamide antibiotics) Nausea And Vomiting    Flexeril [cyclobenzaprine] Rash    Morphine Rash and Hives    Sulfamethoxazole-trimethoprim Rash    Toradol [ketorolac] Rash       Medication List with Changes/Refills   New Medications    PREDNISONE (DELTASONE) 5 MG TABLET    Take 1 tablet (5 mg total) by mouth daily as needed (Joint stiffness).   Current Medications    ALBUTEROL (PROAIR HFA) 90 MCG/ACTUATION INHALER    Inhale 2 puffs into  the lungs every 4 (four) hours as needed for Wheezing.    BUTALBITAL-ACETAMINOPHEN-CAFFEINE -40 MG (FIORICET, ESGIC) -40 MG PER TABLET    Take 1 tablet by mouth 2 (two) times daily as needed.    EMGALITY  MG/ML PNIJ        HYDROCODONE-ACETAMINOPHEN 10-325MG (NORCO)  MG TAB    Take 1 tablet by mouth every 6 (six) hours as needed.    HYDROXYZINE PAMOATE (VISTARIL) 50 MG CAP    Take 1 capsule (50 mg total) by mouth 4 (four) times daily.    TIZANIDINE (ZANAFLEX) 4 MG TABLET    Take 4 mg by mouth every 6 (six) hours as needed.    UBROGEPANT 100 MG TABLET       Discontinued Medications    HYDROXYCHLOROQUINE (PLAQUENIL) 200 MG TABLET    Take 1 tablet (200 mg total) by mouth 2 (two) times daily.    MELOXICAM (MOBIC) 15 MG TABLET    TAKE 1 TABLET BY MOUTH EVERY DAY         Thank you for allowing me to participate in the care ofJaye Danika Martinez.    Disclaimer: This note was prepared using voice recognition system and is likely to have sound alike errors and is not proof read.  Please call me with any questions.

## 2023-06-09 LAB
ALBUMIN SERPL ELPH-MCNC: 4.61 G/DL (ref 3.35–5.55)
ALPHA1 GLOB SERPL ELPH-MCNC: 0.35 G/DL (ref 0.17–0.41)
ALPHA2 GLOB SERPL ELPH-MCNC: 0.76 G/DL (ref 0.43–0.99)
ANA SER QL IF: NORMAL
B-GLOBULIN SERPL ELPH-MCNC: 0.87 G/DL (ref 0.5–1.1)
CCP AB SER IA-ACNC: <0.5 U/ML
GAMMA GLOB SERPL ELPH-MCNC: 1.1 G/DL (ref 0.67–1.58)
INTERPRETATION SERPL IFE-IMP: NORMAL
PROT SERPL-MCNC: 7.7 G/DL (ref 6–8.4)

## 2023-06-09 NOTE — PROGRESS NOTES
Negative LYSSA by new method- IFA. Rules out possibility of lupus flare. Lupus is still in remission.

## 2023-06-11 LAB
PATHOLOGIST INTERPRETATION IFE: NORMAL
PATHOLOGIST INTERPRETATION SPE: NORMAL

## 2023-07-05 ENCOUNTER — OFFICE VISIT (OUTPATIENT)
Dept: OBSTETRICS AND GYNECOLOGY | Facility: CLINIC | Age: 43
End: 2023-07-05
Payer: OTHER GOVERNMENT

## 2023-07-05 VITALS
BODY MASS INDEX: 18.5 KG/M2 | WEIGHT: 100.5 LBS | HEIGHT: 62 IN | SYSTOLIC BLOOD PRESSURE: 98 MMHG | DIASTOLIC BLOOD PRESSURE: 60 MMHG

## 2023-07-05 DIAGNOSIS — N81.4 UTERINE PROLAPSE: ICD-10-CM

## 2023-07-05 DIAGNOSIS — R10.2 PELVIC PAIN: ICD-10-CM

## 2023-07-05 DIAGNOSIS — F17.200 TOBACCO USE DISORDER: ICD-10-CM

## 2023-07-05 DIAGNOSIS — Z12.4 CERVICAL CANCER SCREENING: Primary | ICD-10-CM

## 2023-07-05 PROCEDURE — 99999 PR PBB SHADOW E&M-EST. PATIENT-LVL III: CPT | Mod: PBBFAC,,, | Performed by: SPECIALIST

## 2023-07-05 PROCEDURE — 88141 PR  CYTOPATH CERV/VAG INTERPRET: ICD-10-PCS | Mod: ,,, | Performed by: PATHOLOGY

## 2023-07-05 PROCEDURE — 99999 PR PBB SHADOW E&M-EST. PATIENT-LVL III: ICD-10-PCS | Mod: PBBFAC,,, | Performed by: SPECIALIST

## 2023-07-05 PROCEDURE — 88141 CYTOPATH C/V INTERPRET: CPT | Mod: ,,, | Performed by: PATHOLOGY

## 2023-07-05 PROCEDURE — 99213 OFFICE O/P EST LOW 20 MIN: CPT | Mod: PBBFAC,PN | Performed by: SPECIALIST

## 2023-07-05 PROCEDURE — 99204 OFFICE O/P NEW MOD 45 MIN: CPT | Mod: S$PBB,,, | Performed by: SPECIALIST

## 2023-07-05 PROCEDURE — 88175 CYTOPATH C/V AUTO FLUID REDO: CPT | Performed by: PATHOLOGY

## 2023-07-05 PROCEDURE — 99204 PR OFFICE/OUTPT VISIT, NEW, LEVL IV, 45-59 MIN: ICD-10-PCS | Mod: S$PBB,,, | Performed by: SPECIALIST

## 2023-07-05 NOTE — PROGRESS NOTES
44 yo WF  presents for annual gyn eval and in addition, complain occasional BRANDON and UI s/s  In addition, occasional deep dyspareunia  denies vaginal d/c, DUB, n/v, weight loss/gain  Past Medical History:   Diagnosis Date    Abnormal Pap smear     Anxiety     Cancer     pre-cancer cells cervical     Cervical disc herniation     Chronic cough     Closed fracture 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) level with anterior cord syndrome     T 12 fracture compression     Fractures     Genital herpes     GERD (gastroesophageal reflux disease)     Hemorrhoid     History of colposcopy with cervical biopsy     Lumbar disc herniation     on Hydrocodone    Lupus (systemic lupus erythematosus)     chronic thrombocytopenia    Migraine headache     PONV (postoperative nausea and vomiting)        Past Surgical History:   Procedure Laterality Date    ADENOIDECTOMY      APPENDECTOMY       SECTION, CLASSIC      x2 , last 2014    CHOLECYSTECTOMY      CLOSED REDUCTION OF FRACTURE OF NASAL BONE Bilateral 2022    Procedure: CLOSED REDUCTION, FRACTURE, NASAL BONE;  Surgeon: Mary Carlton MD;  Location: [facility] OR;  Service: ENT;  Laterality: Bilateral;    COLONOSCOPY N/A 10/29/2019    Procedure: COLONOSCOPY;  Surgeon: Jules De La Cruz MD;  Location: [facility] ENDO;  Service: Endoscopy;  Laterality: N/A;    CRYOTHERAPY          DILATION AND CURETTAGE OF UTERUS  [id number]    ENDOMETRIAL ABLATION      GANGLION CYST EXCISION Left     TONSILLECTOMY      TUBAL LIGATION  2014    TYMPANOSTOMY TUBE PLACEMENT      x 7       Family History   Problem Relation Age of Onset    Heart disease Father     Diabetes Mother     Cancer Maternal Grandfather         colon    Cancer Paternal Grandfather         colon    Breast cancer Maternal Grandmother     Breast cancer Maternal Aunt     Breast cancer Maternal Aunt     Ovarian cancer Neg Hx        Social History     Socioeconomic History    Marital status: Legally    Tobacco Use    Smoking status:  Every Day     Packs/day: 0.25     Types: Cigarettes    Smokeless tobacco: Never   Substance and Sexual Activity    Alcohol use: Yes     Comment: special occasions    Drug use: Yes     Types: Marijuana     Comment: prescription    Sexual activity: Yes     Partners: Male     Birth control/protection: None       Current Outpatient Medications   Medication Sig Dispense Refill    butalbital-acetaminophen-caffeine -40 mg (FIORICET, ESGIC) -40 mg per tablet Take 1 tablet by mouth 2 (two) times daily as needed. 30 tablet 0    EMGALITY  mg/mL PnIj       hydrocodone-acetaminophen 10-325mg (NORCO)  mg Tab Take 1 tablet by mouth every 6 (six) hours as needed. 120 tablet 0    predniSONE (DELTASONE) 5 MG tablet Take 1 tablet (5 mg total) by mouth daily as needed (Joint stiffness). 90 tablet 0    tiZANidine (ZANAFLEX) 4 MG tablet Take 4 mg by mouth every 6 (six) hours as needed.      UBROGEPANT 100 mg tablet       albuterol (PROAIR HFA) 90 mcg/actuation inhaler Inhale 2 puffs into the lungs every 4 (four) hours as needed for Wheezing. 25.5 g 0     No current facility-administered medications for this visit.       Review of patient's allergies indicates:   Allergen Reactions    Pregabalin Itching, Other (See Comments) and Hives     Bruising and headaches    Sulfa (sulfonamide antibiotics) Nausea And Vomiting    Flexeril [cyclobenzaprine] Rash    Morphine Rash and Hives    Sulfamethoxazole-trimethoprim Rash    Toradol [ketorolac] Rash       Review of System:   General: no chills, fever, night sweats, weight gain or weight loss  Psychological: no depression or suicidal ideation  Breasts: no new or changing breast lumps, nipple discharge or masses.  Respiratory: no cough, shortness of breath, or wheezing  Cardiovascular: no chest pain or dyspnea on exertion  Gastrointestinal: no abdominal pain, change in bowel habits, or black or bloody stools  Genito-Urinary: as above  Musculoskeletal: no gait disturbance or  muscular weakness                                               General Appearance    A and O x 4, Cooperative, no distress   Breasts    Abdomen   Symmetrical, no masses, no discharge, skin changes , erythema or retraction. No adenopathy  Soft, non-tender, bowel sounds active all four quadrants,  no masses, no organomegaly    Genitourinary:   External rectal exam shows no thrombosed external hemorrhoids.   Pelvic exam was performed with patient supine.  No labial fusion.  There is no rash, lesion or injury on the right labia.  There is no rash, lesion or injury on the left labia.  No bleeding and no signs of injury around the vaginal introitus, urethra is without lesions and well supported. The cervix is visualized with no discharge, lesions or friability.  No vaginal discharge found.  GRADE 2 UTERINE CERVICAL PROLAPSE  WITH GRADE 2 MIDLINE CYSTOCELE MILD GRADE 1 RECTOCELE  Bimanual exam:  The urethra is normal to palpation and there are no palpable vaginal wall masses.  Uterus is not deviated, not enlarged, not fixed, normal shape and not tender.  Cervix exhibits no motion tenderness.   Right adnexum displays no mass and no tenderness.  Left adnexum displays no mass and no tenderness.   Extremities: Extremities normal, atraumatic, no cyanosis or edema                     NOTE  NURSING PERSONAL PRESENT FOR ENTIRE PHYSICAL EXAM     I discussed POP and associated symptoms  Discussed cystocele and associated BRANDON  I discussed treatment options for subjective symptoms and due to prolpase , these would be primarily surgical, anterior repeair, possible hyst and ADÁN  I rec and will obtain pap screening and pelvic u/s and review  BSE monthly  Smoking cessation  Daily calcium intake  Answered all questions    I spent a total of 50 minutes on the day of the visit. This includes face to face time and non-face to face time preparing to see the patient (eg, review of tests), obtaining and/or reviewing separately obtained history,  documenting clinical information in the electronic or other health record, independently interpreting results and communicating results to the patient/family/caregiver, or care coordinator.

## 2023-07-07 ENCOUNTER — HOSPITAL ENCOUNTER (OUTPATIENT)
Dept: RADIOLOGY | Facility: HOSPITAL | Age: 43
Discharge: HOME | End: 2023-07-07
Attending: SPECIALIST
Payer: OTHER GOVERNMENT

## 2023-07-07 DIAGNOSIS — R10.2 PELVIC PAIN: ICD-10-CM

## 2023-07-07 PROCEDURE — 76830 US PELVIS COMP WITH TRANSVAG NON-OB (XPD): ICD-10-PCS | Mod: 26,,, | Performed by: RADIOLOGY

## 2023-07-07 PROCEDURE — 76856 US EXAM PELVIC COMPLETE: CPT | Mod: 26,,, | Performed by: RADIOLOGY

## 2023-07-07 PROCEDURE — 76830 TRANSVAGINAL US NON-OB: CPT | Mod: 26,,, | Performed by: RADIOLOGY

## 2023-07-07 PROCEDURE — 76856 US EXAM PELVIC COMPLETE: CPT | Mod: TC,PN

## 2023-07-07 PROCEDURE — 76856 US PELVIS COMP WITH TRANSVAG NON-OB (XPD): ICD-10-PCS | Mod: 26,,, | Performed by: RADIOLOGY

## 2023-07-11 LAB
FINAL PATHOLOGIC DIAGNOSIS: NORMAL
Lab: NORMAL

## 2023-07-26 ENCOUNTER — OFFICE VISIT (OUTPATIENT)
Dept: OBSTETRICS AND GYNECOLOGY | Facility: CLINIC | Age: 43
End: 2023-07-26
Payer: OTHER GOVERNMENT

## 2023-07-26 VITALS — DIASTOLIC BLOOD PRESSURE: 62 MMHG | BODY MASS INDEX: 18.15 KG/M2 | WEIGHT: 99.19 LBS | SYSTOLIC BLOOD PRESSURE: 98 MMHG

## 2023-07-26 DIAGNOSIS — N81.11 CYSTOCELE, MIDLINE: ICD-10-CM

## 2023-07-26 DIAGNOSIS — N39.3 SUI (STRESS URINARY INCONTINENCE, FEMALE): ICD-10-CM

## 2023-07-26 DIAGNOSIS — R10.2 PELVIC PAIN: Primary | ICD-10-CM

## 2023-07-26 DIAGNOSIS — N81.4 UTERINE PROLAPSE: ICD-10-CM

## 2023-07-26 PROCEDURE — 99999 PR PBB SHADOW E&M-EST. PATIENT-LVL III: ICD-10-PCS | Mod: PBBFAC,,, | Performed by: SPECIALIST

## 2023-07-26 PROCEDURE — 99213 OFFICE O/P EST LOW 20 MIN: CPT | Mod: S$PBB,,, | Performed by: SPECIALIST

## 2023-07-26 PROCEDURE — 99999 PR PBB SHADOW E&M-EST. PATIENT-LVL III: CPT | Mod: PBBFAC,,, | Performed by: SPECIALIST

## 2023-07-26 PROCEDURE — 99213 PR OFFICE/OUTPT VISIT, EST, LEVL III, 20-29 MIN: ICD-10-PCS | Mod: S$PBB,,, | Performed by: SPECIALIST

## 2023-07-26 PROCEDURE — 99213 OFFICE O/P EST LOW 20 MIN: CPT | Mod: PBBFAC,PN | Performed by: SPECIALIST

## 2023-07-26 RX ORDER — HYDROCODONE BITARTRATE AND ACETAMINOPHEN 7.5; 325 MG/1; MG/1
1 TABLET ORAL EVERY 6 HOURS PRN
COMMUNITY
Start: 2023-03-19 | End: 2023-08-23 | Stop reason: CLARIF

## 2023-07-27 ENCOUNTER — PATIENT MESSAGE (OUTPATIENT)
Dept: OBSTETRICS AND GYNECOLOGY | Facility: CLINIC | Age: 43
End: 2023-07-27
Payer: OTHER GOVERNMENT

## 2023-07-27 RX ORDER — VALACYCLOVIR HYDROCHLORIDE 1 G/1
1000 TABLET, FILM COATED ORAL EVERY 12 HOURS
Qty: 10 TABLET | Refills: 3 | Status: SHIPPED | OUTPATIENT
Start: 2023-07-27 | End: 2024-03-12

## 2023-07-27 NOTE — PROGRESS NOTES
44 yo WF  presents for annual gyn eval and in addition, complain occasional BRANDON and UI s/s  In addition, occasional deep dyspareunia  denies vaginal d/c, DUB, n/v, weight loss/gain  Today for follow up  Pt underwent pelvic u/s with the following results...    EXAMINATION:  US PELVIS COMP WITH TRANSVAG NON-OB (XPD)     CLINICAL HISTORY:  Pelvic and perineal pain     TECHNIQUE:  Transabdominal sonography of the pelvis was performed, followed by transvaginal sonography to better evaluate the uterus and ovaries.     COMPARISON:  3/29/23     FINDINGS:  The uterus measures 8.1 x 4.8 x 3.2 cm and the endometrium is poorly displayed presumably secondary to the patient's history of ablation.  It appears of approximately 6 mm which would be considered prominent for a postmenopausal patient but within normal limits for a premenopausal patient.  A similar appearance is noted on the prior.     The right ovary is thought to be visualized at 4.0 x 2.0 x 3.0 cm and the left ovary is thought to be visualized at 3.0 x 2.5 x 2.2 cm     A simple ovarian cyst on the right is noted of 2.2 cm similar to on the prior.  Follow-up in 1 year for size stability would be reasonable.     Arterial and venous blood flow of the right and left ovary appears to be present.     Impression:     1. Evaluation of the endometrium is hindered by the patient's history of ablation.  The endometrium is questioned of 6 mm distally is within normal limits for a premenopausal patient but would be considered thickened for a postmenopausal patient, this appears relatively smoothly thickened distally on the cine images.  Please clinically correlate, this likely relates to the patient's history of ablation and this appears relatively stable since the prior.  Sources for endometrial prominence can include hyperplasia, neoplasm, and polyps..  Longer term ultrasound follow-up for size stability would be reasonable.  No worrisome changes noted since the prior.  2.  Stable right ovarian cystic structure most likely a cyst, follow-up in 1 year for size stability would be suggested        Electronically signed by: Junior Hernández MD  Date:                                            2023  Time:                                           16:05           Exam Ended: 23 15:38 Last Resulted: 23 16:05                  Past Medical History:   Diagnosis Date    Abnormal Pap smear      Anxiety      Cancer       pre-cancer cells cervical     Cervical disc herniation      Chronic cough      Closed fracture 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) level with anterior cord syndrome       T 12 fracture compression     Fractures      Genital herpes      GERD (gastroesophageal reflux disease)      Hemorrhoid      History of colposcopy with cervical biopsy      Lumbar disc herniation       on Hydrocodone    Lupus (systemic lupus erythematosus)       chronic thrombocytopenia    Migraine headache      PONV (postoperative nausea and vomiting)                 Past Surgical History:   Procedure Laterality Date    ADENOIDECTOMY        APPENDECTOMY         SECTION, CLASSIC         x2 , last 2014    CHOLECYSTECTOMY        CLOSED REDUCTION OF FRACTURE OF NASAL BONE Bilateral 2022     Procedure: CLOSED REDUCTION, FRACTURE, NASAL BONE;  Surgeon: Mary Carlton MD;  Location: [facility] OR;  Service: ENT;  Laterality: Bilateral;    COLONOSCOPY N/A 10/29/2019     Procedure: COLONOSCOPY;  Surgeon: Jules De La Cruz MD;  Location: [facility] ENDO;  Service: Endoscopy;  Laterality: N/A;    CRYOTHERAPY             DILATION AND CURETTAGE OF UTERUS   [id number]    ENDOMETRIAL ABLATION        GANGLION CYST EXCISION Left      TONSILLECTOMY        TUBAL LIGATION   2014    TYMPANOSTOMY TUBE PLACEMENT         x 7               Family History   Problem Relation Age of Onset    Heart disease Father      Diabetes Mother      Cancer Maternal Grandfather           colon    Cancer Paternal Grandfather           colon     Breast cancer Maternal Grandmother      Breast cancer Maternal Aunt      Breast cancer Maternal Aunt      Ovarian cancer Neg Hx           Social History            Socioeconomic History    Marital status: Legally    Tobacco Use    Smoking status: Every Day       Packs/day: 0.25       Types: Cigarettes    Smokeless tobacco: Never   Substance and Sexual Activity    Alcohol use: Yes       Comment: special occasions    Drug use: Yes       Types: Marijuana       Comment: prescription    Sexual activity: Yes       Partners: Male       Birth control/protection: None         Current Medications          Current Outpatient Medications   Medication Sig Dispense Refill    butalbital-acetaminophen-caffeine -40 mg (FIORICET, ESGIC) -40 mg per tablet Take 1 tablet by mouth 2 (two) times daily as needed. 30 tablet 0    EMGALITY  mg/mL PnIj          hydrocodone-acetaminophen 10-325mg (NORCO)  mg Tab Take 1 tablet by mouth every 6 (six) hours as needed. 120 tablet 0    predniSONE (DELTASONE) 5 MG tablet Take 1 tablet (5 mg total) by mouth daily as needed (Joint stiffness). 90 tablet 0    tiZANidine (ZANAFLEX) 4 MG tablet Take 4 mg by mouth every 6 (six) hours as needed.        UBROGEPANT 100 mg tablet          albuterol (PROAIR HFA) 90 mcg/actuation inhaler Inhale 2 puffs into the lungs every 4 (four) hours as needed for Wheezing. 25.5 g 0      No current facility-administered medications for this visit.                  Review of patient's allergies indicates:   Allergen Reactions    Pregabalin Itching, Other (See Comments) and Hives       Bruising and headaches    Sulfa (sulfonamide antibiotics) Nausea And Vomiting    Flexeril [cyclobenzaprine] Rash    Morphine Rash and Hives    Sulfamethoxazole-trimethoprim Rash    Toradol [ketorolac] Rash         Review of System:   General: no chills, fever, night sweats, weight gain or weight loss  Psychological: no depression or suicidal ideation  Breasts: no  new or changing breast lumps, nipple discharge or masses.  Respiratory: no cough, shortness of breath, or wheezing  Cardiovascular: no chest pain or dyspnea on exertion  Gastrointestinal: no abdominal pain, change in bowel habits, or black or bloody stools  Genito-Urinary: as above  Musculoskeletal: no gait disturbance or muscular weakness                                                                    General Appearance    A and O x 4, Cooperative, no distress   Breasts     Abdomen   Symmetrical, no masses, no discharge, skin changes , erythema or retraction. No adenopathy  Soft, non-tender, bowel sounds active all four quadrants,  no masses, no organomegaly     Genitourinary:   External rectal exam shows no thrombosed external hemorrhoids.   Pelvic exam was performed with patient supine.  No labial fusion.  There is no rash, lesion or injury on the right labia.  There is no rash, lesion or injury on the left labia.  No bleeding and no signs of injury around the vaginal introitus, urethra is without lesions and well supported. The cervix is visualized with no discharge, lesions or friability.  No vaginal discharge found.  GRADE 2 UTERINE CERVICAL PROLAPSE  WITH GRADE 2 MIDLINE CYSTOCELE MILD GRADE 1 RECTOCELE  Bimanual exam:  The urethra is normal to palpation and there are no palpable vaginal wall masses.  Uterus is not deviated, not enlarged, not fixed, normal shape and not tender.  Cervix exhibits no motion tenderness.   Right adnexum displays no mass and no tenderness.  Left adnexum displays no mass and no tenderness.   Extremities: Extremities normal, atraumatic, no cyanosis or edema             I had a 20 min discussion regarding treatment options including simple expectant management vs TLH BSO with anterior repair  I discussed the risks of surgical mangement  and explained that there is no guarantee of complete resolution of PP and BRANDON. Pt voices understanding of the above  In addition, I rec ovary  sparing procedure  Plan Will schedule TLH BS , anterior reair and follow    I spent a total of 30 minutes on the day of the visit. This includes face to face time and non-face to face time preparing to see the patient (eg, review of tests), obtaining and/or reviewing separately obtained history, documenting clinical information in the electronic or other health record, independently interpreting results and communicating results to the patient/family/caregiver, or care coordinator.

## 2023-08-01 ENCOUNTER — PATIENT MESSAGE (OUTPATIENT)
Dept: OBSTETRICS AND GYNECOLOGY | Facility: CLINIC | Age: 43
End: 2023-08-01
Payer: OTHER GOVERNMENT

## 2023-08-04 DIAGNOSIS — R10.2 PELVIC PAIN IN FEMALE: Primary | ICD-10-CM

## 2023-08-04 DIAGNOSIS — N39.3 SUI (STRESS URINARY INCONTINENCE, FEMALE): ICD-10-CM

## 2023-08-04 DIAGNOSIS — N81.4 UTERINE PROLAPSE: ICD-10-CM

## 2023-08-04 DIAGNOSIS — N81.11 CYSTOCELE, MIDLINE: ICD-10-CM

## 2023-08-04 NOTE — TELEPHONE ENCOUNTER
Patient notified of surgery dates. Pt voiced understanding and notified that the pre-op dates will be on the [employer] nahed when they get scheduled. Pt voiced understanding.

## 2023-08-23 ENCOUNTER — OFFICE VISIT (OUTPATIENT)
Dept: OBSTETRICS AND GYNECOLOGY | Facility: CLINIC | Age: 43
End: 2023-08-23
Payer: OTHER GOVERNMENT

## 2023-08-23 VITALS — DIASTOLIC BLOOD PRESSURE: 64 MMHG | WEIGHT: 98.13 LBS | SYSTOLIC BLOOD PRESSURE: 100 MMHG | BODY MASS INDEX: 17.94 KG/M2

## 2023-08-23 DIAGNOSIS — Z01.818 PREOP EXAMINATION: Primary | ICD-10-CM

## 2023-08-23 DIAGNOSIS — R10.2 PELVIC PAIN: ICD-10-CM

## 2023-08-23 PROCEDURE — 99999 PR PBB SHADOW E&M-EST. PATIENT-LVL III: ICD-10-PCS | Mod: PBBFAC,,, | Performed by: SPECIALIST

## 2023-08-23 PROCEDURE — 99999 PR PBB SHADOW E&M-EST. PATIENT-LVL III: CPT | Mod: PBBFAC,,, | Performed by: SPECIALIST

## 2023-08-23 PROCEDURE — 99213 OFFICE O/P EST LOW 20 MIN: CPT | Mod: S$PBB,,, | Performed by: SPECIALIST

## 2023-08-23 PROCEDURE — 99213 PR OFFICE/OUTPT VISIT, EST, LEVL III, 20-29 MIN: ICD-10-PCS | Mod: S$PBB,,, | Performed by: SPECIALIST

## 2023-08-23 PROCEDURE — 99213 OFFICE O/P EST LOW 20 MIN: CPT | Mod: PBBFAC,PN | Performed by: SPECIALIST

## 2023-08-23 RX ORDER — MUPIROCIN 20 MG/G
OINTMENT TOPICAL
Status: CANCELLED | OUTPATIENT
Start: 2023-08-23

## 2023-08-23 RX ORDER — FAMOTIDINE 20 MG/1
20 TABLET, FILM COATED ORAL
Status: CANCELLED | OUTPATIENT
Start: 2023-08-23

## 2023-08-23 RX ORDER — SODIUM CHLORIDE 9 MG/ML
INJECTION, SOLUTION INTRAVENOUS CONTINUOUS
Status: CANCELLED | OUTPATIENT
Start: 2023-08-23

## 2023-08-23 NOTE — PROGRESS NOTES
44 yo WF  presents for annual gyn eval and in addition, complain occasional BRANDON and UI s/s  In addition, occasional deep dyspareunia  denies vaginal d/c, DUB, n/v, weight loss/gain  Pt is scheduled to undergo TLH BS with anterior repair and cystoscope for symptomatic prolapse  I discussed the risks and benefits of procedure including risks of bowel,bladder ,ureteral injury, infection, prolonged recovery  Pt voices understanding of the above and desires to proceed     EXAMINATION:  US PELVIS COMP WITH TRANSVAG NON-OB (XPD)     CLINICAL HISTORY:  Pelvic and perineal pain     TECHNIQUE:  Transabdominal sonography of the pelvis was performed, followed by transvaginal sonography to better evaluate the uterus and ovaries.     COMPARISON:  3/29/23     FINDINGS:  The uterus measures 8.1 x 4.8 x 3.2 cm and the endometrium is poorly displayed presumably secondary to the patient's history of ablation.  It appears of approximately 6 mm which would be considered prominent for a postmenopausal patient but within normal limits for a premenopausal patient.  A similar appearance is noted on the prior.     The right ovary is thought to be visualized at 4.0 x 2.0 x 3.0 cm and the left ovary is thought to be visualized at 3.0 x 2.5 x 2.2 cm     A simple ovarian cyst on the right is noted of 2.2 cm similar to on the prior.  Follow-up in 1 year for size stability would be reasonable.     Arterial and venous blood flow of the right and left ovary appears to be present.     Impression:     1. Evaluation of the endometrium is hindered by the patient's history of ablation.  The endometrium is questioned of 6 mm distally is within normal limits for a premenopausal patient but would be considered thickened for a postmenopausal patient, this appears relatively smoothly thickened distally on the cine images.  Please clinically correlate, this likely relates to the patient's history of ablation and this appears relatively stable since the  prior.  Sources for endometrial prominence can include hyperplasia, neoplasm, and polyps..  Longer term ultrasound follow-up for size stability would be reasonable.  No worrisome changes noted since the prior.  2. Stable right ovarian cystic structure most likely a cyst, follow-up in 1 year for size stability would be suggested        Electronically signed by: Junior Hernández MD  Date:                                            2023  Time:                                           16:05             Exam Ended: 23 15:38 Last Resulted: 23 16:05                       Past Medical History:   Diagnosis Date    Abnormal Pap smear      Anxiety      Cancer       pre-cancer cells cervical     Cervical disc herniation      Chronic cough      Closed fracture 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) level with anterior cord syndrome       T 12 fracture compression     Fractures      Genital herpes      GERD (gastroesophageal reflux disease)      Hemorrhoid      History of colposcopy with cervical biopsy      Lumbar disc herniation       on Hydrocodone    Lupus (systemic lupus erythematosus)       chronic thrombocytopenia    Migraine headache      PONV (postoperative nausea and vomiting)                     Past Surgical History:   Procedure Laterality Date    ADENOIDECTOMY        APPENDECTOMY         SECTION, CLASSIC         x2 , last 2014    CHOLECYSTECTOMY        CLOSED REDUCTION OF FRACTURE OF NASAL BONE Bilateral 2022     Procedure: CLOSED REDUCTION, FRACTURE, NASAL BONE;  Surgeon: Mary Carlton MD;  Location: [facility] OR;  Service: ENT;  Laterality: Bilateral;    COLONOSCOPY N/A 10/29/2019     Procedure: COLONOSCOPY;  Surgeon: Jules De La Cruz MD;  Location: [facility] ENDO;  Service: Endoscopy;  Laterality: N/A;    CRYOTHERAPY             DILATION AND CURETTAGE OF UTERUS   [id number]    ENDOMETRIAL ABLATION        GANGLION CYST EXCISION Left      TONSILLECTOMY        TUBAL LIGATION   2014    TYMPANOSTOMY TUBE  PLACEMENT         x 7                   Family History   Problem Relation Age of Onset    Heart disease Father      Diabetes Mother      Cancer Maternal Grandfather           colon    Cancer Paternal Grandfather           colon    Breast cancer Maternal Grandmother      Breast cancer Maternal Aunt      Breast cancer Maternal Aunt      Ovarian cancer Neg Hx           Social History                Socioeconomic History    Marital status: Legally    Tobacco Use    Smoking status: Every Day       Packs/day: 0.25       Types: Cigarettes    Smokeless tobacco: Never   Substance and Sexual Activity    Alcohol use: Yes       Comment: special occasions    Drug use: Yes       Types: Marijuana       Comment: prescription    Sexual activity: Yes       Partners: Male       Birth control/protection: None         Current Medications               Current Outpatient Medications   Medication Sig Dispense Refill    butalbital-acetaminophen-caffeine -40 mg (FIORICET, ESGIC) -40 mg per tablet Take 1 tablet by mouth 2 (two) times daily as needed. 30 tablet 0    EMGALITY  mg/mL PnIj          hydrocodone-acetaminophen 10-325mg (NORCO)  mg Tab Take 1 tablet by mouth every 6 (six) hours as needed. 120 tablet 0    predniSONE (DELTASONE) 5 MG tablet Take 1 tablet (5 mg total) by mouth daily as needed (Joint stiffness). 90 tablet 0    tiZANidine (ZANAFLEX) 4 MG tablet Take 4 mg by mouth every 6 (six) hours as needed.        UBROGEPANT 100 mg tablet          albuterol (PROAIR HFA) 90 mcg/actuation inhaler Inhale 2 puffs into the lungs every 4 (four) hours as needed for Wheezing. 25.5 g 0      No current facility-administered medications for this visit.                      Review of patient's allergies indicates:   Allergen Reactions    Pregabalin Itching, Other (See Comments) and Hives       Bruising and headaches    Sulfa (sulfonamide antibiotics) Nausea And Vomiting    Flexeril [cyclobenzaprine] Rash     Morphine Rash and Hives    Sulfamethoxazole-trimethoprim Rash    Toradol [ketorolac] Rash         Review of System:   General: no chills, fever, night sweats, weight gain or weight loss  Psychological: no depression or suicidal ideation  Breasts: no new or changing breast lumps, nipple discharge or masses.  Respiratory: no cough, shortness of breath, or wheezing  Cardiovascular: no chest pain or dyspnea on exertion  Gastrointestinal: no abdominal pain, change in bowel habits, or black or bloody stools  Genito-Urinary: as above  Musculoskeletal: no gait disturbance or muscular weakness                                                                    General Appearance    A and O x 4, Cooperative, no distress   Breasts     Abdomen   Symmetrical, no masses, no discharge, skin changes , erythema or retraction. No adenopathy  Soft, non-tender, bowel sounds active all four quadrants,  no masses, no organomegaly     Genitourinary:   External rectal exam shows no thrombosed external hemorrhoids.   Pelvic exam was performed with patient supine.  No labial fusion.  There is no rash, lesion or injury on the right labia.  There is no rash, lesion or injury on the left labia.  No bleeding and no signs of injury around the vaginal introitus, urethra is without lesions and well supported. The cervix is visualized with no discharge, lesions or friability.  No vaginal discharge found.  GRADE 2 UTERINE CERVICAL PROLAPSE  WITH GRADE 2 MIDLINE CYSTOCELE MILD GRADE 1 RECTOCELE  Bimanual exam:  The urethra is normal to palpation and there are no palpable vaginal wall masses.  Uterus is not deviated, not enlarged, not fixed, normal shape and not tender.  Cervix exhibits no motion tenderness.   Right adnexum displays no mass and no tenderness.  Left adnexum displays no mass and no tenderness.   Extremities: Extremities normal, atraumatic, no cyanosis or edema               I will review the above preop results and proceed as scheduled.      I spent a total of 30 minutes on the day of the visit. This includes face to face time and non-face to face time preparing to see the patient (eg, review of tests), obtaining and/or reviewing separately obtained history, documenting clinical information in the electronic or other health record, independently interpreting results and communicating results to the patient/family/caregiver, or care coordinator.

## 2023-08-24 ENCOUNTER — HOSPITAL ENCOUNTER (EMERGENCY)
Facility: HOSPITAL | Age: 43
Discharge: HOME | End: 2023-08-24
Attending: EMERGENCY MEDICINE
Payer: OTHER GOVERNMENT

## 2023-08-24 VITALS
WEIGHT: 98 LBS | HEART RATE: 72 BPM | SYSTOLIC BLOOD PRESSURE: 92 MMHG | RESPIRATION RATE: 16 BRPM | HEIGHT: 62 IN | OXYGEN SATURATION: 97 % | BODY MASS INDEX: 18.03 KG/M2 | TEMPERATURE: 99 F | DIASTOLIC BLOOD PRESSURE: 58 MMHG

## 2023-08-24 DIAGNOSIS — R07.89 CHEST TIGHTNESS: ICD-10-CM

## 2023-08-24 DIAGNOSIS — U07.1 COVID-19: Primary | ICD-10-CM

## 2023-08-24 DIAGNOSIS — D69.6 THROMBOCYTOPENIA: ICD-10-CM

## 2023-08-24 DIAGNOSIS — R51.9 ACUTE NONINTRACTABLE HEADACHE, UNSPECIFIED HEADACHE TYPE: ICD-10-CM

## 2023-08-24 DIAGNOSIS — R05.9 COUGH: ICD-10-CM

## 2023-08-24 DIAGNOSIS — R11.2 NAUSEA AND VOMITING, UNSPECIFIED VOMITING TYPE: ICD-10-CM

## 2023-08-24 LAB
ALBUMIN SERPL BCP-MCNC: 4.2 G/DL (ref 3.5–5.2)
ALP SERPL-CCNC: 132 U/L (ref 55–135)
ALT SERPL W/O P-5'-P-CCNC: 17 U/L (ref 10–44)
ANION GAP SERPL CALC-SCNC: 5 MMOL/L (ref 8–16)
AST SERPL-CCNC: 19 U/L (ref 10–40)
BASOPHILS # BLD AUTO: 0.02 K/UL (ref 0–0.2)
BASOPHILS NFR BLD: 0.4 % (ref 0–1.9)
BILIRUB SERPL-MCNC: 0.6 MG/DL (ref 0.1–1)
BNP SERPL-MCNC: 75 PG/ML (ref 0–99)
BUN SERPL-MCNC: 6 MG/DL (ref 6–20)
CALCIUM SERPL-MCNC: 8.5 MG/DL (ref 8.7–10.5)
CHLORIDE SERPL-SCNC: 103 MMOL/L (ref 95–110)
CO2 SERPL-SCNC: 25 MMOL/L (ref 23–29)
CREAT SERPL-MCNC: 0.8 MG/DL (ref 0.5–1.4)
DIFFERENTIAL METHOD: ABNORMAL
EOSINOPHIL # BLD AUTO: 0 K/UL (ref 0–0.5)
EOSINOPHIL NFR BLD: 0.5 % (ref 0–8)
ERYTHROCYTE [DISTWIDTH] IN BLOOD BY AUTOMATED COUNT: 13.9 % (ref 11.5–14.5)
EST. GFR  (NO RACE VARIABLE): >60 ML/MIN/1.73 M^2
GLUCOSE SERPL-MCNC: 93 MG/DL (ref 70–110)
GROUP A STREP, MOLECULAR: NEGATIVE
HCT VFR BLD AUTO: 38.3 % (ref 37–48.5)
HGB BLD-MCNC: 13 G/DL (ref 12–16)
IMM GRANULOCYTES # BLD AUTO: 0.03 K/UL (ref 0–0.04)
IMM GRANULOCYTES NFR BLD AUTO: 0.5 % (ref 0–0.5)
INFLUENZA A, MOLECULAR: NEGATIVE
INFLUENZA B, MOLECULAR: NEGATIVE
LIPASE SERPL-CCNC: 22 U/L (ref 4–60)
LYMPHOCYTES # BLD AUTO: 0.5 K/UL (ref 1–4.8)
LYMPHOCYTES NFR BLD: 9.6 % (ref 18–48)
MAGNESIUM SERPL-MCNC: 1.7 MG/DL (ref 1.6–2.6)
MCH RBC QN AUTO: 32.8 PG (ref 27–31)
MCHC RBC AUTO-ENTMCNC: 33.9 G/DL (ref 32–36)
MCV RBC AUTO: 97 FL (ref 82–98)
MONOCYTES # BLD AUTO: 0.7 K/UL (ref 0.3–1)
MONOCYTES NFR BLD: 12.8 % (ref 4–15)
NEUTROPHILS # BLD AUTO: 4.3 K/UL (ref 1.8–7.7)
NEUTROPHILS NFR BLD: 76.2 % (ref 38–73)
NRBC BLD-RTO: 0 /100 WBC
PLATELET # BLD AUTO: 94 K/UL (ref 150–450)
PMV BLD AUTO: 13.4 FL (ref 9.2–12.9)
POTASSIUM SERPL-SCNC: 3.6 MMOL/L (ref 3.5–5.1)
PROT SERPL-MCNC: 7.8 G/DL (ref 6–8.4)
RBC # BLD AUTO: 3.96 M/UL (ref 4–5.4)
SARS-COV-2 RDRP RESP QL NAA+PROBE: POSITIVE
SODIUM SERPL-SCNC: 133 MMOL/L (ref 136–145)
SPECIMEN SOURCE: NORMAL
TROPONIN I SERPL HS-MCNC: 3.4 PG/ML (ref 0–14.9)
WBC # BLD AUTO: 5.62 K/UL (ref 3.9–12.7)

## 2023-08-24 PROCEDURE — 96374 THER/PROPH/DIAG INJ IV PUSH: CPT

## 2023-08-24 PROCEDURE — 85025 COMPLETE CBC W/AUTO DIFF WBC: CPT

## 2023-08-24 PROCEDURE — 93010 EKG 12-LEAD: ICD-10-PCS | Mod: ,,, | Performed by: SPECIALIST

## 2023-08-24 PROCEDURE — U0002 COVID-19 LAB TEST NON-CDC: HCPCS

## 2023-08-24 PROCEDURE — 80053 COMPREHEN METABOLIC PANEL: CPT

## 2023-08-24 PROCEDURE — 83880 ASSAY OF NATRIURETIC PEPTIDE: CPT

## 2023-08-24 PROCEDURE — 96361 HYDRATE IV INFUSION ADD-ON: CPT

## 2023-08-24 PROCEDURE — 84484 ASSAY OF TROPONIN QUANT: CPT

## 2023-08-24 PROCEDURE — 99284 EMERGENCY DEPT VISIT MOD MDM: CPT | Mod: 25

## 2023-08-24 PROCEDURE — 93005 ELECTROCARDIOGRAM TRACING: CPT | Performed by: SPECIALIST

## 2023-08-24 PROCEDURE — 87651 STREP A DNA AMP PROBE: CPT

## 2023-08-24 PROCEDURE — 93010 ELECTROCARDIOGRAM REPORT: CPT | Mod: ,,, | Performed by: SPECIALIST

## 2023-08-24 PROCEDURE — 87502 INFLUENZA DNA AMP PROBE: CPT

## 2023-08-24 PROCEDURE — 25000003 PHARM REV CODE 250

## 2023-08-24 PROCEDURE — 96375 TX/PRO/DX INJ NEW DRUG ADDON: CPT

## 2023-08-24 PROCEDURE — 83690 ASSAY OF LIPASE: CPT

## 2023-08-24 PROCEDURE — 63600175 PHARM REV CODE 636 W HCPCS: Performed by: EMERGENCY MEDICINE

## 2023-08-24 PROCEDURE — 63600175 PHARM REV CODE 636 W HCPCS

## 2023-08-24 PROCEDURE — 83735 ASSAY OF MAGNESIUM: CPT

## 2023-08-24 RX ORDER — ONDANSETRON 4 MG/1
4 TABLET, ORALLY DISINTEGRATING ORAL EVERY 6 HOURS PRN
Qty: 15 TABLET | Refills: 0 | Status: SHIPPED | OUTPATIENT
Start: 2023-08-24 | End: 2023-09-20 | Stop reason: CLARIF

## 2023-08-24 RX ORDER — PROMETHAZINE HYDROCHLORIDE 25 MG/1
25 TABLET ORAL EVERY 6 HOURS PRN
Qty: 15 TABLET | Refills: 0 | OUTPATIENT
Start: 2023-08-24 | End: 2023-08-26

## 2023-08-24 RX ORDER — ONDANSETRON 4 MG/1
4 TABLET, ORALLY DISINTEGRATING ORAL
Status: DISCONTINUED | OUTPATIENT
Start: 2023-08-24 | End: 2023-08-24

## 2023-08-24 RX ORDER — BUTALBITAL, ACETAMINOPHEN AND CAFFEINE 50; 325; 40 MG/1; MG/1; MG/1
1 TABLET ORAL EVERY 4 HOURS PRN
Status: DISCONTINUED | OUTPATIENT
Start: 2023-08-24 | End: 2023-08-24 | Stop reason: HOSPADM

## 2023-08-24 RX ORDER — IBUPROFEN 400 MG/1
400 TABLET ORAL
Status: COMPLETED | OUTPATIENT
Start: 2023-08-24 | End: 2023-08-24

## 2023-08-24 RX ORDER — ONDANSETRON 2 MG/ML
4 INJECTION INTRAMUSCULAR; INTRAVENOUS
Status: COMPLETED | OUTPATIENT
Start: 2023-08-24 | End: 2023-08-24

## 2023-08-24 RX ORDER — METOCLOPRAMIDE HYDROCHLORIDE 5 MG/ML
5 INJECTION INTRAMUSCULAR; INTRAVENOUS
Status: COMPLETED | OUTPATIENT
Start: 2023-08-24 | End: 2023-08-24

## 2023-08-24 RX ADMIN — METOCLOPRAMIDE 5 MG: 5 INJECTION, SOLUTION INTRAMUSCULAR; INTRAVENOUS at 07:08

## 2023-08-24 RX ADMIN — BUTALBITAL, ACETAMINOPHEN, AND CAFFEINE 1 TABLET: 50; 325; 40 TABLET, COATED ORAL at 05:08

## 2023-08-24 RX ADMIN — SODIUM CHLORIDE 1000 ML: 0.9 INJECTION, SOLUTION INTRAVENOUS at 05:08

## 2023-08-24 RX ADMIN — ONDANSETRON 4 MG: 2 INJECTION INTRAMUSCULAR; INTRAVENOUS at 05:08

## 2023-08-24 RX ADMIN — IBUPROFEN 400 MG: 400 TABLET ORAL at 05:08

## 2023-08-24 NOTE — ED PROVIDER NOTES
Encounter Date: 8/24/2023       History     Chief Complaint   Patient presents with    COVID-19 Concerns     Pt presents to ed complaining of fever, body aches, nausea, and severe headache. Daughter is COVID +     Patient is a 43 y.o. female with past medical history of lupus and migraines who presents to ED via family for concern for fever, body aches, cough, and nausea vomiting which began 1 day(s) ago.  Patient reports she woke up around 3:00 a.m. this morning with all her symptoms.  Patient reports she is been coughing up clear mucus and had clear runny nose.  Patient reports her throat is burning and she was had nausea vomiting.  Patient reports her abdomen hurts top.  Patient reports she was having body aches, and hot and cold flashes.  Patient reports fever T-max 104.2° at 4:00 a.m. this morning.  Patient reports she Motrin at the same time a.m. today.  Patient reports she has a migraine.  Patient reports her daughter was diagnosed with COVID-19 on Monday and lives in the home with patient.  Patient is awake and alert in no acute distress.         Review of patient's allergies indicates:   Allergen Reactions    Cyclobenzaprine Rash and Other (See Comments)     Leg cramps    Pregabalin Itching, Other (See Comments) and Hives     Bruising and headaches    Sulfa (sulfonamide antibiotics) Nausea And Vomiting    Morphine Rash and Hives    Sulfamethoxazole-trimethoprim Rash and Nausea And Vomiting     And headache    Toradol [ketorolac] Rash     Past Medical History:   Diagnosis Date    Abnormal Pap smear     Anxiety     Cancer     pre-cancer cells cervical     Cervical disc herniation     Chronic cough     Closed fracture 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) level with anterior cord syndrome     T 12 fracture compression     Fractures     Genital herpes     GERD (gastroesophageal reflux disease)     Hemorrhoid     History of colposcopy with cervical biopsy     Lumbar disc herniation     on Hydrocodone    Lupus (systemic lupus  erythematosus)     chronic thrombocytopenia    Migraine headache     PONV (postoperative nausea and vomiting)      Past Surgical History:   Procedure Laterality Date    ADENOIDECTOMY      APPENDECTOMY       SECTION, CLASSIC      x2 , last 2014    CHOLECYSTECTOMY      CLOSED REDUCTION OF FRACTURE OF NASAL BONE Bilateral 2022    Procedure: CLOSED REDUCTION, FRACTURE, NASAL BONE;  Surgeon: Mary Carlton MD;  Location: [facility] OR;  Service: ENT;  Laterality: Bilateral;    COLONOSCOPY N/A 10/29/2019    Procedure: COLONOSCOPY;  Surgeon: Jules De La Cruz MD;  Location: [facility] ENDO;  Service: Endoscopy;  Laterality: N/A;    CRYOTHERAPY          DILATION AND CURETTAGE OF UTERUS  [id number]    ENDOMETRIAL ABLATION      GANGLION CYST EXCISION Left     TONSILLECTOMY      TUBAL LIGATION  2014    TYMPANOSTOMY TUBE PLACEMENT      x 7     Family History   Problem Relation Age of Onset    Heart disease Father     Diabetes Mother     Cancer Maternal Grandfather         colon    Cancer Paternal Grandfather         colon    Breast cancer Maternal Grandmother     Breast cancer Maternal Aunt     Breast cancer Maternal Aunt     Ovarian cancer Neg Hx      Social History     Tobacco Use    Smoking status: Every Day     Current packs/day: 0.25     Types: Cigarettes    Smokeless tobacco: Never   Substance Use Topics    Alcohol use: Yes     Comment: special occasions    Drug use: Yes     Types: Marijuana     Comment: prescription     Review of Systems   Constitutional:  Positive for appetite change, chills, fatigue and fever.   HENT:  Positive for rhinorrhea, sinus pressure and sore throat. Negative for dental problem, drooling, ear discharge, ear pain, facial swelling and trouble swallowing.    Eyes: Negative.    Respiratory:  Positive for cough, chest tightness and shortness of breath. Negative for apnea and stridor.    Cardiovascular:  Positive for chest pain. Negative for leg swelling.   Gastrointestinal:  Positive  for abdominal pain, nausea and vomiting. Negative for abdominal distention, blood in stool and diarrhea.   Genitourinary: Negative.  Negative for dysuria.   Musculoskeletal: Negative.  Negative for back pain.   Skin:  Negative for color change, pallor and rash.   Neurological:  Positive for dizziness, light-headedness and headaches. Negative for syncope, speech difficulty and weakness.   Hematological:  Does not bruise/bleed easily.   Psychiatric/Behavioral: Negative.         Physical Exam     Initial Vitals [08/24/23 1626]   BP Pulse Resp Temp SpO2   131/73 103 16 100.1 °F (37.8 °C) 98 %      MAP       --         Physical Exam    Nursing note and vitals reviewed.  Constitutional: She appears well-developed and well-nourished. She is not diaphoretic.  Non-toxic appearance. She appears ill. No distress.   HENT:   Head: Normocephalic and atraumatic.   Right Ear: Tympanic membrane, external ear and ear canal normal.   Left Ear: Tympanic membrane, external ear and ear canal normal.   Nose: Nose normal.   Mouth/Throat: Uvula is midline and mucous membranes are normal. Posterior oropharyngeal erythema present. No oropharyngeal exudate or posterior oropharyngeal edema.   Eyes: Conjunctivae and EOM are normal. Pupils are equal, round, and reactive to light.   Neck:   Normal range of motion.  Cardiovascular:  Normal rate, regular rhythm, normal heart sounds and intact distal pulses.     Exam reveals no gallop and no friction rub.       No murmur heard.  Pulmonary/Chest: Effort normal. No accessory muscle usage. No tachypnea. No respiratory distress. She has no decreased breath sounds. She has wheezes. She has no rhonchi. She has no rales. She exhibits no tenderness.   Abdominal: Abdomen is soft and flat. Bowel sounds are normal. She exhibits no distension. There is generalized abdominal tenderness and tenderness in the epigastric area. There is no rebound and no guarding.   Musculoskeletal:         General: Normal range of  motion.      Cervical back: Normal range of motion.     Neurological: She is alert and oriented to person, place, and time. She has normal strength. GCS score is 15. GCS eye subscore is 4. GCS verbal subscore is 5. GCS motor subscore is 6.   Skin: Skin is warm and dry. Capillary refill takes less than 2 seconds.   Psychiatric: She has a normal mood and affect. Her behavior is normal. Judgment and thought content normal.         ED Course   Procedures  Labs Reviewed   SARS-COV-2 RNA AMPLIFICATION, QUAL - Abnormal; Notable for the following components:       Result Value    SARS-CoV-2 RNA, Amplification, Qual Positive (*)     All other components within normal limits   CBC W/ AUTO DIFFERENTIAL - Abnormal; Notable for the following components:    RBC 3.96 (*)     MCH 32.8 (*)     Platelets 94 (*)     MPV 13.4 (*)     Lymph # 0.5 (*)     Gran % 76.2 (*)     Lymph % 9.6 (*)     All other components within normal limits    Narrative:     Recoll. [id number] by [email or web address] at 08/24/2023 18:17, reason:   clotted--talked to brittany about recollect   COMPREHENSIVE METABOLIC PANEL - Abnormal; Notable for the following components:    Sodium 133 (*)     Calcium 8.5 (*)     Anion Gap 5 (*)     All other components within normal limits   GROUP A STREP, MOLECULAR   INFLUENZA A AND B ANTIGEN    Narrative:     Specimen Source->Nasopharyngeal Swab   LIPASE   B-TYPE NATRIURETIC PEPTIDE   TROPONIN I HIGH SENSITIVITY   MAGNESIUM        ECG Results              EKG 12-lead (Final result)  Result time 08/25/23 19:03:52      Final result by Interface, Lab In [facility] (08/25/23 19:03:52)                   Narrative:    Test Reason : R07.89,    Vent. Rate : 091 BPM     Atrial Rate : 091 BPM     P-R Int : 136 ms          QRS Dur : 076 ms      QT Int : 340 ms       P-R-T Axes : 068 077 062 degrees     QTc Int : 418 ms    Normal sinus rhythm  Normal ECG  No previous ECGs available  Confirmed by Chuck ALFONSO, Shaun BEARD ([id number]) on 8/25/2023 7:03:47  PM    Referred By: TEODORA   SELF           Confirmed By:Shaun Woods MD                                  Imaging Results              X-Ray Chest PA And Lateral (Final result)  Result time 08/24/23 18:47:57      Final result by Kemal Joy MD (08/24/23 18:47:57)                   Narrative:    EXAM DESCRIPTION: XR CHEST PA AND LATERAL    CLINICAL HISTORY: 43 years Female,    COMPARISON: None.    FINDINGS: PA and lateral views of the chest were obtained. The heart size is normal. No consolidations are seen, nor is there evidence of pleural fluid. No osseous lesions are seen. Calcified granuloma is seen in the right mid lung.    IMPRESSION:  No acute cardiopulmonary disease is seen.    Electronically signed by:  Kemal Joy MD  8/24/2023 6:47 PM CDT Workstation: [id number]                                     Medications   ibuprofen tablet 400 mg (400 mg Oral Given 8/24/23 1718)   ondansetron injection 4 mg (4 mg Intravenous Given 8/24/23 1759)   sodium chloride 0.9% bolus 1,000 mL 1,000 mL (0 mLs Intravenous Stopped 8/24/23 2049)   metoclopramide HCl injection 5 mg (5 mg Intravenous Given 8/24/23 1953)     Medical Decision Making  [facility]    Patient presents for emergent evaluation of acute body aches and fever that poses a possible threat to life and/or bodily function.    Diagnosis include but not limited to COVID, influenza, strep, pneumonia, anemia, electrolyte abnormality, cardiac related causes  In the ED patient found to have acute and expiratory wheezes with increased work of breathing on exam.  Patient has no generalized patient.  Patient is awake and alert in neuro intact and able to follow commands without difficulty.  Patient has a normal strength in upper and lower extremities.      Discharge MDM  I discussed the patient presentation labs, ekg, X-rays findings with my attending Dr. Orellana.  Patient care left with Dr. Orellana at 1840 for disposition and patient after aggressive lab work  results.  Patient was managed in the ED with IV saline, Zofran, oral Fioricet and ibuprofen.    The response to treatment was decreased headache, decreased body aches, and improvement of symptoms.  Patient and no longer had any episodes of vomiting while in the ED after Zofran.  Patient was discharged in stable condition with close follow up with primary care.  Detailed return precautions discussed to return to the ED for any new or worsening symptoms.     Amount and/or Complexity of Data Reviewed  Labs: ordered.     Details: Positive COVID  CBC WBC 5.62, RBC 3.96, hemoglobin 13.0, hematocrit 38.3, platelets 94, troponin 3.4, BNP 75, magnesium 1.7, lipase 22, CMP significant for sodium 133, calcium 8.5.  Radiology: ordered.     Details: IMPRESSION: No acute cardiopulmonary disease is seen.  ECG/medicine tests: ordered and independent interpretation performed.     Details: Reviewed patient's ECG with my attending: normal sinus rhythm, normal ECG    Risk  OTC drugs.  Prescription drug management.              Attending Attestation:     Physician Attestation Statement for NP/PA:   I have directed and reviewed the workup performed by the PA/NP.  I performed the substantive portion of the medical decision making.     Other NP/PA Attestation Additions:    History of Present Illness: Patient with PMH lupus presented to ED with fever, chills, myalgias, migraine headache, nausea/vomiting that began yesterday.  Daughter tested positive for COVID-19.  Patient denies any significant shortness of breath.   Physical Exam: AAO x3, nontoxic-appearing  Chest clear to auscultation bilaterally, regular rate and rhythm  Abdomen soft and benign  Grossly neuro intact   Medical Decision Making: Patient presents to ED as above.  Vitals are stable.  She is COVID-19 positive here.  All labs reviewed by me and significant for downtrending platelets of 94K, which patient states she has had thrombocytopenia in the past when acutely ill.   Remainder of labs around baseline.  EKG shows no evidence acute ischemia or arrhythmia.  The patient was treated symptomatically for her headache and nausea with improvement.  She states she feels well for discharge.  Will DC with Paxlovid, Zofran and Phenergan.  Patient states she has her other home medications for migraines at home.  ED return precautions discussed and provided.  Recommend PCP follow-up for repeat platelet count and overall rechecked.             ED Course as of 23 1426   Thu Aug 24, 2023   1833 Patient reports her symptoms such as her headache, nausea, and body aches are improved after medications.  Patient denies any further episodes of vomiting after the Zofran.  Patient is awake and alert sitting in the bed in no acute distress. [MP]   1840 Patient handoff given to Dr. Orellana to follow up on patients lab results and further disposition [MP]      ED Course User Index  [MP] Radha Ramon NP                    Clinical Impression:   Final diagnoses:  [R05.9] Cough  [R07.89] Chest tightness  [U07.1] COVID-19 (Primary)  [D69.6] Thrombocytopenia  [R11.2] Nausea and vomiting, unspecified vomiting type  [R51.9] Acute nonintractable headache, unspecified headache type        ED Disposition Condition    Discharge Stable          ED Prescriptions       Medication Sig Dispense Start Date End Date Auth. Provider    promethazine (PHENERGAN) 25 MG tablet () Take 1 tablet (25 mg total) by mouth every 6 (six) hours as needed for Nausea. 15 tablet 2023 Lili Orellana MD    ondansetron (ZOFRAN-ODT) 4 MG TbDL Take 1 tablet (4 mg total) by mouth every 6 (six) hours as needed (nausea/vomiting). 15 tablet 2023 -- Lili Orellana MD    nirmatrelvir-ritonavir 300 mg (150 mg x 2)-100 mg copackaged tablets (EUA) () Take 3 tablets by mouth 2 (two) times daily. Each dose contains 2 nirmatrelvir (pink tablets) and 1 ritonavir (white tablet). Take all 3 tablets together 30  tablet 8/24/2023 8/29/2023 Lili Orellana MD          Follow-up Information       Follow up With Specialties Details Why Contact Info    Steve Gonzalez MD Family Medicine  As needed, If symptoms worsen [address] [facility] [place] [address]  [phone]               Radha Ramon NP  08/26/23 0816       Radha Ramon NP  08/26/23 0819       Lili Orellana MD  08/30/23 1425

## 2023-08-25 ENCOUNTER — HOSPITAL ENCOUNTER (EMERGENCY)
Facility: HOSPITAL | Age: 43
Discharge: HOME | End: 2023-08-26
Attending: STUDENT IN AN ORGANIZED HEALTH CARE EDUCATION/TRAINING PROGRAM
Payer: OTHER GOVERNMENT

## 2023-08-25 DIAGNOSIS — D69.6 THROMBOCYTOPENIA: ICD-10-CM

## 2023-08-25 DIAGNOSIS — R11.2 NAUSEA AND VOMITING, UNSPECIFIED VOMITING TYPE: ICD-10-CM

## 2023-08-25 DIAGNOSIS — U07.1 COVID-19: ICD-10-CM

## 2023-08-25 DIAGNOSIS — R51.9 NONINTRACTABLE HEADACHE, UNSPECIFIED CHRONICITY PATTERN, UNSPECIFIED HEADACHE TYPE: ICD-10-CM

## 2023-08-25 DIAGNOSIS — N30.00 ACUTE CYSTITIS WITHOUT HEMATURIA: Primary | ICD-10-CM

## 2023-08-25 LAB
ALBUMIN SERPL BCP-MCNC: 3.9 G/DL (ref 3.5–5.2)
ALP SERPL-CCNC: 113 U/L (ref 55–135)
ALT SERPL W/O P-5'-P-CCNC: 15 U/L (ref 10–44)
ANION GAP SERPL CALC-SCNC: 7 MMOL/L (ref 8–16)
AST SERPL-CCNC: 15 U/L (ref 10–40)
BACTERIA #/AREA URNS HPF: ABNORMAL /HPF
BASOPHILS # BLD AUTO: 0.04 K/UL (ref 0–0.2)
BASOPHILS NFR BLD: 0.5 % (ref 0–1.9)
BILIRUB SERPL-MCNC: 0.6 MG/DL (ref 0.1–1)
BILIRUB UR QL STRIP: NEGATIVE
BUN SERPL-MCNC: 7 MG/DL (ref 6–20)
CALCIUM SERPL-MCNC: 7.8 MG/DL (ref 8.7–10.5)
CHLORIDE SERPL-SCNC: 97 MMOL/L (ref 95–110)
CLARITY UR: ABNORMAL
CO2 SERPL-SCNC: 26 MMOL/L (ref 23–29)
COLOR UR: YELLOW
CREAT SERPL-MCNC: 0.8 MG/DL (ref 0.5–1.4)
DIFFERENTIAL METHOD: ABNORMAL
EOSINOPHIL # BLD AUTO: 0 K/UL (ref 0–0.5)
EOSINOPHIL NFR BLD: 0.2 % (ref 0–8)
ERYTHROCYTE [DISTWIDTH] IN BLOOD BY AUTOMATED COUNT: 14.1 % (ref 11.5–14.5)
EST. GFR  (NO RACE VARIABLE): >60 ML/MIN/1.73 M^2
GLUCOSE SERPL-MCNC: 73 MG/DL (ref 70–110)
GLUCOSE UR QL STRIP: NEGATIVE
HCT VFR BLD AUTO: 43.8 % (ref 37–48.5)
HGB BLD-MCNC: 14.8 G/DL (ref 12–16)
HGB UR QL STRIP: ABNORMAL
HYALINE CASTS #/AREA URNS LPF: 16 /LPF
IMM GRANULOCYTES # BLD AUTO: 0.03 K/UL (ref 0–0.04)
IMM GRANULOCYTES NFR BLD AUTO: 0.4 % (ref 0–0.5)
KETONES UR QL STRIP: NEGATIVE
LEUKOCYTE ESTERASE UR QL STRIP: ABNORMAL
LYMPHOCYTES # BLD AUTO: 1.9 K/UL (ref 1–4.8)
LYMPHOCYTES NFR BLD: 22.7 % (ref 18–48)
MCH RBC QN AUTO: 33.3 PG (ref 27–31)
MCHC RBC AUTO-ENTMCNC: 33.8 G/DL (ref 32–36)
MCV RBC AUTO: 98 FL (ref 82–98)
MICROSCOPIC COMMENT: ABNORMAL
MONOCYTES # BLD AUTO: 0.8 K/UL (ref 0.3–1)
MONOCYTES NFR BLD: 9.1 % (ref 4–15)
NEUTROPHILS # BLD AUTO: 5.5 K/UL (ref 1.8–7.7)
NEUTROPHILS NFR BLD: 67.1 % (ref 38–73)
NITRITE UR QL STRIP: POSITIVE
NRBC BLD-RTO: 0 /100 WBC
PH UR STRIP: 7 [PH] (ref 5–8)
PLATELET # BLD AUTO: 73 K/UL (ref 150–450)
PMV BLD AUTO: 13.5 FL (ref 9.2–12.9)
POTASSIUM SERPL-SCNC: 4.2 MMOL/L (ref 3.5–5.1)
PROT SERPL-MCNC: 7.5 G/DL (ref 6–8.4)
PROT UR QL STRIP: ABNORMAL
RBC # BLD AUTO: 4.45 M/UL (ref 4–5.4)
RBC #/AREA URNS HPF: 4 /HPF (ref 0–4)
SODIUM SERPL-SCNC: 130 MMOL/L (ref 136–145)
SP GR UR STRIP: 1.01 (ref 1–1.03)
SQUAMOUS #/AREA URNS HPF: 3 /HPF
URN SPEC COLLECT METH UR: ABNORMAL
UROBILINOGEN UR STRIP-ACNC: ABNORMAL EU/DL
WBC # BLD AUTO: 8.23 K/UL (ref 3.9–12.7)
WBC #/AREA URNS HPF: 60 /HPF (ref 0–5)

## 2023-08-25 PROCEDURE — 99284 EMERGENCY DEPT VISIT MOD MDM: CPT | Mod: 25

## 2023-08-25 PROCEDURE — 80053 COMPREHEN METABOLIC PANEL: CPT | Performed by: STUDENT IN AN ORGANIZED HEALTH CARE EDUCATION/TRAINING PROGRAM

## 2023-08-25 PROCEDURE — 81001 URINALYSIS AUTO W/SCOPE: CPT | Performed by: STUDENT IN AN ORGANIZED HEALTH CARE EDUCATION/TRAINING PROGRAM

## 2023-08-25 PROCEDURE — 87077 CULTURE AEROBIC IDENTIFY: CPT | Performed by: STUDENT IN AN ORGANIZED HEALTH CARE EDUCATION/TRAINING PROGRAM

## 2023-08-25 PROCEDURE — 87186 SC STD MICRODIL/AGAR DIL: CPT | Performed by: STUDENT IN AN ORGANIZED HEALTH CARE EDUCATION/TRAINING PROGRAM

## 2023-08-25 PROCEDURE — 87086 URINE CULTURE/COLONY COUNT: CPT | Performed by: STUDENT IN AN ORGANIZED HEALTH CARE EDUCATION/TRAINING PROGRAM

## 2023-08-25 PROCEDURE — 85025 COMPLETE CBC W/AUTO DIFF WBC: CPT | Performed by: STUDENT IN AN ORGANIZED HEALTH CARE EDUCATION/TRAINING PROGRAM

## 2023-08-25 NOTE — DISCHARGE INSTRUCTIONS
Please practice CDC guidelines regarding COVID-19 isolation.  You may return to the emergency room for any acutely worsening symptoms or concerns.   1

## 2023-08-26 VITALS
BODY MASS INDEX: 18.03 KG/M2 | OXYGEN SATURATION: 96 % | SYSTOLIC BLOOD PRESSURE: 95 MMHG | WEIGHT: 98 LBS | HEART RATE: 87 BPM | HEIGHT: 62 IN | DIASTOLIC BLOOD PRESSURE: 59 MMHG | TEMPERATURE: 98 F | RESPIRATION RATE: 18 BRPM

## 2023-08-26 PROCEDURE — 96375 TX/PRO/DX INJ NEW DRUG ADDON: CPT

## 2023-08-26 PROCEDURE — 25000003 PHARM REV CODE 250: Performed by: STUDENT IN AN ORGANIZED HEALTH CARE EDUCATION/TRAINING PROGRAM

## 2023-08-26 PROCEDURE — 96361 HYDRATE IV INFUSION ADD-ON: CPT

## 2023-08-26 PROCEDURE — 96365 THER/PROPH/DIAG IV INF INIT: CPT

## 2023-08-26 PROCEDURE — 63600175 PHARM REV CODE 636 W HCPCS: Performed by: STUDENT IN AN ORGANIZED HEALTH CARE EDUCATION/TRAINING PROGRAM

## 2023-08-26 RX ORDER — KETOROLAC TROMETHAMINE 30 MG/ML
15 INJECTION, SOLUTION INTRAMUSCULAR; INTRAVENOUS
Status: DISCONTINUED | OUTPATIENT
Start: 2023-08-26 | End: 2023-08-26

## 2023-08-26 RX ORDER — PROCHLORPERAZINE EDISYLATE 5 MG/ML
10 INJECTION INTRAMUSCULAR; INTRAVENOUS
Status: COMPLETED | OUTPATIENT
Start: 2023-08-26 | End: 2023-08-26

## 2023-08-26 RX ORDER — PROCHLORPERAZINE MALEATE 10 MG
10 TABLET ORAL 3 TIMES DAILY PRN
Qty: 6 TABLET | Refills: 0 | Status: ON HOLD | OUTPATIENT
Start: 2023-08-26 | End: 2023-09-06 | Stop reason: HOSPADM

## 2023-08-26 RX ORDER — PROMETHAZINE HYDROCHLORIDE 25 MG/1
25 SUPPOSITORY RECTAL EVERY 6 HOURS PRN
Qty: 10 SUPPOSITORY | Refills: 0 | Status: SHIPPED | OUTPATIENT
Start: 2023-08-26 | End: 2023-09-20 | Stop reason: CLARIF

## 2023-08-26 RX ORDER — NAPROXEN 250 MG/1
500 TABLET ORAL
Status: COMPLETED | OUTPATIENT
Start: 2023-08-26 | End: 2023-08-26

## 2023-08-26 RX ORDER — DROPERIDOL 2.5 MG/ML
0.62 INJECTION, SOLUTION INTRAMUSCULAR; INTRAVENOUS ONCE
Status: COMPLETED | OUTPATIENT
Start: 2023-08-26 | End: 2023-08-26

## 2023-08-26 RX ORDER — LIDOCAINE HYDROCHLORIDE 20 MG/ML
5 SOLUTION OROPHARYNGEAL
Status: COMPLETED | OUTPATIENT
Start: 2023-08-26 | End: 2023-08-26

## 2023-08-26 RX ORDER — PROCHLORPERAZINE MALEATE 5 MG
10 TABLET ORAL
Status: DISCONTINUED | OUTPATIENT
Start: 2023-08-26 | End: 2023-08-26

## 2023-08-26 RX ORDER — NAPROXEN 500 MG/1
500 TABLET ORAL EVERY 12 HOURS PRN
Qty: 14 TABLET | Refills: 0 | Status: SHIPPED | OUTPATIENT
Start: 2023-08-26 | End: 2023-09-20 | Stop reason: CLARIF

## 2023-08-26 RX ORDER — DIPHENHYDRAMINE HYDROCHLORIDE 50 MG/ML
25 INJECTION INTRAMUSCULAR; INTRAVENOUS
Status: COMPLETED | OUTPATIENT
Start: 2023-08-26 | End: 2023-08-26

## 2023-08-26 RX ORDER — ORPHENADRINE CITRATE 30 MG/ML
30 INJECTION INTRAMUSCULAR; INTRAVENOUS
Status: COMPLETED | OUTPATIENT
Start: 2023-08-26 | End: 2023-08-26

## 2023-08-26 RX ORDER — OXYMETAZOLINE HCL 0.05 %
1 SPRAY, NON-AEROSOL (ML) NASAL
Status: COMPLETED | OUTPATIENT
Start: 2023-08-26 | End: 2023-08-26

## 2023-08-26 RX ADMIN — LIDOCAINE HYDROCHLORIDE 5 ML: 20 SOLUTION ORAL; TOPICAL at 01:08

## 2023-08-26 RX ADMIN — DROPERIDOL 0.62 MG: 2.5 INJECTION, SOLUTION INTRAMUSCULAR; INTRAVENOUS at 01:08

## 2023-08-26 RX ADMIN — Medication 1 SPRAY: at 02:08

## 2023-08-26 RX ADMIN — DIPHENHYDRAMINE HYDROCHLORIDE 25 MG: 50 INJECTION INTRAMUSCULAR; INTRAVENOUS at 03:08

## 2023-08-26 RX ADMIN — SODIUM CHLORIDE 2000 ML: 0.9 INJECTION, SOLUTION INTRAVENOUS at 01:08

## 2023-08-26 RX ADMIN — NAPROXEN 500 MG: 250 TABLET ORAL at 02:08

## 2023-08-26 RX ADMIN — CEFTRIAXONE SODIUM 1 G: 1 INJECTION, POWDER, FOR SOLUTION INTRAMUSCULAR; INTRAVENOUS at 03:08

## 2023-08-26 RX ADMIN — ORPHENADRINE CITRATE 30 MG: 60 INJECTION INTRAMUSCULAR; INTRAVENOUS at 01:08

## 2023-08-26 RX ADMIN — PROCHLORPERAZINE EDISYLATE 10 MG: 5 INJECTION INTRAMUSCULAR; INTRAVENOUS at 03:08

## 2023-08-26 NOTE — ED PROVIDER NOTES
Encounter Date: 8/25/2023       History     Chief Complaint   Patient presents with    Generalized Body Aches     Body aches, fever, congestion, headache since yesterday morning, dx with covid yesterday, not able to tolerate plavanox that was perscribed     HPI  43-year-old history of lupus, recurrent urinary tract infections presents complaining of body aches and headache and nausea and vomiting.  Symptoms started a week ago with generalized body aches and headache and nausea and vomiting.  She was seen in this emergency department and was prescribed Zofran and Phenergan to go home with.  She took his medication and initially her symptoms were relieved but then recurred the day prior to presentation and she had many episodes of nonbloody, nonbilious emesis, she had a headache located in the front of her head and some generalized body aches.    Patient was on prednisone and Emgality for her lupus.    Review of patient's allergies indicates:   Allergen Reactions    Cyclobenzaprine Rash and Other (See Comments)     Leg cramps    Pregabalin Itching, Other (See Comments) and Hives     Bruising and headaches    Sulfa (sulfonamide antibiotics) Nausea And Vomiting    Morphine Rash and Hives    Sulfamethoxazole-trimethoprim Rash and Nausea And Vomiting     And headache    Toradol [ketorolac] Rash     Past Medical History:   Diagnosis Date    Abnormal Pap smear     Anxiety     Cancer     pre-cancer cells cervical     Cervical disc herniation     Chronic cough     Closed fracture 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) level with anterior cord syndrome     T 12 fracture compression     Fractures     Genital herpes     GERD (gastroesophageal reflux disease)     Hemorrhoid     History of colposcopy with cervical biopsy     Lumbar disc herniation     on Hydrocodone    Lupus (systemic lupus erythematosus)     chronic thrombocytopenia    Migraine headache     PONV (postoperative nausea and vomiting)      Past Surgical History:   Procedure Laterality Date     ADENOIDECTOMY      APPENDECTOMY       SECTION, CLASSIC      x2 , last 2014    CHOLECYSTECTOMY      CLOSED REDUCTION OF FRACTURE OF NASAL BONE Bilateral 2022    Procedure: CLOSED REDUCTION, FRACTURE, NASAL BONE;  Surgeon: Mary Carlton MD;  Location: [facility] OR;  Service: ENT;  Laterality: Bilateral;    COLONOSCOPY N/A 10/29/2019    Procedure: COLONOSCOPY;  Surgeon: Jules De La Cruz MD;  Location: [facility] ENDO;  Service: Endoscopy;  Laterality: N/A;    CRYOTHERAPY          DILATION AND CURETTAGE OF UTERUS  [id number]    ENDOMETRIAL ABLATION      GANGLION CYST EXCISION Left     TONSILLECTOMY      TUBAL LIGATION  2014    TYMPANOSTOMY TUBE PLACEMENT      x 7     Family History   Problem Relation Age of Onset    Heart disease Father     Diabetes Mother     Cancer Maternal Grandfather         colon    Cancer Paternal Grandfather         colon    Breast cancer Maternal Grandmother     Breast cancer Maternal Aunt     Breast cancer Maternal Aunt     Ovarian cancer Neg Hx      Social History     Tobacco Use    Smoking status: Every Day     Current packs/day: 0.25     Types: Cigarettes    Smokeless tobacco: Never   Substance Use Topics    Alcohol use: Yes     Comment: special occasions    Drug use: Yes     Types: Marijuana     Comment: prescription     Review of Systems   All other systems reviewed and are negative.      Physical Exam     Initial Vitals [23 2147]   BP Pulse Resp Temp SpO2   97/67 88 18 98.2 °F (36.8 °C) 97 %      MAP       --         Physical Exam    Constitutional: She appears well-developed and well-nourished.   HENT:   Head: Normocephalic and atraumatic. Hair is normal.   Eyes: Conjunctivae and EOM are normal.   Neck: Neck supple. No stridor present.   Normal range of motion.  Cardiovascular:  Normal rate.     Exam reveals no friction rub.       Pulmonary/Chest: Breath sounds normal. She has no wheezes.   Abdominal: Abdomen is soft.   Mild lower abdominal tenderness to  palpation without rebound or guarding   Musculoskeletal:         General: No tenderness. Normal range of motion.      Cervical back: Normal range of motion and neck supple.     Neurological: She is alert and oriented to person, place, and time.   Skin: Skin is warm and dry.   Psychiatric: She has a normal mood and affect. Her speech is normal.         ED Course   Procedures  PGY5 MDM:   43-year-old history of lupus presents complaining of body aches and headache and nausea and vomiting abdominal cramping.  Patient is known COVID positive for the last 3 days.  Vital signs stable on arrival.  On exam patient is uncomfortable appearing.  Benign abdominal exam, awake and alert.  Was consistent with symptomatic COVID infection, also considering urinary tract infection, anemia, electrolyte abnormality.  Worked up further with labs, urinalysis.  This is significant for CBC with thrombocytopenia that is old, CMP without major metabolic aberrations, urinalysis with leukocyte esterase and nitrates and many bacteria.  Patient's symptoms were treated with Norflex for body aches, Afrin for nasal congestion, Compazine and Benadryl for headaches, droperidol for nausea and Naprosyn for body aches.  After this treatment patient reports that she feels much better.  We will plan to discharge home with Phenergan suppositories and Compazine p.o. as well as script for Keflex for her urinary tract infection.  Instructed to come back to the emergency department for worsening symptoms or other concerns.  Kenny Cardenas MD  [facility] -[address] [place] [address]  [phone]  4:18 AM 8/26/2023     Labs Reviewed   CBC W/ AUTO DIFFERENTIAL - Abnormal; Notable for the following components:       Result Value    MCH 33.3 (*)     Platelets 73 (*)     MPV 13.5 (*)     All other components within normal limits   COMPREHENSIVE METABOLIC PANEL - Abnormal; Notable for the following components:    Sodium 130 (*)     Calcium 7.8  (*)     Anion Gap 7 (*)     All other components within normal limits   URINALYSIS, REFLEX TO URINE CULTURE - Abnormal; Notable for the following components:    Appearance, UA Hazy (*)     Protein, UA Trace (*)     Occult Blood UA 2+ (*)     Nitrite, UA Positive (*)     Urobilinogen, UA 2.0-3.0 (*)     Leukocytes, UA 2+ (*)     All other components within normal limits    Narrative:     In and Out Cath as needed it patient unable to void  Specimen Source->Urine   URINALYSIS MICROSCOPIC - Abnormal; Notable for the following components:    WBC, UA 60 (*)     Bacteria Many (*)     Hyaline Casts, UA 16 (*)     All other components within normal limits    Narrative:     In and Out Cath as needed it patient unable to void  Specimen Source->Urine   CULTURE, URINE          Imaging Results    None          Medications   sodium chloride 0.9% bolus 2,000 mL 2,000 mL (2,000 mLs Intravenous New Bag 8/26/23 0159)   ketorolac injection 15 mg (15 mg Intravenous Not Given 8/26/23 0159)   orphenadrine injection 30 mg (30 mg Intravenous Given 8/26/23 0159)   droPERidol injection 0.625 mg (0.625 mg Intravenous Given 8/26/23 0158)   oxymetazoline 0.05 % nasal spray 1 spray (1 spray Each Nostril Given 8/26/23 0202)   LIDOcaine HCl 2% oral solution 5 mL (5 mLs Oral Given 8/26/23 0159)   naproxen tablet 500 mg (500 mg Oral Given 8/26/23 0211)     Medical Decision Making  Risk  OTC drugs.  Prescription drug management.                               Clinical Impression:                 Leonardo Cardenas MD  Resident  08/26/23 6131

## 2023-08-28 ENCOUNTER — TELEPHONE (OUTPATIENT)
Dept: OBSTETRICS AND GYNECOLOGY | Facility: CLINIC | Age: 43
End: 2023-08-28
Payer: OTHER GOVERNMENT

## 2023-08-28 NOTE — TELEPHONE ENCOUNTER
----- Message from Amelia Conde sent at 8/26/2023 10:57 AM CDT -----  Contact: self  Type:  Sooner Appointment Request    Name of Caller:Pt  When is the first available appointment? Pt was tested positive for Covid on Thursday, 8/24, has procedure scheduled for 8/28... Please call to advise....     Would the patient rather a call back or a response via [facility]?  call  Best Call Back Number: [phone]    Please call to advise... Thank you...

## 2023-08-28 NOTE — PROGRESS NOTES
Please call in Macrobid 100 mg patient's take 1 tablet twice daily for the next 7 days, she can stop taking current antibiotic as it was not sensitive to bacteria in urine

## 2023-08-29 LAB — BACTERIA UR CULT: ABNORMAL

## 2023-08-30 ENCOUNTER — PATIENT MESSAGE (OUTPATIENT)
Dept: OBSTETRICS AND GYNECOLOGY | Facility: CLINIC | Age: 43
End: 2023-08-30
Payer: OTHER GOVERNMENT

## 2023-09-01 ENCOUNTER — HOSPITAL ENCOUNTER (INPATIENT)
Facility: HOSPITAL | Age: 43
LOS: 4 days | Discharge: HOME | DRG: 690 | End: 2023-09-07
Attending: EMERGENCY MEDICINE | Admitting: INTERNAL MEDICINE
Payer: OTHER GOVERNMENT

## 2023-09-01 DIAGNOSIS — I95.9 HYPOTENSION: ICD-10-CM

## 2023-09-01 DIAGNOSIS — U07.1 COVID-19 VIRUS INFECTION: ICD-10-CM

## 2023-09-01 DIAGNOSIS — N12 PYELONEPHRITIS: ICD-10-CM

## 2023-09-01 DIAGNOSIS — R63.4 WEIGHT LOSS: ICD-10-CM

## 2023-09-01 DIAGNOSIS — A49.9 ESBL (EXTENDED SPECTRUM BETA-LACTAMASE) PRODUCING BACTERIA INFECTION: Primary | Chronic | ICD-10-CM

## 2023-09-01 DIAGNOSIS — K83.9 BILE DUCT ABNORMALITY: ICD-10-CM

## 2023-09-01 DIAGNOSIS — Z16.12 ESBL (EXTENDED SPECTRUM BETA-LACTAMASE) PRODUCING BACTERIA INFECTION: Primary | Chronic | ICD-10-CM

## 2023-09-01 DIAGNOSIS — N39.0 URINARY TRACT INFECTION WITHOUT HEMATURIA, SITE UNSPECIFIED: ICD-10-CM

## 2023-09-01 PROBLEM — N83.202 BILATERAL OVARIAN CYSTS: Status: ACTIVE | Noted: 2023-09-01

## 2023-09-01 PROBLEM — R32 URINARY INCONTINENCE: Status: ACTIVE | Noted: 2023-09-01

## 2023-09-01 PROBLEM — K83.8 DILATION OF BILIARY TRACT: Status: ACTIVE | Noted: 2023-09-01

## 2023-09-01 PROBLEM — N83.201 BILATERAL OVARIAN CYSTS: Status: ACTIVE | Noted: 2023-09-01

## 2023-09-01 LAB
ALBUMIN SERPL BCP-MCNC: 4 G/DL (ref 3.5–5.2)
ALP SERPL-CCNC: 131 U/L (ref 55–135)
ALT SERPL W/O P-5'-P-CCNC: 38 U/L (ref 10–44)
AMPHET+METHAMPHET UR QL: NEGATIVE
ANION GAP SERPL CALC-SCNC: 10 MMOL/L (ref 8–16)
AST SERPL-CCNC: 23 U/L (ref 10–40)
B-HCG UR QL: NEGATIVE
BACTERIA #/AREA URNS HPF: NEGATIVE /HPF
BARBITURATES UR QL SCN>200 NG/ML: ABNORMAL
BASOPHILS # BLD AUTO: 0.03 K/UL (ref 0–0.2)
BASOPHILS NFR BLD: 0.2 % (ref 0–1.9)
BENZODIAZ UR QL SCN>200 NG/ML: NEGATIVE
BILIRUB SERPL-MCNC: 0.6 MG/DL (ref 0.1–1)
BILIRUB UR QL STRIP: NEGATIVE
BUN SERPL-MCNC: 8 MG/DL (ref 6–20)
BZE UR QL SCN: NEGATIVE
CALCIUM SERPL-MCNC: 8.8 MG/DL (ref 8.7–10.5)
CANNABINOIDS UR QL SCN: ABNORMAL
CHLORIDE SERPL-SCNC: 103 MMOL/L (ref 95–110)
CLARITY UR: CLEAR
CO2 SERPL-SCNC: 24 MMOL/L (ref 23–29)
COLOR UR: ABNORMAL
CREAT SERPL-MCNC: 0.6 MG/DL (ref 0.5–1.4)
CREAT UR-MCNC: 36 MG/DL (ref 15–325)
CTP QC/QA: YES
DIFFERENTIAL METHOD: ABNORMAL
EOSINOPHIL # BLD AUTO: 0.1 K/UL (ref 0–0.5)
EOSINOPHIL NFR BLD: 1 % (ref 0–8)
ERYTHROCYTE [DISTWIDTH] IN BLOOD BY AUTOMATED COUNT: 13.8 % (ref 11.5–14.5)
EST. GFR  (NO RACE VARIABLE): >60 ML/MIN/1.73 M^2
GLUCOSE SERPL-MCNC: 90 MG/DL (ref 70–110)
GLUCOSE UR QL STRIP: NEGATIVE
HCT VFR BLD AUTO: 40.9 % (ref 37–48.5)
HGB BLD-MCNC: 13.8 G/DL (ref 12–16)
HGB UR QL STRIP: NEGATIVE
HYALINE CASTS #/AREA URNS LPF: 3 /LPF
IMM GRANULOCYTES # BLD AUTO: 0.06 K/UL (ref 0–0.04)
IMM GRANULOCYTES NFR BLD AUTO: 0.5 % (ref 0–0.5)
KETONES UR QL STRIP: NEGATIVE
LACTATE SERPL-SCNC: 1.6 MMOL/L (ref 0.5–1.9)
LEUKOCYTE ESTERASE UR QL STRIP: NEGATIVE
LIPASE SERPL-CCNC: 25 U/L (ref 4–60)
LYMPHOCYTES # BLD AUTO: 3.1 K/UL (ref 1–4.8)
LYMPHOCYTES NFR BLD: 23.3 % (ref 18–48)
MAGNESIUM SERPL-MCNC: 1.8 MG/DL (ref 1.6–2.6)
MCH RBC QN AUTO: 32.8 PG (ref 27–31)
MCHC RBC AUTO-ENTMCNC: 33.7 G/DL (ref 32–36)
MCV RBC AUTO: 97 FL (ref 82–98)
MICROSCOPIC COMMENT: ABNORMAL
MONOCYTES # BLD AUTO: 0.7 K/UL (ref 0.3–1)
MONOCYTES NFR BLD: 5.2 % (ref 4–15)
NEUTROPHILS # BLD AUTO: 9.1 K/UL (ref 1.8–7.7)
NEUTROPHILS NFR BLD: 69.8 % (ref 38–73)
NITRITE UR QL STRIP: POSITIVE
NRBC BLD-RTO: 0 /100 WBC
OPIATES UR QL SCN: ABNORMAL
PCP UR QL SCN>25 NG/ML: NEGATIVE
PH UR STRIP: 6 [PH] (ref 5–8)
PLATELET # BLD AUTO: 309 K/UL (ref 150–450)
PLATELET BLD QL SMEAR: ABNORMAL
PMV BLD AUTO: 12.2 FL (ref 9.2–12.9)
POTASSIUM SERPL-SCNC: 3.7 MMOL/L (ref 3.5–5.1)
PROCALCITONIN SERPL IA-MCNC: <0.05 NG/ML (ref 0–0.5)
PROT SERPL-MCNC: 7.5 G/DL (ref 6–8.4)
PROT UR QL STRIP: NEGATIVE
RBC # BLD AUTO: 4.21 M/UL (ref 4–5.4)
RBC #/AREA URNS HPF: 0 /HPF (ref 0–4)
SODIUM SERPL-SCNC: 137 MMOL/L (ref 136–145)
SP GR UR STRIP: 1 (ref 1–1.03)
SQUAMOUS #/AREA URNS HPF: 1 /HPF
TOXICOLOGY INFORMATION: ABNORMAL
TROPONIN I SERPL HS-MCNC: 3.4 PG/ML (ref 0–14.9)
URN SPEC COLLECT METH UR: ABNORMAL
UROBILINOGEN UR STRIP-ACNC: NEGATIVE EU/DL
WBC # BLD AUTO: 13.07 K/UL (ref 3.9–12.7)
WBC #/AREA URNS HPF: 1 /HPF (ref 0–5)

## 2023-09-01 PROCEDURE — 83690 ASSAY OF LIPASE: CPT | Performed by: EMERGENCY MEDICINE

## 2023-09-01 PROCEDURE — 25000003 PHARM REV CODE 250: Performed by: STUDENT IN AN ORGANIZED HEALTH CARE EDUCATION/TRAINING PROGRAM

## 2023-09-01 PROCEDURE — 93010 EKG 12-LEAD: ICD-10-PCS | Mod: ,,, | Performed by: GENERAL PRACTICE

## 2023-09-01 PROCEDURE — 96361 HYDRATE IV INFUSION ADD-ON: CPT

## 2023-09-01 PROCEDURE — 80307 DRUG TEST PRSMV CHEM ANLYZR: CPT | Performed by: EMERGENCY MEDICINE

## 2023-09-01 PROCEDURE — G0378 HOSPITAL OBSERVATION PER HR: HCPCS

## 2023-09-01 PROCEDURE — 85025 COMPLETE CBC W/AUTO DIFF WBC: CPT | Performed by: EMERGENCY MEDICINE

## 2023-09-01 PROCEDURE — 87040 BLOOD CULTURE FOR BACTERIA: CPT | Performed by: EMERGENCY MEDICINE

## 2023-09-01 PROCEDURE — 83605 ASSAY OF LACTIC ACID: CPT | Performed by: EMERGENCY MEDICINE

## 2023-09-01 PROCEDURE — 63600175 PHARM REV CODE 636 W HCPCS: Performed by: STUDENT IN AN ORGANIZED HEALTH CARE EDUCATION/TRAINING PROGRAM

## 2023-09-01 PROCEDURE — 96367 TX/PROPH/DG ADDL SEQ IV INF: CPT

## 2023-09-01 PROCEDURE — U0002 COVID-19 LAB TEST NON-CDC: HCPCS | Performed by: STUDENT IN AN ORGANIZED HEALTH CARE EDUCATION/TRAINING PROGRAM

## 2023-09-01 PROCEDURE — 80053 COMPREHEN METABOLIC PANEL: CPT | Performed by: EMERGENCY MEDICINE

## 2023-09-01 PROCEDURE — 96365 THER/PROPH/DIAG IV INF INIT: CPT

## 2023-09-01 PROCEDURE — 25000003 PHARM REV CODE 250: Performed by: EMERGENCY MEDICINE

## 2023-09-01 PROCEDURE — 84484 ASSAY OF TROPONIN QUANT: CPT | Performed by: EMERGENCY MEDICINE

## 2023-09-01 PROCEDURE — 96376 TX/PRO/DX INJ SAME DRUG ADON: CPT

## 2023-09-01 PROCEDURE — 25500020 PHARM REV CODE 255: Performed by: EMERGENCY MEDICINE

## 2023-09-01 PROCEDURE — 87086 URINE CULTURE/COLONY COUNT: CPT | Performed by: STUDENT IN AN ORGANIZED HEALTH CARE EDUCATION/TRAINING PROGRAM

## 2023-09-01 PROCEDURE — 93005 ELECTROCARDIOGRAM TRACING: CPT | Performed by: GENERAL PRACTICE

## 2023-09-01 PROCEDURE — 93010 ELECTROCARDIOGRAM REPORT: CPT | Mod: ,,, | Performed by: GENERAL PRACTICE

## 2023-09-01 PROCEDURE — 84145 PROCALCITONIN (PCT): CPT | Performed by: EMERGENCY MEDICINE

## 2023-09-01 PROCEDURE — 96375 TX/PRO/DX INJ NEW DRUG ADDON: CPT

## 2023-09-01 PROCEDURE — 99285 EMERGENCY DEPT VISIT HI MDM: CPT | Mod: 25

## 2023-09-01 PROCEDURE — 81001 URINALYSIS AUTO W/SCOPE: CPT | Performed by: EMERGENCY MEDICINE

## 2023-09-01 PROCEDURE — 63600175 PHARM REV CODE 636 W HCPCS: Performed by: EMERGENCY MEDICINE

## 2023-09-01 PROCEDURE — 83735 ASSAY OF MAGNESIUM: CPT | Performed by: EMERGENCY MEDICINE

## 2023-09-01 PROCEDURE — 81025 URINE PREGNANCY TEST: CPT | Performed by: EMERGENCY MEDICINE

## 2023-09-01 RX ORDER — SODIUM CHLORIDE, SODIUM LACTATE, POTASSIUM CHLORIDE, CALCIUM CHLORIDE 600; 310; 30; 20 MG/100ML; MG/100ML; MG/100ML; MG/100ML
INJECTION, SOLUTION INTRAVENOUS CONTINUOUS
Status: DISCONTINUED | OUTPATIENT
Start: 2023-09-01 | End: 2023-09-02

## 2023-09-01 RX ORDER — ACETAMINOPHEN 325 MG/1
650 TABLET ORAL EVERY 8 HOURS PRN
Status: DISCONTINUED | OUTPATIENT
Start: 2023-09-01 | End: 2023-09-07 | Stop reason: HOSPADM

## 2023-09-01 RX ORDER — PROCHLORPERAZINE EDISYLATE 5 MG/ML
5 INJECTION INTRAMUSCULAR; INTRAVENOUS EVERY 6 HOURS PRN
Status: DISCONTINUED | OUTPATIENT
Start: 2023-09-01 | End: 2023-09-07 | Stop reason: HOSPADM

## 2023-09-01 RX ORDER — POLYETHYLENE GLYCOL 3350 17 G/17G
17 POWDER, FOR SOLUTION ORAL DAILY
Status: DISCONTINUED | OUTPATIENT
Start: 2023-09-02 | End: 2023-09-04

## 2023-09-01 RX ORDER — HYDROMORPHONE HYDROCHLORIDE 1 MG/ML
0.5 INJECTION, SOLUTION INTRAMUSCULAR; INTRAVENOUS; SUBCUTANEOUS
Status: DISCONTINUED | OUTPATIENT
Start: 2023-09-01 | End: 2023-09-02

## 2023-09-01 RX ORDER — HYDROCODONE BITARTRATE AND ACETAMINOPHEN 5; 325 MG/1; MG/1
1 TABLET ORAL EVERY 4 HOURS PRN
Status: DISCONTINUED | OUTPATIENT
Start: 2023-09-01 | End: 2023-09-07 | Stop reason: HOSPADM

## 2023-09-01 RX ORDER — TALC
6 POWDER (GRAM) TOPICAL NIGHTLY PRN
Status: DISCONTINUED | OUTPATIENT
Start: 2023-09-01 | End: 2023-09-07 | Stop reason: HOSPADM

## 2023-09-01 RX ORDER — ONDANSETRON 2 MG/ML
4 INJECTION INTRAMUSCULAR; INTRAVENOUS EVERY 8 HOURS PRN
Status: DISCONTINUED | OUTPATIENT
Start: 2023-09-01 | End: 2023-09-07 | Stop reason: HOSPADM

## 2023-09-01 RX ORDER — BUTALBITAL, ACETAMINOPHEN AND CAFFEINE 50; 325; 40 MG/1; MG/1; MG/1
1 TABLET ORAL 2 TIMES DAILY PRN
Status: DISCONTINUED | OUTPATIENT
Start: 2023-09-01 | End: 2023-09-07 | Stop reason: HOSPADM

## 2023-09-01 RX ORDER — HYDROMORPHONE HYDROCHLORIDE 1 MG/ML
1 INJECTION, SOLUTION INTRAMUSCULAR; INTRAVENOUS; SUBCUTANEOUS
Status: COMPLETED | OUTPATIENT
Start: 2023-09-01 | End: 2023-09-01

## 2023-09-01 RX ORDER — SODIUM CHLORIDE 0.9 % (FLUSH) 0.9 %
10 SYRINGE (ML) INJECTION EVERY 6 HOURS PRN
Status: DISCONTINUED | OUTPATIENT
Start: 2023-09-01 | End: 2023-09-07 | Stop reason: HOSPADM

## 2023-09-01 RX ORDER — HYDROMORPHONE HYDROCHLORIDE 1 MG/ML
0.5 INJECTION, SOLUTION INTRAMUSCULAR; INTRAVENOUS; SUBCUTANEOUS
Status: COMPLETED | OUTPATIENT
Start: 2023-09-01 | End: 2023-09-01

## 2023-09-01 RX ORDER — MORPHINE SULFATE 4 MG/ML
4 INJECTION, SOLUTION INTRAMUSCULAR; INTRAVENOUS EVERY 4 HOURS PRN
Status: DISCONTINUED | OUTPATIENT
Start: 2023-09-01 | End: 2023-09-01

## 2023-09-01 RX ORDER — ONDANSETRON 2 MG/ML
4 INJECTION INTRAMUSCULAR; INTRAVENOUS
Status: COMPLETED | OUTPATIENT
Start: 2023-09-01 | End: 2023-09-01

## 2023-09-01 RX ORDER — NITROFURANTOIN 25; 75 MG/1; MG/1
100 CAPSULE ORAL 2 TIMES DAILY
Status: ON HOLD | COMMUNITY
Start: 2023-08-29 | End: 2023-09-06 | Stop reason: HOSPADM

## 2023-09-01 RX ADMIN — IOHEXOL 100 ML: 350 INJECTION, SOLUTION INTRAVENOUS at 03:09

## 2023-09-01 RX ADMIN — PROCHLORPERAZINE EDISYLATE 5 MG: 5 INJECTION INTRAMUSCULAR; INTRAVENOUS at 10:09

## 2023-09-01 RX ADMIN — SODIUM CHLORIDE, SODIUM LACTATE, POTASSIUM CHLORIDE, AND CALCIUM CHLORIDE: .6; .31; .03; .02 INJECTION, SOLUTION INTRAVENOUS at 09:09

## 2023-09-01 RX ADMIN — SODIUM CHLORIDE, SODIUM LACTATE, POTASSIUM CHLORIDE, AND CALCIUM CHLORIDE: .6; .31; .03; .02 INJECTION, SOLUTION INTRAVENOUS at 06:09

## 2023-09-01 RX ADMIN — ONDANSETRON 4 MG: 2 INJECTION, SOLUTION INTRAMUSCULAR; INTRAVENOUS at 01:09

## 2023-09-01 RX ADMIN — HYDROMORPHONE HYDROCHLORIDE 0.5 MG: 0.5 INJECTION, SOLUTION INTRAMUSCULAR; INTRAVENOUS; SUBCUTANEOUS at 09:09

## 2023-09-01 RX ADMIN — HYDROMORPHONE HYDROCHLORIDE 0.5 MG: 0.5 INJECTION, SOLUTION INTRAMUSCULAR; INTRAVENOUS; SUBCUTANEOUS at 01:09

## 2023-09-01 RX ADMIN — HYDROCODONE BITARTRATE AND ACETAMINOPHEN 1 TABLET: 5; 325 TABLET ORAL at 06:09

## 2023-09-01 RX ADMIN — ERTAPENEM 1 G: 1 INJECTION INTRAMUSCULAR; INTRAVENOUS at 06:09

## 2023-09-01 RX ADMIN — HYDROMORPHONE HYDROCHLORIDE 1 MG: 1 INJECTION, SOLUTION INTRAMUSCULAR; INTRAVENOUS; SUBCUTANEOUS at 02:09

## 2023-09-01 RX ADMIN — DEXTROSE MONOHYDRATE 3.38 G: 50 INJECTION, SOLUTION INTRAVENOUS at 02:09

## 2023-09-01 RX ADMIN — SODIUM CHLORIDE 1000 ML: 0.9 INJECTION, SOLUTION INTRAVENOUS at 01:09

## 2023-09-01 RX ADMIN — ONDANSETRON 4 MG: 2 INJECTION INTRAMUSCULAR; INTRAVENOUS at 06:09

## 2023-09-01 NOTE — ASSESSMENT & PLAN NOTE
The patient is status post cholecystectomy but has continued progression of biliary ductal dilation.  Concerns for distal stricture or sphincter dysfunction.  Bilirubin is normal.  - will further evaluate with MRCP  - may consider GI consult for ampullary manometry or sphincterotomy pending results of MRCP

## 2023-09-01 NOTE — ASSESSMENT & PLAN NOTE
Patient with chronic urinary incontinence was scheduled to have gyn procedure with reported bladder sling on 08/28 however this was canceled due to her COVID-19 diagnosis

## 2023-09-01 NOTE — SUBJECTIVE & OBJECTIVE
Past Medical History:   Diagnosis Date    Abnormal Pap smear     Anxiety     Cancer     pre-cancer cells cervical     Cervical disc herniation     Chronic cough     Closed fracture 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) level with anterior cord syndrome     T 12 fracture compression     Fractures     Genital herpes     GERD (gastroesophageal reflux disease)     Hemorrhoid     History of colposcopy with cervical biopsy     Lumbar disc herniation     on Hydrocodone    Lupus (systemic lupus erythematosus)     chronic thrombocytopenia    Migraine headache     PONV (postoperative nausea and vomiting)        Past Surgical History:   Procedure Laterality Date    ADENOIDECTOMY      APPENDECTOMY       SECTION, CLASSIC      x2 , last 2014    CHOLECYSTECTOMY      CLOSED REDUCTION OF FRACTURE OF NASAL BONE Bilateral 2022    Procedure: CLOSED REDUCTION, FRACTURE, NASAL BONE;  Surgeon: Mary Carlton MD;  Location: [facility] OR;  Service: ENT;  Laterality: Bilateral;    COLONOSCOPY N/A 10/29/2019    Procedure: COLONOSCOPY;  Surgeon: Jules De La Cruz MD;  Location: [facility] ENDO;  Service: Endoscopy;  Laterality: N/A;    CRYOTHERAPY          DILATION AND CURETTAGE OF UTERUS  [id number]    ENDOMETRIAL ABLATION      GANGLION CYST EXCISION Left     TONSILLECTOMY      TUBAL LIGATION  2014    TYMPANOSTOMY TUBE PLACEMENT      x 7       Review of patient's allergies indicates:   Allergen Reactions    Cyclobenzaprine Rash and Other (See Comments)     Leg cramps    Pregabalin Itching, Other (See Comments) and Hives     Bruising and headaches    Sulfa (sulfonamide antibiotics) Nausea And Vomiting    Morphine Rash and Hives    Sulfamethoxazole-trimethoprim Rash and Nausea And Vomiting     And headache    Toradol [ketorolac] Rash       No current facility-administered medications on file prior to encounter.     Current Outpatient Medications on File Prior to Encounter   Medication Sig    butalbital-acetaminophen-caffeine -40 mg (FIORICET,  ESGIC) -40 mg per tablet Take 1 tablet by mouth 2 (two) times daily as needed. (Patient taking differently: Take 1 tablet by mouth 2 (two) times daily as needed for Headaches.)    EMGALITY  mg/mL PnIj Inject 1 mL into the skin every 30 days.    hydrocodone-acetaminophen 10-325mg (NORCO)  mg Tab Take 1 tablet by mouth every 6 (six) hours as needed. (Patient taking differently: Take 1 tablet by mouth every 6 (six) hours as needed for Pain.)    naproxen (NAPROSYN) 500 MG tablet Take 1 tablet (500 mg total) by mouth every 12 (twelve) hours as needed (Body aches).    nitrofurantoin, macrocrystal-monohydrate, (MACROBID) 100 MG capsule Take 100 mg by mouth 2 (two) times daily.    ondansetron (ZOFRAN-ODT) 4 MG TbDL Take 1 tablet (4 mg total) by mouth every 6 (six) hours as needed (nausea/vomiting).    predniSONE (DELTASONE) 5 MG tablet Take 1 tablet (5 mg total) by mouth daily as needed (Joint stiffness).    prochlorperazine (COMPAZINE) 10 MG tablet Take 1 tablet (10 mg total) by mouth 3 (three) times daily as needed (Headache).    promethazine (PHENERGAN) 25 MG suppository Place 1 suppository (25 mg total) rectally every 6 (six) hours as needed for Nausea. (Patient taking differently: Place 25 mg rectally every 6 (six) hours as needed for Nausea. NEW Rx - NOT YET STARTED)    tiZANidine (ZANAFLEX) 4 MG tablet Take 4 mg by mouth every 6 (six) hours as needed.    UBROGEPANT 100 mg tablet Take 100 mg by mouth as needed.    albuterol (PROAIR HFA) 90 mcg/actuation inhaler Inhale 2 puffs into the lungs every 4 (four) hours as needed for Wheezing.    valACYclovir (VALTREX) 1000 MG tablet Take 1 tablet (1,000 mg total) by mouth every 12 (twelve) hours. for 5 days     Family History       Problem Relation (Age of Onset)    Breast cancer Maternal Grandmother, Maternal Aunt, Maternal Aunt    Cancer Maternal Grandfather, Paternal Grandfather    Diabetes Mother    Heart disease Father          Tobacco Use    Smoking  status: Every Day     Current packs/day: 0.25     Types: Cigarettes    Smokeless tobacco: Never   Substance and Sexual Activity    Alcohol use: Yes     Comment: special occasions    Drug use: Yes     Types: Marijuana     Comment: prescription    Sexual activity: Yes     Partners: Male     Birth control/protection: None     Review of Systems   All other systems reviewed and are negative.    Objective:     Vital Signs (Most Recent):  Temp: 98.5 °F (36.9 °C) (09/01/23 1305)  Pulse: 68 (09/01/23 1530)  Resp: 18 (09/01/23 1445)  BP: 111/66 (09/01/23 1500)  SpO2: 99 % (09/01/23 1530) Vital Signs (24h Range):  Temp:  [98.5 °F (36.9 °C)] 98.5 °F (36.9 °C)  Pulse:  [52-73] 68  Resp:  [18-24] 18  SpO2:  [99 %] 99 %  BP: ()/(65-70) 111/66     Weight: 44.5 kg (98 lb)  Body mass index is 17.92 kg/m².     Physical Exam  Constitutional:       Appearance: Normal appearance. She is normal weight.   HENT:      Head: Normocephalic and atraumatic.      Nose: Nose normal.      Mouth/Throat:      Mouth: Mucous membranes are moist.   Eyes:      Conjunctiva/sclera: Conjunctivae normal.   Cardiovascular:      Rate and Rhythm: Normal rate and regular rhythm.      Pulses: Normal pulses.      Heart sounds: Normal heart sounds. No murmur heard.     No friction rub. No gallop.   Pulmonary:      Effort: Pulmonary effort is normal.      Breath sounds: Normal breath sounds. No wheezing or rales.   Abdominal:      General: Abdomen is flat. Bowel sounds are normal. There is no distension.      Palpations: Abdomen is soft.      Tenderness: There is no abdominal tenderness. There is no right CVA tenderness, left CVA tenderness or guarding.      Comments: Right lower quadrant tenderness to palpation and abdominal pain   Musculoskeletal:         General: No swelling. Normal range of motion.      Cervical back: Normal range of motion and neck supple.   Skin:     General: Skin is warm and dry.   Neurological:      General: No focal deficit present.       Mental Status: She is alert.   Psychiatric:         Mood and Affect: Mood normal.         Thought Content: Thought content normal.         Judgment: Judgment normal.                Significant Labs: All pertinent labs within the past 24 hours have been reviewed.    Significant Imaging: I have reviewed all pertinent imaging results/findings within the past 24 hours.

## 2023-09-01 NOTE — ASSESSMENT & PLAN NOTE
Patient with recurrent ongoing urinary tract infection with resistant bacteria.  She was treated with Macrobid has ongoing persistent symptoms.  Recent urine culture with E coli sensitive primarily to IV antibiotics.  She has an allergy to Bactrim.  - start ertapenem 1 g daily  - may need midline with outpatient antibiotics to complete her course  - follow-up repeat urine culture  - IV fluids  - hypotensive on admission responding to fluids  - lactic acid normal.

## 2023-09-01 NOTE — HPI
Pt is a 44 y/o F with hx of Recurrent UTI, Bladder Incontinence, Ovarian cysts who is here with recurrent UTI and abdominal pain after starting treatment with Macrobid for resistant UTI on 08/26.  She has had continued pain and dysuria since that time.  Additionally, she was recently diagnosed with COVID-19 and was planned to have a bladder lift/hysterectomy on 8/28 however this was canceled due to her COVID-19 diagnosis.  She has continued to progressively feel worse.  She has no nausea or vomiting.  No diarrhea.  She denies fever or chills.  She says that her abdominal pain has been progressively worse over the past couple of days.  And feels like a twisting inside.  She is been compliant with her medications.     In the ED:  T 98.5°, P 73, R 24, BP 83/69 > improved to 111/66, O2 sat 99% on room air.  WBC 13.07, hemoglobin 13.8, chemistry panel unremarkable, troponin normal, lactic acid 1.6, procalcitonin 0.05, UDS positive for opiates, barbiturates, marijuana (she has prescriptions for 2 of these) , urinalysis suggestive of urinary tract infection.  Urine culture sent, blood culture sent.  Urine pregnancy test negative.  CT abdomen with biliary ductal dilation however transaminases are normal.  It was further dilated from previous however will proceed with MRCP.  Pelvic ultrasound showing multiple ovarian cysts with 1 small cyst showing some possible hemorrhage, but good blood flow.  Will admit for complicated urinary tract infection with resistant bacteria requiring IV antibiotics, abdominal pain question of whether this may be related to her ovarian cysts.

## 2023-09-01 NOTE — H&P
[facility] - Emergency Dept  Hospital Medicine  History & Physical    Patient Name: Jaye Martinez  MRN: [id number]  Patient Class: OP- Observation  Admission Date: 9/1/2023  Attending Physician: Ion Morris MD   Primary Care Provider: Steve Gonzalez MD         Patient information was obtained from patient and ER records.     Subjective:     Principal Problem:Recurrent UTI    Chief Complaint:   Chief Complaint   Patient presents with    Abdominal Pain     States she took Norco 10mg tab @ 08:00am and 11:00AM, Xanaflex at 8:00AM        HPI: Pt is a 44 y/o F with hx of Recurrent UTI, Bladder Incontinence, Ovarian cysts who is here with recurrent UTI and abdominal pain after starting treatment with Macrobid for resistant UTI on 08/26.  She has had continued pain and dysuria since that time.  Additionally, she was recently diagnosed with COVID-19 and was planned to have a bladder lift/hysterectomy on 8/28 however this was canceled due to her COVID-19 diagnosis.  She has continued to progressively feel worse.  She has no nausea or vomiting.  No diarrhea.  She denies fever or chills.  She says that her abdominal pain has been progressively worse over the past couple of days.  And feels like a twisting inside.  She is been compliant with her medications.     In the ED:  T 98.5°, P 73, R 24, BP 83/69 > improved to 111/66, O2 sat 99% on room air.  WBC 13.07, hemoglobin 13.8, chemistry panel unremarkable, troponin normal, lactic acid 1.6, procalcitonin 0.05, UDS positive for opiates, barbiturates, marijuana (she has prescriptions for 2 of these) , urinalysis suggestive of urinary tract infection.  Urine culture sent, blood culture sent.  Urine pregnancy test negative.  CT abdomen with biliary ductal dilation however transaminases are normal.  It was further dilated from previous however will proceed with MRCP.  Pelvic ultrasound showing multiple ovarian cysts with 1 small cyst showing some  possible hemorrhage, but good blood flow.  Will admit for complicated urinary tract infection with resistant bacteria requiring IV antibiotics, abdominal pain question of whether this may be related to her ovarian cysts.      Past Medical History:   Diagnosis Date    Abnormal Pap smear     Anxiety     Cancer     pre-cancer cells cervical     Cervical disc herniation     Chronic cough     Closed fracture 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) level with anterior cord syndrome     T 12 fracture compression     Fractures     Genital herpes     GERD (gastroesophageal reflux disease)     Hemorrhoid     History of colposcopy with cervical biopsy     Lumbar disc herniation     on Hydrocodone    Lupus (systemic lupus erythematosus)     chronic thrombocytopenia    Migraine headache     PONV (postoperative nausea and vomiting)        Past Surgical History:   Procedure Laterality Date    ADENOIDECTOMY      APPENDECTOMY       SECTION, CLASSIC      x2 , last 2014    CHOLECYSTECTOMY      CLOSED REDUCTION OF FRACTURE OF NASAL BONE Bilateral 2022    Procedure: CLOSED REDUCTION, FRACTURE, NASAL BONE;  Surgeon: Mary Carlton MD;  Location: [facility] OR;  Service: ENT;  Laterality: Bilateral;    COLONOSCOPY N/A 10/29/2019    Procedure: COLONOSCOPY;  Surgeon: Jules De La Cruz MD;  Location: [facility] ENDO;  Service: Endoscopy;  Laterality: N/A;    CRYOTHERAPY          DILATION AND CURETTAGE OF UTERUS  [id number]    ENDOMETRIAL ABLATION      GANGLION CYST EXCISION Left     TONSILLECTOMY      TUBAL LIGATION  2014    TYMPANOSTOMY TUBE PLACEMENT      x 7       Review of patient's allergies indicates:   Allergen Reactions    Cyclobenzaprine Rash and Other (See Comments)     Leg cramps    Pregabalin Itching, Other (See Comments) and Hives     Bruising and headaches    Sulfa (sulfonamide antibiotics) Nausea And Vomiting    Morphine Rash and Hives    Sulfamethoxazole-trimethoprim Rash and Nausea And Vomiting     And  headache    Toradol [ketorolac] Rash       No current facility-administered medications on file prior to encounter.     Current Outpatient Medications on File Prior to Encounter   Medication Sig    butalbital-acetaminophen-caffeine -40 mg (FIORICET, ESGIC) -40 mg per tablet Take 1 tablet by mouth 2 (two) times daily as needed. (Patient taking differently: Take 1 tablet by mouth 2 (two) times daily as needed for Headaches.)    EMGALITY  mg/mL PnIj Inject 1 mL into the skin every 30 days.    hydrocodone-acetaminophen 10-325mg (NORCO)  mg Tab Take 1 tablet by mouth every 6 (six) hours as needed. (Patient taking differently: Take 1 tablet by mouth every 6 (six) hours as needed for Pain.)    naproxen (NAPROSYN) 500 MG tablet Take 1 tablet (500 mg total) by mouth every 12 (twelve) hours as needed (Body aches).    nitrofurantoin, macrocrystal-monohydrate, (MACROBID) 100 MG capsule Take 100 mg by mouth 2 (two) times daily.    ondansetron (ZOFRAN-ODT) 4 MG TbDL Take 1 tablet (4 mg total) by mouth every 6 (six) hours as needed (nausea/vomiting).    predniSONE (DELTASONE) 5 MG tablet Take 1 tablet (5 mg total) by mouth daily as needed (Joint stiffness).    prochlorperazine (COMPAZINE) 10 MG tablet Take 1 tablet (10 mg total) by mouth 3 (three) times daily as needed (Headache).    promethazine (PHENERGAN) 25 MG suppository Place 1 suppository (25 mg total) rectally every 6 (six) hours as needed for Nausea. (Patient taking differently: Place 25 mg rectally every 6 (six) hours as needed for Nausea. NEW Rx - NOT YET STARTED)    tiZANidine (ZANAFLEX) 4 MG tablet Take 4 mg by mouth every 6 (six) hours as needed.    UBROGEPANT 100 mg tablet Take 100 mg by mouth as needed.    albuterol (PROAIR HFA) 90 mcg/actuation inhaler Inhale 2 puffs into the lungs every 4 (four) hours as needed for Wheezing.    valACYclovir (VALTREX) 1000 MG tablet Take 1 tablet (1,000 mg total) by mouth every 12 (twelve)  hours. for 5 days     Family History       Problem Relation (Age of Onset)    Breast cancer Maternal Grandmother, Maternal Aunt, Maternal Aunt    Cancer Maternal Grandfather, Paternal Grandfather    Diabetes Mother    Heart disease Father          Tobacco Use    Smoking status: Every Day     Current packs/day: 0.25     Types: Cigarettes    Smokeless tobacco: Never   Substance and Sexual Activity    Alcohol use: Yes     Comment: special occasions    Drug use: Yes     Types: Marijuana     Comment: prescription    Sexual activity: Yes     Partners: Male     Birth control/protection: None     Review of Systems   All other systems reviewed and are negative.    Objective:     Vital Signs (Most Recent):  Temp: 98.5 °F (36.9 °C) (09/01/23 1305)  Pulse: 68 (09/01/23 1530)  Resp: 18 (09/01/23 1445)  BP: 111/66 (09/01/23 1500)  SpO2: 99 % (09/01/23 1530) Vital Signs (24h Range):  Temp:  [98.5 °F (36.9 °C)] 98.5 °F (36.9 °C)  Pulse:  [52-73] 68  Resp:  [18-24] 18  SpO2:  [99 %] 99 %  BP: ()/(65-70) 111/66     Weight: 44.5 kg (98 lb)  Body mass index is 17.92 kg/m².     Physical Exam  Constitutional:       Appearance: Normal appearance. She is normal weight.   HENT:      Head: Normocephalic and atraumatic.      Nose: Nose normal.      Mouth/Throat:      Mouth: Mucous membranes are moist.   Eyes:      Conjunctiva/sclera: Conjunctivae normal.   Cardiovascular:      Rate and Rhythm: Normal rate and regular rhythm.      Pulses: Normal pulses.      Heart sounds: Normal heart sounds. No murmur heard.     No friction rub. No gallop.   Pulmonary:      Effort: Pulmonary effort is normal.      Breath sounds: Normal breath sounds. No wheezing or rales.   Abdominal:      General: Abdomen is flat. Bowel sounds are normal. There is no distension.      Palpations: Abdomen is soft.      Tenderness: There is no abdominal tenderness. There is no right CVA tenderness, left CVA tenderness or guarding.      Comments: Right lower quadrant  tenderness to palpation and abdominal pain   Musculoskeletal:         General: No swelling. Normal range of motion.      Cervical back: Normal range of motion and neck supple.   Skin:     General: Skin is warm and dry.   Neurological:      General: No focal deficit present.      Mental Status: She is alert.   Psychiatric:         Mood and Affect: Mood normal.         Thought Content: Thought content normal.         Judgment: Judgment normal.                Significant Labs: All pertinent labs within the past 24 hours have been reviewed.    Significant Imaging: I have reviewed all pertinent imaging results/findings within the past 24 hours.    Assessment/Plan:     * Recurrent UTI  Patient with recurrent ongoing urinary tract infection with resistant bacteria.  She was treated with Macrobid has ongoing persistent symptoms.  Recent urine culture with E coli sensitive primarily to IV antibiotics.  She has an allergy to Bactrim.  - start ertapenem 1 g daily  - may need midline with outpatient antibiotics to complete her course  - follow-up repeat urine culture  - IV fluids  - hypotensive on admission responding to fluids  - lactic acid normal.      Dilation of biliary tract  The patient is status post cholecystectomy but has continued progression of biliary ductal dilation.  Concerns for distal stricture or sphincter dysfunction.  Bilirubin is normal.  - will further evaluate with MRCP  - may consider GI consult for ampullary manometry or sphincterotomy pending results of MRCP      Bilateral ovarian cysts  Patient with ovarian cysts noted on pelvic ultrasound, 17 mm cyst on the right with complex/hemorrhagic component.  This is the location of the pain that she has in her abdomen question of whether this may be related.  She has good blood flow to her bilateral adnexa.  Will consult gyn for their opinion on whether this may be the source of her pain and any recommendations.      Urinary incontinence  Patient with chronic  urinary incontinence was scheduled to have gyn procedure with reported bladder sling on 08/28 however this was canceled due to her COVID-19 diagnosis      Migraine headache  Continue home Fioricet        VTE Risk Mitigation (From admission, onward)    None             On 09/01/2023, patient should be placed in hospital observation services under my care.        Ion Morris MD  Department of Hospital Medicine  [facility] - Emergency Dept

## 2023-09-01 NOTE — ED PROVIDER NOTES
Encounter Date: 2023       History     Chief Complaint   Patient presents with    Abdominal Pain     States she took Norco 10mg tab @ 08:00am and 11:00AM, Xanaflex at 8:00AM     Patient with recent diagnosis of urinary tract infection.  Patient had resistant bacteria.  Patient was switched to Macrobid.  Patient was supposed to have gynecologic surgery but canceled due to positive COVID test 1 week ago.  Patient reports 1 day history of increased lower abdominal/pelvic pain.  Pain is severe.  Patient has weakness.  No fever chills.  Positive dysuria.      Review of patient's allergies indicates:   Allergen Reactions    Cyclobenzaprine Rash and Other (See Comments)     Leg cramps    Pregabalin Itching, Other (See Comments) and Hives     Bruising and headaches    Sulfa (sulfonamide antibiotics) Nausea And Vomiting    Morphine Rash and Hives    Sulfamethoxazole-trimethoprim Rash and Nausea And Vomiting     And headache    Toradol [ketorolac] Rash     Past Medical History:   Diagnosis Date    Abnormal Pap smear     Anxiety     Cancer     pre-cancer cells cervical     Cervical disc herniation     Chronic cough     Closed fracture 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) level with anterior cord syndrome     T 12 fracture compression     Fractures     Genital herpes     GERD (gastroesophageal reflux disease)     Hemorrhoid     History of colposcopy with cervical biopsy     Lumbar disc herniation     on Hydrocodone    Lupus (systemic lupus erythematosus)     chronic thrombocytopenia    Migraine headache     PONV (postoperative nausea and vomiting)      Past Surgical History:   Procedure Laterality Date    ADENOIDECTOMY      APPENDECTOMY       SECTION, CLASSIC      x2 , last 2014    CHOLECYSTECTOMY      CLOSED REDUCTION OF FRACTURE OF NASAL BONE Bilateral 2022    Procedure: CLOSED REDUCTION, FRACTURE, NASAL BONE;  Surgeon: Mary Carlton MD;  Location: [facility];  Service: ENT;  Laterality: Bilateral;    COLONOSCOPY N/A 10/29/2019     Procedure: COLONOSCOPY;  Surgeon: Jules De La Cruz MD;  Location: [facility];  Service: Endoscopy;  Laterality: N/A;    CRYOTHERAPY      2002    DILATION AND CURETTAGE OF UTERUS  [id number]    ENDOMETRIAL ABLATION      GANGLION CYST EXCISION Left     TONSILLECTOMY      TUBAL LIGATION  4/2014    TYMPANOSTOMY TUBE PLACEMENT      x 7     Family History   Problem Relation Age of Onset    Heart disease Father     Diabetes Mother     Cancer Maternal Grandfather         colon    Cancer Paternal Grandfather         colon    Breast cancer Maternal Grandmother     Breast cancer Maternal Aunt     Breast cancer Maternal Aunt     Ovarian cancer Neg Hx      Social History     Tobacco Use    Smoking status: Every Day     Current packs/day: 0.25     Types: Cigarettes    Smokeless tobacco: Never   Substance Use Topics    Alcohol use: Yes     Comment: special occasions    Drug use: Yes     Types: Marijuana     Comment: prescription     Review of Systems   Constitutional:  Negative for chills and fever.   HENT:  Negative for congestion.    Eyes:  Negative for visual disturbance.   Respiratory:  Negative for shortness of breath.    Cardiovascular:  Negative for chest pain and palpitations.   Gastrointestinal:  Positive for abdominal pain. Negative for vomiting.   Genitourinary:  Positive for dysuria.   Musculoskeletal:  Negative for joint swelling.   Neurological:  Positive for weakness. Negative for seizures, syncope and headaches.   Psychiatric/Behavioral:  Negative for confusion.        Physical Exam     Initial Vitals [09/01/23 1305]   BP Pulse Resp Temp SpO2   (!) 83/69 73 (!) 24 98.5 °F (36.9 °C) 99 %      MAP       --         Physical Exam    Nursing note and vitals reviewed.  Constitutional: She is not diaphoretic. No distress.   HENT:   Head: Normocephalic and atraumatic.   Eyes: Conjunctivae are normal.   Neck: Neck supple.   Normal range of motion.  Cardiovascular:  Normal rate.           Pulmonary/Chest: Breath sounds  normal.   Abdominal: Abdomen is soft. Bowel sounds are normal.   Moderate suprapubic tenderness.  No guarding rebound   Genitourinary:    Genitourinary Comments: There is some tenderness to perianal area with no fluctuance or induration.  No uterine or bladder prolapse on bimanual exam.  No vaginal discharge or bleeding.     Musculoskeletal:         General: Normal range of motion.      Cervical back: Normal range of motion and neck supple.     Lymphadenopathy:     She has no cervical adenopathy.   Neurological: She is alert. She has normal strength. No sensory deficit.   No gross deficits   Skin: No rash noted.   Psychiatric:   Alert, anxious         ED Course   Procedures  Labs Reviewed   URINALYSIS, REFLEX TO URINE CULTURE - Abnormal; Notable for the following components:       Result Value    Color, UA Orange (*)     Nitrite, UA Positive (*)     All other components within normal limits    Narrative:     Specimen Source->Urine   CBC W/ AUTO DIFFERENTIAL - Abnormal; Notable for the following components:    WBC 13.07 (*)     MCH 32.8 (*)     Gran # (ANC) 9.1 (*)     Immature Grans (Abs) 0.06 (*)     All other components within normal limits   DRUG SCREEN PANEL, URINE EMERGENCY - Abnormal; Notable for the following components:    Opiate Scrn, Ur Presumptive Positive (*)     Barbiturate Screen, Ur Presumptive Positive (*)     THC Presumptive Positive (*)     All other components within normal limits    Narrative:     Specimen Source->Urine   URINALYSIS MICROSCOPIC - Abnormal; Notable for the following components:    Hyaline Casts, UA 3 (*)     All other components within normal limits    Narrative:     Specimen Source->Urine   CULTURE, BLOOD   CULTURE, BLOOD   MAGNESIUM   LIPASE   TROPONIN I HIGH SENSITIVITY   COMPREHENSIVE METABOLIC PANEL   LACTIC ACID, PLASMA   PROCALCITONIN   POCT URINE PREGNANCY   POCT CREATININE   POCT CREATININE        ECG Results              EKG 12-lead (In process)  Result time 09/01/23  16:05:35      In process by Interface, Lab In [facility] (09/01/23 16:05:35)                   Narrative:    Test Reason : I95.9,    Vent. Rate : 055 BPM     Atrial Rate : 055 BPM     P-R Int : 150 ms          QRS Dur : 080 ms      QT Int : 450 ms       P-R-T Axes : 072 080 070 degrees     QTc Int : 430 ms    Sinus bradycardia with sinus arrhythmia  Otherwise normal ECG  When compared with ECG of 24-AUG-2023 17:22,  Vent. rate has decreased BY  36 BPM  Inverted T waves have replaced nonspecific T wave abnormality in Anterior  leads    Referred By: [email or web address]   SELF           Confirmed By:                                   Imaging Results              CT Abdomen Pelvis With Contrast (Final result)  Result time 09/01/23 16:21:34      Final result by Jerzy Marcus MD (09/01/23 16:21:34)                   Narrative:    EXAM DESCRIPTION:  CT ABDOMEN PELVIS WITH IV CONTRAST    CLINICAL INDICATION: Abdominal abscess/infection suspected    COMPARISON: CT scan of the abdomen and pelvis dated 6/2/2023    TECHNIQUE: Multiple axial 2 mm thick images were acquired through the abdomen and pelvis with IV contrast only. This exam was performed according to our departmental dose-optimization program, which includes automated exposure control, adjustment of the mA and/or kV according to patient size and/or use of iterative reconstruction technique.    FINDINGS: The liver and pancreas and adrenal glands appear within normal limits. Incidental note is made of a single tiny simple cyst in the lower pole of the left kidney. The kidneys are otherwise unremarkable. There is no hydronephrosis or hydroureter. There is a small simple cyst in the posterior aspect of the spleen. The spleen is otherwise unremarkable. The gallbladder is absent. There is moderate dilatation of the extrahepatic bile ducts. The common bile duct measures up to 14 mm in diameter. This may be physiologic due to the cholecystectomy. However the degree of ductal  dilatation has increased significantly since the preceding study on 6/2/2023 where the common bile duct measured up to 11 mm in maximum transverse diameter. This may, may not be of any clinical significance. Correlation with liver function tests is recommended. No obstructing mass or gallstone is evident. An MRCP of the abdomen may be helpful for further evaluation. The stomach and small and large bowel appear grossly normal. There is no bowel distention. No abnormal masses or fluid collections are identified in the abdomen or pelvis.    IMPRESSION: Status post cholecystectomy with moderate dilatation of the extrahepatic bile ducts showing slight further increase in dilatation since the fairly recent previous CT scan dated 6/2/2023. Findings may be physiologic due to the cholecystectomy. However an irregular stenosis could also produce these findings. Correlation with clinical findings is recommended. An MRCP may be helpful for further evaluation. If ampullary stenosis is suspected, an ERCP with manometry should be considered..    Electronically signed by:  Jerzy Marcus MD  9/1/2023 4:21 PM CDT Workstation: [id number] oral                                     US Pelvis Comp with Transvag NON-OB (xpd) (Final result)  Result time 09/01/23 14:21:55   Procedure changed from US Pelvis Complete Non OB     Final result by Pauline Guadalupe MD (09/01/23 14:21:55)                   Narrative:    Pelvic ultrasound    Clinical history is pain    Transabdominal and transvaginal ultrasound was performed. Comparison is made to prior study 7/7/2023    Transabdominal ultrasound is limited due to overlying bowel gas. The bladder is normal.    Transvaginally the uterus measures 7.9 x 3.4 x 5.2 cm. The endometrium is within normal limits. The patient has had a endometrial ablation.  The right ovary measures 2.4 x 3.3 x 2.9 cm. There is a 17 mm complex right ovarian cyst.  There is a 2.4 x 1.6 cm simple right ovarian cyst.  Left  ovary measures 2.1 x 3.0 x 1.9 cm. There is normal flow to both ovaries.    IMPRESSION: Multiple right ovarian cysts. There is a 2.4 mm simple right ovarian cyst previously measured 2 cm.  There is a 17 mm complex right ovarian cyst suggestive of hemorrhagic cyst.    Normal appearance of the uterus and left ovary    Electronically signed by:  Pauline Guadalupe MD  9/1/2023 2:21 PM CDT Workstation: [id number]                                     X-Ray Chest AP Portable (Final result)  Result time 09/01/23 13:32:55      Final result by Garcia Harley MD (09/01/23 13:32:55)                   Narrative:    Chest single view    CLINICAL DATA: Cough and infection    FINDINGS: 2 AP views are compared to August 2023.    Heart size is normal. Mediastinum is unremarkable. There are no infiltrates or effusions. Calcified granuloma in the right mid lung is unchanged. No acute osseous abnormality is identified.    IMPRESSION:  1. No acute radiographic abnormalities.    Electronically signed by:  Garcia Harley MD  9/1/2023 1:32 PM CDT Workstation: [id number]                                     Medications   sodium chloride 0.9% bolus 1,000 mL 1,000 mL (0 mLs Intravenous Stopped 9/1/23 1446)   ondansetron injection 4 mg (4 mg Intravenous Given 9/1/23 1330)   HYDROmorphone injection 0.5 mg (0.5 mg Intravenous Given 9/1/23 1331)   piperacillin-tazobactam 3.375 g in dextrose 5 % 100 mL IVPB (ready to mix) (0 g Intravenous Stopped 9/1/23 1451)   HYDROmorphone injection 1 mg (1 mg Intravenous Given 9/1/23 1445)   iohexoL (OMNIPAQUE 350) injection 100 mL (100 mLs Intravenous Given 9/1/23 1533)     Medical Decision Making  Patient presents with lower abdominal/suprapubic pain.  Concerning is low blood pressure.  Patient was recent COVID diagnosis.  No evidence of pneumonitis.  Oxygen saturation okay.  Very concerning is initial presentation of hypotension.  Patient responded to fluid resuscitation.  Systolic blood pressure is now  111.  Patient with recent diagnosis of urinary tract infection.  Patient was started on Macrobid.  Unsure if hypotension is due to infectious etiology like urinary tract infection.  Broad-spectrum antibiotics initiated with Zosyn given urine culture sensitivities.  Patient is doing better after pain medication now.  Patient does have ovarian cyst.  CT with no clear etiology of pain.  Hospitalist consulted for admission.    Amount and/or Complexity of Data Reviewed  Labs: ordered.  Radiology: ordered.    Risk  Prescription drug management.                               Clinical Impression:   Final diagnoses:  [I95.9] Hypotension  [N39.0] Urinary tract infection without hematuria, site unspecified (Primary)  [U07.1] COVID-19 virus infection               Kai Whitley MD  09/01/23 1639       Kai Whitley MD  09/01/23 1640

## 2023-09-01 NOTE — ASSESSMENT & PLAN NOTE
Patient with ovarian cysts noted on pelvic ultrasound, 17 mm cyst on the right with complex/hemorrhagic component.  This is the location of the pain that she has in her abdomen question of whether this may be related.  She has good blood flow to her bilateral adnexa.  Will consult gyn for their opinion on whether this may be the source of her pain and any recommendations.

## 2023-09-02 PROBLEM — N12 PYELONEPHRITIS: Status: ACTIVE | Noted: 2023-09-02

## 2023-09-02 PROBLEM — Z16.12 ESBL (EXTENDED SPECTRUM BETA-LACTAMASE) PRODUCING BACTERIA INFECTION: Chronic | Status: ACTIVE | Noted: 2023-09-02

## 2023-09-02 PROBLEM — A49.9 ESBL (EXTENDED SPECTRUM BETA-LACTAMASE) PRODUCING BACTERIA INFECTION: Chronic | Status: ACTIVE | Noted: 2023-09-02

## 2023-09-02 PROBLEM — F41.9 ANXIETY: Chronic | Status: ACTIVE | Noted: 2023-09-02

## 2023-09-02 PROBLEM — D53.9 MACROCYTIC ANEMIA: Chronic | Status: ACTIVE | Noted: 2023-09-02

## 2023-09-02 LAB
ANION GAP SERPL CALC-SCNC: 3 MMOL/L (ref 8–16)
BASOPHILS # BLD AUTO: 0.05 K/UL (ref 0–0.2)
BASOPHILS NFR BLD: 0.7 % (ref 0–1.9)
BUN SERPL-MCNC: 6 MG/DL (ref 6–20)
CALCIUM SERPL-MCNC: 8 MG/DL (ref 8.7–10.5)
CHLORIDE SERPL-SCNC: 109 MMOL/L (ref 95–110)
CO2 SERPL-SCNC: 29 MMOL/L (ref 23–29)
CREAT SERPL-MCNC: 0.5 MG/DL (ref 0.5–1.4)
DIFFERENTIAL METHOD: ABNORMAL
EOSINOPHIL # BLD AUTO: 0.1 K/UL (ref 0–0.5)
EOSINOPHIL NFR BLD: 1.6 % (ref 0–8)
ERYTHROCYTE [DISTWIDTH] IN BLOOD BY AUTOMATED COUNT: 14.1 % (ref 11.5–14.5)
EST. GFR  (NO RACE VARIABLE): >60 ML/MIN/1.73 M^2
FOLATE SERPL-MCNC: 2.7 NG/ML (ref 4–24)
GLUCOSE SERPL-MCNC: 82 MG/DL (ref 70–110)
HCT VFR BLD AUTO: 37.7 % (ref 37–48.5)
HGB BLD-MCNC: 12.4 G/DL (ref 12–16)
IMM GRANULOCYTES # BLD AUTO: 0.03 K/UL (ref 0–0.04)
IMM GRANULOCYTES NFR BLD AUTO: 0.4 % (ref 0–0.5)
LYMPHOCYTES # BLD AUTO: 1.7 K/UL (ref 1–4.8)
LYMPHOCYTES NFR BLD: 23.3 % (ref 18–48)
MCH RBC QN AUTO: 32.5 PG (ref 27–31)
MCHC RBC AUTO-ENTMCNC: 32.9 G/DL (ref 32–36)
MCV RBC AUTO: 99 FL (ref 82–98)
MONOCYTES # BLD AUTO: 0.6 K/UL (ref 0.3–1)
MONOCYTES NFR BLD: 7.8 % (ref 4–15)
NEUTROPHILS # BLD AUTO: 4.9 K/UL (ref 1.8–7.7)
NEUTROPHILS NFR BLD: 66.2 % (ref 38–73)
NRBC BLD-RTO: 0 /100 WBC
PLATELET # BLD AUTO: 242 K/UL (ref 150–450)
PMV BLD AUTO: 12.3 FL (ref 9.2–12.9)
POTASSIUM SERPL-SCNC: 3.6 MMOL/L (ref 3.5–5.1)
RBC # BLD AUTO: 3.81 M/UL (ref 4–5.4)
SARS-COV-2 RDRP RESP QL NAA+PROBE: NEGATIVE
SODIUM SERPL-SCNC: 141 MMOL/L (ref 136–145)
VIT B12 SERPL-MCNC: 480 PG/ML (ref 210–950)
WBC # BLD AUTO: 7.42 K/UL (ref 3.9–12.7)

## 2023-09-02 PROCEDURE — G0378 HOSPITAL OBSERVATION PER HR: HCPCS

## 2023-09-02 PROCEDURE — 82746 ASSAY OF FOLIC ACID SERUM: CPT | Performed by: INTERNAL MEDICINE

## 2023-09-02 PROCEDURE — 99205 OFFICE O/P NEW HI 60 MIN: CPT | Mod: ,,, | Performed by: STUDENT IN AN ORGANIZED HEALTH CARE EDUCATION/TRAINING PROGRAM

## 2023-09-02 PROCEDURE — 63600175 PHARM REV CODE 636 W HCPCS: Performed by: STUDENT IN AN ORGANIZED HEALTH CARE EDUCATION/TRAINING PROGRAM

## 2023-09-02 PROCEDURE — 63600175 PHARM REV CODE 636 W HCPCS: Performed by: INTERNAL MEDICINE

## 2023-09-02 PROCEDURE — C1751 CATH, INF, PER/CENT/MIDLINE: HCPCS

## 2023-09-02 PROCEDURE — 36415 COLL VENOUS BLD VENIPUNCTURE: CPT | Performed by: INTERNAL MEDICINE

## 2023-09-02 PROCEDURE — 25000003 PHARM REV CODE 250: Performed by: INTERNAL MEDICINE

## 2023-09-02 PROCEDURE — 96372 THER/PROPH/DIAG INJ SC/IM: CPT | Performed by: INTERNAL MEDICINE

## 2023-09-02 PROCEDURE — 99205 PR OFFICE/OUTPT VISIT, NEW, LEVL V, 60-74 MIN: ICD-10-PCS | Mod: ,,, | Performed by: STUDENT IN AN ORGANIZED HEALTH CARE EDUCATION/TRAINING PROGRAM

## 2023-09-02 PROCEDURE — 96367 TX/PROPH/DG ADDL SEQ IV INF: CPT

## 2023-09-02 PROCEDURE — 80048 BASIC METABOLIC PNL TOTAL CA: CPT | Performed by: STUDENT IN AN ORGANIZED HEALTH CARE EDUCATION/TRAINING PROGRAM

## 2023-09-02 PROCEDURE — 36415 COLL VENOUS BLD VENIPUNCTURE: CPT | Performed by: STUDENT IN AN ORGANIZED HEALTH CARE EDUCATION/TRAINING PROGRAM

## 2023-09-02 PROCEDURE — 94761 N-INVAS EAR/PLS OXIMETRY MLT: CPT

## 2023-09-02 PROCEDURE — 36410 VNPNXR 3YR/> PHY/QHP DX/THER: CPT

## 2023-09-02 PROCEDURE — 82607 VITAMIN B-12: CPT | Performed by: INTERNAL MEDICINE

## 2023-09-02 PROCEDURE — 85025 COMPLETE CBC W/AUTO DIFF WBC: CPT | Performed by: STUDENT IN AN ORGANIZED HEALTH CARE EDUCATION/TRAINING PROGRAM

## 2023-09-02 PROCEDURE — 96376 TX/PRO/DX INJ SAME DRUG ADON: CPT

## 2023-09-02 PROCEDURE — 25000003 PHARM REV CODE 250: Performed by: STUDENT IN AN ORGANIZED HEALTH CARE EDUCATION/TRAINING PROGRAM

## 2023-09-02 RX ORDER — ENOXAPARIN SODIUM 100 MG/ML
40 INJECTION SUBCUTANEOUS
Status: DISCONTINUED | OUTPATIENT
Start: 2023-09-02 | End: 2023-09-07 | Stop reason: HOSPADM

## 2023-09-02 RX ORDER — HYDROMORPHONE HYDROCHLORIDE 1 MG/ML
0.5 INJECTION, SOLUTION INTRAMUSCULAR; INTRAVENOUS; SUBCUTANEOUS EVERY 12 HOURS PRN
Status: DISCONTINUED | OUTPATIENT
Start: 2023-09-02 | End: 2023-09-03

## 2023-09-02 RX ORDER — AMOXICILLIN 250 MG
2 CAPSULE ORAL 2 TIMES DAILY PRN
Status: DISCONTINUED | OUTPATIENT
Start: 2023-09-02 | End: 2023-09-07 | Stop reason: HOSPADM

## 2023-09-02 RX ADMIN — HYDROCODONE BITARTRATE AND ACETAMINOPHEN 1 TABLET: 5; 325 TABLET ORAL at 12:09

## 2023-09-02 RX ADMIN — ONDANSETRON 4 MG: 2 INJECTION INTRAMUSCULAR; INTRAVENOUS at 10:09

## 2023-09-02 RX ADMIN — HYDROMORPHONE HYDROCHLORIDE 0.5 MG: 0.5 INJECTION, SOLUTION INTRAMUSCULAR; INTRAVENOUS; SUBCUTANEOUS at 05:09

## 2023-09-02 RX ADMIN — POLYETHYLENE GLYCOL 3350 17 G: 17 POWDER, FOR SOLUTION ORAL at 09:09

## 2023-09-02 RX ADMIN — ENOXAPARIN SODIUM 40 MG: 40 INJECTION SUBCUTANEOUS at 09:09

## 2023-09-02 RX ADMIN — ERTAPENEM 1 G: 1 INJECTION INTRAMUSCULAR; INTRAVENOUS at 09:09

## 2023-09-02 RX ADMIN — FOLIC ACID 5 MG: 5 INJECTION, SOLUTION INTRAMUSCULAR; INTRAVENOUS; SUBCUTANEOUS at 10:09

## 2023-09-02 RX ADMIN — ONDANSETRON 4 MG: 2 INJECTION INTRAMUSCULAR; INTRAVENOUS at 12:09

## 2023-09-02 RX ADMIN — HYDROCODONE BITARTRATE AND ACETAMINOPHEN 1 TABLET: 5; 325 TABLET ORAL at 10:09

## 2023-09-02 NOTE — SUBJECTIVE & OBJECTIVE
Brief consult with this 43-year-old patient, apparently to assess whether hemorrhagic cyst on the right could be causing some of the patient's pain.  Upon review of the patient's radiologic studies the hemorrhagic cyst on the right is 17 mm along with a simple 2.4 cm cyst.  These were considered normal findings in a reproductive age woman and are certainly not to account for the pain the patient reports.  The patient actually denies any pelvic pain.  The pain seems to be more in the right mid and upper quadrants.  Her current gynecologist is  and they have hysterectomy with bladder repair planned, though I advised she will have to wait for the surgery until after current problem is resolved.    OB History    Para Term  AB Living   10 9 5 0 0 5   SAB IAB Ectopic Multiple Live Births   0 0 0 0 5      # Outcome Date GA Lbr Nir/2nd Weight Sex Delivery Anes PTL Lv   10  14    F  Gen N CHEIKH      Birth Comments: System Generated. Please review and update pregnancy details.   9 Para    3.232 kg (7 lb 2 oz)  CS-LTranv   CHEIKH   8 Para    3.232 kg (7 lb 2 oz)  Vag-Spont   CHEIKH   7 Para    3.232 kg (7 lb 2 oz)  Vag-Spont   CHEIKH   6 Para    2.637 kg (5 lb 13 oz)  Vag-Spont   CHEIKH   5 Term            4 Term            3 Term            2 Term            1 Term              Past Medical History:   Diagnosis Date    Abnormal Pap smear     Anxiety     Cancer     pre-cancer cells cervical     Cervical disc herniation     Chronic cough     Closed fracture 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) level with anterior cord syndrome     T 12 fracture compression     Fractures     Genital herpes     GERD (gastroesophageal reflux disease)     Hemorrhoid     History of colposcopy with cervical biopsy     Lumbar disc herniation     on Hydrocodone    Lupus (systemic lupus erythematosus)     chronic thrombocytopenia    Migraine headache     PONV (postoperative nausea and vomiting)      Past Surgical History:   Procedure  Laterality Date    ADENOIDECTOMY      APPENDECTOMY       SECTION, CLASSIC      x2 , last 2014    CHOLECYSTECTOMY      CLOSED REDUCTION OF FRACTURE OF NASAL BONE Bilateral 2022    Procedure: CLOSED REDUCTION, FRACTURE, NASAL BONE;  Surgeon: Mary Carlton MD;  Location: [facility] OR;  Service: ENT;  Laterality: Bilateral;    COLONOSCOPY N/A 10/29/2019    Procedure: COLONOSCOPY;  Surgeon: Jules De La Cruz MD;  Location: [facility] ENDO;  Service: Endoscopy;  Laterality: N/A;    CRYOTHERAPY          DILATION AND CURETTAGE OF UTERUS  [id number]    ENDOMETRIAL ABLATION      GANGLION CYST EXCISION Left     TONSILLECTOMY      TUBAL LIGATION  2014    TYMPANOSTOMY TUBE PLACEMENT      x 7       PTA Medications   Medication Sig    butalbital-acetaminophen-caffeine -40 mg (FIORICET, ESGIC) -40 mg per tablet Take 1 tablet by mouth 2 (two) times daily as needed. (Patient taking differently: Take 1 tablet by mouth 2 (two) times daily as needed for Headaches.)    EMGALITY  mg/mL PnIj Inject 1 mL into the skin every 30 days.    hydrocodone-acetaminophen 10-325mg (NORCO)  mg Tab Take 1 tablet by mouth every 6 (six) hours as needed. (Patient taking differently: Take 1 tablet by mouth every 6 (six) hours as needed for Pain.)    naproxen (NAPROSYN) 500 MG tablet Take 1 tablet (500 mg total) by mouth every 12 (twelve) hours as needed (Body aches).    nitrofurantoin, macrocrystal-monohydrate, (MACROBID) 100 MG capsule Take 100 mg by mouth 2 (two) times daily.    ondansetron (ZOFRAN-ODT) 4 MG TbDL Take 1 tablet (4 mg total) by mouth every 6 (six) hours as needed (nausea/vomiting).    predniSONE (DELTASONE) 5 MG tablet Take 1 tablet (5 mg total) by mouth daily as needed (Joint stiffness).    prochlorperazine (COMPAZINE) 10 MG tablet Take 1 tablet (10 mg total) by mouth 3 (three) times daily as needed (Headache).    promethazine (PHENERGAN) 25 MG suppository Place 1 suppository (25 mg total) rectally  every 6 (six) hours as needed for Nausea. (Patient taking differently: Place 25 mg rectally every 6 (six) hours as needed for Nausea. NEW Rx - NOT YET STARTED)    tiZANidine (ZANAFLEX) 4 MG tablet Take 4 mg by mouth every 6 (six) hours as needed.    UBROGEPANT 100 mg tablet Take 100 mg by mouth as needed.    albuterol (PROAIR HFA) 90 mcg/actuation inhaler Inhale 2 puffs into the lungs every 4 (four) hours as needed for Wheezing.    valACYclovir (VALTREX) 1000 MG tablet Take 1 tablet (1,000 mg total) by mouth every 12 (twelve) hours. for 5 days       Review of patient's allergies indicates:   Allergen Reactions    Cyclobenzaprine Rash and Other (See Comments)     Leg cramps    Pregabalin Itching, Other (See Comments) and Hives     Bruising and headaches    Sulfa (sulfonamide antibiotics) Nausea And Vomiting    Morphine Rash and Hives    Sulfamethoxazole-trimethoprim Rash and Nausea And Vomiting     And headache    Toradol [ketorolac] Rash        Family History       Problem Relation (Age of Onset)    Breast cancer Maternal Grandmother, Maternal Aunt, Maternal Aunt    Cancer Maternal Grandfather, Paternal Grandfather    Diabetes Mother    Heart disease Father          Tobacco Use    Smoking status: Every Day     Current packs/day: 0.25     Types: Cigarettes    Smokeless tobacco: Never   Substance and Sexual Activity    Alcohol use: Yes     Comment: special occasions    Drug use: Yes     Types: Marijuana     Comment: prescription    Sexual activity: Yes     Partners: Male     Birth control/protection: None     Review of Systems   Objective:     Vital Signs (Most Recent):  Temp: 98.2 °F (36.8 °C) (09/02/23 1120)  Pulse: 74 (09/02/23 1120)  Resp: 17 (09/02/23 1120)  BP: 108/61 (09/02/23 1120)  SpO2: 95 % (09/02/23 1120) Vital Signs (24h Range):  Temp:  [97.7 °F (36.5 °C)-98.5 °F (36.9 °C)] 98.2 °F (36.8 °C)  Pulse:  [45-74] 74  Resp:  [16-24] 17  SpO2:  [94 %-100 %] 95 %  BP: ()/(61-78) 108/61     Weight: 46.1 kg  (101 lb 10.1 oz)  Body mass index is 18.59 kg/m².    No LMP recorded. Patient has had an ablation.     Physical Exam     Laboratory:  I have personallly reviewed all pertinent lab results from the last 24 hours.    Diagnostic Results:

## 2023-09-02 NOTE — NURSING
Nurses Note -- 4 Eyes      9/2/2023   1:49 AM      Skin assessed during: Admit      [x] No Altered Skin Integrity Present    []Prevention Measures Documented      [] Yes- Altered Skin Integrity Present or Discovered   [] LDA Added if Not in Epic (Describe Wound)   [] New Altered Skin Integrity was Present on Admit and Documented in LDA   [] Wound Image Taken    Wound Care Consulted? No    Attending Nurse:  Emma Barton RN/Staff Member:   [id number]

## 2023-09-02 NOTE — PLAN OF CARE
Spoke to Kevin Tena with [facility] regarding referral for home IV antibiotics.  He will review and call CM.    Received call from Kevin Tena who stated they are out of network for patient's insurance.  He will contact [employer] regarding referral.    Received call from [facility] and they are not servicing the [place] area at this time.

## 2023-09-02 NOTE — PLAN OF CARE
09/02/23 1401   Patient Assessment/Suction   Level of Consciousness (AVPU) alert   Respiratory Effort Unlabored   PRE-TX-O2   Device (Oxygen Therapy) room air   SpO2 97 %   Pulse Oximetry Type Intermittent   $ Pulse Oximetry - Multiple Charge Pulse Oximetry - Multiple   Pulse (!) 52   Resp 18

## 2023-09-02 NOTE — SUBJECTIVE & OBJECTIVE
Interval History:  See hospital course.  Patient states she has noted interval improvement.  Lower abdominal and back pain improved.  Still with intermittent nausea but able to tolerate oral intake.  States she had to assist her mother with home IV antibiotics over the summer and feels comfortable if this as needed.  States she only has residue all nonproductive cough in the setting of COVID.  Afebrile with T-max 98.4°, on room air, blood pressure stable.  Labs with WBC 7.42, hemoglobin 12.4, BUN/creatinine 6/0.5.  Multiple imaging studies reviewed.  Urine drug screen positive for opioids, barbiturates, THC.  UA with nitrite positive.  Urine culture 8/25 ESBL E coli.  Discussed with patient, significant other at bedside.  Discussed with Infectious Disease.    Review of Systems   Constitutional:  Negative for fever.   Respiratory:  Positive for cough.    Gastrointestinal:  Positive for abdominal pain and nausea.   Psychiatric/Behavioral:  Negative for confusion.      Objective:     Vital Signs (Most Recent):  Temp: 98.1 °F (36.7 °C) (09/02/23 1155)  Pulse: 77 (09/02/23 1155)  Resp: 16 (09/02/23 1234)  BP: 136/69 (09/02/23 1155)  SpO2: 98 % (09/02/23 1155) Vital Signs (24h Range):  Temp:  [97.7 °F (36.5 °C)-98.4 °F (36.9 °C)] 98.1 °F (36.7 °C)  Pulse:  [45-77] 77  Resp:  [16-19] 16  SpO2:  [94 %-100 %] 98 %  BP: (103-136)/(61-78) 136/69     Weight: 46.1 kg (101 lb 10.1 oz)  Body mass index is 18.59 kg/m².    Intake/Output Summary (Last 24 hours) at 9/2/2023 1342  Last data filed at 9/2/2023 0834  Gross per 24 hour   Intake 2409.17 ml   Output 350 ml   Net 2059.17 ml         Physical Exam  Vitals and nursing note reviewed.   Constitutional:       General: She is not in acute distress.     Appearance: She is not ill-appearing, toxic-appearing or diaphoretic.      Comments: Sitting in bed, NAD, cooperative   HENT:      Head: Normocephalic and atraumatic.      Mouth/Throat:      Mouth: Mucous membranes are moist.  "  Cardiovascular:      Rate and Rhythm: Normal rate and regular rhythm.      Comments: Warm peripheries, no LE edema  Pulmonary:      Comments: On RA  Abdominal:      General: Bowel sounds are normal.      Palpations: Abdomen is soft.   Musculoskeletal:      Cervical back: Neck supple.   Skin:     General: Skin is warm.   Neurological:      Mental Status: She is alert and oriented to person, place, and time.   Psychiatric:         Mood and Affect: Mood normal.             Significant Labs: BMP:   Recent Labs   Lab 09/01/23  1336 09/02/23  0549   GLU 90 82    141   K 3.7 3.6    109   CO2 24 29   BUN 8 6   CREATININE 0.6 0.5   CALCIUM 8.8 8.0*   MG 1.8  --      CBC:   Recent Labs   Lab 09/01/23  1336 09/02/23  0549   WBC 13.07* 7.42   HGB 13.8 12.4   HCT 40.9 37.7    242     CMP:   Recent Labs   Lab 09/01/23  1336 09/02/23  0549    141   K 3.7 3.6    109   CO2 24 29   GLU 90 82   BUN 8 6   CREATININE 0.6 0.5   CALCIUM 8.8 8.0*   PROT 7.5  --    ALBUMIN 4.0  --    BILITOT 0.6  --    ALKPHOS 131  --    AST 23  --    ALT 38  --    ANIONGAP 10 3*     Cardiac Markers: No results for input(s): "CKMB", "MYOGLOBIN", "BNP", "TROPISTAT" in the last 48 hours.  Lactic Acid:   Recent Labs   Lab 09/01/23  1336   LACTATE 1.6     POCT Glucose: No results for input(s): "POCTGLUCOSE" in the last 48 hours.    Significant Imaging: I have reviewed all pertinent imaging results/findings within the past 24 hours.    MRI MRCP Without Contrast    Result Date: 9/2/2023  MR CHOLANGIOPANCREATOGRAPHY (MRCP) INDICATION: 43 years Female; biliary dilation TECHNIQUE: MR images of the abdomen was performed without IV contrast. Unless otherwise specified, incidental findings do not require dedicated imaging follow-up. 3D MRCP images were performed. COMPARISON: CT 9/1/2023. FINDINGS: Liver: Liver is enlarged measuring up to 22 cm in maximum craniocaudal dimension. Gallbladder/Biliary tree: Status post cholecystectomy. " Moderate intrahepatic biliary dilatation. The common bile duct is dilated measuring up to 1.4 cm and smoothly tapers to 9 mm at the distal CBD with abrupt caliber change at the ampulla. No evidence of choledocholithiasis. Pancreas: No pancreatic duct dilatation. No pancreatitis. Spleen: Unremarkable. Adrenals: Unremarkable. Genitourinary: A few high T2 signal cysts in the inferior left kidney are probably benign; no further imaging follow-up. Gastrointestinal: No gastric wall thickening. No wall thickening of the visualized bowel. Lymph nodes: No pathologically enlarged lymph nodes. Impression: 1.  Status post cholecystectomy. Moderate intrahepatic biliary dilatation. The common bile duct is dilated measuring up to 1.4 cm and smoothly tapers to 9 mm at the distal CBD with abrupt caliber change at the ampulla. No evidence of choledocholithiasis. The possibility of a stricture at the ampulla cannot be excluded. 2.  Hepatomegaly. Electronically signed by:  Cintia Christine MD  9/2/2023 12:23 AM CDT Workstation: [id number]    CT Abdomen Pelvis With Contrast    Result Date: 9/1/2023  EXAM DESCRIPTION:  CT ABDOMEN PELVIS WITH IV CONTRAST CLINICAL INDICATION: Abdominal abscess/infection suspected COMPARISON: CT scan of the abdomen and pelvis dated 6/2/2023 TECHNIQUE: Multiple axial 2 mm thick images were acquired through the abdomen and pelvis with IV contrast only. This exam was performed according to our departmental dose-optimization program, which includes automated exposure control, adjustment of the mA and/or kV according to patient size and/or use of iterative reconstruction technique. FINDINGS: The liver and pancreas and adrenal glands appear within normal limits. Incidental note is made of a single tiny simple cyst in the lower pole of the left kidney. The kidneys are otherwise unremarkable. There is no hydronephrosis or hydroureter. There is a small simple cyst in the posterior aspect of the spleen. The spleen is  otherwise unremarkable. The gallbladder is absent. There is moderate dilatation of the extrahepatic bile ducts. The common bile duct measures up to 14 mm in diameter. This may be physiologic due to the cholecystectomy. However the degree of ductal dilatation has increased significantly since the preceding study on 6/2/2023 where the common bile duct measured up to 11 mm in maximum transverse diameter. This may, may not be of any clinical significance. Correlation with liver function tests is recommended. No obstructing mass or gallstone is evident. An MRCP of the abdomen may be helpful for further evaluation. The stomach and small and large bowel appear grossly normal. There is no bowel distention. No abnormal masses or fluid collections are identified in the abdomen or pelvis. IMPRESSION: Status post cholecystectomy with moderate dilatation of the extrahepatic bile ducts showing slight further increase in dilatation since the fairly recent previous CT scan dated 6/2/2023. Findings may be physiologic due to the cholecystectomy. However an irregular stenosis could also produce these findings. Correlation with clinical findings is recommended. An MRCP may be helpful for further evaluation. If ampullary stenosis is suspected, an ERCP with manometry should be considered.. Electronically signed by:  Jerzy Marcus MD  9/1/2023 4:21 PM CDT Workstation: [id number]    US Pelvis Comp with Transvag NON-OB (xpd)    Result Date: 9/1/2023  Pelvic ultrasound Clinical history is pain Transabdominal and transvaginal ultrasound was performed. Comparison is made to prior study 7/7/2023 Transabdominal ultrasound is limited due to overlying bowel gas. The bladder is normal. Transvaginally the uterus measures 7.9 x 3.4 x 5.2 cm. The endometrium is within normal limits. The patient has had a endometrial ablation. The right ovary measures 2.4 x 3.3 x 2.9 cm. There is a 17 mm complex right ovarian cyst. There is a 2.4 x 1.6 cm simple  right ovarian cyst. Left ovary measures 2.1 x 3.0 x 1.9 cm. There is normal flow to both ovaries. IMPRESSION: Multiple right ovarian cysts. There is a 2.4 mm simple right ovarian cyst previously measured 2 cm. There is a 17 mm complex right ovarian cyst suggestive of hemorrhagic cyst. Normal appearance of the uterus and left ovary Electronically signed by:  Pauline Guadalupe MD  9/1/2023 2:21 PM CDT Workstation: [id number]    X-Ray Chest AP Portable    Result Date: 9/1/2023  Chest single view CLINICAL DATA: Cough and infection FINDINGS: 2 AP views are compared to August 2023. Heart size is normal. Mediastinum is unremarkable. There are no infiltrates or effusions. Calcified granuloma in the right mid lung is unchanged. No acute osseous abnormality is identified. IMPRESSION: 1. No acute radiographic abnormalities. Electronically signed by:  Garcia Harley MD  9/1/2023 1:32 PM CDT Workstation: [id number]    X-Ray Chest PA And Lateral    Result Date: 8/24/2023  EXAM DESCRIPTION: XR CHEST PA AND LATERAL CLINICAL HISTORY: 43 years Female, COMPARISON: None. FINDINGS: PA and lateral views of the chest were obtained. The heart size is normal. No consolidations are seen, nor is there evidence of pleural fluid. No osseous lesions are seen. Calcified granuloma is seen in the right mid lung. IMPRESSION: No acute cardiopulmonary disease is seen. Electronically signed by:  Kemal Joy MD  8/24/2023 6:47 PM CDT Workstation: [id number]    CT Abdomen Pelvis With Contrast    Result Date: 8/4/2023  CT ABDOMEN AND PELVIS WITH CONTRAST HISTORY:  Bilateral flank pain with nausea and vomiting   COMPARISON:  None Contrast: 100 ML of Isovue-[id number] TECHNIQUE: CT abdomen and pelvic images were obtained with iv contrast. Note: This CT examination was performed using one or more of the following dose reduction techniques: Automated exposure control, adjustment of the mA and or kv according to patient size, use of iterative reconstruction  techniques. FINDINGS: The lung bases are clear. The heart size is normal. Mild intrahepatic biliary duct dilatation with calcified granulomas are seen. Gallbladder is absent. The adrenal glands are unremarkable. The pancreas is unremarkable. Calcified splenic granulomas are noted. Kidneys are unremarkable. No bowel obstruction is seen. Appendix is surgically absent. There is diverticulosis of the colon. No free air is seen. No lymphadenopathy is seen.   No free fluid is seen. The urinary bladder is unremarkable. Uterus is unremarkable. No acute bony process is seen.    No acute abdominal or pelvic finding Cholecystectomy, appendectomy Diverticulosis Effective Dose: 2.5 mSv This report was signed by Jl Grimm MD on 8/4/2023 11:56 PM on the following workstation: [id number].

## 2023-09-02 NOTE — HOSPITAL COURSE
Patient with history of ovarian cyst with bladder incontinence with prolapse, followed by gynecologist Dr. Moore, was scheduled for surgery but postponed due to testing positive for COVID-19.  History of drug resistant UTI, stated she started Macrobid on 8/29/23.  Presented due to nausea, vomiting, lower abdominal pain with back pain.  Hypotensive with systolics in the 80s in the ED, improved with IV fluids.  Procalcitonin negative.  Multiple imaging studies, dilated CBD post cholecystectomy, MRCP no evidence of choledocholithiasis but concern for possible stricture at ampulla, subsequently mild elevation in CMP, GI consulted and planning EUS on Tuesday. No evidence of urinary obstruction/hydronephrosis.  She was admitted, started on ertapenem with ID and gynecology consult.  On 09/03 midline in place, EUS planned for Tuesday, case management still working on home IV antibiotics.

## 2023-09-02 NOTE — PROGRESS NOTES
[facility] Medicine  Progress Note    Patient Name: Jaye Martinez  MRN: [id number]  Patient Class: OP- Observation   Admission Date: 9/1/2023  Length of Stay: 0 days  Attending Physician: Breanna Shah MD  Primary Care Provider: Steve Gonzalez MD        Subjective:     Principal Problem:Pyelonephritis        HPI:  Pt is a 42 y/o F with hx of Recurrent UTI, Bladder Incontinence, Ovarian cysts who is here with recurrent UTI and abdominal pain after starting treatment with Macrobid for resistant UTI on 08/26.  She has had continued pain and dysuria since that time.  Additionally, she was recently diagnosed with COVID-19 and was planned to have a bladder lift/hysterectomy on 8/28 however this was canceled due to her COVID-19 diagnosis.  She has continued to progressively feel worse.  She has no nausea or vomiting.  No diarrhea.  She denies fever or chills.  She says that her abdominal pain has been progressively worse over the past couple of days.  And feels like a twisting inside.  She is been compliant with her medications.     In the ED:  T 98.5°, P 73, R 24, BP 83/69 > improved to 111/66, O2 sat 99% on room air.  WBC 13.07, hemoglobin 13.8, chemistry panel unremarkable, troponin normal, lactic acid 1.6, procalcitonin 0.05, UDS positive for opiates, barbiturates, marijuana (she has prescriptions for 2 of these) , urinalysis suggestive of urinary tract infection.  Urine culture sent, blood culture sent.  Urine pregnancy test negative.  CT abdomen with biliary ductal dilation however transaminases are normal.  It was further dilated from previous however will proceed with MRCP.  Pelvic ultrasound showing multiple ovarian cysts with 1 small cyst showing some possible hemorrhage, but good blood flow.  Will admit for complicated urinary tract infection with resistant bacteria requiring IV antibiotics, abdominal pain question of whether this may be related to her ovarian  cysts.      Overview/Hospital Course:  Patient with history of ovarian cyst with bladder incontinence with prolapse, followed by gynecologist Dr. Moore, was scheduled for surgery but postponed due to testing positive for COVID-19.  History of drug resistant UTI, stated she started Macrobid on 8/29/23.  Presented due to nausea, vomiting, lower abdominal pain with back pain.  Hypotensive with systolics in the 80s in the ED, improved with IV fluids.  Procalcitonin negative.  Multiple imaging studies, dilated CBD post cholecystectomy, MRCP no evidence of choledocholithiasis, CMP normal, UA with nitrate but was previously on Macrobid.  No evidence of urinary obstruction/hydronephrosis.  She was admitted, started on ertapenem with ID and gynecology consult.      Interval History:  See hospital course.  Patient states she has noted interval improvement.  Lower abdominal and back pain improved.  Still with intermittent nausea but able to tolerate oral intake.  States she had to assist her mother with home IV antibiotics over the summer and feels comfortable if this as needed.  States she only has residue all nonproductive cough in the setting of COVID.  Afebrile with T-max 98.4°, on room air, blood pressure stable.  Labs with WBC 7.42, hemoglobin 12.4, BUN/creatinine 6/0.5.  Multiple imaging studies reviewed.  Urine drug screen positive for opioids, barbiturates, THC.  UA with nitrite positive.  Urine culture 8/25 ESBL E coli.  Discussed with patient, significant other at bedside.  Discussed with Infectious Disease.    Review of Systems   Constitutional:  Negative for fever.   Respiratory:  Positive for cough.    Gastrointestinal:  Positive for abdominal pain and nausea.   Psychiatric/Behavioral:  Negative for confusion.      Objective:     Vital Signs (Most Recent):  Temp: 98.1 °F (36.7 °C) (09/02/23 1155)  Pulse: 77 (09/02/23 1155)  Resp: 16 (09/02/23 1234)  BP: 136/69 (09/02/23 1155)  SpO2: 98 % (09/02/23 1155) Vital  "Signs (24h Range):  Temp:  [97.7 °F (36.5 °C)-98.4 °F (36.9 °C)] 98.1 °F (36.7 °C)  Pulse:  [45-77] 77  Resp:  [16-19] 16  SpO2:  [94 %-100 %] 98 %  BP: (103-136)/(61-78) 136/69     Weight: 46.1 kg (101 lb 10.1 oz)  Body mass index is 18.59 kg/m².    Intake/Output Summary (Last 24 hours) at 9/2/2023 1342  Last data filed at 9/2/2023 0834  Gross per 24 hour   Intake 2409.17 ml   Output 350 ml   Net 2059.17 ml         Physical Exam  Vitals and nursing note reviewed.   Constitutional:       General: She is not in acute distress.     Appearance: She is not ill-appearing, toxic-appearing or diaphoretic.      Comments: Sitting in bed, NAD, cooperative   HENT:      Head: Normocephalic and atraumatic.      Mouth/Throat:      Mouth: Mucous membranes are moist.   Cardiovascular:      Rate and Rhythm: Normal rate and regular rhythm.      Comments: Warm peripheries, no LE edema  Pulmonary:      Comments: On RA  Abdominal:      General: Bowel sounds are normal.      Palpations: Abdomen is soft.   Musculoskeletal:      Cervical back: Neck supple.   Skin:     General: Skin is warm.   Neurological:      Mental Status: She is alert and oriented to person, place, and time.   Psychiatric:         Mood and Affect: Mood normal.             Significant Labs: BMP:   Recent Labs   Lab 09/01/23  1336 09/02/23  0549   GLU 90 82    141   K 3.7 3.6    109   CO2 24 29   BUN 8 6   CREATININE 0.6 0.5   CALCIUM 8.8 8.0*   MG 1.8  --      CBC:   Recent Labs   Lab 09/01/23 1336 09/02/23  0549   WBC 13.07* 7.42   HGB 13.8 12.4   HCT 40.9 37.7    242     CMP:   Recent Labs   Lab 09/01/23  1336 09/02/23  0549    141   K 3.7 3.6    109   CO2 24 29   GLU 90 82   BUN 8 6   CREATININE 0.6 0.5   CALCIUM 8.8 8.0*   PROT 7.5  --    ALBUMIN 4.0  --    BILITOT 0.6  --    ALKPHOS 131  --    AST 23  --    ALT 38  --    ANIONGAP 10 3*     Cardiac Markers: No results for input(s): "CKMB", "MYOGLOBIN", "BNP", "TROPISTAT" in the last " "48 hours.  Lactic Acid:   Recent Labs   Lab 09/01/23  1336   LACTATE 1.6     POCT Glucose: No results for input(s): "POCTGLUCOSE" in the last 48 hours.    Significant Imaging: I have reviewed all pertinent imaging results/findings within the past 24 hours.    MRI MRCP Without Contrast    Result Date: 9/2/2023  MR CHOLANGIOPANCREATOGRAPHY (MRCP) INDICATION: 43 years Female; biliary dilation TECHNIQUE: MR images of the abdomen was performed without IV contrast. Unless otherwise specified, incidental findings do not require dedicated imaging follow-up. 3D MRCP images were performed. COMPARISON: CT 9/1/2023. FINDINGS: Liver: Liver is enlarged measuring up to 22 cm in maximum craniocaudal dimension. Gallbladder/Biliary tree: Status post cholecystectomy. Moderate intrahepatic biliary dilatation. The common bile duct is dilated measuring up to 1.4 cm and smoothly tapers to 9 mm at the distal CBD with abrupt caliber change at the ampulla. No evidence of choledocholithiasis. Pancreas: No pancreatic duct dilatation. No pancreatitis. Spleen: Unremarkable. Adrenals: Unremarkable. Genitourinary: A few high T2 signal cysts in the inferior left kidney are probably benign; no further imaging follow-up. Gastrointestinal: No gastric wall thickening. No wall thickening of the visualized bowel. Lymph nodes: No pathologically enlarged lymph nodes. Impression: 1.  Status post cholecystectomy. Moderate intrahepatic biliary dilatation. The common bile duct is dilated measuring up to 1.4 cm and smoothly tapers to 9 mm at the distal CBD with abrupt caliber change at the ampulla. No evidence of choledocholithiasis. The possibility of a stricture at the ampulla cannot be excluded. 2.  Hepatomegaly. Electronically signed by:  Cintia Christine MD  9/2/2023 12:23 AM CDT Workstation: [id number]    CT Abdomen Pelvis With Contrast    Result Date: 9/1/2023  EXAM DESCRIPTION:  CT ABDOMEN PELVIS WITH IV CONTRAST CLINICAL INDICATION: Abdominal " abscess/infection suspected COMPARISON: CT scan of the abdomen and pelvis dated 6/2/2023 TECHNIQUE: Multiple axial 2 mm thick images were acquired through the abdomen and pelvis with IV contrast only. This exam was performed according to our departmental dose-optimization program, which includes automated exposure control, adjustment of the mA and/or kV according to patient size and/or use of iterative reconstruction technique. FINDINGS: The liver and pancreas and adrenal glands appear within normal limits. Incidental note is made of a single tiny simple cyst in the lower pole of the left kidney. The kidneys are otherwise unremarkable. There is no hydronephrosis or hydroureter. There is a small simple cyst in the posterior aspect of the spleen. The spleen is otherwise unremarkable. The gallbladder is absent. There is moderate dilatation of the extrahepatic bile ducts. The common bile duct measures up to 14 mm in diameter. This may be physiologic due to the cholecystectomy. However the degree of ductal dilatation has increased significantly since the preceding study on 6/2/2023 where the common bile duct measured up to 11 mm in maximum transverse diameter. This may, may not be of any clinical significance. Correlation with liver function tests is recommended. No obstructing mass or gallstone is evident. An MRCP of the abdomen may be helpful for further evaluation. The stomach and small and large bowel appear grossly normal. There is no bowel distention. No abnormal masses or fluid collections are identified in the abdomen or pelvis. IMPRESSION: Status post cholecystectomy with moderate dilatation of the extrahepatic bile ducts showing slight further increase in dilatation since the fairly recent previous CT scan dated 6/2/2023. Findings may be physiologic due to the cholecystectomy. However an irregular stenosis could also produce these findings. Correlation with clinical findings is recommended. An MRCP may be  helpful for further evaluation. If ampullary stenosis is suspected, an ERCP with manometry should be considered.. Electronically signed by:  Jerzy Marcus MD  9/1/2023 4:21 PM CDT Workstation: [id number]    US Pelvis Comp with Transvag NON-OB (xpd)    Result Date: 9/1/2023  Pelvic ultrasound Clinical history is pain Transabdominal and transvaginal ultrasound was performed. Comparison is made to prior study 7/7/2023 Transabdominal ultrasound is limited due to overlying bowel gas. The bladder is normal. Transvaginally the uterus measures 7.9 x 3.4 x 5.2 cm. The endometrium is within normal limits. The patient has had a endometrial ablation. The right ovary measures 2.4 x 3.3 x 2.9 cm. There is a 17 mm complex right ovarian cyst. There is a 2.4 x 1.6 cm simple right ovarian cyst. Left ovary measures 2.1 x 3.0 x 1.9 cm. There is normal flow to both ovaries. IMPRESSION: Multiple right ovarian cysts. There is a 2.4 mm simple right ovarian cyst previously measured 2 cm. There is a 17 mm complex right ovarian cyst suggestive of hemorrhagic cyst. Normal appearance of the uterus and left ovary Electronically signed by:  Pauline Guadalupe MD  9/1/2023 2:21 PM CDT Workstation: [id number]    X-Ray Chest AP Portable    Result Date: 9/1/2023  Chest single view CLINICAL DATA: Cough and infection FINDINGS: 2 AP views are compared to August 2023. Heart size is normal. Mediastinum is unremarkable. There are no infiltrates or effusions. Calcified granuloma in the right mid lung is unchanged. No acute osseous abnormality is identified. IMPRESSION: 1. No acute radiographic abnormalities. Electronically signed by:  Garcia Harley MD  9/1/2023 1:32 PM CDT Workstation: [id number]    X-Ray Chest PA And Lateral    Result Date: 8/24/2023  EXAM DESCRIPTION: XR CHEST PA AND LATERAL CLINICAL HISTORY: 43 years Female, COMPARISON: None. FINDINGS: PA and lateral views of the chest were obtained. The heart size is normal. No consolidations  are seen, nor is there evidence of pleural fluid. No osseous lesions are seen. Calcified granuloma is seen in the right mid lung. IMPRESSION: No acute cardiopulmonary disease is seen. Electronically signed by:  Kemal Joy MD  8/24/2023 6:47 PM CDT Workstation: [id number]    CT Abdomen Pelvis With Contrast    Result Date: 8/4/2023  CT ABDOMEN AND PELVIS WITH CONTRAST HISTORY:  Bilateral flank pain with nausea and vomiting   COMPARISON:  None Contrast: 100 ML of Isovue-370 TECHNIQUE: CT abdomen and pelvic images were obtained with iv contrast. Note: This CT examination was performed using one or more of the following dose reduction techniques: Automated exposure control, adjustment of the mA and or kv according to patient size, use of iterative reconstruction techniques. FINDINGS: The lung bases are clear. The heart size is normal. Mild intrahepatic biliary duct dilatation with calcified granulomas are seen. Gallbladder is absent. The adrenal glands are unremarkable. The pancreas is unremarkable. Calcified splenic granulomas are noted. Kidneys are unremarkable. No bowel obstruction is seen. Appendix is surgically absent. There is diverticulosis of the colon. No free air is seen. No lymphadenopathy is seen.   No free fluid is seen. The urinary bladder is unremarkable. Uterus is unremarkable. No acute bony process is seen.    No acute abdominal or pelvic finding Cholecystectomy, appendectomy Diverticulosis Effective Dose: 2.5 mSv This report was signed by Jl Grimm MD on 8/4/2023 11:56 PM on the following workstation: [id number].          Assessment/Plan:      Active Hospital Problems    Diagnosis    *Pyelonephritis    ESBL (extended spectrum beta-lactamase) producing bacteria infection    Anxiety    Macrocytic anemia    Urinary incontinence    Bilateral ovarian cysts    Dilation of biliary tract    Recurrent UTI    Chronic lumbar pain    History of systemic lupus erythematosus    Tobacco use disorder     Migraine headache     Plan:  Continue care on medical floor  Contact isolation with history of ESBL  Continue ertapenem 1 g Q 24 hours   Consult for midline placed given anticipating home IV antibiotics   Case management consulted for home antibiotics   reviewed 8/25 Fioricet 20 tablets filled, 8/2 Norco 10, 30 day supply, 120 tablets ordered, reordered   Check B12 and folic acid with macrocytosis   A.m. labs ordered with CMP for trending   Appreciate gynecology input, outpatient follow-up with Dr. Moore  Appreciate Infectious Disease input   Further plan as per hospital course      VTE Risk Mitigation (From admission, onward)         Ordered     enoxaparin injection 40 mg  Every 24 hours (non-standard times)         09/02/23 1333     IP VTE HIGH RISK PATIENT  Once         09/01/23 2035     Place sequential compression device  Until discontinued         09/01/23 2035                Discharge Planning   TANESHA:      Code Status: Full Code   Is the patient medically ready for discharge?:     Reason for patient still in hospital (select all that apply): Patient trending condition                     Breanna Shah MD  Department of Hospital Medicine   [facility]

## 2023-09-02 NOTE — PLAN OF CARE
[facility]  Initial Discharge Assessment       Primary Care Provider: Steve Gonzalez MD    Admission Diagnosis: Urinary tract infection without hematuria, site unspecified [N39.0]    Admission Date: 9/1/2023  Expected Discharge Date: TBD  Met with patient to complete assessment. No advance directive, living will or POA. No HH, HD, Coumadin or DME. Patient's significant other will bring her home when she is discharged. No needs.    Transition of Care Barriers: None    Payor:  / Plan:  [employer] / Product Type: Government /     Extended Emergency Contact Information  Primary Emergency Contact: Kevin rPuitt  Home Phone: [phone]  Mobile Phone: [phone]  Relation: Significant other  Preferred language: English   needed? No  Secondary Emergency Contact: Danii Levi  Address: [facility] [place] [address] [facility]  Home Phone: [phone]  Mobile Phone: [phone]  Relation: Mother  Preferred language: English   needed? No    Discharge Plan A: Home with family  Discharge Plan B: Home with family      [facility] - [place], [place] - [address]  [address] [facility] [address] [facility] [address]  Phone: [phone] Fax: [phone]     Pharmacy - [facility] [address] [place] [address]  [address] [place] [address]  Phone: [phone] Fax: [phone]    [facility] [address] [place] [address]  Phone: [phone] Fax: [phone]    [facility] - [place], [place] - [address]  [address] [facility] [address]  Phone: [phone] Fax: [phone]    [id number] - [place] - [address] AT [facility] [address]  [address] [facility] [place] [address]  Phone: [phone] Fax: [phone]    [id number] - [place] - [address] [facility]  [address]  [place] [address]  Phone: [phone] Fax: [phone]      Initial Assessment (most recent)       Adult Discharge Assessment - 09/02/23 1657          Discharge Assessment    Assessment Type Discharge Planning Assessment     Confirmed/corrected address, phone number and insurance Yes     Confirmed Demographics Correct on Facesheet     Source of Information patient     When was your last doctors appointment? --   June 2023    Does patient/caregiver understand observation status Yes     Communicated TANESHA with patient/caregiver No     Reason For Admission Pyelonephritis     People in Home significant other     Facility Arrived From: home     Do you expect to return to your current living situation? Yes     Do you have help at home or someone to help you manage your care at home? Yes     Who are your caregiver(s) and their phone number(s)? Kevin Pruitt/significant other [phone]     Prior to hospitilization cognitive status: Alert/Oriented     Current cognitive status: Alert/Oriented     Equipment Currently Used at Home none     Readmission within 30 days? No     Patient currently being followed by outpatient case management? No     Do you currently have service(s) that help you manage your care at home? No     Do you take prescription medications? Yes     Do you have prescription coverage? Yes     Coverage      Do you have any problems affording any of your prescribed medications? No     Is the patient taking medications as prescribed? yes     Who is going to help you get home at discharge? Kevin Pruitt/significant other [phone]     How do you get to doctors appointments? car, drives self     Are you on dialysis? No     Do you take coumadin? No     DME Needed Upon Discharge  none     Discharge Plan discussed with: Patient     Transition of Care Barriers None     Discharge Plan A Home with family     Discharge Plan B Home with family        OTHER    Name(s) of People in Home Kevin Pruitt/erica  other [phone]

## 2023-09-02 NOTE — CONSULTS
[facility]  Obstetrics & Gynecology  Consult Note    Patient Name: Jaye Martinez  MRN: [id number]  Admission Date: 2023  Hospital Length of Stay: 0 days  Code Status: Full Code  Primary Care Provider: Steve Gonzalez MD  Principal Problem: Recurrent UTI    Consults  Subjective:     Chief Complaint: right abdominal pain    History of Present Illness:  No notes on file    Brief consult with this 43-year-old patient, apparently to assess whether hemorrhagic cyst on the right could be causing some of the patient's pain.  Upon review of the patient's radiologic studies the hemorrhagic cyst on the right is 17 mm along with a simple 2.4 cm cyst.  These were considered normal findings in a reproductive age woman and are certainly not to account for the pain the patient reports.  The patient actually denies any pelvic pain.  The pain seems to be more in the right mid and upper quadrants.  Her current gynecologist is  and they have hysterectomy with bladder repair planned, though I advised she will have to wait for the surgery until after current problem is resolved.    OB History    Para Term  AB Living   10 9 5 0 0 5   SAB IAB Ectopic Multiple Live Births   0 0 0 0 5      # Outcome Date GA Lbr Nir/2nd Weight Sex Delivery Anes PTL Lv   10  14    F  Gen N CHEIKH      Birth Comments: System Generated. Please review and update pregnancy details.   9 Para    3.232 kg (7 lb 2 oz)  CS-LTranv   CHEIKH   8 Para    3.232 kg (7 lb 2 oz)  Vag-Spont   CHEIKH   7 Para    3.232 kg (7 lb 2 oz)  Vag-Spont   CHEIKH   6 Para    2.637 kg (5 lb 13 oz)  Vag-Spont   CHEIKH   5 Term            4 Term            3 Term            2 Term            1 Term              Past Medical History:   Diagnosis Date    Abnormal Pap smear     Anxiety     Cancer     pre-cancer cells cervical     Cervical disc herniation     Chronic cough     Closed fracture 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) level with anterior cord  syndrome     T 12 fracture compression     Fractures     Genital herpes     GERD (gastroesophageal reflux disease)     Hemorrhoid     History of colposcopy with cervical biopsy     Lumbar disc herniation     on Hydrocodone    Lupus (systemic lupus erythematosus)     chronic thrombocytopenia    Migraine headache     PONV (postoperative nausea and vomiting)      Past Surgical History:   Procedure Laterality Date    ADENOIDECTOMY      APPENDECTOMY       SECTION, CLASSIC      x2 , last 2014    CHOLECYSTECTOMY      CLOSED REDUCTION OF FRACTURE OF NASAL BONE Bilateral 2022    Procedure: CLOSED REDUCTION, FRACTURE, NASAL BONE;  Surgeon: Mary Carlton MD;  Location: [facility] OR;  Service: ENT;  Laterality: Bilateral;    COLONOSCOPY N/A 10/29/2019    Procedure: COLONOSCOPY;  Surgeon: Jules De La Cruz MD;  Location: [facility] ENDO;  Service: Endoscopy;  Laterality: N/A;    CRYOTHERAPY          DILATION AND CURETTAGE OF UTERUS  [id number]    ENDOMETRIAL ABLATION      GANGLION CYST EXCISION Left     TONSILLECTOMY      TUBAL LIGATION  2014    TYMPANOSTOMY TUBE PLACEMENT      x 7       PTA Medications   Medication Sig    butalbital-acetaminophen-caffeine -40 mg (FIORICET, ESGIC) -40 mg per tablet Take 1 tablet by mouth 2 (two) times daily as needed. (Patient taking differently: Take 1 tablet by mouth 2 (two) times daily as needed for Headaches.)    EMGALITY  mg/mL PnIj Inject 1 mL into the skin every 30 days.    hydrocodone-acetaminophen 10-325mg (NORCO)  mg Tab Take 1 tablet by mouth every 6 (six) hours as needed. (Patient taking differently: Take 1 tablet by mouth every 6 (six) hours as needed for Pain.)    naproxen (NAPROSYN) 500 MG tablet Take 1 tablet (500 mg total) by mouth every 12 (twelve) hours as needed (Body aches).    nitrofurantoin, macrocrystal-monohydrate, (MACROBID) 100 MG capsule Take 100 mg by mouth 2 (two) times daily.    ondansetron  (ZOFRAN-ODT) 4 MG TbDL Take 1 tablet (4 mg total) by mouth every 6 (six) hours as needed (nausea/vomiting).    predniSONE (DELTASONE) 5 MG tablet Take 1 tablet (5 mg total) by mouth daily as needed (Joint stiffness).    prochlorperazine (COMPAZINE) 10 MG tablet Take 1 tablet (10 mg total) by mouth 3 (three) times daily as needed (Headache).    promethazine (PHENERGAN) 25 MG suppository Place 1 suppository (25 mg total) rectally every 6 (six) hours as needed for Nausea. (Patient taking differently: Place 25 mg rectally every 6 (six) hours as needed for Nausea. NEW Rx - NOT YET STARTED)    tiZANidine (ZANAFLEX) 4 MG tablet Take 4 mg by mouth every 6 (six) hours as needed.    UBROGEPANT 100 mg tablet Take 100 mg by mouth as needed.    albuterol (PROAIR HFA) 90 mcg/actuation inhaler Inhale 2 puffs into the lungs every 4 (four) hours as needed for Wheezing.    valACYclovir (VALTREX) 1000 MG tablet Take 1 tablet (1,000 mg total) by mouth every 12 (twelve) hours. for 5 days       Review of patient's allergies indicates:   Allergen Reactions    Cyclobenzaprine Rash and Other (See Comments)     Leg cramps    Pregabalin Itching, Other (See Comments) and Hives     Bruising and headaches    Sulfa (sulfonamide antibiotics) Nausea And Vomiting    Morphine Rash and Hives    Sulfamethoxazole-trimethoprim Rash and Nausea And Vomiting     And headache    Toradol [ketorolac] Rash        Family History       Problem Relation (Age of Onset)    Breast cancer Maternal Grandmother, Maternal Aunt, Maternal Aunt    Cancer Maternal Grandfather, Paternal Grandfather    Diabetes Mother    Heart disease Father          Tobacco Use    Smoking status: Every Day     Current packs/day: 0.25     Types: Cigarettes    Smokeless tobacco: Never   Substance and Sexual Activity    Alcohol use: Yes     Comment: special occasions    Drug use: Yes     Types: Marijuana     Comment: prescription    Sexual activity: Yes     Partners: Male      Birth control/protection: None     Review of Systems   Objective:     Vital Signs (Most Recent):  Temp: 98.2 °F (36.8 °C) (09/02/23 1120)  Pulse: 74 (09/02/23 1120)  Resp: 17 (09/02/23 1120)  BP: 108/61 (09/02/23 1120)  SpO2: 95 % (09/02/23 1120) Vital Signs (24h Range):  Temp:  [97.7 °F (36.5 °C)-98.5 °F (36.9 °C)] 98.2 °F (36.8 °C)  Pulse:  [45-74] 74  Resp:  [16-24] 17  SpO2:  [94 %-100 %] 95 %  BP: ()/(61-78) 108/61     Weight: 46.1 kg (101 lb 10.1 oz)  Body mass index is 18.59 kg/m².    No LMP recorded. Patient has had an ablation.     Physical Exam     Laboratory:  I have personallly reviewed all pertinent lab results from the last 24 hours.    Diagnostic Results:      Assessment/Plan:     No new Assessment & Plan notes have been filed under this hospital service since the last note was generated.  Service: Obstetrics and Gynecology      Thank you for your consult. I will sign off. Please contact us if you have any additional questions.    Leonor Randhawa MD  Obstetrics & Gynecology  [facility]

## 2023-09-02 NOTE — CONSULTS
[facility]  Department of Infectious Disease  Consult Note        PATIENT NAME: Jaye Martinez  MRN: [id number]  TODAY'S DATE: 2023  ADMIT DATE: 2023    CHIEF COMPLAINT: Abdominal Pain (States she took Norco 10mg tab @ 08:00am and 11:00AM, Xanaflex at 8:00AM)    PRINCIPLE PROBLEM: Recurrent UTI    ASSESSMENT and PLAN     ESBL E coli UTI in the setting of bladder/uterine prolapse, she has planned surgery outpatient by Gynecology (hysterectomy and cystopexy)  Urine culture  ESBL E coli  Blood cultures x 2 no growth to date, pending final (on Macrobid)  CT scan biliary ductal dilatation, multiple R ovarian cysts. And L kidney simple cyst, no hydronephrosis.    COVID 19 +, , asymptomatic, on room air    PMHx: anxiety, prior close fracture T7-T12, SLE on chronic prednisone 5 mg daily    Recommendations:    Kidney US to rule out acute process  Midline   Anticipating dc on Ertapenem 1g IV daily for 7 days, end date 23  CBC w/diff, CMP x 1   Remove Midline after completing therapy   Addendum: Kidney US with L kidney simple cyst  Follow up Gyn outpt for planned surgery   Follow up ID clinic/any provider in 2-3 weeks  Follow up with PCP outpt  Contact precautions    D/w patient at length, partner at bedside, Dr Shah    Thank you for this consult. Please send Trudev secure chat with any questions.    HPI      Jaye Martinez is a 43 y.o. female A0, active smoker, on medical marijuana, with past medical history of anxiety, prior close fracture T7-T12, SLE on chronic prednisone 5 mg daily urinary incontinence in the setting of uterine and bladder prolapse.  She was following outpatient with Gynecology, she had planned hysterectomy with cystopexy outpatient on .  The procedure was canceled since she tested positive for COVID.  She was having urinary symptoms the week before the , dysuria, increased urinary frequency, foul-smelling urine, dark urine with associated nausea,  vomit, diffuse abdominal pain/left flank pain.  She denies fever or chills, no headache, she reports her respiratory symptoms are now better, sore throat and persistent dry cough are resolved.  She is unvaccinated against COVID-19.      She presented , seen by Gyn, sent on Macrobid.    Urine culture  grew ESBL E coli.    The patient returned to the hospital on  for worsening and recurrent symptoms.  Labs on admission with leukocytosis 13, no left shift, H&H and platelet count normal   Stable kidney and liver function tests   Pregnancy test negative   U tox positive for opiates, barbiturates and marijuana  UA with positive nitrates, negative bacteria, she was taking Macrobid, urine culture in process     CXR clear  CT scan revealed biliary ductal dilatation, multiple R ovarian cysts. And L kidney simple cyst, no hydronephrosis.  MRCP without abnormal findings.    ID consult for  persistent UTI/ESBL    Outdoor activities:  Active smoker, about half a pack a day, medical marijuana/vapes.  No alcohol, denies other drugs.  Lives with current partner, sexually active.  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  Travel:  None  Implants:  None  Antibiotic history:  Zosyn IV, ertapenem IV    Past Medical History:   Diagnosis Date    Abnormal Pap smear     Anxiety     Cancer     pre-cancer cells cervical     Cervical disc herniation     Chronic cough     Closed fracture 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) level with anterior cord syndrome     T 12 fracture compression     Fractures     Genital herpes     GERD (gastroesophageal reflux disease)     Hemorrhoid     History of colposcopy with cervical biopsy     Lumbar disc herniation     on Hydrocodone    Lupus (systemic lupus erythematosus)     chronic thrombocytopenia    Migraine headache     PONV (postoperative nausea and vomiting)        Past Surgical History:   Procedure Laterality Date    ADENOIDECTOMY      APPENDECTOMY       SECTION, CLASSIC      x2 ,  last 4/2014    CHOLECYSTECTOMY      CLOSED REDUCTION OF FRACTURE OF NASAL BONE Bilateral 11/18/2022    Procedure: CLOSED REDUCTION, FRACTURE, NASAL BONE;  Surgeon: Mary Carlton MD;  Location: [facility] OR;  Service: ENT;  Laterality: Bilateral;    COLONOSCOPY N/A 10/29/2019    Procedure: COLONOSCOPY;  Surgeon: Jules De La Cruz MD;  Location: [facility] ENDO;  Service: Endoscopy;  Laterality: N/A;    CRYOTHERAPY      2002    DILATION AND CURETTAGE OF UTERUS  [id number]    ENDOMETRIAL ABLATION      GANGLION CYST EXCISION Left     TONSILLECTOMY      TUBAL LIGATION  4/2014    TYMPANOSTOMY TUBE PLACEMENT      x 7       Family History   Problem Relation Age of Onset    Heart disease Father     Diabetes Mother     Cancer Maternal Grandfather         colon    Cancer Paternal Grandfather         colon    Breast cancer Maternal Grandmother     Breast cancer Maternal Aunt     Breast cancer Maternal Aunt     Ovarian cancer Neg Hx        Social History     Socioeconomic History    Marital status: Legally    Tobacco Use    Smoking status: Every Day     Current packs/day: 0.25     Types: Cigarettes    Smokeless tobacco: Never   Substance and Sexual Activity    Alcohol use: Yes     Comment: special occasions    Drug use: Yes     Types: Marijuana     Comment: prescription    Sexual activity: Yes     Partners: Male     Birth control/protection: None       Review of patient's allergies indicates:   Allergen Reactions    Cyclobenzaprine Rash and Other (See Comments)     Leg cramps    Pregabalin Itching, Other (See Comments) and Hives     Bruising and headaches    Sulfa (sulfonamide antibiotics) Nausea And Vomiting    Morphine Rash and Hives    Sulfamethoxazole-trimethoprim Rash and Nausea And Vomiting     And headache    Toradol [ketorolac] Rash       Current Outpatient Medications   Medication Instructions    albuterol (PROAIR HFA) 90 mcg/actuation inhaler 2 puffs, Inhalation, Every 4 hours PRN    butalbital-acetaminophen-caffeine  -40 mg (FIORICET, ESGIC) -40 mg per tablet 1 tablet, Oral, 2 times daily PRN    EMGALITY  mg/mL PnIj 1 mL, Subcutaneous, Every 30 days    hydrocodone-acetaminophen 10-325mg (NORCO)  mg Tab 1 tablet, Oral, Every 6 hours PRN    naproxen (NAPROSYN) 500 mg, Oral, Every 12 hours PRN    nitrofurantoin, macrocrystal-monohydrate, (MACROBID) 100 MG capsule 100 mg, Oral, 2 times daily    ondansetron (ZOFRAN-ODT) 4 mg, Oral, Every 6 hours PRN    predniSONE (DELTASONE) 5 mg, Oral, Daily PRN    prochlorperazine (COMPAZINE) 10 mg, Oral, 3 times daily PRN    promethazine (PHENERGAN) 25 mg, Rectal, Every 6 hours PRN    tiZANidine (ZANAFLEX) 4 mg, Oral, Every 6 hours PRN    UBRELVY 100 mg, Oral, As needed (PRN)    valACYclovir (VALTREX) 1,000 mg, Oral, Every 12 hours         Review of Systems   Constitutional:  Negative for chills and fever.   HENT:  Negative for sinus pain.    Respiratory:  Negative for cough and shortness of breath.    Cardiovascular:  Negative for chest pain and leg swelling.   Gastrointestinal:  Positive for nausea and vomiting. Negative for abdominal pain.   Genitourinary:  Positive for difficulty urinating, dysuria, flank pain and frequency.        Genital prolapse   Musculoskeletal:  Negative for back pain.   Skin:  Negative for rash.   Neurological:  Negative for headaches.   Psychiatric/Behavioral:  Negative for confusion.            OBJECTIVE     Temp:  [97.7 °F (36.5 °C)-98.5 °F (36.9 °C)] 98.4 °F (36.9 °C)  Pulse:  [45-73] 52  Resp:  [16-24] 18  SpO2:  [94 %-100 %] 100 %  BP: ()/(62-78) 127/74         Physical Exam  Constitutional:       Appearance: Normal appearance.      Comments: Thin lady   HENT:      Mouth/Throat:      Mouth: Mucous membranes are moist.      Pharynx: Oropharynx is clear.      Comments: Fair teeth, poor oral hygiene  Eyes:      Extraocular Movements: Extraocular movements intact.      Conjunctiva/sclera: Conjunctivae normal.      Pupils: Pupils are  equal, round, and reactive to light.   Cardiovascular:      Rate and Rhythm: Normal rate and regular rhythm.      Pulses: Normal pulses.      Heart sounds: Normal heart sounds.   Pulmonary:      Effort: Pulmonary effort is normal.      Breath sounds: Normal breath sounds.   Abdominal:      General: Bowel sounds are normal.      Palpations: Abdomen is soft.      Tenderness: There is no abdominal tenderness. There is no rebound.      Comments: L flank pain   Genitourinary:     Comments: Genital prolapse   No ulcers or genital lesions noted   Musculoskeletal:         General: Normal range of motion.      Cervical back: Normal range of motion and neck supple.      Right lower leg: No edema.      Left lower leg: No edema.   Skin:     General: Skin is warm.      Capillary Refill: Capillary refill takes less than 2 seconds.      Findings: No rash.   Neurological:      Mental Status: She is alert and oriented to person, place, and time. Mental status is at baseline.      Sensory: No sensory deficit.      Motor: No weakness.   Psychiatric:         Mood and Affect: Mood normal.         Thought Content: Thought content normal.           Wounds: none  VAD: peripheral IV  Isolation: Contact/ESBL    CBC LAST 7 DAYS  Recent Labs   Lab 09/01/23  1336 09/02/23  0549   WBC 13.07* 7.42   RBC 4.21 3.81*   HGB 13.8 12.4   HCT 40.9 37.7   MCV 97 99*   MCH 32.8* 32.5*   MCHC 33.7 32.9   RDW 13.8 14.1    242   MPV 12.2 12.3   GRAN 69.8  9.1* 66.2  4.9   LYMPH 23.3  3.1 23.3  1.7   MONO 5.2  0.7 7.8  0.6   BASO 0.03 0.05   NRBC 0 0       CHEMISTRY LAST 7 DAYS  Recent Labs   Lab 09/01/23  1336 09/02/23  0549    141   K 3.7 3.6    109   CO2 24 29   ANIONGAP 10 3*   BUN 8 6   CREATININE 0.6 0.5   GLU 90 82   CALCIUM 8.8 8.0*   MG 1.8  --    ALBUMIN 4.0  --    PROT 7.5  --    ALKPHOS 131  --    ALT 38  --    AST 23  --    BILITOT 0.6  --        Estimated Creatinine Clearance: 106.7 mL/min (based on SCr of 0.5  "mg/dL).    INFLAMMATORY/PROCAL  LAST 7 DAYS  Recent Labs   Lab 09/01/23  1336   PROCAL <0.05     No results found for: "ESR"  CRP   Date Value Ref Range Status   06/08/2023 0.8 0.0 - 8.2 mg/L Final   11/16/2022 6.4 0.0 - 8.2 mg/L Final   09/06/2013 1.0 0.0 - 8.2 mg/L Final       PRIOR  MICROBIOLOGY:    Urine culture [[id number]] (Abnormal)  Collected: 08/25/23 2241   Order Status: Completed Specimen: Urine Updated: 08/29/23 0658    Urine Culture, Routine ESCHERICHIA COLI ESBL   >100,000 cfu/ml   Results called to and read back by:Wisam Hatch RN ED  08/29/2023  06:56   JBM    Abnormal    Narrative:     In and Out Cath as needed it patient unable to void   Specimen Source->Urine   Susceptibility     Escherichia coli ESBL     CULTURE, URINE     Amp/Sulbactam 8/4 mcg/mL Sensitive     Ampicillin >16 mcg/mL Resistant     Cefazolin >16 mcg/mL Resistant     Cefepime 16 mcg/mL Resistant     Ceftriaxone >32 mcg/mL Resistant     Ciprofloxacin >2 mcg/mL Resistant     Ertapenem <=0.5 mcg/mL Sensitive     Gentamicin >8 mcg/mL Resistant     Levofloxacin >4 mcg/mL Resistant     Meropenem <=1 mcg/mL Sensitive     Nitrofurantoin <=32 mcg/mL Sensitive     Piperacillin/Tazo <=16 mcg/mL Sensitive     Tetracycline >8 mcg/mL Resistant     Tobramycin >8 mcg/mL Resistant     Trimeth/Sulfa <=2/38 mcg/mL Sensitive              LAST 7 DAYS MICROBIOLOGY   Microbiology Results (last 7 days)       Procedure Component Value Units Date/Time    Blood culture [[id number]] Collected: 09/01/23 1327    Order Status: Completed Specimen: Blood from Antecubital, Right Updated: 09/01/23 1958     Blood Culture, Routine No Growth to date    Blood culture [[id number]] Collected: 09/01/23 1327    Order Status: Completed Specimen: Blood Updated: 09/01/23 1958     Blood Culture, Routine No Growth to date    Urine Culture High Risk [[id number]] Collected: 09/01/23 1708    Order Status: Sent Specimen: Urine, Clean Catch Updated: 09/01/23 1709      "           CURRENT/PREVIOUS VISIT EKG  Results for orders placed or performed during the hospital encounter of 09/01/23   EKG 12-lead    Collection Time: 09/01/23  2:56 PM    Narrative    Test Reason : I95.9,    Vent. Rate : 055 BPM     Atrial Rate : 055 BPM     P-R Int : 150 ms          QRS Dur : 080 ms      QT Int : 450 ms       P-R-T Axes : 072 080 070 degrees     QTc Int : 430 ms    Sinus bradycardia with sinus arrhythmia  Otherwise normal ECG  When compared with ECG of 24-AUG-2023 17:22,  Vent. rate has decreased BY  36 BPM  Inverted T waves have replaced nonspecific T wave abnormality in Anterior  leads    Referred By: [email or web address]   SELF           Confirmed By:        Significant Labs: All pertinent labs within the past 24 hours have been reviewed.     Significant Imaging: I have reviewed all relevant and available imaging results/findings within the past 24 hours.          Destiny Alfaro MD    Date of Service: 09/02/2023  10:26 AM

## 2023-09-03 PROBLEM — R74.01 TRANSAMINITIS: Status: ACTIVE | Noted: 2023-09-03

## 2023-09-03 PROBLEM — E53.8 FOLATE DEFICIENCY: Chronic | Status: ACTIVE | Noted: 2023-09-03

## 2023-09-03 LAB
ALBUMIN SERPL BCP-MCNC: 2.8 G/DL (ref 3.5–5.2)
ALP SERPL-CCNC: 144 U/L (ref 55–135)
ALT SERPL W/O P-5'-P-CCNC: 122 U/L (ref 10–44)
ANION GAP SERPL CALC-SCNC: 1 MMOL/L (ref 8–16)
AST SERPL-CCNC: 53 U/L (ref 10–40)
BASOPHILS # BLD AUTO: 0.05 K/UL (ref 0–0.2)
BASOPHILS NFR BLD: 0.7 % (ref 0–1.9)
BILIRUB SERPL-MCNC: 0.2 MG/DL (ref 0.1–1)
BUN SERPL-MCNC: 8 MG/DL (ref 6–20)
CALCIUM SERPL-MCNC: 8 MG/DL (ref 8.7–10.5)
CHLORIDE SERPL-SCNC: 107 MMOL/L (ref 95–110)
CO2 SERPL-SCNC: 30 MMOL/L (ref 23–29)
CREAT SERPL-MCNC: 0.6 MG/DL (ref 0.5–1.4)
DIFFERENTIAL METHOD: ABNORMAL
EOSINOPHIL # BLD AUTO: 0.2 K/UL (ref 0–0.5)
EOSINOPHIL NFR BLD: 2 % (ref 0–8)
ERYTHROCYTE [DISTWIDTH] IN BLOOD BY AUTOMATED COUNT: 14.1 % (ref 11.5–14.5)
EST. GFR  (NO RACE VARIABLE): >60 ML/MIN/1.73 M^2
GLUCOSE SERPL-MCNC: 80 MG/DL (ref 70–110)
HCT VFR BLD AUTO: 36.7 % (ref 37–48.5)
HGB BLD-MCNC: 11.7 G/DL (ref 12–16)
IMM GRANULOCYTES # BLD AUTO: 0.03 K/UL (ref 0–0.04)
IMM GRANULOCYTES NFR BLD AUTO: 0.4 % (ref 0–0.5)
LYMPHOCYTES # BLD AUTO: 3 K/UL (ref 1–4.8)
LYMPHOCYTES NFR BLD: 40.1 % (ref 18–48)
MCH RBC QN AUTO: 31.8 PG (ref 27–31)
MCHC RBC AUTO-ENTMCNC: 31.9 G/DL (ref 32–36)
MCV RBC AUTO: 100 FL (ref 82–98)
MONOCYTES # BLD AUTO: 0.7 K/UL (ref 0.3–1)
MONOCYTES NFR BLD: 9.9 % (ref 4–15)
NEUTROPHILS # BLD AUTO: 3.5 K/UL (ref 1.8–7.7)
NEUTROPHILS NFR BLD: 46.9 % (ref 38–73)
NRBC BLD-RTO: 0 /100 WBC
PLATELET # BLD AUTO: 269 K/UL (ref 150–450)
PMV BLD AUTO: 12.6 FL (ref 9.2–12.9)
POTASSIUM SERPL-SCNC: 4.7 MMOL/L (ref 3.5–5.1)
PROT SERPL-MCNC: 5.6 G/DL (ref 6–8.4)
RBC # BLD AUTO: 3.68 M/UL (ref 4–5.4)
SODIUM SERPL-SCNC: 138 MMOL/L (ref 136–145)
WBC # BLD AUTO: 7.36 K/UL (ref 3.9–12.7)

## 2023-09-03 PROCEDURE — 36415 COLL VENOUS BLD VENIPUNCTURE: CPT | Performed by: INTERNAL MEDICINE

## 2023-09-03 PROCEDURE — 12000002 HC ACUTE/MED SURGE SEMI-PRIVATE ROOM

## 2023-09-03 PROCEDURE — 63600175 PHARM REV CODE 636 W HCPCS: Performed by: STUDENT IN AN ORGANIZED HEALTH CARE EDUCATION/TRAINING PROGRAM

## 2023-09-03 PROCEDURE — 94761 N-INVAS EAR/PLS OXIMETRY MLT: CPT

## 2023-09-03 PROCEDURE — 63600175 PHARM REV CODE 636 W HCPCS: Performed by: INTERNAL MEDICINE

## 2023-09-03 PROCEDURE — 25000003 PHARM REV CODE 250: Performed by: INTERNAL MEDICINE

## 2023-09-03 PROCEDURE — 85025 COMPLETE CBC W/AUTO DIFF WBC: CPT | Performed by: INTERNAL MEDICINE

## 2023-09-03 PROCEDURE — 96376 TX/PRO/DX INJ SAME DRUG ADON: CPT

## 2023-09-03 PROCEDURE — 99900031 HC PATIENT EDUCATION (STAT)

## 2023-09-03 PROCEDURE — 80053 COMPREHEN METABOLIC PANEL: CPT | Performed by: INTERNAL MEDICINE

## 2023-09-03 PROCEDURE — 25000003 PHARM REV CODE 250: Performed by: STUDENT IN AN ORGANIZED HEALTH CARE EDUCATION/TRAINING PROGRAM

## 2023-09-03 RX ORDER — SODIUM CHLORIDE, SODIUM LACTATE, POTASSIUM CHLORIDE, CALCIUM CHLORIDE 600; 310; 30; 20 MG/100ML; MG/100ML; MG/100ML; MG/100ML
INJECTION, SOLUTION INTRAVENOUS CONTINUOUS
Status: DISCONTINUED | OUTPATIENT
Start: 2023-09-03 | End: 2023-09-03

## 2023-09-03 RX ORDER — FOLIC ACID 1 MG/1
1 TABLET ORAL DAILY
Status: DISCONTINUED | OUTPATIENT
Start: 2023-09-03 | End: 2023-09-07 | Stop reason: HOSPADM

## 2023-09-03 RX ADMIN — ERTAPENEM 1 G: 1 INJECTION INTRAMUSCULAR; INTRAVENOUS at 09:09

## 2023-09-03 RX ADMIN — HYDROCODONE BITARTRATE AND ACETAMINOPHEN 1 TABLET: 5; 325 TABLET ORAL at 11:09

## 2023-09-03 RX ADMIN — PROCHLORPERAZINE EDISYLATE 5 MG: 5 INJECTION INTRAMUSCULAR; INTRAVENOUS at 12:09

## 2023-09-03 RX ADMIN — POLYETHYLENE GLYCOL 3350 17 G: 17 POWDER, FOR SOLUTION ORAL at 08:09

## 2023-09-03 RX ADMIN — FOLIC ACID 1 MG: 1 TABLET ORAL at 09:09

## 2023-09-03 RX ADMIN — ENOXAPARIN SODIUM 40 MG: 40 INJECTION SUBCUTANEOUS at 08:09

## 2023-09-03 RX ADMIN — SODIUM CHLORIDE, SODIUM LACTATE, POTASSIUM CHLORIDE, AND CALCIUM CHLORIDE: .6; .31; .03; .02 INJECTION, SOLUTION INTRAVENOUS at 12:09

## 2023-09-03 RX ADMIN — ONDANSETRON 4 MG: 2 INJECTION INTRAMUSCULAR; INTRAVENOUS at 11:09

## 2023-09-03 RX ADMIN — BUTALBITAL, ACETAMINOPHEN AND CAFFEINE 1 TABLET: 325; 50; 40 TABLET ORAL at 09:09

## 2023-09-03 RX ADMIN — PROCHLORPERAZINE EDISYLATE 5 MG: 5 INJECTION INTRAMUSCULAR; INTRAVENOUS at 09:09

## 2023-09-03 NOTE — CARE UPDATE
09/03/23 1020   Patient Assessment/Suction   Level of Consciousness (AVPU) alert   Respiratory Effort Normal   Rhythm/Pattern, Respiratory unlabored   PRE-TX-O2   Device (Oxygen Therapy) room air   SpO2 95 %   Pulse Oximetry Type Intermittent   $ Pulse Oximetry - Multiple Charge Pulse Oximetry - Multiple   Pulse 63   Resp 16   Positioning Sitting in bed   Education   $ Education 15 min

## 2023-09-03 NOTE — CONSULTS
GASTROENTEROLOGY INPATIENT CONSULT NOTE  Patient Name: Jaye Martinez  Patient MRN: [id number]  Patient : 1980    Admit Date: 2023  Service date: 9/3/2023    Reason for Consult: abnormal MRI/CT    PCP: Steve Gonzalez MD    Chief Complaint   Patient presents with    Abdominal Pain     States she took Norco 10mg tab @ 08:00am and 11:00AM, Xanaflex at 8:00AM       HPI: Patient is a 43 y.o. female admitted to the hospital with pyelonephritis and incidentally found to have abnormal imaging of bile duct on ct scan.  CT showing dilated bile duct. MRCP showing common bile duct is dilated measuring up to 1.4 cm and smoothly tapers to 9 mm at the distal CBD with abrupt caliber change at the ampulla. No cbd stone.  Possible stricture noted.     Of note reports 30 lb weight loss over the past 1-2 months.       CT Abdomen 2023  IMPRESSION: Status post cholecystectomy with moderate dilatation of the extrahepatic bile ducts showing slight further increase in dilatation since the fairly recent previous CT scan dated 2023. Findings may be physiologic due to the cholecystectomy. However an irregular stenosis could also produce these findings. Correlation with clinical findings is recommended. An MRCP may be helpful for further evaluation. If ampullary stenosis is suspected, an ERCP with manometry should be considered..     MRCP   Impression:  1.  Status post cholecystectomy. Moderate intrahepatic biliary dilatation. The common bile duct is dilated measuring up to 1.4 cm and smoothly tapers to 9 mm at the distal CBD with abrupt caliber change at the ampulla. No evidence of choledocholithiasis. The possibility of a stricture at the ampulla cannot be excluded.  2.  Hepatomegaly.      CMP  Sodium   Date Value Ref Range Status   2023 138 136 - 145 mmol/L Final     Potassium   Date Value Ref Range Status   2023 4.7 3.5 - 5.1 mmol/L Final     Chloride   Date Value Ref Range Status   2023 107 95 - 110  mmol/L Final     CO2   Date Value Ref Range Status   09/03/2023 30 (H) 23 - 29 mmol/L Final     Glucose   Date Value Ref Range Status   09/03/2023 80 70 - 110 mg/dL Final     BUN   Date Value Ref Range Status   09/03/2023 8 6 - 20 mg/dL Final     Creatinine   Date Value Ref Range Status   09/03/2023 0.6 0.5 - 1.4 mg/dL Final     Calcium   Date Value Ref Range Status   09/03/2023 8.0 (L) 8.7 - 10.5 mg/dL Final     Total Protein   Date Value Ref Range Status   09/03/2023 5.6 (L) 6.0 - 8.4 g/dL Final     Albumin   Date Value Ref Range Status   09/03/2023 2.8 (L) 3.5 - 5.2 g/dL Final     Comment:     Delta check review     Total Bilirubin   Date Value Ref Range Status   09/03/2023 0.2 0.1 - 1.0 mg/dL Final     Comment:     For infants and newborns, interpretation of results should be based  on gestational age, weight and in agreement with clinical  observations.    Premature Infant recommended reference ranges:  Up to 24 hours.............<8.0 mg/dL  Up to 48 hours............<12.0 mg/dL  3-5 days..................<15.0 mg/dL  6-29 days.................<15.0 mg/dL       Alkaline Phosphatase   Date Value Ref Range Status   09/03/2023 144 (H) 55 - 135 U/L Final     AST   Date Value Ref Range Status   09/03/2023 53 (H) 10 - 40 U/L Final     ALT   Date Value Ref Range Status   09/03/2023 122 (H) 10 - 44 U/L Final     Anion Gap   Date Value Ref Range Status   09/03/2023 1 (L) 8 - 16 mmol/L Final     eGFR   Date Value Ref Range Status   09/03/2023 >60.0 >60 mL/min/1.73 m^2 Final     Lab Results   Component Value Date    WBC 7.36 09/03/2023    HGB 11.7 (L) 09/03/2023    HCT 36.7 (L) 09/03/2023     (H) 09/03/2023     09/03/2023               Past Medical History:  Past Medical History:   Diagnosis Date    Abnormal Pap smear     Anxiety     Cancer     pre-cancer cells cervical     Cervical disc herniation     Chronic cough     Closed fracture 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) level with anterior cord syndrome     T 12 fracture  compression     Fractures     Genital herpes     GERD (gastroesophageal reflux disease)     Hemorrhoid     History of colposcopy with cervical biopsy     Lumbar disc herniation     on Hydrocodone    Lupus (systemic lupus erythematosus)     chronic thrombocytopenia    Migraine headache     PONV (postoperative nausea and vomiting)         Past Surgical History:  Past Surgical History:   Procedure Laterality Date    ADENOIDECTOMY      APPENDECTOMY       SECTION, CLASSIC      x2 , last 2014    CHOLECYSTECTOMY      CLOSED REDUCTION OF FRACTURE OF NASAL BONE Bilateral 2022    Procedure: CLOSED REDUCTION, FRACTURE, NASAL BONE;  Surgeon: Mary Carlton MD;  Location: [facility] OR;  Service: ENT;  Laterality: Bilateral;    COLONOSCOPY N/A 10/29/2019    Procedure: COLONOSCOPY;  Surgeon: Jules De La Cruz MD;  Location: [facility] ENDO;  Service: Endoscopy;  Laterality: N/A;    CRYOTHERAPY          DILATION AND CURETTAGE OF UTERUS  [id number]    ENDOMETRIAL ABLATION      GANGLION CYST EXCISION Left     TONSILLECTOMY      TUBAL LIGATION  2014    TYMPANOSTOMY TUBE PLACEMENT      x 7        Home Medications:  Medications Prior to Admission   Medication Sig Dispense Refill Last Dose    butalbital-acetaminophen-caffeine -40 mg (FIORICET, ESGIC) -40 mg per tablet Take 1 tablet by mouth 2 (two) times daily as needed. (Patient taking differently: Take 1 tablet by mouth 2 (two) times daily as needed for Headaches.) 30 tablet 0 2023 at 17:00    EMGALITY  mg/mL PnIj Inject 1 mL into the skin every 30 days.   2023    hydrocodone-acetaminophen 10-325mg (NORCO)  mg Tab Take 1 tablet by mouth every 6 (six) hours as needed. (Patient taking differently: Take 1 tablet by mouth every 6 (six) hours as needed for Pain.) 120 tablet 0 2023 at 11:00    naproxen (NAPROSYN) 500 MG tablet Take 1 tablet (500 mg total) by mouth every 12 (twelve) hours as needed (Body aches). 14 tablet 0      nitrofurantoin, macrocrystal-monohydrate, (MACROBID) 100 MG capsule Take 100 mg by mouth 2 (two) times daily.   9/1/2023 at 09:00    ondansetron (ZOFRAN-ODT) 4 MG TbDL Take 1 tablet (4 mg total) by mouth every 6 (six) hours as needed (nausea/vomiting). 15 tablet 0 Past Week    predniSONE (DELTASONE) 5 MG tablet Take 1 tablet (5 mg total) by mouth daily as needed (Joint stiffness). 90 tablet 0     prochlorperazine (COMPAZINE) 10 MG tablet Take 1 tablet (10 mg total) by mouth 3 (three) times daily as needed (Headache). 6 tablet 0 8/31/2023    promethazine (PHENERGAN) 25 MG suppository Place 1 suppository (25 mg total) rectally every 6 (six) hours as needed for Nausea. (Patient taking differently: Place 25 mg rectally every 6 (six) hours as needed for Nausea. NEW Rx - NOT YET STARTED) 10 suppository 0     tiZANidine (ZANAFLEX) 4 MG tablet Take 4 mg by mouth every 6 (six) hours as needed.   9/1/2023 at 11:00    UBROGEPANT 100 mg tablet Take 100 mg by mouth as needed.   Past Week    albuterol (PROAIR HFA) 90 mcg/actuation inhaler Inhale 2 puffs into the lungs every 4 (four) hours as needed for Wheezing. 25.5 g 0     valACYclovir (VALTREX) 1000 MG tablet Take 1 tablet (1,000 mg total) by mouth every 12 (twelve) hours. for 5 days 10 tablet 3        Inpatient Medications:   enoxparin  40 mg Subcutaneous Q24H    ertapenem (INVANZ) IVPB  1 g Intravenous Q24H    folic acid  1 mg Oral Daily    polyethylene glycol  17 g Oral Daily     acetaminophen, butalbital-acetaminophen-caffeine -40 mg, HYDROcodone-acetaminophen, HYDROmorphone, melatonin, ondansetron, prochlorperazine, senna-docusate 8.6-50 mg, sodium chloride 0.9%    Review of patient's allergies indicates:   Allergen Reactions    Cyclobenzaprine Rash and Other (See Comments)     Leg cramps    Pregabalin Itching, Other (See Comments) and Hives     Bruising and headaches    Sulfa (sulfonamide antibiotics) Nausea And Vomiting    Morphine Rash and Hives     "Sulfamethoxazole-trimethoprim Rash and Nausea And Vomiting     And headache    Toradol [ketorolac] Rash       Social History:   Social History     Occupational History    Not on file   Tobacco Use    Smoking status: Every Day     Current packs/day: 0.25     Types: Cigarettes    Smokeless tobacco: Never   Substance and Sexual Activity    Alcohol use: Yes     Comment: special occasions    Drug use: Yes     Types: Marijuana     Comment: prescription    Sexual activity: Yes     Partners: Male     Birth control/protection: None       Family History:   Family History   Problem Relation Age of Onset    Heart disease Father     Diabetes Mother     Cancer Maternal Grandfather         colon    Cancer Paternal Grandfather         colon    Breast cancer Maternal Grandmother     Breast cancer Maternal Aunt     Breast cancer Maternal Aunt     Ovarian cancer Neg Hx        Review of Systems:  A 10 point review of systems was performed and was normal, except as mentioned in the HPI, including constitutional, HEENT, heme, lymph, cardiovascular, respiratory, gastrointestinal, genitourinary, neurologic, endocrine, psychiatric and musculoskeletal.      OBJECTIVE:    Physical Exam:  24 Hour Vital Sign Ranges: Temp:  [97.6 °F (36.4 °C)-99.1 °F (37.3 °C)] 98.3 °F (36.8 °C)  Pulse:  [50-69] 54  Resp:  [16-19] 18  SpO2:  [94 %-97 %] 97 %  BP: ()/(59-81) 108/69  Most recent vitals: /69   Pulse (!) 54   Temp 98.3 °F (36.8 °C) (Oral)   Resp 18   Ht 5' 2" (1.575 m)   Wt 46.1 kg (101 lb 10.1 oz)   SpO2 97%   Breastfeeding No   BMI 18.59 kg/m²    GEN: well-developed, well-nourished, awake and alert, non-toxic appearing adult  HEENT: PERRL, sclera anicteric, oral mucosa pink and moist without lesion  NECK: trachea midline; Good ROM  CV: regular rate and rhythm, no murmurs or gallops  RESP: clear to auscultation bilaterally, no wheezes, rhonci or rales  ABD: soft, non-tender, non-distended, normal bowel sounds  EXT: no swelling " "or edema, 2+ pulses distally  SKIN: no rashes or jaundice  PSYCH: normal affect    Labs:   Recent Labs     09/01/23  1336 09/02/23  0549 09/03/23  0656   WBC 13.07* 7.42 7.36   MCV 97 99* 100*    242 269     Recent Labs     09/01/23  1336 09/02/23  0549 09/03/23  0656    141 138   K 3.7 3.6 4.7    109 107   CO2 24 29 30*   BUN 8 6 8   GLU 90 82 80     No results for input(s): "ALB" in the last 72 hours.    Invalid input(s): "ALKP", "SGOT", "SGPT", "TBIL", "DBIL", "TPRO"  No results for input(s): "PT", "INR", "PTT" in the last 72 hours.      Radiology Review:  US Retroperitoneal Complete   Final Result      MRI MRCP Without Contrast   Final Result      CT Abdomen Pelvis With Contrast   Final Result      US Pelvis Comp with Transvag NON-OB (xpd)   Final Result      X-Ray Chest AP Portable   Final Result            IMPRESSION / RECOMMENDATIONS:  Weight loss and concern for CBD stricture  -EUS Tuesday to evaluate for malignancy    Thank you for this consult.    Darrell David  9/3/2023  2:09 PM        "

## 2023-09-03 NOTE — PROGRESS NOTES
[facility] Medicine  Progress Note    Patient Name: Jaye Martinez  MRN: [id number]  Patient Class: OP- Observation   Admission Date: 9/1/2023  Length of Stay: 0 days  Attending Physician: Breanna Shah MD  Primary Care Provider: Steve Gonzalez MD        Subjective:     Principal Problem:Pyelonephritis        HPI:  Pt is a 44 y/o F with hx of Recurrent UTI, Bladder Incontinence, Ovarian cysts who is here with recurrent UTI and abdominal pain after starting treatment with Macrobid for resistant UTI on 08/26.  She has had continued pain and dysuria since that time.  Additionally, she was recently diagnosed with COVID-19 and was planned to have a bladder lift/hysterectomy on 8/28 however this was canceled due to her COVID-19 diagnosis.  She has continued to progressively feel worse.  She has no nausea or vomiting.  No diarrhea.  She denies fever or chills.  She says that her abdominal pain has been progressively worse over the past couple of days.  And feels like a twisting inside.  She is been compliant with her medications.     In the ED:  T 98.5°, P 73, R 24, BP 83/69 > improved to 111/66, O2 sat 99% on room air.  WBC 13.07, hemoglobin 13.8, chemistry panel unremarkable, troponin normal, lactic acid 1.6, procalcitonin 0.05, UDS positive for opiates, barbiturates, marijuana (she has prescriptions for 2 of these) , urinalysis suggestive of urinary tract infection.  Urine culture sent, blood culture sent.  Urine pregnancy test negative.  CT abdomen with biliary ductal dilation however transaminases are normal.  It was further dilated from previous however will proceed with MRCP.  Pelvic ultrasound showing multiple ovarian cysts with 1 small cyst showing some possible hemorrhage, but good blood flow.  Will admit for complicated urinary tract infection with resistant bacteria requiring IV antibiotics, abdominal pain question of whether this may be related to her ovarian  cysts.      Overview/Hospital Course:  Patient with history of ovarian cyst with bladder incontinence with prolapse, followed by gynecologist Dr. Moore, was scheduled for surgery but postponed due to testing positive for COVID-19.  History of drug resistant UTI, stated she started Macrobid on 8/29/23.  Presented due to nausea, vomiting, lower abdominal pain with back pain.  Hypotensive with systolics in the 80s in the ED, improved with IV fluids.  Procalcitonin negative.  Multiple imaging studies, dilated CBD post cholecystectomy, MRCP no evidence of choledocholithiasis but concern for possible stricture at ampulla, subsequently mild elevation in CMP, GI consulted and planning EUS on Tuesday. No evidence of urinary obstruction/hydronephrosis.  She was admitted, started on ertapenem with ID and gynecology consult.  On 09/03 midline in place, EUS planned for Tuesday, case management still working on home IV antibiotics.      Interval History: Patient states overall she feels improved today. Still intermittent nausea but tolerating oral intake, lower abdominal pain still present but improving, states appetite is also improving, urinating more and clear. She has been ambulating the hallways today.  T-max 99.1°.  Labs with hemoglobin 11.7, folic deficient, BUN/creatinine 8/0.6, mild elevation in CMP today.  Discussed with patient.  Communicated with case management and Infectious Disease.  As per GI, plans for EUS on Tuesday.    Review of Systems   Constitutional:  Negative for fever.   Gastrointestinal:  Positive for abdominal pain.   Genitourinary:  Negative for dysuria.   Psychiatric/Behavioral:  Negative for confusion.      Objective:     Vital Signs (Most Recent):  Temp: 98.3 °F (36.8 °C) (09/03/23 1208)  Pulse: (!) 54 (09/03/23 1208)  Resp: 18 (09/03/23 1208)  BP: 108/69 (09/03/23 1208)  SpO2: 97 % (09/03/23 1208) Vital Signs (24h Range):  Temp:  [97.6 °F (36.4 °C)-99.1 °F (37.3 °C)] 98.3 °F (36.8 °C)  Pulse:   "[50-69] 54  Resp:  [16-19] 18  SpO2:  [94 %-97 %] 97 %  BP: ()/(59-81) 108/69     Weight: 46.1 kg (101 lb 10.1 oz)  Body mass index is 18.59 kg/m².    Intake/Output Summary (Last 24 hours) at 9/3/2023 1433  Last data filed at 9/2/2023 2343  Gross per 24 hour   Intake 590 ml   Output --   Net 590 ml         Physical Exam  Vitals and nursing note reviewed.   Constitutional:       General: She is not in acute distress.     Appearance: She is not ill-appearing, toxic-appearing or diaphoretic.      Comments: Sitting in bed, NAD, cooperative   HENT:      Head: Normocephalic and atraumatic.      Mouth/Throat:      Mouth: Mucous membranes are moist.   Cardiovascular:      Rate and Rhythm: Normal rate and regular rhythm.      Comments: Warm peripheries, no LE edema  Pulmonary:      Comments: On RA  Abdominal:      General: Bowel sounds are normal.      Palpations: Abdomen is soft.   Musculoskeletal:      Cervical back: Neck supple.   Skin:     General: Skin is warm.      Comments: LUE midline in place   Neurological:      Mental Status: She is alert and oriented to person, place, and time.   Psychiatric:         Mood and Affect: Mood normal.             Significant Labs: BMP:   Recent Labs   Lab 09/03/23  0656   GLU 80      K 4.7      CO2 30*   BUN 8   CREATININE 0.6   CALCIUM 8.0*     CBC:   Recent Labs   Lab 09/02/23  0549 09/03/23  0656   WBC 7.42 7.36   HGB 12.4 11.7*   HCT 37.7 36.7*    269     CMP:   Recent Labs   Lab 09/02/23  0549 09/03/23  0656    138   K 3.6 4.7    107   CO2 29 30*   GLU 82 80   BUN 6 8   CREATININE 0.5 0.6   CALCIUM 8.0* 8.0*   PROT  --  5.6*   ALBUMIN  --  2.8*   BILITOT  --  0.2   ALKPHOS  --  144*   AST  --  53*   ALT  --  122*   ANIONGAP 3* 1*     Cardiac Markers: No results for input(s): "CKMB", "MYOGLOBIN", "BNP", "TROPISTAT" in the last 48 hours.  Lactic Acid: No results for input(s): "LACTATE" in the last 48 hours.  Magnesium: No results for input(s): " ""MG" in the last 48 hours.  POCT Glucose: No results for input(s): "POCTGLUCOSE" in the last 48 hours.    Significant Imaging: I have reviewed all pertinent imaging results/findings within the past 24 hours.      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 Moore  Appreciate Infectious Disease input   Appreciate Gastroenterology input, EUS planned on Tuesday  Further plan as per hospital course     VTE Risk Mitigation (From admission, onward)         Ordered     enoxaparin injection 40 mg  Every 24 hours (non-standard times)         09/02/23 1333     IP VTE HIGH RISK PATIENT  Once         09/01/23 2035     Place sequential compression device  Until discontinued         09/01/23 2035                Discharge Planning   TANESHA:      Code Status: Full Code   Is the patient medically ready for discharge?:     Reason for patient still in hospital (select all that apply): Patient trending condition  Discharge Plan A: Home with family                  Breanna Shah MD  Department of Hospital Medicine   [facility]  "

## 2023-09-03 NOTE — SUBJECTIVE & OBJECTIVE
Interval History: Patient states overall she feels improved today. Still intermittent nausea but tolerating oral intake, lower abdominal pain still present but improving, states appetite is also improving, urinating more and clear. She has been ambulating the hallways today.  T-max 99.1°.  Labs with hemoglobin 11.7, folic deficient, BUN/creatinine 8/0.6, mild elevation in CMP today.  Discussed with patient.  Communicated with case management and Infectious Disease.  As per GI, plans for EUS on Tuesday.    Review of Systems   Constitutional:  Negative for fever.   Gastrointestinal:  Positive for abdominal pain.   Genitourinary:  Negative for dysuria.   Psychiatric/Behavioral:  Negative for confusion.      Objective:     Vital Signs (Most Recent):  Temp: 98.3 °F (36.8 °C) (09/03/23 1208)  Pulse: (!) 54 (09/03/23 1208)  Resp: 18 (09/03/23 1208)  BP: 108/69 (09/03/23 1208)  SpO2: 97 % (09/03/23 1208) Vital Signs (24h Range):  Temp:  [97.6 °F (36.4 °C)-99.1 °F (37.3 °C)] 98.3 °F (36.8 °C)  Pulse:  [50-69] 54  Resp:  [16-19] 18  SpO2:  [94 %-97 %] 97 %  BP: ()/(59-81) 108/69     Weight: 46.1 kg (101 lb 10.1 oz)  Body mass index is 18.59 kg/m².    Intake/Output Summary (Last 24 hours) at 9/3/2023 1433  Last data filed at 9/2/2023 2343  Gross per 24 hour   Intake 590 ml   Output --   Net 590 ml         Physical Exam  Vitals and nursing note reviewed.   Constitutional:       General: She is not in acute distress.     Appearance: She is not ill-appearing, toxic-appearing or diaphoretic.      Comments: Sitting in bed, NAD, cooperative   HENT:      Head: Normocephalic and atraumatic.      Mouth/Throat:      Mouth: Mucous membranes are moist.   Cardiovascular:      Rate and Rhythm: Normal rate and regular rhythm.      Comments: Warm peripheries, no LE edema  Pulmonary:      Comments: On RA  Abdominal:      General: Bowel sounds are normal.      Palpations: Abdomen is soft.   Musculoskeletal:      Cervical back: Neck  "supple.   Skin:     General: Skin is warm.      Comments: LUE midline in place   Neurological:      Mental Status: She is alert and oriented to person, place, and time.   Psychiatric:         Mood and Affect: Mood normal.             Significant Labs: BMP:   Recent Labs   Lab 09/03/23  0656   GLU 80      K 4.7      CO2 30*   BUN 8   CREATININE 0.6   CALCIUM 8.0*     CBC:   Recent Labs   Lab 09/02/23  0549 09/03/23  0656   WBC 7.42 7.36   HGB 12.4 11.7*   HCT 37.7 36.7*    269     CMP:   Recent Labs   Lab 09/02/23  0549 09/03/23  0656    138   K 3.6 4.7    107   CO2 29 30*   GLU 82 80   BUN 6 8   CREATININE 0.5 0.6   CALCIUM 8.0* 8.0*   PROT  --  5.6*   ALBUMIN  --  2.8*   BILITOT  --  0.2   ALKPHOS  --  144*   AST  --  53*   ALT  --  122*   ANIONGAP 3* 1*     Cardiac Markers: No results for input(s): "CKMB", "MYOGLOBIN", "BNP", "TROPISTAT" in the last 48 hours.  Lactic Acid: No results for input(s): "LACTATE" in the last 48 hours.  Magnesium: No results for input(s): "MG" in the last 48 hours.  POCT Glucose: No results for input(s): "POCTGLUCOSE" in the last 48 hours.    Significant Imaging: I have reviewed all pertinent imaging results/findings within the past 24 hours.  "

## 2023-09-03 NOTE — PLAN OF CARE
Received call from Catrachita with [facility] [phone]. They can accept patient but will need home health setup.      Received call from Anuradha with [facility] [phone].  They can accept patient.

## 2023-09-03 NOTE — PLAN OF CARE
Spoke to Catrachita with [facility] regarding possible co-payments.  Catrachita spoke to pharmacist who said patient will have to pay cash for medication until they can get authorization from [facility] on Tuesday.  Catrachita will call CM with a cash price.      Received call from Catrachita and yanez price for IV abx would be approximately $800.  Spoke to patient who stated she is unable to pay for the medicine.

## 2023-09-04 LAB
ALBUMIN SERPL BCP-MCNC: 2.8 G/DL (ref 3.5–5.2)
ALP SERPL-CCNC: 137 U/L (ref 55–135)
ALT SERPL W/O P-5'-P-CCNC: 89 U/L (ref 10–44)
ANION GAP SERPL CALC-SCNC: 3 MMOL/L (ref 8–16)
AST SERPL-CCNC: 26 U/L (ref 10–40)
BILIRUB SERPL-MCNC: 0.3 MG/DL (ref 0.1–1)
BUN SERPL-MCNC: 10 MG/DL (ref 6–20)
CALCIUM SERPL-MCNC: 8 MG/DL (ref 8.7–10.5)
CHLORIDE SERPL-SCNC: 107 MMOL/L (ref 95–110)
CO2 SERPL-SCNC: 27 MMOL/L (ref 23–29)
CREAT SERPL-MCNC: 0.5 MG/DL (ref 0.5–1.4)
EST. GFR  (NO RACE VARIABLE): >60 ML/MIN/1.73 M^2
GLUCOSE SERPL-MCNC: 83 MG/DL (ref 70–110)
POTASSIUM SERPL-SCNC: 3.5 MMOL/L (ref 3.5–5.1)
PROT SERPL-MCNC: 5.7 G/DL (ref 6–8.4)
SODIUM SERPL-SCNC: 137 MMOL/L (ref 136–145)

## 2023-09-04 PROCEDURE — 25000003 PHARM REV CODE 250: Performed by: INTERNAL MEDICINE

## 2023-09-04 PROCEDURE — 63600175 PHARM REV CODE 636 W HCPCS: Performed by: INTERNAL MEDICINE

## 2023-09-04 PROCEDURE — 25000003 PHARM REV CODE 250: Performed by: STUDENT IN AN ORGANIZED HEALTH CARE EDUCATION/TRAINING PROGRAM

## 2023-09-04 PROCEDURE — 36415 COLL VENOUS BLD VENIPUNCTURE: CPT | Performed by: INTERNAL MEDICINE

## 2023-09-04 PROCEDURE — 63600175 PHARM REV CODE 636 W HCPCS: Performed by: STUDENT IN AN ORGANIZED HEALTH CARE EDUCATION/TRAINING PROGRAM

## 2023-09-04 PROCEDURE — 12000002 HC ACUTE/MED SURGE SEMI-PRIVATE ROOM

## 2023-09-04 PROCEDURE — 80053 COMPREHEN METABOLIC PANEL: CPT | Performed by: INTERNAL MEDICINE

## 2023-09-04 PROCEDURE — 99233 SBSQ HOSP IP/OBS HIGH 50: CPT | Mod: ,,, | Performed by: STUDENT IN AN ORGANIZED HEALTH CARE EDUCATION/TRAINING PROGRAM

## 2023-09-04 PROCEDURE — 99233 PR SUBSEQUENT HOSPITAL CARE,LEVL III: ICD-10-PCS | Mod: ,,, | Performed by: STUDENT IN AN ORGANIZED HEALTH CARE EDUCATION/TRAINING PROGRAM

## 2023-09-04 RX ORDER — MUPIROCIN 20 MG/G
OINTMENT TOPICAL 2 TIMES DAILY
Status: DISCONTINUED | OUTPATIENT
Start: 2023-09-04 | End: 2023-09-07 | Stop reason: HOSPADM

## 2023-09-04 RX ADMIN — ERTAPENEM 1 G: 1 INJECTION INTRAMUSCULAR; INTRAVENOUS at 10:09

## 2023-09-04 RX ADMIN — BUTALBITAL, ACETAMINOPHEN AND CAFFEINE 1 TABLET: 325; 50; 40 TABLET ORAL at 08:09

## 2023-09-04 RX ADMIN — FOLIC ACID 1 MG: 1 TABLET ORAL at 09:09

## 2023-09-04 RX ADMIN — ENOXAPARIN SODIUM 40 MG: 40 INJECTION SUBCUTANEOUS at 08:09

## 2023-09-04 RX ADMIN — MUPIROCIN 1 G: 20 OINTMENT TOPICAL at 08:09

## 2023-09-04 RX ADMIN — ONDANSETRON 4 MG: 2 INJECTION INTRAMUSCULAR; INTRAVENOUS at 08:09

## 2023-09-04 NOTE — PROGRESS NOTES
[facility]  Department of Infectious Disease  Progress Note        PATIENT NAME: Jaye Martinez  MRN: [id number]  TODAY'S DATE: 09/04/2023  ADMIT DATE: 9/1/2023    PRINCIPLE PROBLEM: Pyelonephritis    INTERVAL HISTORY      9/4:  Interim reviewed, patient seen examined at bedside, she reports her urinary symptoms are resolved, urine is clear, non foul-smelling, left flank tenderness is resolved.  Kidney ultrasound revealed left kidney with simple cyst, no hydronephrosis. Seen by GI, plan for EUS tomorrow for biliary ductal dilatation, rule out malignancy.  Discussed with hospitalist, due to insurance issues, awaiting clearance for patient to continue IV antibiotics at home.    Antibiotics (From admission, onward)      Start     Stop Route Frequency Ordered    09/04/23 2100  mupirocin 2 % ointment         09/09/23 2059 Nasl 2 times daily 09/04/23 1137    09/01/23 1845  ertapenem (INVANZ) 1 g in sodium chloride 0.9% 100 mL IVPB         -- IV Every 24 hours (non-standard times) 09/01/23 1731            ASSESSMENT and PLAN     ESBL E coli UTI in the setting of bladder/uterine prolapse, she has planned surgery outpatient by Gynecology (hysterectomy and cystopexy)  Urine culture 8/25 ESBL E coli  Blood cultures x 2 no growth to date, pending final (on Macrobid)  CT scan biliary ductal dilatation, multiple R ovarian cysts. And L kidney simple cyst, no hydronephrosis.     COVID 19 +, 8/24, asymptomatic, on room air     Biliary ductal dilatation, GI following   Plan for EUS 9/5     PMHx: anxiety, prior close fracture T7-T12, SLE on chronic prednisone 5 mg daily     Recommendations:    Anticipating dc on Ertapenem 1g IV daily for 7 days, end date 9/7/23  CBC w/diff, CMP x 1   Remove Midline after completing therapy   Follow up Gyn outpt for planned surgery   Follow up ID clinic/any provider in 2-3 weeks  Follow up with PCP outpt  Contact precautions     D/w patient at length, Dr David, Dr Shah     Will sign  off, call us back with any questions      SUBJECTIVE       Review of Systems   Constitutional:  Negative for chills and fever.   HENT:  Negative for sinus pain.    Respiratory:  Negative for cough and shortness of breath.    Cardiovascular:  Negative for chest pain and leg swelling.   Gastrointestinal:  Negative for abdominal pain, diarrhea and nausea.   Genitourinary:  Negative for dysuria and frequency.   Musculoskeletal:  Negative for back pain.   Skin:  Negative for rash.   Neurological:  Negative for headaches.   Psychiatric/Behavioral:  Negative for confusion.            OBJECTIVE     Temp:  [98.2 °F (36.8 °C)-98.6 °F (37 °C)] 98.6 °F (37 °C)  Pulse:  [50-58] 56  Resp:  [17-20] 18  SpO2:  [94 %-97 %] 95 %  BP: ()/(52-79) 149/78         Physical Exam  Constitutional:       Comments: Very thin lady   HENT:      Mouth/Throat:      Mouth: Mucous membranes are moist.      Pharynx: Oropharynx is clear.      Comments: Fair teeth, poor oral hygiene  Eyes:      Conjunctiva/sclera: Conjunctivae normal.      Pupils: Pupils are equal, round, and reactive to light.   Cardiovascular:      Rate and Rhythm: Normal rate and regular rhythm.      Pulses: Normal pulses.      Heart sounds: Normal heart sounds.   Pulmonary:      Effort: Pulmonary effort is normal.      Breath sounds: Normal breath sounds.   Abdominal:      General: Bowel sounds are normal.      Palpations: Abdomen is soft.      Tenderness: There is no abdominal tenderness. There is no rebound.   Musculoskeletal:      Cervical back: Normal range of motion and neck supple.      Right lower leg: No edema.      Left lower leg: No edema.   Skin:     General: Skin is warm.      Capillary Refill: Capillary refill takes less than 2 seconds.   Neurological:      General: No focal deficit present.      Mental Status: She is alert and oriented to person, place, and time.      Sensory: No sensory deficit.      Motor: No weakness.   Psychiatric:         Thought Content:  "Thought content normal.           Wounds: None  VAD: Midline  Isolation: Contact/ESBL    CBC LAST 7 DAYS  Recent Labs   Lab 09/01/23  1336 09/02/23  0549 09/03/23  0656   WBC 13.07* 7.42 7.36   RBC 4.21 3.81* 3.68*   HGB 13.8 12.4 11.7*   HCT 40.9 37.7 36.7*   MCV 97 99* 100*   MCH 32.8* 32.5* 31.8*   MCHC 33.7 32.9 31.9*   RDW 13.8 14.1 14.1    242 269   MPV 12.2 12.3 12.6   GRAN 69.8  9.1* 66.2  4.9 46.9  3.5   LYMPH 23.3  3.1 23.3  1.7 40.1  3.0   MONO 5.2  0.7 7.8  0.6 9.9  0.7   BASO 0.03 0.05 0.05   NRBC 0 0 0       CHEMISTRY LAST 7 DAYS  Recent Labs   Lab 09/01/23 1336 09/02/23  0549 09/03/23  0656 09/04/23  0613    141 138 137   K 3.7 3.6 4.7 3.5    109 107 107   CO2 24 29 30* 27   ANIONGAP 10 3* 1* 3*   BUN 8 6 8 10   CREATININE 0.6 0.5 0.6 0.5   GLU 90 82 80 83   CALCIUM 8.8 8.0* 8.0* 8.0*   MG 1.8  --   --   --    ALBUMIN 4.0  --  2.8* 2.8*   PROT 7.5  --  5.6* 5.7*   ALKPHOS 131  --  144* 137*   ALT 38  --  122* 89*   AST 23  --  53* 26   BILITOT 0.6  --  0.2 0.3       Estimated Creatinine Clearance: 106.7 mL/min (based on SCr of 0.5 mg/dL).    INFLAMMATORY/PROCAL  LAST 7 DAYS  Recent Labs   Lab 09/01/23 1336   PROCAL <0.05     No results found for: "ESR"  CRP   Date Value Ref Range Status   06/08/2023 0.8 0.0 - 8.2 mg/L Final   11/16/2022 6.4 0.0 - 8.2 mg/L Final   09/06/2013 1.0 0.0 - 8.2 mg/L Final     PRIOR  MICROBIOLOGY:           Urine culture [[id number]] (Abnormal)  Collected: 08/25/23 2241    Order Status: Completed Specimen: Urine Updated: 08/29/23 0658      Urine Culture, Routine ESCHERICHIA COLI ESBL   >100,000 cfu/ml   Results called to and read back by:Wisam Hatch RN ED  08/29/2023  06:56   JBM    Abnormal     Narrative:      In and Out Cath as needed it patient unable to void   Specimen Source->Urine    Susceptibility              Escherichia coli ESBL       CULTURE, URINE       Amp/Sulbactam 8/4 mcg/mL Sensitive       Ampicillin >16 mcg/mL Resistant       " Cefazolin >16 mcg/mL Resistant       Cefepime 16 mcg/mL Resistant       Ceftriaxone >32 mcg/mL Resistant       Ciprofloxacin >2 mcg/mL Resistant       Ertapenem <=0.5 mcg/mL Sensitive       Gentamicin >8 mcg/mL Resistant       Levofloxacin >4 mcg/mL Resistant       Meropenem <=1 mcg/mL Sensitive       Nitrofurantoin <=32 mcg/mL Sensitive       Piperacillin/Tazo <=16 mcg/mL Sensitive       Tetracycline >8 mcg/mL Resistant       Tobramycin >8 mcg/mL Resistant       Trimeth/Sulfa <=2/38 mcg/mL Sensitive               LAST 7 DAYS MICROBIOLOGY   Microbiology Results (last 7 days)       Procedure Component Value Units Date/Time    Blood culture [[id number]] Collected: 09/01/23 1327    Order Status: Completed Specimen: Blood from Antecubital, Right Updated: 09/04/23 1432     Blood Culture, Routine No Growth to date      No Growth to date      No Growth to date      No Growth to date    Blood culture [[id number]] Collected: 09/01/23 1327    Order Status: Completed Specimen: Blood Updated: 09/04/23 1432     Blood Culture, Routine No Growth to date      No Growth to date      No Growth to date      No Growth to date    Urine Culture High Risk [[id number]] Collected: 09/01/23 1708    Order Status: Sent Specimen: Urine, Clean Catch Updated: 09/01/23 1709            CURRENT/PREVIOUS VISIT EKG  Results for orders placed or performed during the hospital encounter of 09/01/23   EKG 12-lead    Collection Time: 09/01/23  2:56 PM    Narrative    Test Reason : I95.9,    Vent. Rate : 055 BPM     Atrial Rate : 055 BPM     P-R Int : 150 ms          QRS Dur : 080 ms      QT Int : 450 ms       P-R-T Axes : 072 080 070 degrees     QTc Int : 430 ms    Sinus bradycardia with sinus arrhythmia  Otherwise normal ECG  When compared with ECG of 24-AUG-2023 17:22,  Vent. rate has decreased BY  36 BPM  Inverted T waves have replaced nonspecific T wave abnormality in Anterior  leads    Referred By: [email or web address]   SELF           Confirmed By:         Significant Labs: All pertinent labs within the past 24 hours have been reviewed.     Significant Imaging: I have reviewed all relevant and available imaging results/findings within the past 24 hours.      Destiny Alfaro MD    Date of Service: 09/04/2023  2:34 PM

## 2023-09-04 NOTE — PROGRESS NOTES
[facility]  Progress Note    Patient Name: Jaye Martinez  MRN: [id number]  Patient Class: IP- Inpatient   Admission Date: 9/1/2023  Length of Stay: 1 days  Attending Physician: Breanna Shah MD  Primary Care Provider: Steve Gonzalez MD        Subjective:     Principal Problem:Pyelonephritis        HPI:  Pt is a 42 y/o F with hx of Recurrent UTI, Bladder Incontinence, Ovarian cysts who is here with recurrent UTI and abdominal pain after starting treatment with Macrobid for resistant UTI on 08/26.  She has had continued pain and dysuria since that time.  Additionally, she was recently diagnosed with COVID-19 and was planned to have a bladder lift/hysterectomy on 8/28 however this was canceled due to her COVID-19 diagnosis.  She has continued to progressively feel worse.  She has no nausea or vomiting.  No diarrhea.  She denies fever or chills.  She says that her abdominal pain has been progressively worse over the past couple of days.  And feels like a twisting inside.  She is been compliant with her medications.     In the ED:  T 98.5°, P 73, R 24, BP 83/69 > improved to 111/66, O2 sat 99% on room air.  WBC 13.07, hemoglobin 13.8, chemistry panel unremarkable, troponin normal, lactic acid 1.6, procalcitonin 0.05, UDS positive for opiates, barbiturates, marijuana (she has prescriptions for 2 of these) , urinalysis suggestive of urinary tract infection.  Urine culture sent, blood culture sent.  Urine pregnancy test negative.  CT abdomen with biliary ductal dilation however transaminases are normal.  It was further dilated from previous however will proceed with MRCP.  Pelvic ultrasound showing multiple ovarian cysts with 1 small cyst showing some possible hemorrhage, but good blood flow.  Will admit for complicated urinary tract infection with resistant bacteria requiring IV antibiotics, abdominal pain question of whether this may be related to her ovarian  cysts.      Overview/Hospital Course:  Patient with history of ovarian cyst with bladder incontinence with prolapse, followed by gynecologist Dr. Moore, was scheduled for surgery but postponed due to testing positive for COVID-19.  History of drug resistant UTI, stated she started Macrobid on 8/29/23.  Presented due to nausea, vomiting, lower abdominal pain with back pain.  Hypotensive with systolics in the 80s in the ED, improved with IV fluids.  Procalcitonin negative.  Multiple imaging studies, dilated CBD post cholecystectomy, MRCP no evidence of choledocholithiasis but concern for possible stricture at ampulla, subsequently mild elevation in CMP, GI consulted and planning EUS on Tuesday. No evidence of urinary obstruction/hydronephrosis.  She was admitted, started on ertapenem with ID and gynecology consult.  On 09/03 midline in place, EUS planned for Tuesday, case management still working on home IV antibiotics.      Interval History:  Patient states abdominal discomfort does feel improved, mild soreness lower abdomen, feels less bloated.  States she had severe nausea yesterday evening but improved today, reports she ate [employer] for breakfast.  Had bowel movement yesterday and today.  Afebrile with T-max 98.6°, on room air.  Labs with AST 26, ALT 86, , T bilirubin 0.3.  Discussed with patient.  Infectious Disease at bedside.    Review of Systems   Constitutional:  Negative for fever.   Gastrointestinal:  Positive for abdominal pain.   Genitourinary:  Negative for difficulty urinating.   Psychiatric/Behavioral:  Negative for confusion.      Objective:     Vital Signs (Most Recent):  Temp: 98.6 °F (37 °C) (09/04/23 0825)  Pulse: (!) 56 (notified nurse kacey) (09/04/23 0825)  Resp: 18 (09/04/23 0825)  BP: (!) 149/78 (notified nurse kacey) (09/04/23 0825)  SpO2: 95 % (09/04/23 0825) Vital Signs (24h Range):  Temp:  [98.2 °F (36.8 °C)-98.6 °F (37 °C)] 98.6 °F (37 °C)  Pulse:  [50-58] 56  Resp:   "[17-20] 18  SpO2:  [94 %-97 %] 95 %  BP: ()/(52-79) 149/78     Weight: 46.1 kg (101 lb 10.1 oz)  Body mass index is 18.59 kg/m².    Intake/Output Summary (Last 24 hours) at 9/4/2023 1130  Last data filed at 9/3/2023 1200  Gross per 24 hour   Intake 360 ml   Output 300 ml   Net 60 ml         Physical Exam  Vitals and nursing note reviewed.   Constitutional:       General: She is not in acute distress.     Appearance: She is not ill-appearing, toxic-appearing or diaphoretic.      Comments: Sitting in bed, NAD, cooperative   HENT:      Head: Normocephalic and atraumatic.      Mouth/Throat:      Mouth: Mucous membranes are moist.   Cardiovascular:      Rate and Rhythm: Regular rhythm.      Comments: Warm peripheries, no LE edema  Pulmonary:      Comments: On RA  Abdominal:      General: Bowel sounds are normal.      Palpations: Abdomen is soft.   Musculoskeletal:      Cervical back: Neck supple.   Skin:     General: Skin is warm.      Comments: LUE midline in place   Neurological:      Mental Status: She is alert and oriented to person, place, and time.   Psychiatric:         Mood and Affect: Mood normal.             Significant Labs: CBC:   Recent Labs   Lab 09/03/23  0656   WBC 7.36   HGB 11.7*   HCT 36.7*        CMP:   Recent Labs   Lab 09/03/23  0656 09/04/23  0613    137   K 4.7 3.5    107   CO2 30* 27   GLU 80 83   BUN 8 10   CREATININE 0.6 0.5   CALCIUM 8.0* 8.0*   PROT 5.6* 5.7*   ALBUMIN 2.8* 2.8*   BILITOT 0.2 0.3   ALKPHOS 144* 137*   AST 53* 26   * 89*   ANIONGAP 1* 3*     Cardiac Markers: No results for input(s): "CKMB", "MYOGLOBIN", "BNP", "TROPISTAT" in the last 48 hours.  Lactic Acid: No results for input(s): "LACTATE" in the last 48 hours.  Magnesium: No results for input(s): "MG" in the last 48 hours.    Significant Imaging: I have reviewed all pertinent imaging results/findings within the past 24 hours.      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 Moore  Appreciate Infectious Disease input   Appreciate Gastroenterology input  Further plan as per hospital course       VTE Risk Mitigation (From admission, onward)         Ordered     enoxaparin injection 40 mg  Every 24 hours (non-standard times)         09/02/23 1333     IP VTE HIGH RISK PATIENT  Once         09/01/23 2035     Place sequential compression device  Until discontinued         09/01/23 2035                Discharge Planning   TANESHA:      Code Status: Full Code   Is the patient medically ready for discharge?:     Reason for patient still in hospital (select all that apply): Patient trending condition  Discharge Plan A: Home with family                  Breanna Shah MD  Department of Hospital Medicine   [facility]

## 2023-09-04 NOTE — SUBJECTIVE & OBJECTIVE
Interval History:  Patient states abdominal discomfort does feel improved, mild soreness lower abdomen, feels less bloated.  States she had severe nausea yesterday evening but improved today, reports she ate [employer] for breakfast.  Had bowel movement yesterday and today.  Afebrile with T-max 98.6°, on room air.  Labs with AST 26, ALT 86, , T bilirubin 0.3.  Discussed with patient.  Infectious Disease at bedside.    Review of Systems   Constitutional:  Negative for fever.   Gastrointestinal:  Positive for abdominal pain.   Genitourinary:  Negative for difficulty urinating.   Psychiatric/Behavioral:  Negative for confusion.      Objective:     Vital Signs (Most Recent):  Temp: 98.6 °F (37 °C) (09/04/23 0825)  Pulse: (!) 56 (notified nurse kacey) (09/04/23 0825)  Resp: 18 (09/04/23 0825)  BP: (!) 149/78 (notified nurse kacey) (09/04/23 0825)  SpO2: 95 % (09/04/23 0825) Vital Signs (24h Range):  Temp:  [98.2 °F (36.8 °C)-98.6 °F (37 °C)] 98.6 °F (37 °C)  Pulse:  [50-58] 56  Resp:  [17-20] 18  SpO2:  [94 %-97 %] 95 %  BP: ()/(52-79) 149/78     Weight: 46.1 kg (101 lb 10.1 oz)  Body mass index is 18.59 kg/m².    Intake/Output Summary (Last 24 hours) at 9/4/2023 1130  Last data filed at 9/3/2023 1200  Gross per 24 hour   Intake 360 ml   Output 300 ml   Net 60 ml         Physical Exam  Vitals and nursing note reviewed.   Constitutional:       General: She is not in acute distress.     Appearance: She is not ill-appearing, toxic-appearing or diaphoretic.      Comments: Sitting in bed, NAD, cooperative   HENT:      Head: Normocephalic and atraumatic.      Mouth/Throat:      Mouth: Mucous membranes are moist.   Cardiovascular:      Rate and Rhythm: Regular rhythm.      Comments: Warm peripheries, no LE edema  Pulmonary:      Comments: On RA  Abdominal:      General: Bowel sounds are normal.      Palpations: Abdomen is soft.   Musculoskeletal:      Cervical back: Neck supple.   Skin:     General: Skin is  "warm.      Comments: LUE midline in place   Neurological:      Mental Status: She is alert and oriented to person, place, and time.   Psychiatric:         Mood and Affect: Mood normal.             Significant Labs: CBC:   Recent Labs   Lab 09/03/23  0656   WBC 7.36   HGB 11.7*   HCT 36.7*        CMP:   Recent Labs   Lab 09/03/23  0656 09/04/23  0613    137   K 4.7 3.5    107   CO2 30* 27   GLU 80 83   BUN 8 10   CREATININE 0.6 0.5   CALCIUM 8.0* 8.0*   PROT 5.6* 5.7*   ALBUMIN 2.8* 2.8*   BILITOT 0.2 0.3   ALKPHOS 144* 137*   AST 53* 26   * 89*   ANIONGAP 1* 3*     Cardiac Markers: No results for input(s): "CKMB", "MYOGLOBIN", "BNP", "TROPISTAT" in the last 48 hours.  Lactic Acid: No results for input(s): "LACTATE" in the last 48 hours.  Magnesium: No results for input(s): "MG" in the last 48 hours.    Significant Imaging: I have reviewed all pertinent imaging results/findings within the past 24 hours.  "

## 2023-09-05 ENCOUNTER — ANESTHESIA EVENT (OUTPATIENT)
Dept: SURGERY | Facility: HOSPITAL | Age: 43
DRG: 690 | End: 2023-09-05
Payer: OTHER GOVERNMENT

## 2023-09-05 ENCOUNTER — ANESTHESIA (OUTPATIENT)
Dept: SURGERY | Facility: HOSPITAL | Age: 43
DRG: 690 | End: 2023-09-05
Payer: OTHER GOVERNMENT

## 2023-09-05 VITALS
HEART RATE: 69 BPM | OXYGEN SATURATION: 96 % | RESPIRATION RATE: 28 BRPM | SYSTOLIC BLOOD PRESSURE: 94 MMHG | DIASTOLIC BLOOD PRESSURE: 52 MMHG

## 2023-09-05 LAB
ALBUMIN SERPL BCP-MCNC: 2.9 G/DL (ref 3.5–5.2)
ALP SERPL-CCNC: 141 U/L (ref 55–135)
ALT SERPL W/O P-5'-P-CCNC: 75 U/L (ref 10–44)
ANION GAP SERPL CALC-SCNC: 5 MMOL/L (ref 8–16)
AST SERPL-CCNC: 27 U/L (ref 10–40)
BASOPHILS # BLD AUTO: 0.07 K/UL (ref 0–0.2)
BASOPHILS NFR BLD: 0.7 % (ref 0–1.9)
BILIRUB SERPL-MCNC: 0.4 MG/DL (ref 0.1–1)
BUN SERPL-MCNC: 12 MG/DL (ref 6–20)
CALCIUM SERPL-MCNC: 8.2 MG/DL (ref 8.7–10.5)
CHLORIDE SERPL-SCNC: 111 MMOL/L (ref 95–110)
CO2 SERPL-SCNC: 26 MMOL/L (ref 23–29)
CREAT SERPL-MCNC: 0.6 MG/DL (ref 0.5–1.4)
DIFFERENTIAL METHOD: ABNORMAL
EOSINOPHIL # BLD AUTO: 0.1 K/UL (ref 0–0.5)
EOSINOPHIL NFR BLD: 1.3 % (ref 0–8)
ERYTHROCYTE [DISTWIDTH] IN BLOOD BY AUTOMATED COUNT: 14.2 % (ref 11.5–14.5)
EST. GFR  (NO RACE VARIABLE): >60 ML/MIN/1.73 M^2
GLUCOSE SERPL-MCNC: 109 MG/DL (ref 70–110)
HCT VFR BLD AUTO: 35.8 % (ref 37–48.5)
HGB BLD-MCNC: 12.2 G/DL (ref 12–16)
IMM GRANULOCYTES # BLD AUTO: 0.05 K/UL (ref 0–0.04)
IMM GRANULOCYTES NFR BLD AUTO: 0.5 % (ref 0–0.5)
LYMPHOCYTES # BLD AUTO: 3.1 K/UL (ref 1–4.8)
LYMPHOCYTES NFR BLD: 30.9 % (ref 18–48)
MCH RBC QN AUTO: 33.2 PG (ref 27–31)
MCHC RBC AUTO-ENTMCNC: 34.1 G/DL (ref 32–36)
MCV RBC AUTO: 97 FL (ref 82–98)
MONOCYTES # BLD AUTO: 0.8 K/UL (ref 0.3–1)
MONOCYTES NFR BLD: 8.4 % (ref 4–15)
NEUTROPHILS # BLD AUTO: 5.8 K/UL (ref 1.8–7.7)
NEUTROPHILS NFR BLD: 58.2 % (ref 38–73)
NRBC BLD-RTO: 0 /100 WBC
PLATELET # BLD AUTO: 318 K/UL (ref 150–450)
PMV BLD AUTO: 12.6 FL (ref 9.2–12.9)
POTASSIUM SERPL-SCNC: 3.6 MMOL/L (ref 3.5–5.1)
PROT SERPL-MCNC: 5.9 G/DL (ref 6–8.4)
RBC # BLD AUTO: 3.68 M/UL (ref 4–5.4)
SODIUM SERPL-SCNC: 142 MMOL/L (ref 136–145)
WBC # BLD AUTO: 10.03 K/UL (ref 3.9–12.7)

## 2023-09-05 PROCEDURE — D9220A PRA ANESTHESIA: Mod: ANES,,, | Performed by: ANESTHESIOLOGY

## 2023-09-05 PROCEDURE — 25000003 PHARM REV CODE 250: Performed by: STUDENT IN AN ORGANIZED HEALTH CARE EDUCATION/TRAINING PROGRAM

## 2023-09-05 PROCEDURE — 88305 TISSUE EXAM BY PATHOLOGIST: CPT | Mod: TC,59 | Performed by: PATHOLOGY

## 2023-09-05 PROCEDURE — 80053 COMPREHEN METABOLIC PANEL: CPT | Performed by: INTERNAL MEDICINE

## 2023-09-05 PROCEDURE — 63600175 PHARM REV CODE 636 W HCPCS: Performed by: NURSE ANESTHETIST, CERTIFIED REGISTERED

## 2023-09-05 PROCEDURE — 37000009 HC ANESTHESIA EA ADD 15 MINS: Performed by: INTERNAL MEDICINE

## 2023-09-05 PROCEDURE — 63600175 PHARM REV CODE 636 W HCPCS: Performed by: STUDENT IN AN ORGANIZED HEALTH CARE EDUCATION/TRAINING PROGRAM

## 2023-09-05 PROCEDURE — D9220A PRA ANESTHESIA: Mod: CRNA,,, | Performed by: NURSE ANESTHETIST, CERTIFIED REGISTERED

## 2023-09-05 PROCEDURE — 63600175 PHARM REV CODE 636 W HCPCS: Performed by: INTERNAL MEDICINE

## 2023-09-05 PROCEDURE — 85025 COMPLETE CBC W/AUTO DIFF WBC: CPT | Performed by: INTERNAL MEDICINE

## 2023-09-05 PROCEDURE — 43237 ENDOSCOPIC US EXAM ESOPH: CPT | Performed by: INTERNAL MEDICINE

## 2023-09-05 PROCEDURE — 94761 N-INVAS EAR/PLS OXIMETRY MLT: CPT

## 2023-09-05 PROCEDURE — 27200043 HC FORCEPS, BIOPSY: Performed by: INTERNAL MEDICINE

## 2023-09-05 PROCEDURE — 99900035 HC TECH TIME PER 15 MIN (STAT)

## 2023-09-05 PROCEDURE — D9220A PRA ANESTHESIA: ICD-10-PCS | Mod: CRNA,,, | Performed by: NURSE ANESTHETIST, CERTIFIED REGISTERED

## 2023-09-05 PROCEDURE — D9220A PRA ANESTHESIA: ICD-10-PCS | Mod: ANES,,, | Performed by: ANESTHESIOLOGY

## 2023-09-05 PROCEDURE — 25000003 PHARM REV CODE 250: Performed by: NURSE ANESTHETIST, CERTIFIED REGISTERED

## 2023-09-05 PROCEDURE — 43239 EGD BIOPSY SINGLE/MULTIPLE: CPT | Performed by: INTERNAL MEDICINE

## 2023-09-05 PROCEDURE — 12000002 HC ACUTE/MED SURGE SEMI-PRIVATE ROOM

## 2023-09-05 PROCEDURE — 25000003 PHARM REV CODE 250: Performed by: INTERNAL MEDICINE

## 2023-09-05 PROCEDURE — 37000008 HC ANESTHESIA 1ST 15 MINUTES: Performed by: INTERNAL MEDICINE

## 2023-09-05 PROCEDURE — 36415 COLL VENOUS BLD VENIPUNCTURE: CPT | Performed by: INTERNAL MEDICINE

## 2023-09-05 RX ORDER — DEXAMETHASONE SODIUM PHOSPHATE 4 MG/ML
INJECTION, SOLUTION INTRA-ARTICULAR; INTRALESIONAL; INTRAMUSCULAR; INTRAVENOUS; SOFT TISSUE
Status: DISCONTINUED | OUTPATIENT
Start: 2023-09-05 | End: 2023-09-05

## 2023-09-05 RX ORDER — ONDANSETRON HYDROCHLORIDE 2 MG/ML
INJECTION, SOLUTION INTRAMUSCULAR; INTRAVENOUS
Status: DISCONTINUED | OUTPATIENT
Start: 2023-09-05 | End: 2023-09-05

## 2023-09-05 RX ORDER — PROPOFOL 10 MG/ML
VIAL (ML) INTRAVENOUS
Status: DISCONTINUED | OUTPATIENT
Start: 2023-09-05 | End: 2023-09-05

## 2023-09-05 RX ADMIN — MUPIROCIN 1 G: 20 OINTMENT TOPICAL at 09:09

## 2023-09-05 RX ADMIN — SODIUM CHLORIDE: 900 INJECTION, SOLUTION INTRAVENOUS at 03:09

## 2023-09-05 RX ADMIN — ERTAPENEM 1 G: 1 INJECTION INTRAMUSCULAR; INTRAVENOUS at 10:09

## 2023-09-05 RX ADMIN — ENOXAPARIN SODIUM 40 MG: 40 INJECTION SUBCUTANEOUS at 08:09

## 2023-09-05 RX ADMIN — PROPOFOL 50 MG: 10 INJECTION, EMULSION INTRAVENOUS at 03:09

## 2023-09-05 RX ADMIN — ONDANSETRON 4 MG: 2 INJECTION INTRAMUSCULAR; INTRAVENOUS at 03:09

## 2023-09-05 RX ADMIN — HYDROCODONE BITARTRATE AND ACETAMINOPHEN 1 TABLET: 5; 325 TABLET ORAL at 04:09

## 2023-09-05 RX ADMIN — PROPOFOL 30 MG: 10 INJECTION, EMULSION INTRAVENOUS at 03:09

## 2023-09-05 RX ADMIN — PROCHLORPERAZINE EDISYLATE 5 MG: 5 INJECTION INTRAMUSCULAR; INTRAVENOUS at 04:09

## 2023-09-05 RX ADMIN — MUPIROCIN 1 G: 20 OINTMENT TOPICAL at 08:09

## 2023-09-05 RX ADMIN — DEXAMETHASONE SODIUM PHOSPHATE 8 MG: 4 INJECTION, SOLUTION INTRAMUSCULAR; INTRAVENOUS at 03:09

## 2023-09-05 NOTE — ANESTHESIA PREPROCEDURE EVALUATION
09/05/2023  Jaye Martinez is a 43 y.o., female.      Pre-op Assessment    I have reviewed the Patient Summary Reports.     I have reviewed the Nursing Notes. I have reviewed the NPO Status.   I have reviewed the Medications.     Review of Systems  Anesthesia Hx:  Denies Family Hx of Anesthesia complications.  Personal Hx of Anesthesia complications, Post-Operative Nausea/Vomiting, with every anesthetic, treatment not known   Social:  Smoker, Alcohol Use Marijuana   Hematology/Oncology:     Oncology Normal    -- Anemia:   EENT/Dental:   Patient reports that most of her teeth are somewhat loose.   Cardiovascular:  Cardiovascular Normal     Pulmonary:   Recent URI (COVID positive 08/24/2023, fully resolved) Chronic cough   Renal/:   Current pyelonephritis   Hepatic/GI:   GERD Recent abdominal pain and CT shows dilated bile tyree. Patient has chronic nausea.   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 Only residual affect is bilateral leg pain.  Chronic Pain Syndrome ( patient takes hydrocodone daily.)   Endocrine:  Endocrine Normal    Psych:   anxiety          Physical Exam  General: Well nourished, Cooperative, Alert and Oriented    Airway:  Mallampati: II   Mouth Opening: Normal  TM Distance: > 6 cm  Tongue: Normal  Neck ROM: Normal ROM    Dental:  Periodontal disease    Chest/Lungs:  Clear to auscultation, Normal Respiratory Rate    Heart:  Rate: Normal  Rhythm: Regular Rhythm  Sounds: Normal        Anesthesia Plan  Type of Anesthesia, risks & benefits discussed:    Anesthesia Type: Gen Natural Airway  Intra-op Monitoring Plan: Standard ASA  Monitors  Induction:  IV  Informed Consent: Informed consent signed with the Patient and all parties understand the risks and agree with anesthesia plan.  All questions answered.   ASA Score: 3    Ready For Surgery From Anesthesia Perspective.     .

## 2023-09-05 NOTE — ANESTHESIA POSTPROCEDURE EVALUATION
Anesthesia Post Evaluation    Patient: Jaye Martinez    Procedure(s) Performed: Procedure(s) (LRB):  ULTRASOUND, UPPER GI TRACT, ENDOSCOPIC (N/A)    Final Anesthesia Type: general      Patient location during evaluation: GI PACU  Patient participation: Yes- Able to Participate  Level of consciousness: awake and alert  Post-procedure vital signs: reviewed and stable  Pain management: adequate  Airway patency: patent    PONV status at discharge: No PONV  Anesthetic complications: no      Cardiovascular status: stable  Respiratory status: unassisted  Hydration status: euvolemic  Follow-up not needed.          Vitals Value Taken Time   BP 98/61 09/05/23 1556   Temp  09/05/23 1601   Pulse 55 09/05/23 1559   Resp 13 09/05/23 1559   SpO2 98 % 09/05/23 1556   Vitals shown include unvalidated device data.      No case tracking events are documented in the log.      Pain/Shiva Score: Pain Rating Prior to Med Admin: 6 (9/4/2023  8:19 PM)

## 2023-09-05 NOTE — TRANSFER OF CARE
"Anesthesia Transfer of Care Note    Patient: Jaye Martinez    Procedure(s) Performed: Procedure(s) (LRB):  ULTRASOUND, UPPER GI TRACT, ENDOSCOPIC (N/A)    Patient location: GI    Anesthesia Type: general    Transport from OR: Transported from OR on 2-3 L/min O2 by NC with adequate spontaneous ventilation    Post pain: adequate analgesia    Post assessment: no apparent anesthetic complications and tolerated procedure well    Post vital signs: stable    Level of consciousness: responds to stimulation and sedated    Nausea/Vomiting: no nausea/vomiting    Complications: none    Transfer of care protocol was followed      Last vitals:   Visit Vitals  /61   Pulse 63   Temp 36.8 °C (98.3 °F)   Resp 16   Ht 5' 2" (1.575 m)   Wt 46.1 kg (101 lb 10.1 oz)   SpO2 96%   Breastfeeding No   BMI 18.59 kg/m²     "

## 2023-09-05 NOTE — PROGRESS NOTES
[facility] Medicine  Progress Note     Patient Name: Jaye Martinez  MRN: [id number]  Patient Class: IP- Inpatient     Admission Date: 9/1/2023  Length of Stay: 2 days  Attending Physician: Vipin Casper MD  Primary Care Provider: Steve Gonzalez MD           Subjective:      Principal Problem:Pyelonephritis           HPI:  Pt is a 42 y/o F with hx of Recurrent UTI, Bladder Incontinence, Ovarian cysts who is here with recurrent UTI and abdominal pain after starting treatment with Macrobid for resistant UTI on 08/26.  She has had continued pain and dysuria since that time.  Additionally, she was recently diagnosed with COVID-19 and was planned to have a bladder lift/hysterectomy on 8/28 however this was canceled due to her COVID-19 diagnosis.  She has continued to progressively feel worse.  She has no nausea or vomiting.  No diarrhea.  She denies fever or chills.  She says that her abdominal pain has been progressively worse over the past couple of days.  And feels like a twisting inside.  She is been compliant with her medications.      In the ED:  T 98.5°, P 73, R 24, BP 83/69 > improved to 111/66, O2 sat 99% on room air.  WBC 13.07, hemoglobin 13.8, chemistry panel unremarkable, troponin normal, lactic acid 1.6, procalcitonin 0.05, UDS positive for opiates, barbiturates, marijuana (she has prescriptions for 2 of these) , urinalysis suggestive of urinary tract infection.  Urine culture sent, blood culture sent.  Urine pregnancy test negative.  CT abdomen with biliary ductal dilation however transaminases are normal.  It was further dilated from previous however will proceed with MRCP.  Pelvic ultrasound showing multiple ovarian cysts with 1 small cyst showing some possible hemorrhage, but good blood flow.  Will admit for complicated urinary tract infection with resistant bacteria requiring IV antibiotics, abdominal pain question of whether this may be related to her ovarian cysts.         Overview/Hospital Course:  Patient with history of ovarian cyst with bladder incontinence with prolapse, followed by gynecologist Dr. Moore, was scheduled for surgery but postponed due to testing positive for COVID-19.  History of drug resistant UTI, stated she started Macrobid on 8/29/23.  Presented due to nausea, vomiting, lower abdominal pain with back pain.  Hypotensive with systolics in the 80s in the ED, improved with IV fluids.  Procalcitonin negative.  Multiple imaging studies, dilated CBD post cholecystectomy, MRCP no evidence of choledocholithiasis but concern for possible stricture at ampulla, subsequently mild elevation in CMP, GI consulted and planning EUS on Tuesday. No evidence of urinary obstruction/hydronephrosis.  She was admitted, started on ertapenem with ID and gynecology consult.  On 09/03 midline in place, EUS planned for Tuesday, case management still working on home IV antibiotics.        Interval History:      9/5- resting comfortably, mild TTP abd diffusely.  Going for EUS today.  Vitals stable.  No fever. Will need to continue IV abx for 2 more days to complete 7 day course.  CM following, attempting to set up home iv abx.      9/4- Patient states abdominal discomfort does feel improved, mild soreness lower abdomen, feels less bloated.  States she had severe nausea yesterday evening but improved today, reports she ate [employer] for breakfast.  Had bowel movement yesterday and today.  Afebrile with T-max 98.6°, on room air.  Labs with AST 26, ALT 86, , T bilirubin 0.3.  Discussed with patient.  Infectious Disease at bedside.     Review of Systems   Constitutional:  Negative for fever.   Gastrointestinal:  Positive for abdominal pain.   Genitourinary:  Negative for difficulty urinating.   Psychiatric/Behavioral:  Negative for confusion.       Objective:      Vital Signs (Most Recent):  Temp: 98.6 °F (37 °C) (09/04/23 0825)  Pulse: (!) 56 (notified nurse kacey) (09/04/23  "0825)  Resp: 18 (09/04/23 0825)  BP: (!) 149/78 (notified nurse kacey) (09/04/23 0825)  SpO2: 95 % (09/04/23 0825) Vital Signs (24h Range):  Temp:  [98.2 °F (36.8 °C)-98.6 °F (37 °C)] 98.6 °F (37 °C)  Pulse:  [50-58] 56  Resp:  [17-20] 18  SpO2:  [94 %-97 %] 95 %  BP: ()/(52-79) 149/78      Weight: 46.1 kg (101 lb 10.1 oz)  Body mass index is 18.59 kg/m².     Intake/Output Summary (Last 24 hours) at 9/4/2023 1130  Last data filed at 9/3/2023 1200      Gross per 24 hour   Intake 360 ml   Output 300 ml   Net 60 ml         Physical Exam  Vitals and nursing note reviewed.   Constitutional:       General: She is not in acute distress.     Appearance: She is not ill-appearing, toxic-appearing or diaphoretic.      Comments: Sitting in bed, NAD, cooperative   HENT:      Head: Normocephalic and atraumatic.      Mouth/Throat:      Mouth: Mucous membranes are moist.   Cardiovascular:      Rate and Rhythm: Regular rhythm.      Comments: Warm peripheries, no LE edema  Pulmonary:      Comments: On RA  Abdominal:      General: Bowel sounds are normal.      Palpations: Abdomen is soft. Mild TTP  Musculoskeletal:      Cervical back: Neck supple.   Skin:     General: Skin is warm.      Comments: LUE midline in place   Neurological:      Mental Status: She is alert and oriented to person, place, and time.   Psychiatric:         Mood and Affect: Mood normal.                Significant Labs: CBC:       Recent Labs   Lab 09/03/23 0656   WBC 7.36   HGB 11.7*   HCT 36.7*         CMP:        Recent Labs   Lab 09/03/23 0656 09/04/23 0613    137   K 4.7 3.5    107   CO2 30* 27   GLU 80 83   BUN 8 10   CREATININE 0.6 0.5   CALCIUM 8.0* 8.0*   PROT 5.6* 5.7*   ALBUMIN 2.8* 2.8*   BILITOT 0.2 0.3   ALKPHOS 144* 137*   AST 53* 26   * 89*   ANIONGAP 1* 3*      Cardiac Markers: No results for input(s): "CKMB", "MYOGLOBIN", "BNP", "TROPISTAT" in the last 48 hours.  Lactic Acid: No results for input(s): "LACTATE" " "in the last 48 hours.  Magnesium: No results for input(s): "MG" in the last 48 hours.     Significant Imaging: I have reviewed all pertinent imaging results/findings within the past 24 hours.        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 Moore  Appreciate Infectious Disease input   Appreciate Gastroenterology input  Further plan as per hospital course            VTE Risk Mitigation (From admission, onward)           Ordered       enoxaparin injection 40 mg  Every 24 hours (non-standard times)         09/02/23 1333       IP VTE HIGH RISK PATIENT  Once         09/01/23 2035       Place sequential compression device  Until discontinued         09/01/23 2035                    Discharge Planning   TANESHA:      Code Status: Full Code   Is the patient medically ready for discharge?:     Reason for patient still in hospital (select all that apply): Patient trending condition  Discharge Plan A: Home with family                     Vipni Casper MD  Department of Hospital Medicine   [facility]          "

## 2023-09-05 NOTE — PLAN OF CARE
Patient is not medically clear for discharge. Pt needs IV antibiotics through 9/7 per Dr Galan notes.CM unable to secure home antibiotics due to out of pocket cost to patient.       09/05/23 1053   Discharge Reassessment   Assessment Type Discharge Planning Reassessment   Did the patient's condition or plan change since previous assessment? Yes   Discharge Plan discussed with: Patient   Communicated TANESHA with patient/caregiver Yes   Discharge Plan A Home   Discharge Plan B Home   DME Needed Upon Discharge  none   Transition of Care Barriers None   Why the patient remains in the hospital Requires continued medical care

## 2023-09-05 NOTE — [employer] PATIENT INSTRUCTIONS
Discharge Summary/Instructions after an Endoscopic Procedure  Patient Name: Jaye Martinez  Patient MRN: [id number]  Patient YOB: 1980 Tuesday, September 5, 2023  Kai Hermosillo III, MD  RESTRICTIONS:  During your procedure today, you received medications for sedation.  These   medications may affect your judgment, balance and coordination.  Therefore,   for 24 hours, you have the following restrictions:   - DO NOT drive a car, operate machinery, make legal/financial decisions,   sign important papers or drink alcohol.    ACTIVITY:  Today: no heavy lifting, straining or running due to procedural   sedation/anesthesia.  The following day: return to full activity including work.  DIET:  Eat and drink normally unless instructed otherwise.     TREATMENT FOR COMMON SIDE EFFECTS:  - Mild abdominal pain, nausea, belching, bloating or excessive gas:  rest,   eat lightly and use a heating pad.  - Sore Throat: treat with throat lozenges and/or gargle with warm salt   water.  - Because air was used during the procedure, expelling large amounts of air   from your rectum or belching is normal.  - If a bowel prep was taken, you may not have a bowel movement for 1-3 days.    This is normal.  SYMPTOMS TO WATCH FOR AND REPORT TO YOUR PHYSICIAN:  1. Abdominal pain or bloating, other than gas cramps.  2. Chest pain.  3. Back pain.  4. Signs of infection such as: chills or fever occurring within 24 hours   after the procedure.  5. Rectal bleeding, which would show as bright red, maroon, or black stools.   (A tablespoon of blood from the rectum is not serious, especially if   hemorrhoids are present.)  6. Vomiting.  7. Weakness or dizziness.  GO DIRECTLY TO THE NEAREST EMERGENCY ROOM IF YOU HAVE ANY OF THE FOLLOWING:      Difficulty breathing              Chills and/or fever over 101 F   Persistent vomiting and/or vomiting blood   Severe abdominal pain   Severe chest pain   Black, tarry stools   Bleeding- more than one  tablespoon   Any other symptom or condition that you feel may need urgent attention  Your doctor recommends these additional instructions:  If any biopsies were taken, your doctors clinic will contact you in 1 to 2   weeks with any results.  - Patient has a contact number available for emergencies.  The signs and   symptoms of potential delayed complications were discussed with the   patient.  Return to normal activities tomorrow.  Written discharge   instructions were provided to the patient.   - Resume regular diet.   - Continue present medications.   - Return patient to [facility] for ongoing care. If develops RUQ pain w/   recurrent LFTS, consider ERCP but otherwise conservative for now  For questions, problems or results please call your physician - Kai Hermosillo III, MD at Work:  [phone].  [facility], EMERGENCY ROOM PHONE NUMBER: [phone]  IF A COMPLICATION OR EMERGENCY SITUATION ARISES AND YOU ARE UNABLE TO REACH   YOUR PHYSICIAN - GO DIRECTLY TO THE EMERGENCY ROOM.  Kai Hermosillo III, MD  9/5/2023 3:55:26 PM  This report has been verified and signed electronically.  Dear patient,  As a result of recent federal legislation (The Federal Cures Act), you may   receive lab or pathology results from your procedure in your [facility]   account before your physician is able to contact you. Your physician or   their representative will relay the results to you with their   recommendations at their soonest availability.  Thank you,  [employer]ATION

## 2023-09-06 LAB
ALBUMIN SERPL BCP-MCNC: 3.6 G/DL (ref 3.5–5.2)
ALP SERPL-CCNC: 144 U/L (ref 55–135)
ALT SERPL W/O P-5'-P-CCNC: 106 U/L (ref 10–44)
ANION GAP SERPL CALC-SCNC: 7 MMOL/L (ref 8–16)
AST SERPL-CCNC: 75 U/L (ref 10–40)
BACTERIA BLD CULT: NORMAL
BACTERIA BLD CULT: NORMAL
BASOPHILS # BLD AUTO: 0.05 K/UL (ref 0–0.2)
BASOPHILS NFR BLD: 0.3 % (ref 0–1.9)
BILIRUB SERPL-MCNC: 0.3 MG/DL (ref 0.1–1)
BUN SERPL-MCNC: 10 MG/DL (ref 6–20)
CALCIUM SERPL-MCNC: 8.7 MG/DL (ref 8.7–10.5)
CHLORIDE SERPL-SCNC: 109 MMOL/L (ref 95–110)
CO2 SERPL-SCNC: 25 MMOL/L (ref 23–29)
CREAT SERPL-MCNC: 0.7 MG/DL (ref 0.5–1.4)
DIFFERENTIAL METHOD: ABNORMAL
EOSINOPHIL # BLD AUTO: 0.1 K/UL (ref 0–0.5)
EOSINOPHIL NFR BLD: 0.4 % (ref 0–8)
ERYTHROCYTE [DISTWIDTH] IN BLOOD BY AUTOMATED COUNT: 14.7 % (ref 11.5–14.5)
EST. GFR  (NO RACE VARIABLE): >60 ML/MIN/1.73 M^2
GLUCOSE SERPL-MCNC: 120 MG/DL (ref 70–110)
HCT VFR BLD AUTO: 36.8 % (ref 37–48.5)
HGB BLD-MCNC: 12.5 G/DL (ref 12–16)
IMM GRANULOCYTES # BLD AUTO: 0.06 K/UL (ref 0–0.04)
IMM GRANULOCYTES NFR BLD AUTO: 0.4 % (ref 0–0.5)
LYMPHOCYTES # BLD AUTO: 3.9 K/UL (ref 1–4.8)
LYMPHOCYTES NFR BLD: 25.7 % (ref 18–48)
MCH RBC QN AUTO: 33.3 PG (ref 27–31)
MCHC RBC AUTO-ENTMCNC: 34 G/DL (ref 32–36)
MCV RBC AUTO: 98 FL (ref 82–98)
MONOCYTES # BLD AUTO: 0.9 K/UL (ref 0.3–1)
MONOCYTES NFR BLD: 6 % (ref 4–15)
NEUTROPHILS # BLD AUTO: 10.1 K/UL (ref 1.8–7.7)
NEUTROPHILS NFR BLD: 67.2 % (ref 38–73)
NRBC BLD-RTO: 0 /100 WBC
PLATELET # BLD AUTO: 368 K/UL (ref 150–450)
PMV BLD AUTO: 11.4 FL (ref 9.2–12.9)
POTASSIUM SERPL-SCNC: 4 MMOL/L (ref 3.5–5.1)
PROT SERPL-MCNC: 6.8 G/DL (ref 6–8.4)
RBC # BLD AUTO: 3.75 M/UL (ref 4–5.4)
SODIUM SERPL-SCNC: 141 MMOL/L (ref 136–145)
WBC # BLD AUTO: 15.05 K/UL (ref 3.9–12.7)

## 2023-09-06 PROCEDURE — 63600175 PHARM REV CODE 636 W HCPCS: Performed by: STUDENT IN AN ORGANIZED HEALTH CARE EDUCATION/TRAINING PROGRAM

## 2023-09-06 PROCEDURE — 25000003 PHARM REV CODE 250: Performed by: INTERNAL MEDICINE

## 2023-09-06 PROCEDURE — 25000003 PHARM REV CODE 250: Performed by: STUDENT IN AN ORGANIZED HEALTH CARE EDUCATION/TRAINING PROGRAM

## 2023-09-06 PROCEDURE — 36415 COLL VENOUS BLD VENIPUNCTURE: CPT | Performed by: STUDENT IN AN ORGANIZED HEALTH CARE EDUCATION/TRAINING PROGRAM

## 2023-09-06 PROCEDURE — 85025 COMPLETE CBC W/AUTO DIFF WBC: CPT | Performed by: STUDENT IN AN ORGANIZED HEALTH CARE EDUCATION/TRAINING PROGRAM

## 2023-09-06 PROCEDURE — 80053 COMPREHEN METABOLIC PANEL: CPT | Performed by: STUDENT IN AN ORGANIZED HEALTH CARE EDUCATION/TRAINING PROGRAM

## 2023-09-06 PROCEDURE — 63600175 PHARM REV CODE 636 W HCPCS: Performed by: INTERNAL MEDICINE

## 2023-09-06 PROCEDURE — 12000002 HC ACUTE/MED SURGE SEMI-PRIVATE ROOM

## 2023-09-06 RX ORDER — FOLIC ACID 1 MG/1
1 TABLET ORAL DAILY
Qty: 30 TABLET | Refills: 0 | Status: SHIPPED | OUTPATIENT
Start: 2023-09-07 | End: 2023-09-20 | Stop reason: CLARIF

## 2023-09-06 RX ADMIN — MUPIROCIN 1 G: 20 OINTMENT TOPICAL at 09:09

## 2023-09-06 RX ADMIN — ENOXAPARIN SODIUM 40 MG: 40 INJECTION SUBCUTANEOUS at 09:09

## 2023-09-06 RX ADMIN — ONDANSETRON 4 MG: 2 INJECTION INTRAMUSCULAR; INTRAVENOUS at 06:09

## 2023-09-06 RX ADMIN — ERTAPENEM 1 G: 1 INJECTION INTRAMUSCULAR; INTRAVENOUS at 05:09

## 2023-09-06 RX ADMIN — ACETAMINOPHEN 650 MG: 325 TABLET ORAL at 03:09

## 2023-09-06 RX ADMIN — FOLIC ACID 1 MG: 1 TABLET ORAL at 09:09

## 2023-09-06 RX ADMIN — BUTALBITAL, ACETAMINOPHEN AND CAFFEINE 1 TABLET: 325; 50; 40 TABLET ORAL at 05:09

## 2023-09-06 NOTE — DISCHARGE SUMMARY
[facility]  Discharge Summary  Patient Name: Jaye Martinez MRN: [id number]   Patient Class: IP- Inpatient  Length of Stay: 3   Admission Date: 9/1/2023  1:01 PM Attending Physician: Mickey Casper MD   Primary Care Provider: Steve Gonzalez MD Face-to-Face encounter date: 09/06/2023   Chief Complaint: Abdominal Pain (States she took Norco 10mg tab @ 08:00am and 11:00AM, Xanaflex at 8:00AM)    Date of Discharge: 9/6/2023  Discharge Disposition:Home or Self Care   Condition: Stable       Reason for Hospitalization     Active Hospital Problems    Diagnosis    *Pyelonephritis    Folate deficiency    Transaminitis    ESBL (extended spectrum beta-lactamase) producing bacteria infection    Anxiety    Macrocytic anemia    Urinary incontinence    Bilateral ovarian cysts    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  She has had continued pain and dysuria since that time.  Additionally, she was recently diagnosed with COVID-19 and was planned to have a bladder lift/hysterectomy on 8/28 however this was canceled due to her COVID-19 diagnosis.  She has continued to progressively feel worse.  She has no nausea or vomiting.  No diarrhea.  She denies fever or chills.  She says that her abdominal pain has been progressively worse over the past couple of days.  And feels like a twisting inside.  She is been compliant with her medications.      In the ED:  T 98.5°, P 73, R 24, BP 83/69 > improved to 111/66, O2 sat 99% on room air.  WBC 13.07, hemoglobin 13.8, chemistry panel unremarkable, troponin normal, lactic acid 1.6,  procalcitonin 0.05, UDS positive for opiates, barbiturates, marijuana (she has prescriptions for 2 of these) , urinalysis suggestive of urinary tract infection.  Urine culture sent, blood culture sent.  Urine pregnancy test negative.  CT abdomen with biliary ductal dilation however transaminases are normal.  It was further dilated from previous however will proceed with MRCP.  Pelvic ultrasound showing multiple ovarian cysts with 1 small cyst showing some possible hemorrhage, but good blood flow.  Will admit for complicated urinary tract infection with resistant bacteria requiring IV antibiotics, abdominal pain question of whether this may be related to her ovarian cysts.         For the full HPI please refer to the History & Physical from this admission.    Hospital Course By Problem with Pertinent Findings     Patient with history of ovarian cyst with bladder incontinence with prolapse, followed by gynecologist Dr. Moore, was scheduled for surgery but postponed due to testing positive for COVID-19.  History of drug resistant UTI, stated she started Macrobid on 8/29/23.  Presented due to nausea, vomiting, lower abdominal pain with back pain.  Hypotensive with systolics in the 80s in the ED, improved with IV fluids.  Procalcitonin negative.  Multiple imaging studies, dilated CBD post cholecystectomy, MRCP no evidence of choledocholithiasis but concern for possible stricture at ampulla, subsequently mild elevation in CMP, GI consulted and  EUS completed on 9/5, no mass or stricture noted to cause CBD obstruction, essentially normal EUS except a 1 cm hiatal hernia . No evidence of urinary obstruction/hydronephrosis.  She was started on ertapenem with ID and gynecology consult.  On 09/03 midline in place case management attempted home IV antibiotics but insurance did not cover.  She was able to discharge on 9/6 with plan to return to infusion center as outpt for final IV ertapenem dose.  Follow up with ob/gyn as  "previous, f/u with infectious disease in 2 weeks.         Patient was seen and examined on the date of discharge and determined to be suitable for discharge.    Physical Exam  /72   Pulse 62   Temp 98.1 °F (36.7 °C)   Resp 16   Ht 5' 2" (1.575 m)   Wt 46.1 kg (101 lb 10.1 oz)   SpO2 99%   Breastfeeding No   BMI 18.59 kg/m²   Vitals reviewed.    Constitutional: No distress.   HENT: Atraumatic.   Cardiovascular: Normal rate, regular rhythm.  S1 S2.    Pulmonary/Chest: Effort normal. Clear to auscultation bilaterally. No wheezes.   Abdominal: Soft. Bowel sounds are normal. Exhibits no distension and no mass. No tenderness  Neurological: Alert.   Skin: Skin is warm and dry.     Following labs were Reviewed   No results for input(s): "WBC", "HGB", "HCT", "PLT", "GLUCOSE", "CALCIUM", "ALBUMIN", "PROT", "NA", "K", "CO2", "CL", "BUN", "CREATININE", "ALKPHOS", "ALT", "AST", "BILITOT" in the last 24 hours.  No results found for: "POCTGLUCOSE"     All labs within the past 24 hours have been reviewed    Microbiology Results (last 7 days)       Procedure Component Value Units Date/Time    Urine Culture High Risk [[id number]] Collected: 09/01/23 1708    Order Status: Completed Specimen: Urine, Clean Catch Updated: 09/06/23 0716     Urine Culture, Routine No growth to date    Narrative:      Indicated criteria for high risk culture:->Prior to urologic  procedures    Blood culture [[id number]] Collected: 09/01/23 1327    Order Status: Completed Specimen: Blood from Antecubital, Right Updated: 09/05/23 1432     Blood Culture, Routine No Growth to date      No Growth to date      No Growth to date      No Growth to date      No Growth to date    Blood culture [[id number]] Collected: 09/01/23 1327    Order Status: Completed Specimen: Blood Updated: 09/05/23 1432     Blood Culture, Routine No Growth to date      No Growth to date      No Growth to date      No Growth to date      No Growth to date          US Retroperitoneal " Complete   Final Result      MRI MRCP Without Contrast   Final Result      CT Abdomen Pelvis With Contrast   Final Result      US Pelvis Comp with Transvag NON-OB (xpd)   Final Result      X-Ray Chest AP Portable   Final Result          No results found for this or any previous visit.      Consultants and Procedures   Consultants:  Consults (From admission, onward)          Status Ordering Provider     Inpatient consult to   Once        Provider:  (Not yet assigned)    Acknowledged PHILLIP COKER     Inpatient consult to Midline team  Once        Provider:  (Not yet assigned)    Completed DAMON HORAN     Inpatient consult to Infectious Diseases  Once        Provider:  Phillip Ckoer MD    Completed DAMON HORAN     Inpatient consult to OB/GYN  Once        Provider:  Leonor Randhawa MD    Acknowledged CONNIE HOWELL            Procedures:   Procedure(s) (LRB):  ULTRASOUND, UPPER GI TRACT, ENDOSCOPIC (N/A)     Discharge Information:   Diet:  Resume Cardiac diet/Diabetic Diet    Physical Activity:  Activity as tolerated    Instructions:  1. Take all medications as prescribed  2. Keep all follow-up appointments  3. Return to the hospital or call your primary care physicians if any worsening symptoms such as chest pain, shortness of breath, bleeding,  intractable pain, fever >101 occur.      Follow-Up Appointments:  Please call your primary care physician to schedule an appointment in 1 week time.     Follow-up Information       Steve Gonzalez MD Follow up in 1 week(s).    Specialty: Family Medicine  Contact information:  [address] [facility] [address] [place] [address]  [phone]               Phillip Coker MD Follow up in 2 week(s).    Specialty: Infectious Diseases  Contact information:  [address]  [address]  [facility] [address]  [phone]                               Pending laboratory work/Tests to be performed/followed by the Primary care  Physician:    The patient was discharged with discharge instructions reviewed in written and verbal form. All questions were answered and prescriptions were provided. The importance of making follow up appointments and compliance of medications has been emphasized. The patient will follow up in 1 week or sooner as needed with the PCP. Tthe patient understands and agrees with the plan. Upon discharge, patient needs to be on following medications.    Discharge Medications:     Medication List        START taking these medications      folic acid 1 MG tablet  Commonly known as: FOLVITE  Take 1 tablet (1 mg total) by mouth once daily.  Start taking on: September 7, 2023     sodium chloride 0.9% SolP 100 mL with ertapenem 1 gram SolR 1 g  Inject 1 g into the vein once. for 1 dose  Start taking on: September 7, 2023            CONTINUE taking these medications      albuterol 90 mcg/actuation inhaler  Commonly known as: PROAIR HFA  Inhale 2 puffs into the lungs every 4 (four) hours as needed for Wheezing.     butalbital-acetaminophen-caffeine -40 mg -40 mg per tablet  Commonly known as: FIORICET, ESGIC  Take 1 tablet by mouth 2 (two) times daily as needed.     EMGALITY  mg/mL Pnij  Generic drug: galcanezumab-gnlm     HYDROcodone-acetaminophen  mg per tablet  Commonly known as: NORCO  Take 1 tablet by mouth every 6 (six) hours as needed.     naproxen 500 MG tablet  Commonly known as: NAPROSYN  Take 1 tablet (500 mg total) by mouth every 12 (twelve) hours as needed (Body aches).     ondansetron 4 MG Tbdl  Commonly known as: ZOFRAN-ODT  Take 1 tablet (4 mg total) by mouth every 6 (six) hours as needed (nausea/vomiting).     predniSONE 5 MG tablet  Commonly known as: DELTASONE  Take 1 tablet (5 mg total) by mouth daily as needed (Joint stiffness).     promethazine 25 MG suppository  Commonly known as: PHENERGAN  Place 1 suppository (25 mg total) rectally every 6 (six) hours as needed for Nausea.      tiZANidine 4 MG tablet  Commonly known as: ZANAFLEX     UBRELVY 100 mg tablet  Generic drug: ubrogepant     valACYclovir 1000 MG tablet  Commonly known as: VALTREX  Take 1 tablet (1,000 mg total) by mouth every 12 (twelve) hours. for 5 days            STOP taking these medications      nitrofurantoin (macrocrystal-monohydrate) 100 MG capsule  Commonly known as: MACROBID     prochlorperazine 10 MG tablet  Commonly known as: COMPAZINE               Where to Get Your Medications        These medications were sent to [facility] [place] [address], [place] [address]      Hours: Mon-Fri, 8a-5:30p Phone: [phone]   folic acid 1 MG tablet       Information about where to get these medications is not yet available    Ask your nurse or doctor about these medications  sodium chloride 0.9% SolP 100 mL with ertapenem 1 gram SolR 1 g           I spent 33 minutes preparing the discharge for this patient including reviewing records from previous encounters, preparation of discharge summary, assessing and final examination of the patient, discharge medicine reconciliation, discussing plan of care, follow up and education and prescriptions.       Mickey Casper  [facility] Hospitalist

## 2023-09-06 NOTE — PLAN OF CARE
Dr Casper requested CM contact outpatient for out of pocket cost for last dose of IV antibiotics. If cost are reasonable pt can discharge today and come to Outpatient for last dose of IV antibiotics on 9/7.     CM contacted Renae in scheduling to inquire about cost. Renae will run benefits and notify CM of cost.    10:15 am - per Renae pt is 100% covered for Outpatient IV infusion. Dr Casper notified.    1116- DC orders entered. Order for IV ertepenem noted for tomorrow. CELSA contacted Deidre in scheduling to arrange for pt to have dose at [facility]. Deidre states Dr needs to enter a therapy plan before appointment can be scheduled.  notified Dr Casper and Dr Galan. Dr Galan will enter therapy plan.

## 2023-09-06 NOTE — PLAN OF CARE
Patient is medically ready for discharge.  Please return to infusion center for your last dose of IV antibiotics as below.       Recommendations:    Ertapenem 1g IV daily for 7 days, end date 9/7/23  CBC w/diff, CMP x 1   Remove Midline after completing therapy

## 2023-09-06 NOTE — CARE UPDATE
09/05/23 1935   Patient Assessment/Suction   Level of Consciousness (AVPU) alert   Respiratory Effort Normal;Unlabored   PRE-TX-O2   Device (Oxygen Therapy) room air   SpO2 95 %   Pulse Oximetry Type Intermittent   $ Pulse Oximetry - Multiple Charge Pulse Oximetry - Multiple   Pulse 60   Resp 16   Positioning Left side   Positioning   Head of Bed (HOB) Positioning HOB at 20 degrees   ETCO2   ETCO2 (mmHg) 0 mmHg   Respiratory Evaluation   $ Care Plan Tech Time 15 min

## 2023-09-06 NOTE — PLAN OF CARE
Patient cleared for discharge from case management standpoint after today's dose of IV antibiotics.    Follow up appointments scheduled and added to AVS.    Pt has Outpatient infusion appointments scheduled on 9/7 and 9/8 at [facility] infusion center. Pt confirmed having transportation available to all scheduled appointments.    Chart and discharge orders reviewed.  Patient discharged home with no further case management needs.       09/06/23 1424   Final Note   Assessment Type Final Discharge Note   Anticipated Discharge Disposition Home   Hospital Resources/Appts/Education Provided Provided patient/caregiver with written discharge plan information;Appointments scheduled and added to AVS;Community resources provided   Post-Acute Status   Post-Acute Authorization IV Infusion   Coverage    IV Infusion Status Set-up Complete/Auth obtained   Discharge Delays (!) Medication Delivery

## 2023-09-06 NOTE — PROGRESS NOTES
[facility] Medicine  Progress Note     Patient Name: Jaye Martinez  MRN: [id number]  Patient Class: IP- Inpatient     Admission Date: 9/1/2023  Length of Stay: 2 days  Attending Physician: Vipin Casper MD  Primary Care Provider: Steve Gonzalez MD           Subjective:      Principal Problem:Pyelonephritis          HPI:  Pt is a 42 y/o F with hx of Recurrent UTI, Bladder Incontinence, Ovarian cysts who is here with recurrent UTI and abdominal pain after starting treatment with Macrobid for resistant UTI on 08/26.  She has had continued pain and dysuria since that time.  Additionally, she was recently diagnosed with COVID-19 and was planned to have a bladder lift/hysterectomy on 8/28 however this was canceled due to her COVID-19 diagnosis.  She has continued to progressively feel worse.  She has no nausea or vomiting.  No diarrhea.  She denies fever or chills.  She says that her abdominal pain has been progressively worse over the past couple of days.  And feels like a twisting inside.  She is been compliant with her medications.      In the ED:  T 98.5°, P 73, R 24, BP 83/69 > improved to 111/66, O2 sat 99% on room air.  WBC 13.07, hemoglobin 13.8, chemistry panel unremarkable, troponin normal, lactic acid 1.6, procalcitonin 0.05, UDS positive for opiates, barbiturates, marijuana (she has prescriptions for 2 of these) , urinalysis suggestive of urinary tract infection.  Urine culture sent, blood culture sent.  Urine pregnancy test negative.  CT abdomen with biliary ductal dilation however transaminases are normal.  It was further dilated from previous however will proceed with MRCP.  Pelvic ultrasound showing multiple ovarian cysts with 1 small cyst showing some possible hemorrhage, but good blood flow.  Will admit for complicated urinary tract infection with resistant bacteria requiring IV antibiotics, abdominal pain question of whether this may be related to her ovarian cysts.         Overview/Hospital Course:  Patient with history of ovarian cyst with bladder incontinence with prolapse, followed by gynecologist Dr. Moore, was scheduled for surgery but postponed due to testing positive for COVID-19.  History of drug resistant UTI, stated she started Macrobid on 8/29/23.  Presented due to nausea, vomiting, lower abdominal pain with back pain.  Hypotensive with systolics in the 80s in the ED, improved with IV fluids.  Procalcitonin negative.  Multiple imaging studies, dilated CBD post cholecystectomy, MRCP no evidence of choledocholithiasis but concern for possible stricture at ampulla, subsequently mild elevation in CMP, GI consulted and planning EUS on Tuesday. No evidence of urinary obstruction/hydronephrosis.  She was admitted, started on ertapenem with ID and gynecology consult.  On 09/03 midline in place, EUS planned for Tuesday, case management still working on home IV antibiotics.        Interval History:      9/6- EUS yesterday essentially normal, vitals stable.  No problems overnight.  She has one more day of IV abx to complete 7 day course. Attempting to set up outpt infusion and dc    9/5- resting comfortably, mild TTP abd diffusely.  Going for EUS today.  Vitals stable.  No fever. Will need to continue IV abx for 2 more days to complete 7 day course.  CM following, attempting to set up home iv abx.      9/4- Patient states abdominal discomfort does feel improved, mild soreness lower abdomen, feels less bloated.  States she had severe nausea yesterday evening but improved today, reports she ate POKKT for breakfast.  Had bowel movement yesterday and today.  Afebrile with T-max 98.6°, on room air.  Labs with AST 26, ALT 86, , T bilirubin 0.3.  Discussed with patient.  Infectious Disease at bedside.     Review of Systems   Constitutional:  Negative for fever.   Gastrointestinal:  Positive for abdominal pain.   Genitourinary:  Negative for difficulty urinating.    Psychiatric/Behavioral:  Negative for confusion.       Objective:      Vital Signs (Most Recent):  Temp: 98.6 °F (37 °C) (09/04/23 0825)  Pulse: (!) 56 (notified nurse kacey) (09/04/23 0825)  Resp: 18 (09/04/23 0825)  BP: (!) 149/78 (notified nurse kacey) (09/04/23 0825)  SpO2: 95 % (09/04/23 0825) Vital Signs (24h Range):  Temp:  [98.2 °F (36.8 °C)-98.6 °F (37 °C)] 98.6 °F (37 °C)  Pulse:  [50-58] 56  Resp:  [17-20] 18  SpO2:  [94 %-97 %] 95 %  BP: ()/(52-79) 149/78      Weight: 46.1 kg (101 lb 10.1 oz)  Body mass index is 18.59 kg/m².     Intake/Output Summary (Last 24 hours) at 9/4/2023 1130  Last data filed at 9/3/2023 1200      Gross per 24 hour   Intake 360 ml   Output 300 ml   Net 60 ml         Physical Exam  Vitals and nursing note reviewed.   Constitutional:       General: She is not in acute distress.     Appearance: She is not ill-appearing, toxic-appearing or diaphoretic.      Comments: Sitting in bed, NAD, cooperative   HENT:      Head: Normocephalic and atraumatic.      Mouth/Throat:      Mouth: Mucous membranes are moist.   Cardiovascular:      Rate and Rhythm: Regular rhythm.      Comments: Warm peripheries, no LE edema  Pulmonary:      Comments: On RA  Abdominal:      General: Bowel sounds are normal.      Palpations: Abdomen is soft. Mild TTP  Musculoskeletal:      Cervical back: Neck supple.   Skin:     General: Skin is warm.      Comments: LUE midline in place   Neurological:      Mental Status: She is alert and oriented to person, place, and time.   Psychiatric:         Mood and Affect: Mood normal.                Significant Labs: CBC:       Recent Labs   Lab 09/03/23 0656   WBC 7.36   HGB 11.7*   HCT 36.7*         CMP:        Recent Labs   Lab 09/03/23 0656 09/04/23 0613    137   K 4.7 3.5    107   CO2 30* 27   GLU 80 83   BUN 8 10   CREATININE 0.6 0.5   CALCIUM 8.0* 8.0*   PROT 5.6* 5.7*   ALBUMIN 2.8* 2.8*   BILITOT 0.2 0.3   ALKPHOS 144* 137*   AST 53* 26  "  * 89*   ANIONGAP 1* 3*      Cardiac Markers: No results for input(s): "CKMB", "MYOGLOBIN", "BNP", "TROPISTAT" in the last 48 hours.  Lactic Acid: No results for input(s): "LACTATE" in the last 48 hours.  Magnesium: No results for input(s): "MG" in the last 48 hours.     Significant Imaging: I have reviewed all pertinent imaging results/findings within the past 24 hours.        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 Moore  Appreciate Infectious Disease input   Appreciate Gastroenterology input  Further plan as per hospital course            VTE Risk Mitigation (From admission, onward)           Ordered       enoxaparin injection 40 mg  Every 24 hours (non-standard times)         09/02/23 1333       IP VTE HIGH RISK PATIENT  Once         09/01/23 2035       Place sequential compression device  Until discontinued         09/01/23 2035                    Discharge Planning   TANESHA:      Code Status: Full Code   Is the patient medically ready for discharge?:     Reason for patient still in hospital (select all that apply): Patient trending condition  Discharge Plan A: " Home with family                     Vipin Casper MD  Department of Hospital Medicine   [facility]

## 2023-09-07 VITALS
TEMPERATURE: 97 F | OXYGEN SATURATION: 98 % | HEART RATE: 62 BPM | BODY MASS INDEX: 18.7 KG/M2 | SYSTOLIC BLOOD PRESSURE: 104 MMHG | HEIGHT: 62 IN | WEIGHT: 101.63 LBS | DIASTOLIC BLOOD PRESSURE: 63 MMHG | RESPIRATION RATE: 16 BRPM

## 2023-09-07 LAB — BACTERIA UR CULT: NO GROWTH

## 2023-09-07 PROCEDURE — 25000003 PHARM REV CODE 250: Performed by: INTERNAL MEDICINE

## 2023-09-07 PROCEDURE — 25000003 PHARM REV CODE 250: Performed by: STUDENT IN AN ORGANIZED HEALTH CARE EDUCATION/TRAINING PROGRAM

## 2023-09-07 PROCEDURE — 63600175 PHARM REV CODE 636 W HCPCS: Performed by: STUDENT IN AN ORGANIZED HEALTH CARE EDUCATION/TRAINING PROGRAM

## 2023-09-07 RX ADMIN — MUPIROCIN 1 G: 20 OINTMENT TOPICAL at 09:09

## 2023-09-07 RX ADMIN — FOLIC ACID 1 MG: 1 TABLET ORAL at 09:09

## 2023-09-07 RX ADMIN — ERTAPENEM 1 G: 1 INJECTION INTRAMUSCULAR; INTRAVENOUS at 03:09

## 2023-09-07 NOTE — PROGRESS NOTES
"[facility]  Adult Nutrition   Progress Note (Initial Assessment)     SUMMARY     Recommendations  Recommendation/Intervention: 1. Continue regular diet as tolerated. 2.  to provide daily menu choices.  Goals: 1. Intake will continue to be >/= EEN / EPN.  Communication of RD Recs: reviewed with RN    Dietitian Rounds Brief  LOS screen. Patient had dc orders yesterday but she spiked a temp and stayed overnight. No new temp. Intake remains good. Has regular diet. Last BM 9/3/23. NFPE not performed due to dx COVID +. No reports of wt loss or poor intake prior to admit per nursing screen.    Diet order:   Current Diet Order: regular diet                 Evaluation of Received Nutrient/Fluid Intake  Energy Calories Required: meeting needs  Protein Required: meeting needs  Fluid Required: meeting needs  Tolerance: tolerating     % Intake of Estimated Energy Needs: 50 - 75 %  % Meal Intake: 50 - 75 %    No intake or output data in the 24 hours ending 09/07/23 1530     Anthropometrics  Temp: 97.4 °F (36.3 °C)  Height Method: Stated  Height: 5' 2" (157.5 cm)  Height (inches): 62 in  Weight Method: Bed Scale  Weight: 46.1 kg (101 lb 10.1 oz)  Weight (lb): 101.63 lb  Ideal Body Weight (IBW), Female: 110 lb  % Ideal Body Weight, Female (lb): 92.39 %  BMI (Calculated): 18.6       Estimated/Assessed Needs  Weight Used For Calorie Calculations: 46.1 kg (101 lb 10.1 oz)  Energy Calorie Requirements (kcal): 5677-8087 /day (30-35)  Energy Need Method: Kcal/kg  Protein Requirements: 46-69 gm/day (1.0-1.5 gm/kg)  Weight Used For Protein Calculations: 46.1 kg (101 lb 10.1 oz)     Estimated Fluid Requirement Method: RDA Method  RDA Method (mL): 1383       Reason for Assessment  Reason For Assessment: length of stay  Relevant Medical History: cancer, GERD  Interdisciplinary Rounds: did not attend    Nutrition/Diet History  Food Allergies: NKFA  Factors Affecting Nutritional Intake: None identified at this " "time    Nutrition Risk Screen  Nutrition Risk Screen: no indicators present     MST Score: 0  Have you recently lost weight without trying?: No  Weight loss score: 0  Have you been eating poorly because of a decreased appetite?: No  Appetite score: 0       Weight History:  Wt Readings from Last 5 Encounters:   09/01/23 46.1 kg (101 lb 10.1 oz)   08/25/23 44.5 kg (98 lb)   08/24/23 44.5 kg (98 lb)   08/23/23 44.5 kg (98 lb 1.7 oz)   08/23/23 44.5 kg (98 lb 1.7 oz)        Lab/Procedures/Meds: Pertinent Labs/Meds Reviewed    Medications:Pertinent Medications Reviewed  Scheduled Meds:   enoxparin  40 mg Subcutaneous Q24H    ertapenem (INVANZ) IVPB  1 g Intravenous Q24H    folic acid  1 mg Oral Daily    mupirocin   Nasal BID     Continuous Infusions:  PRN Meds:.acetaminophen, butalbital-acetaminophen-caffeine -40 mg, HYDROcodone-acetaminophen, melatonin, ondansetron, prochlorperazine, senna-docusate 8.6-50 mg, sodium chloride 0.9%    Labs: Pertinent Labs Reviewed  Clinical Chemistry:  Recent Labs   Lab 09/01/23  1336 09/02/23  0549 09/04/23  0613 09/05/23  0431 09/06/23  1622      < > 137 142 141   K 3.7   < > 3.5 3.6 4.0      < > 107 111* 109   CO2 24   < > 27 26 25   GLU 90   < > 83 109 120*   BUN 8   < > 10 12 10   CREATININE 0.6   < > 0.5 0.6 0.7   CALCIUM 8.8   < > 8.0* 8.2* 8.7   PROT 7.5   < > 5.7* 5.9* 6.8   ALBUMIN 4.0   < > 2.8* 2.9* 3.6   BILITOT 0.6   < > 0.3 0.4 0.3   ALKPHOS 131   < > 137* 141* 144*   AST 23   < > 26 27 75*   ALT 38   < > 89* 75* 106*   ANIONGAP 10   < > 3* 5* 7*   MG 1.8  --   --   --   --    LIPASE 25  --   --   --   --     < > = values in this interval not displayed.     CBC:   Recent Labs   Lab 09/06/23  1622   WBC 15.05*   RBC 3.75*   HGB 12.5   HCT 36.8*      MCV 98   MCH 33.3*   MCHC 34.0     Lipid Panel:  No results for input(s): "CHOL", "HDL", "LDLCALC", "TRIG", "CHOLHDL" in the last 168 hours.  Cardiac Profile:  No results for input(s): "BNP", "CPK", " ""CPKMB", "TROPONINI", "CKTOTAL" in the last 168 hours.  Inflammatory Labs:  No results for input(s): "CRP" in the last 168 hours.  Diabetes:  No results for input(s): "HGBA1C", "POCTGLUCOSE" in the last 168 hours.  Thyroid & Parathyroid:  No results for input(s): "TSH", "FREET4", "B2KLEAV", "C4PTJRL", "THYROIDAB" in the last 168 hours.    Monitor and Evaluation  Food and Nutrient Intake: energy intake, food and beverage intake  Food and Nutrient Adminstration: diet order  Knowledge/Beliefs/Attitudes: food and nutrition knowledge/skill  Physical Activity and Function: nutrition-related ADLs and IADLs  Anthropometric Measurements: weight change, weight, body mass index  Biochemical Data, Medical Tests and Procedures: electrolyte and renal panel, gastrointestinal profile, glucose/endocrine profile, inflammatory profile, lipid profile  Nutrition-Focused Physical Findings: overall appearance     Nutrition Risk  Level of Risk/Frequency of Follow-up: moderate     Nutrition Follow-Up  RD Follow-up?: Yes      Tatianna Molina RD, LDN 09/07/2023 3:25 PM     "

## 2023-09-07 NOTE — NURSING
Pt discharged in stable condition with belongings/discharge instructions. No needs/concerns voiced at this time.

## 2023-09-08 ENCOUNTER — PATIENT MESSAGE (OUTPATIENT)
Dept: OBSTETRICS AND GYNECOLOGY | Facility: CLINIC | Age: 43
End: 2023-09-08
Payer: OTHER GOVERNMENT

## 2023-09-08 ENCOUNTER — PATIENT MESSAGE (OUTPATIENT)
Dept: ADMINISTRATIVE | Facility: CLINIC | Age: 43
End: 2023-09-08
Payer: OTHER GOVERNMENT

## 2023-09-08 ENCOUNTER — PATIENT OUTREACH (OUTPATIENT)
Dept: ADMINISTRATIVE | Facility: CLINIC | Age: 43
End: 2023-09-08
Payer: OTHER GOVERNMENT

## 2023-09-08 NOTE — PROGRESS NOTES
C3 nurse attempted to contact Jaye Martinez  for a TCC post hospital discharge follow up call. No answer. Left voicemail with callback information. The patient has a scheduled HOSFU appointment with Steev Gonzalez MD  on 09/12/2023 @ 2888.     Message sent to PCP staff

## 2023-09-11 NOTE — PROGRESS NOTES
C3 nurse attempted to contact Jaye Martinez  for a TCC post hospital discharge follow up call. No answer. Left voicemail with callback information. The patient has a scheduled HOSFU appointment with Steve Gonzalez MD  on 09/12/2023 @ 5901.      Message sent to PCP staff

## 2023-09-11 NOTE — PROGRESS NOTES
C3 nurse attempted to contact Jaye Martinez  for a TCC post hospital discharge follow up call. No answer. Left voicemail with callback information. The patient has a scheduled HOSFU appointment with Steve Gonzalez MD  on 09/12/2023 @ [id number].      Message sent to PCP staff

## 2023-09-12 ENCOUNTER — OFFICE VISIT (OUTPATIENT)
Dept: FAMILY MEDICINE | Facility: CLINIC | Age: 43
End: 2023-09-12
Payer: OTHER GOVERNMENT

## 2023-09-12 VITALS
WEIGHT: 96.31 LBS | DIASTOLIC BLOOD PRESSURE: 58 MMHG | OXYGEN SATURATION: 98 % | HEART RATE: 75 BPM | HEIGHT: 62 IN | BODY MASS INDEX: 17.72 KG/M2 | SYSTOLIC BLOOD PRESSURE: 102 MMHG

## 2023-09-12 DIAGNOSIS — R74.01 TRANSAMINITIS: Primary | ICD-10-CM

## 2023-09-12 DIAGNOSIS — M62.89 PELVIC FLOOR INSTABILITY: ICD-10-CM

## 2023-09-12 DIAGNOSIS — R16.0 HEPATOMEGALY: ICD-10-CM

## 2023-09-12 PROCEDURE — 99999 PR PBB SHADOW E&M-EST. PATIENT-LVL IV: ICD-10-PCS | Mod: PBBFAC,,, | Performed by: FAMILY MEDICINE

## 2023-09-12 PROCEDURE — 99999 PR PBB SHADOW E&M-EST. PATIENT-LVL IV: CPT | Mod: PBBFAC,,, | Performed by: FAMILY MEDICINE

## 2023-09-12 PROCEDURE — 99214 OFFICE O/P EST MOD 30 MIN: CPT | Mod: S$PBB,,, | Performed by: FAMILY MEDICINE

## 2023-09-12 PROCEDURE — 99214 OFFICE O/P EST MOD 30 MIN: CPT | Mod: PBBFAC,PO | Performed by: FAMILY MEDICINE

## 2023-09-12 PROCEDURE — 99214 PR OFFICE/OUTPT VISIT, EST, LEVL IV, 30-39 MIN: ICD-10-PCS | Mod: S$PBB,,, | Performed by: FAMILY MEDICINE

## 2023-09-12 RX ORDER — NITROFURANTOIN 25; 75 MG/1; MG/1
100 CAPSULE ORAL 2 TIMES DAILY
Qty: 60 CAPSULE | Refills: 1 | Status: SHIPPED | OUTPATIENT
Start: 2023-09-12 | End: 2023-10-18

## 2023-09-12 NOTE — PROGRESS NOTES
"Subjective:       Patient ID: Jaye Martniez is a 43 y.o. female.    Chief Complaint: Follow-up (Hosp f/u)    HPI:  Pleasant 41-year-old patient here with her boyfriend Kevin.  Both individuals are highly intelligent.  Kevin should have been a physician.  The patient is admitted to the hospital in [place] recently for what appeared to be UTI and early pyelonephritis.  He was given antibiotics for E coli she was recovered in his here for hospital follow up today.      The patient had a lot of labs and she is not in the normal range in most of them.  Most of it does not clinically relevant but she does have elevated LFTs and will be seeing hepatology soon to discern why she has a fatty liver.  Full chart review accomplish.  They will determine as well if there is any evidence of cirrhosis.  Due to fat.  The patient does have some bladder for vaccination.  That might predispose her to frequent UTIs.  I am going to recommend Macrobid be taken after intercourse.  And she should try to empty her bladder the best she can as well.    Review of Systems   Constitutional: Negative.    HENT: Negative.     Eyes: Negative.    Respiratory: Negative.     Cardiovascular: Negative.    Gastrointestinal: Negative.    Endocrine: Negative.    Genitourinary: Negative.    Musculoskeletal: Negative.    Skin: Negative.    Allergic/Immunologic: Negative.    Neurological: Negative.    Hematological: Negative.    Psychiatric/Behavioral: Negative.         Objective:      Vitals:    09/12/23 1322   BP: (!) 102/58   Pulse: 75   SpO2: 98%   Weight: 43.7 kg (96 lb 5.5 oz)   Height: 5' 2" (1.575 m)      Physical Exam  Vitals and nursing note reviewed.   Constitutional:       Appearance: Normal appearance. She is normal weight.   HENT:      Head: Normocephalic and atraumatic.      Nose: Nose normal.      Mouth/Throat:      Mouth: Mucous membranes are moist.      Pharynx: Oropharynx is clear.   Eyes:      Extraocular Movements: Extraocular movements " intact.      Conjunctiva/sclera: Conjunctivae normal.      Pupils: Pupils are equal, round, and reactive to light.   Cardiovascular:      Rate and Rhythm: Normal rate and regular rhythm.      Pulses: Normal pulses.      Heart sounds: Normal heart sounds.   Pulmonary:      Effort: Pulmonary effort is normal.      Breath sounds: Normal breath sounds.   Abdominal:      General: Abdomen is flat. Bowel sounds are normal.      Palpations: Abdomen is soft.   Musculoskeletal:         General: Normal range of motion.      Cervical back: Normal range of motion and neck supple.   Skin:     General: Skin is warm and dry.      Capillary Refill: Capillary refill takes less than 2 seconds.   Neurological:      General: No focal deficit present.      Mental Status: She is alert and oriented to person, place, and time. Mental status is at baseline.   Psychiatric:         Mood and Affect: Mood normal.         Behavior: Behavior normal.         Thought Content: Thought content normal.         Judgment: Judgment normal.         Results for orders placed or performed during the hospital encounter of 09/01/23   Blood culture    Specimen: Antecubital, Right; Blood   Result Value Ref Range    Blood Culture, Routine No growth after 5 days.    Blood culture    Specimen: Blood   Result Value Ref Range    Blood Culture, Routine No growth after 5 days.    Urine Culture High Risk    Specimen: Urine, Clean Catch   Result Value Ref Range    Urine Culture, Routine No growth    Urinalysis, Reflex to Urine Culture Urine, Clean Catch    Specimen: Urine, Clean Catch   Result Value Ref Range    Specimen UA Urine, Clean Catch     Color, UA Orange (A) Yellow, Straw, Fidelia    Appearance, UA Clear Clear    pH, UA 6.0 5.0 - 8.0    Specific Gravity, UA 1.005 1.005 - 1.030    Protein, UA Negative Negative    Glucose, UA Negative Negative    Ketones, UA Negative Negative    Bilirubin (UA) Negative Negative    Occult Blood UA Negative Negative    Nitrite, UA  Positive (A) Negative    Urobilinogen, UA Negative Negative EU/dL    Leukocytes, UA Negative Negative   Magnesium   Result Value Ref Range    Magnesium 1.8 1.6 - 2.6 mg/dL   Lipase   Result Value Ref Range    Lipase 25 4 - 60 U/L   TROPONIN I HIGH SENSITIVITY   Result Value Ref Range    Troponin I High Sensitivity 3.4 0.0 - 14.9 pg/mL   Comprehensive Metabolic Panel   Result Value Ref Range    Sodium 137 136 - 145 mmol/L    Potassium 3.7 3.5 - 5.1 mmol/L    Chloride 103 95 - 110 mmol/L    CO2 24 23 - 29 mmol/L    Glucose 90 70 - 110 mg/dL    BUN 8 6 - 20 mg/dL    Creatinine 0.6 0.5 - 1.4 mg/dL    Calcium 8.8 8.7 - 10.5 mg/dL    Total Protein 7.5 6.0 - 8.4 g/dL    Albumin 4.0 3.5 - 5.2 g/dL    Total Bilirubin 0.6 0.1 - 1.0 mg/dL    Alkaline Phosphatase 131 55 - 135 U/L    AST 23 10 - 40 U/L    ALT 38 10 - 44 U/L    eGFR >60.0 >60 mL/min/1.73 m^2    Anion Gap 10 8 - 16 mmol/L   CBC Auto Differential   Result Value Ref Range    WBC 13.07 (H) 3.90 - 12.70 K/uL    RBC 4.21 4.00 - 5.40 M/uL    Hemoglobin 13.8 12.0 - 16.0 g/dL    Hematocrit 40.9 37.0 - 48.5 %    MCV 97 82 - 98 fL    MCH 32.8 (H) 27.0 - 31.0 pg    MCHC 33.7 32.0 - 36.0 g/dL    RDW 13.8 11.5 - 14.5 %    Platelets 309 150 - 450 K/uL    MPV 12.2 9.2 - 12.9 fL    Immature Granulocytes 0.5 0.0 - 0.5 %    Gran # (ANC) 9.1 (H) 1.8 - 7.7 K/uL    Immature Grans (Abs) 0.06 (H) 0.00 - 0.04 K/uL    Lymph # 3.1 1.0 - 4.8 K/uL    Mono # 0.7 0.3 - 1.0 K/uL    Eos # 0.1 0.0 - 0.5 K/uL    Baso # 0.03 0.00 - 0.20 K/uL    nRBC 0 0 /100 WBC    Gran % 69.8 38.0 - 73.0 %    Lymph % 23.3 18.0 - 48.0 %    Mono % 5.2 4.0 - 15.0 %    Eosinophil % 1.0 0.0 - 8.0 %    Basophil % 0.2 0.0 - 1.9 %    Platelet Estimate Appears normal     Differential Method Automated    Lactic Acid, Plasma   Result Value Ref Range    Lactate (Lactic Acid) 1.6 0.5 - 1.9 mmol/L   Procalcitonin   Result Value Ref Range    Procalcitonin <0.05 0.00 - 0.50 ng/mL   Drug screen panel, in-house   Result Value Ref  Range    Benzodiazepines Negative Negative    Cocaine (Metab.) Negative Negative    Opiate Scrn, Ur Presumptive Positive (A) Negative    Barbiturate Screen, Ur Presumptive Positive (A) Negative    Amphetamine Screen, Ur Negative Negative    THC Presumptive Positive (A) Negative    Phencyclidine Negative Negative    Creatinine, Urine 36.0 15.0 - 325.0 mg/dL    Toxicology Information SEE COMMENT    Urinalysis Microscopic   Result Value Ref Range    RBC, UA 0 0 - 4 /hpf    WBC, UA 1 0 - 5 /hpf    Bacteria Negative None-Occ /hpf    Squam Epithel, UA 1 /hpf    Hyaline Casts, UA 3 (A) 0-1/lpf /lpf    Microscopic Comment SEE COMMENT    COVID-19 Rapid Screening   Result Value Ref Range    SARS-CoV-2 RNA, Amplification, Qual Negative Negative   CBC auto differential   Result Value Ref Range    WBC 7.42 3.90 - 12.70 K/uL    RBC 3.81 (L) 4.00 - 5.40 M/uL    Hemoglobin 12.4 12.0 - 16.0 g/dL    Hematocrit 37.7 37.0 - 48.5 %    MCV 99 (H) 82 - 98 fL    MCH 32.5 (H) 27.0 - 31.0 pg    MCHC 32.9 32.0 - 36.0 g/dL    RDW 14.1 11.5 - 14.5 %    Platelets 242 150 - 450 K/uL    MPV 12.3 9.2 - 12.9 fL    Immature Granulocytes 0.4 0.0 - 0.5 %    Gran # (ANC) 4.9 1.8 - 7.7 K/uL    Immature Grans (Abs) 0.03 0.00 - 0.04 K/uL    Lymph # 1.7 1.0 - 4.8 K/uL    Mono # 0.6 0.3 - 1.0 K/uL    Eos # 0.1 0.0 - 0.5 K/uL    Baso # 0.05 0.00 - 0.20 K/uL    nRBC 0 0 /100 WBC    Gran % 66.2 38.0 - 73.0 %    Lymph % 23.3 18.0 - 48.0 %    Mono % 7.8 4.0 - 15.0 %    Eosinophil % 1.6 0.0 - 8.0 %    Basophil % 0.7 0.0 - 1.9 %    Differential Method Automated    Basic metabolic panel   Result Value Ref Range    Sodium 141 136 - 145 mmol/L    Potassium 3.6 3.5 - 5.1 mmol/L    Chloride 109 95 - 110 mmol/L    CO2 29 23 - 29 mmol/L    Glucose 82 70 - 110 mg/dL    BUN 6 6 - 20 mg/dL    Creatinine 0.5 0.5 - 1.4 mg/dL    Calcium 8.0 (L) 8.7 - 10.5 mg/dL    Anion Gap 3 (L) 8 - 16 mmol/L    eGFR >60.0 >60 mL/min/1.73 m^2   Folate   Result Value Ref Range    Folate 2.7 (L)  4.0 - 24.0 ng/mL   Vitamin B12   Result Value Ref Range    Vitamin B-12 480 210 - 950 pg/mL   CBC auto differential   Result Value Ref Range    WBC 7.36 3.90 - 12.70 K/uL    RBC 3.68 (L) 4.00 - 5.40 M/uL    Hemoglobin 11.7 (L) 12.0 - 16.0 g/dL    Hematocrit 36.7 (L) 37.0 - 48.5 %     (H) 82 - 98 fL    MCH 31.8 (H) 27.0 - 31.0 pg    MCHC 31.9 (L) 32.0 - 36.0 g/dL    RDW 14.1 11.5 - 14.5 %    Platelets 269 150 - 450 K/uL    MPV 12.6 9.2 - 12.9 fL    Immature Granulocytes 0.4 0.0 - 0.5 %    Gran # (ANC) 3.5 1.8 - 7.7 K/uL    Immature Grans (Abs) 0.03 0.00 - 0.04 K/uL    Lymph # 3.0 1.0 - 4.8 K/uL    Mono # 0.7 0.3 - 1.0 K/uL    Eos # 0.2 0.0 - 0.5 K/uL    Baso # 0.05 0.00 - 0.20 K/uL    nRBC 0 0 /100 WBC    Gran % 46.9 38.0 - 73.0 %    Lymph % 40.1 18.0 - 48.0 %    Mono % 9.9 4.0 - 15.0 %    Eosinophil % 2.0 0.0 - 8.0 %    Basophil % 0.7 0.0 - 1.9 %    Differential Method Automated    Comprehensive Metabolic Panel   Result Value Ref Range    Sodium 138 136 - 145 mmol/L    Potassium 4.7 3.5 - 5.1 mmol/L    Chloride 107 95 - 110 mmol/L    CO2 30 (H) 23 - 29 mmol/L    Glucose 80 70 - 110 mg/dL    BUN 8 6 - 20 mg/dL    Creatinine 0.6 0.5 - 1.4 mg/dL    Calcium 8.0 (L) 8.7 - 10.5 mg/dL    Total Protein 5.6 (L) 6.0 - 8.4 g/dL    Albumin 2.8 (L) 3.5 - 5.2 g/dL    Total Bilirubin 0.2 0.1 - 1.0 mg/dL    Alkaline Phosphatase 144 (H) 55 - 135 U/L    AST 53 (H) 10 - 40 U/L     (H) 10 - 44 U/L    eGFR >60.0 >60 mL/min/1.73 m^2    Anion Gap 1 (L) 8 - 16 mmol/L   Comprehensive Metabolic Panel   Result Value Ref Range    Sodium 137 136 - 145 mmol/L    Potassium 3.5 3.5 - 5.1 mmol/L    Chloride 107 95 - 110 mmol/L    CO2 27 23 - 29 mmol/L    Glucose 83 70 - 110 mg/dL    BUN 10 6 - 20 mg/dL    Creatinine 0.5 0.5 - 1.4 mg/dL    Calcium 8.0 (L) 8.7 - 10.5 mg/dL    Total Protein 5.7 (L) 6.0 - 8.4 g/dL    Albumin 2.8 (L) 3.5 - 5.2 g/dL    Total Bilirubin 0.3 0.1 - 1.0 mg/dL    Alkaline Phosphatase 137 (H) 55 - 135 U/L    AST  26 10 - 40 U/L    ALT 89 (H) 10 - 44 U/L    eGFR >60.0 >60 mL/min/1.73 m^2    Anion Gap 3 (L) 8 - 16 mmol/L   Comprehensive Metabolic Panel   Result Value Ref Range    Sodium 142 136 - 145 mmol/L    Potassium 3.6 3.5 - 5.1 mmol/L    Chloride 111 (H) 95 - 110 mmol/L    CO2 26 23 - 29 mmol/L    Glucose 109 70 - 110 mg/dL    BUN 12 6 - 20 mg/dL    Creatinine 0.6 0.5 - 1.4 mg/dL    Calcium 8.2 (L) 8.7 - 10.5 mg/dL    Total Protein 5.9 (L) 6.0 - 8.4 g/dL    Albumin 2.9 (L) 3.5 - 5.2 g/dL    Total Bilirubin 0.4 0.1 - 1.0 mg/dL    Alkaline Phosphatase 141 (H) 55 - 135 U/L    AST 27 10 - 40 U/L    ALT 75 (H) 10 - 44 U/L    eGFR >60.0 >60 mL/min/1.73 m^2    Anion Gap 5 (L) 8 - 16 mmol/L   CBC auto differential   Result Value Ref Range    WBC 10.03 3.90 - 12.70 K/uL    RBC 3.68 (L) 4.00 - 5.40 M/uL    Hemoglobin 12.2 12.0 - 16.0 g/dL    Hematocrit 35.8 (L) 37.0 - 48.5 %    MCV 97 82 - 98 fL    MCH 33.2 (H) 27.0 - 31.0 pg    MCHC 34.1 32.0 - 36.0 g/dL    RDW 14.2 11.5 - 14.5 %    Platelets 318 150 - 450 K/uL    MPV 12.6 9.2 - 12.9 fL    Immature Granulocytes 0.5 0.0 - 0.5 %    Gran # (ANC) 5.8 1.8 - 7.7 K/uL    Immature Grans (Abs) 0.05 (H) 0.00 - 0.04 K/uL    Lymph # 3.1 1.0 - 4.8 K/uL    Mono # 0.8 0.3 - 1.0 K/uL    Eos # 0.1 0.0 - 0.5 K/uL    Baso # 0.07 0.00 - 0.20 K/uL    nRBC 0 0 /100 WBC    Gran % 58.2 38.0 - 73.0 %    Lymph % 30.9 18.0 - 48.0 %    Mono % 8.4 4.0 - 15.0 %    Eosinophil % 1.3 0.0 - 8.0 %    Basophil % 0.7 0.0 - 1.9 %    Differential Method Automated    CBC auto differential   Result Value Ref Range    WBC 15.05 (H) 3.90 - 12.70 K/uL    RBC 3.75 (L) 4.00 - 5.40 M/uL    Hemoglobin 12.5 12.0 - 16.0 g/dL    Hematocrit 36.8 (L) 37.0 - 48.5 %    MCV 98 82 - 98 fL    MCH 33.3 (H) 27.0 - 31.0 pg    MCHC 34.0 32.0 - 36.0 g/dL    RDW 14.7 (H) 11.5 - 14.5 %    Platelets 368 150 - 450 K/uL    MPV 11.4 9.2 - 12.9 fL    Immature Granulocytes 0.4 0.0 - 0.5 %    Gran # (ANC) 10.1 (H) 1.8 - 7.7 K/uL    Immature Grans  (Abs) 0.06 (H) 0.00 - 0.04 K/uL    Lymph # 3.9 1.0 - 4.8 K/uL    Mono # 0.9 0.3 - 1.0 K/uL    Eos # 0.1 0.0 - 0.5 K/uL    Baso # 0.05 0.00 - 0.20 K/uL    nRBC 0 0 /100 WBC    Gran % 67.2 38.0 - 73.0 %    Lymph % 25.7 18.0 - 48.0 %    Mono % 6.0 4.0 - 15.0 %    Eosinophil % 0.4 0.0 - 8.0 %    Basophil % 0.3 0.0 - 1.9 %    Differential Method Automated    Comprehensive metabolic panel   Result Value Ref Range    Sodium 141 136 - 145 mmol/L    Potassium 4.0 3.5 - 5.1 mmol/L    Chloride 109 95 - 110 mmol/L    CO2 25 23 - 29 mmol/L    Glucose 120 (H) 70 - 110 mg/dL    BUN 10 6 - 20 mg/dL    Creatinine 0.7 0.5 - 1.4 mg/dL    Calcium 8.7 8.7 - 10.5 mg/dL    Total Protein 6.8 6.0 - 8.4 g/dL    Albumin 3.6 3.5 - 5.2 g/dL    Total Bilirubin 0.3 0.1 - 1.0 mg/dL    Alkaline Phosphatase 144 (H) 55 - 135 U/L    AST 75 (H) 10 - 40 U/L     (H) 10 - 44 U/L    eGFR >60.0 >60 mL/min/1.73 m^2    Anion Gap 7 (L) 8 - 16 mmol/L   POCT urine pregnancy   Result Value Ref Range    POC Preg Test, Ur Negative Negative     Acceptable Yes       Assessment:       1. Transaminitis    2. Hepatomegaly    3. Pelvic floor instability        Plan:       Transaminitis  Comments:  Soon to be seen in hepatology Clinic.  Need to try to come up with a solution to fatty liver    Hepatomegaly  Comments:  Hepatology Clinic.    Pelvic floor instability  Comments:  Headed for surgery.    Other orders  -     nitrofurantoin, macrocrystal-monohydrate, (MACROBID) 100 MG capsule; Take 1 capsule (100 mg total) by mouth 2 (two) times daily.  Dispense: 60 capsule; Refill: 1          Medication List with Changes/Refills   New Medications    NITROFURANTOIN, MACROCRYSTAL-MONOHYDRATE, (MACROBID) 100 MG CAPSULE    Take 1 capsule (100 mg total) by mouth 2 (two) times daily.   Current Medications    ALBUTEROL (PROAIR HFA) 90 MCG/ACTUATION INHALER    Inhale 2 puffs into the lungs every 4 (four) hours as needed for Wheezing.     BUTALBITAL-ACETAMINOPHEN-CAFFEINE -40 MG (FIORICET, ESGIC) -40 MG PER TABLET    Take 1 tablet by mouth 2 (two) times daily as needed.    EMGALITY  MG/ML PNIJ    Inject 1 mL into the skin every 30 days.    FOLIC ACID (FOLVITE) 1 MG TABLET    Take 1 tablet (1 mg total) by mouth once daily.    HYDROCODONE-ACETAMINOPHEN 10-325MG (NORCO)  MG TAB    Take 1 tablet by mouth every 6 (six) hours as needed.    NAPROXEN (NAPROSYN) 500 MG TABLET    Take 1 tablet (500 mg total) by mouth every 12 (twelve) hours as needed (Body aches).    ONDANSETRON (ZOFRAN-ODT) 4 MG TBDL    Take 1 tablet (4 mg total) by mouth every 6 (six) hours as needed (nausea/vomiting).    PREDNISONE (DELTASONE) 5 MG TABLET    Take 1 tablet (5 mg total) by mouth daily as needed (Joint stiffness).    PROMETHAZINE (PHENERGAN) 25 MG SUPPOSITORY    Place 1 suppository (25 mg total) rectally every 6 (six) hours as needed for Nausea.    TIZANIDINE (ZANAFLEX) 4 MG TABLET    Take 4 mg by mouth every 6 (six) hours as needed.    UBROGEPANT 100 MG TABLET    Take 100 mg by mouth as needed.    VALACYCLOVIR (VALTREX) 1000 MG TABLET    Take 1 tablet (1,000 mg total) by mouth every 12 (twelve) hours. for 5 days

## 2023-09-16 DIAGNOSIS — M32.9 HISTORY OF SYSTEMIC LUPUS ERYTHEMATOSUS: ICD-10-CM

## 2023-09-18 RX ORDER — PREDNISONE 5 MG/1
5 TABLET ORAL DAILY PRN
Qty: 90 TABLET | Refills: 0 | Status: SHIPPED | OUTPATIENT
Start: 2023-09-18

## 2023-09-20 ENCOUNTER — OFFICE VISIT (OUTPATIENT)
Dept: OBSTETRICS AND GYNECOLOGY | Facility: CLINIC | Age: 43
End: 2023-09-20
Payer: OTHER GOVERNMENT

## 2023-09-20 VITALS — DIASTOLIC BLOOD PRESSURE: 60 MMHG | SYSTOLIC BLOOD PRESSURE: 98 MMHG | BODY MASS INDEX: 17.62 KG/M2 | WEIGHT: 96.31 LBS

## 2023-09-20 DIAGNOSIS — R10.2 PELVIC PAIN: ICD-10-CM

## 2023-09-20 DIAGNOSIS — Z01.818 PREOP EXAMINATION: Primary | ICD-10-CM

## 2023-09-20 PROCEDURE — 99999 PR PBB SHADOW E&M-EST. PATIENT-LVL III: ICD-10-PCS | Mod: PBBFAC,,, | Performed by: SPECIALIST

## 2023-09-20 PROCEDURE — 99999 PR PBB SHADOW E&M-EST. PATIENT-LVL III: CPT | Mod: PBBFAC,,, | Performed by: SPECIALIST

## 2023-09-20 PROCEDURE — 99213 PR OFFICE/OUTPT VISIT, EST, LEVL III, 20-29 MIN: ICD-10-PCS | Mod: S$PBB,,, | Performed by: SPECIALIST

## 2023-09-20 PROCEDURE — 99213 OFFICE O/P EST LOW 20 MIN: CPT | Mod: PBBFAC,PN | Performed by: SPECIALIST

## 2023-09-20 PROCEDURE — 99213 OFFICE O/P EST LOW 20 MIN: CPT | Mod: S$PBB,,, | Performed by: SPECIALIST

## 2023-09-20 RX ORDER — SODIUM CHLORIDE 9 MG/ML
INJECTION, SOLUTION INTRAVENOUS CONTINUOUS
Status: CANCELLED | OUTPATIENT
Start: 2023-09-20

## 2023-09-20 RX ORDER — FAMOTIDINE 20 MG/1
20 TABLET, FILM COATED ORAL
Status: CANCELLED | OUTPATIENT
Start: 2023-09-20

## 2023-09-20 RX ORDER — MUPIROCIN 20 MG/G
OINTMENT TOPICAL
Status: CANCELLED | OUTPATIENT
Start: 2023-09-20

## 2023-09-20 NOTE — PROGRESS NOTES
42 yo WF  presents for preop eval for scheduled TLH BS and anterior colporaphy for PP and uterocervical prolapse  I discussed the risks and benefits of procedure  Risks of infection, bowel, bladder , ureteral injury,. Bleeding requireing transfusion and prolonged recovery.     EXAMINATION:  US PELVIS COMP WITH TRANSVAG NON-OB (XPD)     CLINICAL HISTORY:  Pelvic and perineal pain     TECHNIQUE:  Transabdominal sonography of the pelvis was performed, followed by transvaginal sonography to better evaluate the uterus and ovaries.     COMPARISON:  3/29/23     FINDINGS:  The uterus measures 8.1 x 4.8 x 3.2 cm and the endometrium is poorly displayed presumably secondary to the patient's history of ablation.  It appears of approximately 6 mm which would be considered prominent for a postmenopausal patient but within normal limits for a premenopausal patient.  A similar appearance is noted on the prior.     The right ovary is thought to be visualized at 4.0 x 2.0 x 3.0 cm and the left ovary is thought to be visualized at 3.0 x 2.5 x 2.2 cm     A simple ovarian cyst on the right is noted of 2.2 cm similar to on the prior.  Follow-up in 1 year for size stability would be reasonable.     Arterial and venous blood flow of the right and left ovary appears to be present.     Impression:     1. Evaluation of the endometrium is hindered by the patient's history of ablation.  The endometrium is questioned of 6 mm distally is within normal limits for a premenopausal patient but would be considered thickened for a postmenopausal patient, this appears relatively smoothly thickened distally on the cine images.  Please clinically correlate, this likely relates to the patient's history of ablation and this appears relatively stable since the prior.  Sources for endometrial prominence can include hyperplasia, neoplasm, and polyps..  Longer term ultrasound follow-up for size stability would be reasonable.  No worrisome changes noted  since the prior.  2. Stable right ovarian cystic structure most likely a cyst, follow-up in 1 year for size stability would be suggested        Electronically signed by: Junior Hernández MD  Date:                                            2023  Time:                                           16:05             Exam Ended: 23 15:38 Last Resulted: 23 16:05                       Past Medical History:   Diagnosis Date    Abnormal Pap smear      Anxiety      Cancer       pre-cancer cells cervical     Cervical disc herniation      Chronic cough      Closed fracture 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) level with anterior cord syndrome       T 12 fracture compression     Fractures      Genital herpes      GERD (gastroesophageal reflux disease)      Hemorrhoid      History of colposcopy with cervical biopsy      Lumbar disc herniation       on Hydrocodone    Lupus (systemic lupus erythematosus)       chronic thrombocytopenia    Migraine headache      PONV (postoperative nausea and vomiting)                     Past Surgical History:   Procedure Laterality Date    ADENOIDECTOMY        APPENDECTOMY         SECTION, CLASSIC         x2 , last 2014    CHOLECYSTECTOMY        CLOSED REDUCTION OF FRACTURE OF NASAL BONE Bilateral 2022     Procedure: CLOSED REDUCTION, FRACTURE, NASAL BONE;  Surgeon: Mary Carlton MD;  Location: [facility] OR;  Service: ENT;  Laterality: Bilateral;    COLONOSCOPY N/A 10/29/2019     Procedure: COLONOSCOPY;  Surgeon: Jules De La Cruz MD;  Location: [facility] ENDO;  Service: Endoscopy;  Laterality: N/A;    CRYOTHERAPY             DILATION AND CURETTAGE OF UTERUS   [id number]    ENDOMETRIAL ABLATION        GANGLION CYST EXCISION Left      TONSILLECTOMY        TUBAL LIGATION   2014    TYMPANOSTOMY TUBE PLACEMENT         x 7                   Family History   Problem Relation Age of Onset    Heart disease Father      Diabetes Mother      Cancer Maternal Grandfather           colon    Cancer Paternal  Grandfather           colon    Breast cancer Maternal Grandmother      Breast cancer Maternal Aunt      Breast cancer Maternal Aunt      Ovarian cancer Neg Hx           Social History                Socioeconomic History    Marital status: Legally    Tobacco Use    Smoking status: Every Day       Packs/day: 0.25       Types: Cigarettes    Smokeless tobacco: Never   Substance and Sexual Activity    Alcohol use: Yes       Comment: special occasions    Drug use: Yes       Types: Marijuana       Comment: prescription    Sexual activity: Yes       Partners: Male       Birth control/protection: None         Current Medications               Current Outpatient Medications   Medication Sig Dispense Refill    butalbital-acetaminophen-caffeine -40 mg (FIORICET, ESGIC) -40 mg per tablet Take 1 tablet by mouth 2 (two) times daily as needed. 30 tablet 0    EMGALITY  mg/mL PnIj          hydrocodone-acetaminophen 10-325mg (NORCO)  mg Tab Take 1 tablet by mouth every 6 (six) hours as needed. 120 tablet 0    predniSONE (DELTASONE) 5 MG tablet Take 1 tablet (5 mg total) by mouth daily as needed (Joint stiffness). 90 tablet 0    tiZANidine (ZANAFLEX) 4 MG tablet Take 4 mg by mouth every 6 (six) hours as needed.        UBROGEPANT 100 mg tablet          albuterol (PROAIR HFA) 90 mcg/actuation inhaler Inhale 2 puffs into the lungs every 4 (four) hours as needed for Wheezing. 25.5 g 0      No current facility-administered medications for this visit.                      Review of patient's allergies indicates:   Allergen Reactions    Pregabalin Itching, Other (See Comments) and Hives       Bruising and headaches    Sulfa (sulfonamide antibiotics) Nausea And Vomiting    Flexeril [cyclobenzaprine] Rash    Morphine Rash and Hives    Sulfamethoxazole-trimethoprim Rash    Toradol [ketorolac] Rash         Review of System:   General: no chills, fever, night sweats, weight gain or weight loss  Psychological: no  depression or suicidal ideation  Breasts: no new or changing breast lumps, nipple discharge or masses.  Respiratory: no cough, shortness of breath, or wheezing  Cardiovascular: no chest pain or dyspnea on exertion  Gastrointestinal: no abdominal pain, change in bowel habits, or black or bloody stools  Genito-Urinary: as above  Musculoskeletal: no gait disturbance or muscular weakness                                                                    General Appearance    A and O x 4, Cooperative, no distress   Breasts     Abdomen   Symmetrical, no masses, no discharge, skin changes , erythema or retraction. No adenopathy  Soft, non-tender, bowel sounds active all four quadrants,  no masses, no organomegaly     Genitourinary:   External rectal exam shows no thrombosed external hemorrhoids.   Pelvic exam was performed with patient supine.  No labial fusion.  There is no rash, lesion or injury on the right labia.  There is no rash, lesion or injury on the left labia.  No bleeding and no signs of injury around the vaginal introitus, urethra is without lesions and well supported. The cervix is visualized with no discharge, lesions or friability.  No vaginal discharge found.  GRADE 2 UTERINE CERVICAL PROLAPSE  WITH GRADE 2 MIDLINE CYSTOCELE MILD GRADE 1 RECTOCELE  Bimanual exam:  The urethra is normal to palpation and there are no palpable vaginal wall masses.  Uterus is not deviated, not enlarged, not fixed, normal shape and not tender.  Cervix exhibits no motion tenderness.   Right adnexum displays no mass and no tenderness.  Left adnexum displays no mass and no tenderness.   Extremities: Extremities normal, atraumatic, no cyanosis or edema            Consents and orders obtained  Will review preop lab results and proceed at [facility] 9/27 /23 7am    I spent a total of 30 minutes on the day of the visit. This includes face to face time and non-face to face time preparing to see the patient (eg, review of tests), obtaining  and/or reviewing separately obtained history, documenting clinical information in the electronic or other health record, independently interpreting results and communicating results to the patient/family/caregiver, or care coordinator.

## 2023-09-22 ENCOUNTER — OFFICE VISIT (OUTPATIENT)
Dept: HEPATOLOGY | Facility: CLINIC | Age: 43
End: 2023-09-22
Payer: OTHER GOVERNMENT

## 2023-09-22 VITALS — HEIGHT: 62 IN | BODY MASS INDEX: 17.76 KG/M2 | WEIGHT: 96.5 LBS

## 2023-09-22 DIAGNOSIS — R16.0 HEPATOMEGALY: Primary | ICD-10-CM

## 2023-09-22 DIAGNOSIS — R74.8 ELEVATED LIVER ENZYMES: ICD-10-CM

## 2023-09-22 PROCEDURE — 99203 PR OFFICE/OUTPT VISIT, NEW, LEVL III, 30-44 MIN: ICD-10-PCS | Mod: S$PBB,,, | Performed by: NURSE PRACTITIONER

## 2023-09-22 PROCEDURE — 99999 PR PBB SHADOW E&M-EST. PATIENT-LVL IV: ICD-10-PCS | Mod: PBBFAC,,, | Performed by: NURSE PRACTITIONER

## 2023-09-22 PROCEDURE — 99999 PR PBB SHADOW E&M-EST. PATIENT-LVL IV: CPT | Mod: PBBFAC,,, | Performed by: NURSE PRACTITIONER

## 2023-09-22 PROCEDURE — 99214 OFFICE O/P EST MOD 30 MIN: CPT | Mod: PBBFAC,PN | Performed by: NURSE PRACTITIONER

## 2023-09-22 PROCEDURE — 99203 OFFICE O/P NEW LOW 30 MIN: CPT | Mod: S$PBB,,, | Performed by: NURSE PRACTITIONER

## 2023-09-22 NOTE — PROGRESS NOTES
[facility] New Patient Visit    Reason for Visit: Hepatomegaly    PCP: Steve Gonzalez    HPI:  This is a 43 y.o. female with PMH noted below, here for evaluation of hepatomegaly.    The patient's risk factors for fatty liver disease include:     Obesity                                        No; BMI 17.65  Dyslipidemia                                No - No recent lipid panel on file  Insulin resistance/Diabetes         No - No recent HgbA1c on file  Family history of diabetes           Yes; Mother with history of DM    PMH is significant for Lupus, followed by Rheumatologist in [place]. She was recently discharged from the hospital. Refer to discharge summary below:    Patient with history of ovarian cyst with bladder incontinence with prolapse, followed by gynecologist Dr. Moore, was scheduled for surgery but postponed due to testing positive for COVID-19.  History of drug resistant UTI, stated she started Macrobid on 8/29/23.  Presented due to nausea, vomiting, lower abdominal pain with back pain.  Hypotensive with systolics in the 80s in the ED, improved with IV fluids.  Procalcitonin negative.  Multiple imaging studies, dilated CBD post cholecystectomy, MRCP no evidence of choledocholithiasis but concern for possible stricture at ampulla, subsequently mild elevation in CMP, GI consulted and  EUS completed on 9/5, no mass or stricture noted to cause CBD obstruction, essentially normal EUS except a 1 cm hiatal hernia . No evidence of urinary obstruction/hydronephrosis.  She was started on ertapenem with ID and gynecology consult.  On 09/03 midline in place case management attempted home IV antibiotics but insurance did not cover.  She was able to discharge on 9/6 with plan to return to infusion center as outpt for final IV ertapenem dose.  Follow up with ob/gyn as previous, f/u with infectious disease in 2 weeks.      LFT's at discharge showed elevated liver enzymes, with an ALP of 144,  ALT of 160, AST of 75. LFT's prior to hospital admission were normal. CT of the abdomen in 2023 and MRCP in 2023 showed hepatomegaly (22 cm), with no focal liver lesions. She has never undergone a liver biopsy or non-invasive staging exam. She has no known family history of liver disease or genetic diseases. She denies any history of heavy alcohol use. She denies IVDU. HCV and HIV negative on prior labs. She is well appearing, and has no signs or symptoms of hepatic decompensation including jaundice, dark urine, pruritus, abdominal distention, lower extremity edema, hematemesis, melena, or periods of confusion suggestive of hepatic encephalopathy.      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    PSHX:  has a past surgical history that includes Cryotherapy; Appendectomy; Cholecystectomy;  section, classic; Tonsillectomy; Adenoidectomy; Tubal ligation (2014); Ganglion cyst excision (Left); Tympanostomy tube placement; Dilation and curettage of uterus ([id number]); Endometrial ablation; Colonoscopy (N/A, 10/29/2019); Closed reduction of fracture of nasal bone (Bilateral, 2022); and Endoscopic ultrasound of upper gastrointestinal tract (N/A, 2023).    The patient's social and family histories were reviewed by me and updated in the appropriate section of the electronic medical record.    Review of patient's allergies indicates:   Allergen Reactions    Cyclobenzaprine Rash and Other (See Comments)     Leg cramps    Pregabalin Itching, Other (See  Comments) and Hives     Bruising and headaches    Sulfa (sulfonamide antibiotics) Nausea And Vomiting    Morphine Rash and Hives    Sulfamethoxazole-trimethoprim Rash and Nausea And Vomiting     And headache    Toradol [ketorolac] Rash     Current Outpatient Medications on File Prior to Visit   Medication Sig Dispense Refill    butalbital-acetaminophen-caffeine -40 mg (FIORICET, ESGIC) -40 mg per tablet Take 1 tablet by mouth 2 (two) times daily as needed. 30 tablet 0    [START ON 9/25/2023] chlorhexidine (PERIDEX) 0.12 % solution Use as directed 10 mLs in the mouth or throat 2 (two) times daily.      chlorhexidine (PERIDEX) 0.12 % solution Swish and spit 10ml  by mouth twice daily for 5 days starting on 9/25/2023 473 mL 0    EMGALITY  mg/mL PnIj Inject 1 mL into the skin every 30 days.      hydrocodone-acetaminophen 10-325mg (NORCO)  mg Tab Take 1 tablet by mouth every 6 (six) hours as needed. 120 tablet 0    [START ON 9/25/2023] mupirocin (BACTROBAN) 2 % ointment Apply topically 2 (two) times daily.      mupirocin (BACTROBAN) 2 % ointment Apply to each nostril with a clean cotton swab twice daily for 5 days. Start on 09/25/2023 22 g 0    nitrofurantoin, macrocrystal-monohydrate, (MACROBID) 100 MG capsule Take 1 capsule (100 mg total) by mouth 2 (two) times daily. 60 capsule 1    predniSONE (DELTASONE) 5 MG tablet TAKE 1 TABLET (5 MG TOTAL) BY MOUTH DAILY AS NEEDED (JOINT STIFFNESS). 90 tablet 0    tiZANidine (ZANAFLEX) 4 MG tablet Take 4 mg by mouth every 6 (six) hours as needed.      UBROGEPANT 100 mg tablet Take 100 mg by mouth as needed for Migraine.      albuterol (PROAIR HFA) 90 mcg/actuation inhaler Inhale 2 puffs into the lungs every 4 (four) hours as needed for Wheezing. (Patient taking differently: Inhale 2 puffs into the lungs every 6 (six) hours as needed for Wheezing.) 25.5 g 0    valACYclovir (VALTREX) 1000 MG tablet Take 1 tablet (1,000 mg total) by mouth every 12 (twelve)  "hours. for 5 days (Patient not taking: Reported on 9/20/2023) 10 tablet 3     No current facility-administered medications on file prior to visit.       SOCIAL HISTORY:   Social History     Tobacco Use   Smoking Status Every Day    Current packs/day: 0.25    Types: Cigarettes   Smokeless Tobacco Never     Social History     Substance and Sexual Activity   Alcohol Use Yes    Comment: special occasions     Social History     Substance and Sexual Activity   Drug Use Yes    Types: Marijuana    Comment: prescription     ROS:   GENERAL: Denies fever, chills, weight loss/gain, fatigue  HEENT: Denies headaches, dizziness, vision/hearing changes  CARDIOVASCULAR: Denies chest pain, palpitations, or edema  RESPIRATORY: Denies dyspnea, cough  GI: Denies abdominal pain, rectal bleeding, nausea, vomiting. No change in bowel pattern or color  : Denies dysuria, hematuria   SKIN: Denies rash, itching   NEURO: Denies confusion, memory loss, or mood changes  PSYCH: Denies depression or anxiety  HEME/LYMPH: Denies easy bruising or bleeding    Objective Findings:    PHYSICAL EXAM:   Friendly White female, in no acute distress; alert and oriented to person, place and time.  VITALS: Ht 5' 2" (1.575 m)   Wt 43.8 kg (96 lb 8.3 oz)   BMI 17.65 kg/m²   HENT: Normocephalic, without obvious abnormality.  EYES: Sclerae anicteric.   NECK: No obvious masses.  CARDIOVASCULAR: No peripheral edema.  RESPIRATORY: Normal respiratory effort.   GI: Soft, non-tender, non-distended.  EXTREMITIES:  No clubbing, cyanosis or edema.  SKIN: Warm and dry. No jaundice. No rashes noted to exposed skin  NEURO:  Normal gait. No asterixis.  PSYCH:  Memory intact. Thought and speech pattern appropriate. Behavior normal. No depression or anxiety noted.    DIAGNOSTIC STUDIES:    CT ABDOMEN PELVIS WITH CONTRAST 6/2/2023:    FINDINGS:    Minimal hypoventilatory change within the dependent lung bases.     ABDOMEN:     Stomach: Within normal limits.     Liver: No focal " lesions. Hepatomegaly, with a craniocaudal liver length of approximately 22.4 cm.     Gallbladder: Surgically absent. Mild intra and extrahepatic biliary ductal dilatation, with the common bile duct measuring approximately 8 mm in diameter, tapering toward the ampulla. This in retrospect was also present on the previous examination, compatible with a chronic finding, likely secondary to previous cholecystectomy. If there is further clinical concern, correlation with liver enzyme levels could be done.     Pancreas: Within normal limits.     Spleen: Small low-attenuation lesion along the posterior spleen measuring approximately 1.2 cm in diameter and another along the anterior spleen measuring approximately 0.5 cm in diameter, most likely small cysts, doubtful clinical significance. Calcified granuloma within the spleen, compatible with prior healed granulomatous disease, as on prior.     Adrenal glands: Within normal limits.     Right kidney: No hydronephrosis. No focal lesion.  Left kidney: No hydronephrosis. No focal lesion.     Vascular structures: Vascular calcifications.     Nodes: No lymphadenopathy by size criteria.     PELVIS:     Small bowel: No significant distention.     Appendix: Not seen, possibly surgically absent, as a surgical clip is seen in the expected location along the cecum.     Colon: Wall thickening along the cecum. Additional possible wall thickening along portions of the transverse and descending colon, which could be artifactual related to nondistention in these regions. However, colitis is not excluded.     Bladder: Unremarkable.     No pelvic mass or adenopathy.     No free intraperitoneal fluid or air.     Bones: No acute bone findings.     IMPRESSION:  1.  Wall thickening along the cecum. Additional possible wall thickening along portions of the transverse and descending colon, which could be artifactual related to nondistention in these regions. However, colitis is not excluded.  2.   Hepatomegaly.  3.  Mild intra and extrahepatic biliary ductal dilatation, in retrospect also present on the previous examination, compatible with a chronic finding, likely secondary to previous cholecystectomy. If there is further clinical concern, correlation with liver enzyme levels could be done.    CT ABDOMEN PELVIS WITH CONTRAST 9/1/2023:    FINDINGS: The liver and pancreas and adrenal glands appear within normal limits. Incidental note is made of a single tiny simple cyst in the lower pole of the left kidney. The kidneys are otherwise unremarkable. There is no hydronephrosis or hydroureter. There is a small simple cyst in the posterior aspect of the spleen. The spleen is otherwise unremarkable. The gallbladder is absent. There is moderate dilatation of the extrahepatic bile ducts. The common bile duct measures up to 14 mm in diameter. This may be physiologic due to the cholecystectomy. However the degree of ductal dilatation has increased significantly since the preceding study on 6/2/2023 where the common bile duct measured up to 11 mm in maximum transverse diameter. This may, may not be of any clinical significance. Correlation with liver function tests is recommended. No obstructing mass or gallstone is evident. An MRCP of the abdomen may be helpful for further evaluation. The stomach and small and large bowel appear grossly normal. There is no bowel distention. No abnormal masses or fluid collections are identified in the abdomen or pelvis.     IMPRESSION: Status post cholecystectomy with moderate dilatation of the extrahepatic bile ducts showing slight further increase in dilatation since the fairly recent previous CT scan dated 6/2/2023. Findings may be physiologic due to the cholecystectomy. However an irregular stenosis could also produce these findings. Correlation with clinical findings is recommended. An MRCP may be helpful for further evaluation. If ampullary stenosis is suspected, an ERCP with  manometry should be considered..    MRCP 9/2/2023:    FINDINGS:  Liver: Liver is enlarged measuring up to 22 cm in maximum craniocaudal dimension.  Gallbladder/Biliary tree: Status post cholecystectomy. Moderate intrahepatic biliary dilatation. The common bile duct is dilated measuring up to 1.4 cm and smoothly tapers to 9 mm at the distal CBD with abrupt caliber change at the ampulla. No evidence of choledocholithiasis.  Pancreas: No pancreatic duct dilatation. No pancreatitis.  Spleen: Unremarkable.  Adrenals: Unremarkable.  Genitourinary: A few high T2 signal cysts in the inferior left kidney are probably benign; no further imaging follow-up.  Gastrointestinal: No gastric wall thickening. No wall thickening of the visualized bowel.  Lymph nodes: No pathologically enlarged lymph nodes.     Impression:  1.  Status post cholecystectomy. Moderate intrahepatic biliary dilatation. The common bile duct is dilated measuring up to 1.4 cm and smoothly tapers to 9 mm at the distal CBD with abrupt caliber change at the ampulla. No evidence of choledocholithiasis. The possibility of a stricture at the ampulla cannot be excluded.  2.  Hepatomegaly.    FIBROSCAN - Ordered at visit.    ASSESSMENT & PLAN:  43 y.o. White female with:    1. Hepatomegaly  FibroScan (Vibration Controlled Transient Elastography)    Hepatic Function Panel    Alpha-1-Antitrypsin    Ceruloplasmin    Phosphatidylethanol (PETH)    Hepatitis A antibody, IgG    Hepatitis B Core Antibody, Total    Hepatitis B Surface Antibody, Qual/Quant    Hepatitis B Surface Antigen      2. Elevated liver enzymes  Hepatic Function Panel    Alpha-1-Antitrypsin    Ceruloplasmin    Phosphatidylethanol (PETH)    Hepatitis A antibody, IgG    Hepatitis B Core Antibody, Total    Hepatitis B Surface Antibody, Qual/Quant    Hepatitis B Surface Antigen        - Schedule Fibroscan to non-invasively stage liver disease.  - Repeat liver function tests in 1 month. Will also obtain  serological work up for other causes of chronic liver disease.  - Avoid alcohol and herbal supplements/alternative remedies.  - Recommend good control of cholesterol, blood pressure, & blood sugar levels.  - Continue close follow up with PCP and Rheumatologist, as planned.  - Return to clinic to be determined by lab and Fibroscan results.    Thank you for allowing me to participate in the care of Jaye Martinez       Hepatology Nurse Practitioner  [facility]    CC'ed note to:   Self, Aaareferral  Steve Gonzalez MD

## 2023-09-24 ENCOUNTER — HOSPITAL ENCOUNTER (EMERGENCY)
Facility: HOSPITAL | Age: 43
Discharge: PSYCHIATRIC OR SUBSTANCE USE FACILITY | End: 2023-09-24
Attending: EMERGENCY MEDICINE
Payer: OTHER GOVERNMENT

## 2023-09-24 VITALS
HEIGHT: 62 IN | TEMPERATURE: 99 F | RESPIRATION RATE: 20 BRPM | OXYGEN SATURATION: 99 % | WEIGHT: 95 LBS | HEART RATE: 72 BPM | DIASTOLIC BLOOD PRESSURE: 68 MMHG | BODY MASS INDEX: 17.48 KG/M2 | SYSTOLIC BLOOD PRESSURE: 101 MMHG

## 2023-09-24 DIAGNOSIS — R45.851 SUICIDAL IDEATION: Primary | ICD-10-CM

## 2023-09-24 DIAGNOSIS — Z00.8 MEDICAL CLEARANCE FOR PSYCHIATRIC ADMISSION: ICD-10-CM

## 2023-09-24 PROBLEM — F32.A DEPRESSION WITH SUICIDAL IDEATION: Status: ACTIVE | Noted: 2023-09-24

## 2023-09-24 LAB
ALBUMIN SERPL BCP-MCNC: 4.5 G/DL (ref 3.5–5.2)
ALP SERPL-CCNC: 95 U/L (ref 55–135)
ALT SERPL W/O P-5'-P-CCNC: 8 U/L (ref 10–44)
AMPHET+METHAMPHET UR QL: NEGATIVE
ANION GAP SERPL CALC-SCNC: 8 MMOL/L (ref 8–16)
APAP SERPL-MCNC: 10 UG/ML (ref 10–20)
AST SERPL-CCNC: 10 U/L (ref 10–40)
B-HCG UR QL: NEGATIVE
BACTERIA #/AREA URNS HPF: NEGATIVE /HPF
BARBITURATES UR QL SCN>200 NG/ML: ABNORMAL
BASOPHILS # BLD AUTO: 0.05 K/UL (ref 0–0.2)
BASOPHILS NFR BLD: 0.3 % (ref 0–1.9)
BENZODIAZ UR QL SCN>200 NG/ML: NEGATIVE
BILIRUB SERPL-MCNC: 0.4 MG/DL (ref 0.1–1)
BILIRUB UR QL STRIP: NEGATIVE
BUN SERPL-MCNC: 5 MG/DL (ref 6–20)
BZE UR QL SCN: NEGATIVE
CALCIUM SERPL-MCNC: 9.1 MG/DL (ref 8.7–10.5)
CANNABINOIDS UR QL SCN: ABNORMAL
CHLORIDE SERPL-SCNC: 107 MMOL/L (ref 95–110)
CLARITY UR: CLEAR
CO2 SERPL-SCNC: 25 MMOL/L (ref 23–29)
COLOR UR: YELLOW
CREAT SERPL-MCNC: 0.5 MG/DL (ref 0.5–1.4)
CREAT UR-MCNC: 50.4 MG/DL (ref 15–325)
CTP QC/QA: YES
DIFFERENTIAL METHOD: ABNORMAL
EOSINOPHIL # BLD AUTO: 0.1 K/UL (ref 0–0.5)
EOSINOPHIL NFR BLD: 0.5 % (ref 0–8)
ERYTHROCYTE [DISTWIDTH] IN BLOOD BY AUTOMATED COUNT: 14.3 % (ref 11.5–14.5)
EST. GFR  (NO RACE VARIABLE): >60 ML/MIN/1.73 M^2
ETHANOL SERPL-MCNC: 16 MG/DL
GLUCOSE SERPL-MCNC: 95 MG/DL (ref 70–110)
GLUCOSE UR QL STRIP: NEGATIVE
HCT VFR BLD AUTO: 40.1 % (ref 37–48.5)
HGB BLD-MCNC: 14 G/DL (ref 12–16)
HGB UR QL STRIP: ABNORMAL
HYALINE CASTS #/AREA URNS LPF: 6 /LPF
IMM GRANULOCYTES # BLD AUTO: 0.05 K/UL (ref 0–0.04)
IMM GRANULOCYTES NFR BLD AUTO: 0.3 % (ref 0–0.5)
KETONES UR QL STRIP: NEGATIVE
LEUKOCYTE ESTERASE UR QL STRIP: NEGATIVE
LYMPHOCYTES # BLD AUTO: 2.2 K/UL (ref 1–4.8)
LYMPHOCYTES NFR BLD: 14.7 % (ref 18–48)
MCH RBC QN AUTO: 33.7 PG (ref 27–31)
MCHC RBC AUTO-ENTMCNC: 34.9 G/DL (ref 32–36)
MCV RBC AUTO: 97 FL (ref 82–98)
MICROSCOPIC COMMENT: ABNORMAL
MONOCYTES # BLD AUTO: 0.7 K/UL (ref 0.3–1)
MONOCYTES NFR BLD: 4.6 % (ref 4–15)
NEUTROPHILS # BLD AUTO: 11.7 K/UL (ref 1.8–7.7)
NEUTROPHILS NFR BLD: 79.6 % (ref 38–73)
NITRITE UR QL STRIP: NEGATIVE
NRBC BLD-RTO: 0 /100 WBC
OPIATES UR QL SCN: NEGATIVE
PCP UR QL SCN>25 NG/ML: NEGATIVE
PH UR STRIP: 6 [PH] (ref 5–8)
PLATELET # BLD AUTO: 219 K/UL (ref 150–450)
PMV BLD AUTO: 11.5 FL (ref 9.2–12.9)
POTASSIUM SERPL-SCNC: 3.5 MMOL/L (ref 3.5–5.1)
PROT SERPL-MCNC: 7.6 G/DL (ref 6–8.4)
PROT UR QL STRIP: NEGATIVE
RBC # BLD AUTO: 4.15 M/UL (ref 4–5.4)
RBC #/AREA URNS HPF: 2 /HPF (ref 0–4)
SALICYLATES SERPL-MCNC: 4 MG/DL (ref 15–30)
SODIUM SERPL-SCNC: 140 MMOL/L (ref 136–145)
SP GR UR STRIP: 1.01 (ref 1–1.03)
SQUAMOUS #/AREA URNS HPF: 3 /HPF
TOXICOLOGY INFORMATION: ABNORMAL
TSH SERPL DL<=0.005 MIU/L-ACNC: 0.36 UIU/ML (ref 0.34–5.6)
URN SPEC COLLECT METH UR: ABNORMAL
UROBILINOGEN UR STRIP-ACNC: NEGATIVE EU/DL
WBC # BLD AUTO: 14.71 K/UL (ref 3.9–12.7)
WBC #/AREA URNS HPF: 2 /HPF (ref 0–5)

## 2023-09-24 PROCEDURE — 81025 URINE PREGNANCY TEST: CPT | Performed by: EMERGENCY MEDICINE

## 2023-09-24 PROCEDURE — 25000003 PHARM REV CODE 250: Performed by: EMERGENCY MEDICINE

## 2023-09-24 PROCEDURE — G0426 INPT/ED TELECONSULT50: HCPCS | Mod: 95,,, | Performed by: PSYCHIATRY & NEUROLOGY

## 2023-09-24 PROCEDURE — 80307 DRUG TEST PRSMV CHEM ANLYZR: CPT | Performed by: EMERGENCY MEDICINE

## 2023-09-24 PROCEDURE — 81001 URINALYSIS AUTO W/SCOPE: CPT | Mod: 59 | Performed by: EMERGENCY MEDICINE

## 2023-09-24 PROCEDURE — 82077 ASSAY SPEC XCP UR&BREATH IA: CPT | Performed by: EMERGENCY MEDICINE

## 2023-09-24 PROCEDURE — 80179 DRUG ASSAY SALICYLATE: CPT | Performed by: EMERGENCY MEDICINE

## 2023-09-24 PROCEDURE — 99285 EMERGENCY DEPT VISIT HI MDM: CPT

## 2023-09-24 PROCEDURE — 80053 COMPREHEN METABOLIC PANEL: CPT | Performed by: EMERGENCY MEDICINE

## 2023-09-24 PROCEDURE — 84443 ASSAY THYROID STIM HORMONE: CPT | Performed by: EMERGENCY MEDICINE

## 2023-09-24 PROCEDURE — 80143 DRUG ASSAY ACETAMINOPHEN: CPT | Performed by: EMERGENCY MEDICINE

## 2023-09-24 PROCEDURE — G0426 PR INPT TELEHEALTH CONSULT 50M: ICD-10-PCS | Mod: 95,,, | Performed by: PSYCHIATRY & NEUROLOGY

## 2023-09-24 PROCEDURE — 85025 COMPLETE CBC W/AUTO DIFF WBC: CPT | Performed by: EMERGENCY MEDICINE

## 2023-09-24 RX ORDER — BUTALBITAL, ACETAMINOPHEN AND CAFFEINE 50; 325; 40 MG/1; MG/1; MG/1
1 TABLET ORAL ONCE
Status: COMPLETED | OUTPATIENT
Start: 2023-09-24 | End: 2023-09-24

## 2023-09-24 RX ORDER — BUTALBITAL, ACETAMINOPHEN AND CAFFEINE 50; 325; 40 MG/1; MG/1; MG/1
1 TABLET ORAL ONCE
Status: DISCONTINUED | OUTPATIENT
Start: 2023-09-24 | End: 2023-09-24

## 2023-09-24 RX ADMIN — BUTALBITAL, ACETAMINOPHEN AND CAFFEINE 1 TABLET: 325; 50; 40 TABLET ORAL at 03:09
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
## 2023-09-25 PROBLEM — F43.23 ADJUSTMENT DISORDER WITH MIXED ANXIETY AND DEPRESSED MOOD: Status: ACTIVE | Noted: 2023-09-25

## 2023-09-28 PROBLEM — Z90.710 S/P LAPAROSCOPIC HYSTERECTOMY: Status: ACTIVE | Noted: 2023-09-28

## 2023-10-05 ENCOUNTER — OFFICE VISIT (OUTPATIENT)
Dept: OBSTETRICS AND GYNECOLOGY | Facility: CLINIC | Age: 43
End: 2023-10-05
Payer: OTHER GOVERNMENT

## 2023-10-05 VITALS — BODY MASS INDEX: 17.98 KG/M2 | DIASTOLIC BLOOD PRESSURE: 62 MMHG | SYSTOLIC BLOOD PRESSURE: 102 MMHG | WEIGHT: 98.31 LBS

## 2023-10-05 DIAGNOSIS — Z09 POSTOPERATIVE EXAMINATION: Primary | ICD-10-CM

## 2023-10-05 PROCEDURE — 99999 PR PBB SHADOW E&M-EST. PATIENT-LVL III: ICD-10-PCS | Mod: PBBFAC,,, | Performed by: SPECIALIST

## 2023-10-05 PROCEDURE — 99999 PR PBB SHADOW E&M-EST. PATIENT-LVL III: CPT | Mod: PBBFAC,,, | Performed by: SPECIALIST

## 2023-10-05 PROCEDURE — 99024 POSTOP FOLLOW-UP VISIT: CPT | Mod: ,,, | Performed by: SPECIALIST

## 2023-10-05 PROCEDURE — 99213 OFFICE O/P EST LOW 20 MIN: CPT | Mod: PBBFAC,PN | Performed by: SPECIALIST

## 2023-10-05 PROCEDURE — 99024 PR POST-OP FOLLOW-UP VISIT: ICD-10-PCS | Mod: ,,, | Performed by: SPECIALIST

## 2023-10-05 NOTE — PROGRESS NOTES
44 yo WF s/p TLH BS omn  for PO eval  Pt denies f/c, intolerable pain or vaginal bleeding,dysuria, flank pain or hematuria  Pathology as follows...    [facility]   [facility] [place] [address]   Phone [phone]   DIAGNOSIS:   2023 JHL:fc     UTERUS AND CERVIX, BILATERAL FALLOPIAN TUBES, TH-BILATERAL    SALPINGECTOMY:   - CHRONIC CERVICITIS WITH SQUAMOUS METAPLASIA.   - BENIGN DENUDED ENDOMETRIAL CAVITY, CONSISTENT WITH ABLATION.   - UNREMARKABLE FALLOPIAN TUBES.     Past Medical History:   Diagnosis Date    Abnormal Pap smear     Anxiety     Cancer     pre-cancer cells cervical     Cervical disc herniation     Chronic cough     Closed fracture 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) level with anterior cord syndrome     T 12 fracture compression     COVID     Dilated bile duct     Fractures     Genital herpes     GERD (gastroesophageal reflux disease)     Hemorrhoid     Hepatomegaly     History of colposcopy with cervical biopsy     Lumbar disc herniation     on Hydrocodone    Lupus (systemic lupus erythematosus)     chronic thrombocytopenia    Migraine headache     PONV (postoperative nausea and vomiting)     UTI (urinary tract infection)        Past Surgical History:   Procedure Laterality Date    ADENOIDECTOMY      APPENDECTOMY       SECTION, CLASSIC      x2 , last 2014    CHOLECYSTECTOMY      CLOSED REDUCTION OF FRACTURE OF NASAL BONE Bilateral 2022    Procedure: CLOSED REDUCTION, FRACTURE, NASAL BONE;  Surgeon: Mary Carlton MD;  Location: [facility] OR;  Service: ENT;  Laterality: Bilateral;    COLONOSCOPY N/A 10/29/2019    Procedure: COLONOSCOPY;  Surgeon: Jules De La Cruz MD;  Location: [facility] ENDO;  Service: Endoscopy;  Laterality: N/A;    CRYOTHERAPY      2002    CYSTOSCOPY N/A 2023    Procedure: CYSTOSCOPY;  Surgeon: Kevin Wright MD;  Location: [facility] OR;  Service: OB/GYN;  Laterality: N/A;    DILATION AND CURETTAGE OF UTERUS  [id number]    ENDOMETRIAL ABLATION       ENDOSCOPIC ULTRASOUND OF UPPER GASTROINTESTINAL TRACT N/A 9/5/2023    Procedure: ULTRASOUND, UPPER GI TRACT, ENDOSCOPIC;  Surgeon: Kai Hermosillo III, MD;  Location: [facility];  Service: Endoscopy;  Laterality: N/A;    GANGLION CYST EXCISION Left     LAPAROSCOPIC SALPINGECTOMY Bilateral 9/27/2023    Procedure: SALPINGECTOMY, LAPAROSCOPIC;  Surgeon: Kevin Wright MD;  Location: [facility] OR;  Service: OB/GYN;  Laterality: Bilateral;    LAPAROSCOPIC TOTAL HYSTERECTOMY N/A 9/27/2023    Procedure: HYSTERECTOMY, TOTAL, LAPAROSCOPIC;  Surgeon: Kevin Wright MD;  Location: [facility] OR;  Service: OB/GYN;  Laterality: N/A;    TONSILLECTOMY      TUBAL LIGATION  4/2014    TYMPANOSTOMY TUBE PLACEMENT      x 7       Family History   Problem Relation Age of Onset    Heart disease Father     Diabetes Mother     Cancer Maternal Grandfather         colon    Cancer Paternal Grandfather         colon    Breast cancer Maternal Grandmother     Breast cancer Maternal Aunt     Breast cancer Maternal Aunt     Ovarian cancer Neg Hx        Social History     Socioeconomic History    Marital status: Legally    Tobacco Use    Smoking status: Every Day     Current packs/day: 0.25     Types: Cigarettes    Smokeless tobacco: Never   Substance and Sexual Activity    Alcohol use: Yes     Comment: special occasions    Drug use: Yes     Types: Marijuana     Comment: prescription    Sexual activity: Yes     Partners: Male     Birth control/protection: None     Social Determinants of Health     Financial Resource Strain: Low Risk  (9/28/2023)    Overall Financial Resource Strain (CARDIA)     Difficulty of Paying Living Expenses: Not hard at all   Recent Concern: Financial Resource Strain - High Risk (9/25/2023)    Overall Financial Resource Strain (CARDIA)     Difficulty of Paying Living Expenses: Hard   Food Insecurity: No Food Insecurity (9/28/2023)    Hunger Vital Sign     Worried About Running Out of Food in the Last Year: Never  true     Ran Out of Food in the Last Year: Never true   Transportation Needs: No Transportation Needs (9/28/2023)    PRAPARE - Transportation     Lack of Transportation (Medical): No     Lack of Transportation (Non-Medical): No   Physical Activity: Inactive (9/28/2023)    Exercise Vital Sign     Days of Exercise per Week: 0 days     Minutes of Exercise per Session: 0 min   Stress: No Stress Concern Present (9/28/2023)    [facility] of Occupational Health - Occupational Stress Questionnaire     Feeling of Stress : Not at all   Recent Concern: Stress - Stress Concern Present (9/25/2023)    [facility] of Occupational Health - Occupational Stress Questionnaire     Feeling of Stress : To some extent   Social Connections: Moderately Integrated (9/28/2023)    Social Connection and Isolation Panel [NHANES]     Frequency of Communication with Friends and Family: More than three times a week     Frequency of Social Gatherings with Friends and Family: More than three times a week     Attends Quaker Services: More than 4 times per year     Active Member of Clubs or Organizations: Yes     Attends Club or Organization Meetings: More than 4 times per year     Marital Status:    Recent Concern: Social Connections - Moderately Isolated (9/25/2023)    Social Connection and Isolation Panel [NHANES]     Frequency of Communication with Friends and Family: More than three times a week     Frequency of Social Gatherings with Friends and Family: More than three times a week     Attends Quaker Services: More than 4 times per year     Active Member of Clubs or Organizations: No     Attends Club or Organization Meetings: Never     Marital Status:    Housing Stability: Low Risk  (9/28/2023)    Housing Stability Vital Sign     Unable to Pay for Housing in the Last Year: No     Number of Places Lived in the Last Year: 1     Unstable Housing in the Last Year: No   Recent Concern: Housing Stability - High Risk  (9/25/2023)    Housing Stability Vital Sign     Unable to Pay for Housing in the Last Year: Yes     Number of Places Lived in the Last Year: 2     Unstable Housing in the Last Year: No       Current Outpatient Medications   Medication Sig Dispense Refill    EMGALITY  mg/mL PnIj Inject 1 mL into the skin every 30 days.      hydrocodone-acetaminophen 10-325mg (NORCO)  mg Tab Take 1 tablet by mouth every 6 (six) hours as needed. 120 tablet 0    ibuprofen (ADVIL,MOTRIN) 800 MG tablet Take 1 tablet (800 mg total) by mouth every 6 (six) hours as needed for Pain. 30 tablet 1    predniSONE (DELTASONE) 5 MG tablet TAKE 1 TABLET (5 MG TOTAL) BY MOUTH DAILY AS NEEDED (JOINT STIFFNESS). 90 tablet 0    tiZANidine (ZANAFLEX) 4 MG tablet Take 4 mg by mouth every 6 (six) hours as needed.      UBROGEPANT 100 mg tablet Take 100 mg by mouth as needed for Migraine.      albuterol (PROAIR HFA) 90 mcg/actuation inhaler Inhale 2 puffs into the lungs every 4 (four) hours as needed for Wheezing. (Patient not taking: Reported on 10/5/2023) 25.5 g 0    butalbital-acetaminophen-caffeine -40 mg (FIORICET, ESGIC) -40 mg per tablet Take 1 tablet by mouth 2 (two) times daily as needed. (Patient not taking: Reported on 10/5/2023) 30 tablet 0    mirtazapine (REMERON) 15 MG tablet Take 1 tablet (15 mg total) by mouth every evening. (Patient not taking: Reported on 10/5/2023) 30 tablet 2    nitrofurantoin, macrocrystal-monohydrate, (MACROBID) 100 MG capsule Take 1 capsule (100 mg total) by mouth 2 (two) times daily. (Patient not taking: Reported on 10/5/2023) 60 capsule 1    oxyCODONE-acetaminophen (PERCOCET) 5-325 mg per tablet Take 1 tablet by mouth every 4 (four) hours as needed for Pain. (Patient not taking: Reported on 10/5/2023) 14 each 0    valACYclovir (VALTREX) 1000 MG tablet Take 1 tablet (1,000 mg total) by mouth every 12 (twelve) hours. for 5 days (Patient not taking: Reported on 9/20/2023) 10 tablet 3     No  current facility-administered medications for this visit.       Review of patient's allergies indicates:   Allergen Reactions    Cyclobenzaprine Rash and Other (See Comments)     Leg cramps    Pregabalin Itching, Other (See Comments) and Hives     Bruising and headaches    Sulfa (sulfonamide antibiotics) Nausea And Vomiting    Morphine Rash and Hives    Sulfamethoxazole-trimethoprim Rash and Nausea And Vomiting     And headache    Toradol [ketorolac] Rash       Review of System:   General: no chills, fever, night sweats, weight gain or weight loss  Psychological: no depression or suicidal ideation  Breasts: no new or changing breast lumps, nipple discharge or masses.  Respiratory: no cough, shortness of breath, or wheezing  Cardiovascular: no chest pain or dyspnea on exertion  Gastrointestinal: no abdominal pain, change in bowel habits, or black or bloody stools  Genito-Urinary: no incontinence, urinary frequency/urgency or vulvar/vaginal symptoms, pelvic pain or abnormal vaginal bleeding.  Musculoskeletal: no gait disturbance or muscular weakness     VSS  Abdomen soft , incision swell approx no drainage rebound guarding or crepitance    Discussed continued pelvic rest  Will have pt RTO 6 weeks for final PO eval    I spent a total of 30 minutes on the day of the visit. This includes face to face time and non-face to face time preparing to see the patient (eg, review of tests), obtaining and/or reviewing separately obtained history, documenting clinical information in the electronic or other health record, independently interpreting results and communicating results to the patient/family/caregiver, or care coordinator.

## 2023-10-16 ENCOUNTER — TELEPHONE (OUTPATIENT)
Dept: OBSTETRICS AND GYNECOLOGY | Facility: CLINIC | Age: 43
End: 2023-10-16
Payer: OTHER GOVERNMENT

## 2023-10-16 NOTE — TELEPHONE ENCOUNTER
----- Message from Shobha Torres RN sent at 10/16/2023  9:10 AM CDT -----  Regarding: [facility] ER visit f/u 10/15/2023 Dx: Vaginal bleeding  Please call patient to schedule a close follow up appointment for further evaluation/treatment of Post op vaginal bleeding.     Thanks,    Shobha Torres, RN, BSN  ED Navigator/Case Management  [phone]

## 2023-10-18 ENCOUNTER — OFFICE VISIT (OUTPATIENT)
Dept: OBSTETRICS AND GYNECOLOGY | Facility: CLINIC | Age: 43
End: 2023-10-18
Payer: OTHER GOVERNMENT

## 2023-10-18 VITALS
HEIGHT: 62 IN | WEIGHT: 99.88 LBS | DIASTOLIC BLOOD PRESSURE: 62 MMHG | BODY MASS INDEX: 18.38 KG/M2 | SYSTOLIC BLOOD PRESSURE: 102 MMHG

## 2023-10-18 DIAGNOSIS — Z09 POSTOPERATIVE EXAMINATION: Primary | ICD-10-CM

## 2023-10-18 PROCEDURE — 99024 POSTOP FOLLOW-UP VISIT: CPT | Mod: ,,, | Performed by: SPECIALIST

## 2023-10-18 PROCEDURE — 99024 PR POST-OP FOLLOW-UP VISIT: ICD-10-PCS | Mod: ,,, | Performed by: SPECIALIST

## 2023-10-18 PROCEDURE — 99213 OFFICE O/P EST LOW 20 MIN: CPT | Mod: PBBFAC,PN | Performed by: SPECIALIST

## 2023-10-18 PROCEDURE — 99999 PR PBB SHADOW E&M-EST. PATIENT-LVL III: ICD-10-PCS | Mod: PBBFAC,,, | Performed by: SPECIALIST

## 2023-10-18 PROCEDURE — 99999 PR PBB SHADOW E&M-EST. PATIENT-LVL III: CPT | Mod: PBBFAC,,, | Performed by: SPECIALIST

## 2023-10-19 NOTE — PROGRESS NOTES
42 yo WF s/p [facility] BS  presents for PO follow up  Pt doing well had a solitary episode of small vaginal blood clot last week which has since abated Pt denies any bleeding, pain, f/c, dysuria, hematuria, n/v, dizziness today  Past Medical History:   Diagnosis Date    Abnormal Pap smear     Anxiety     Cancer     pre-cancer cells cervical     Cervical disc herniation     Chronic cough     Closed fracture 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) level with anterior cord syndrome     T 12 fracture compression     COVID     Dilated bile duct     Fractures     Genital herpes     GERD (gastroesophageal reflux disease)     Hemorrhoid     Hepatomegaly     History of colposcopy with cervical biopsy     Lumbar disc herniation     on Hydrocodone    Lupus (systemic lupus erythematosus)     chronic thrombocytopenia    Migraine headache     PONV (postoperative nausea and vomiting)     UTI (urinary tract infection)        Past Surgical History:   Procedure Laterality Date    ADENOIDECTOMY      APPENDECTOMY       SECTION, CLASSIC      x2 , last 2014    CHOLECYSTECTOMY      CLOSED REDUCTION OF FRACTURE OF NASAL BONE Bilateral 2022    Procedure: CLOSED REDUCTION, FRACTURE, NASAL BONE;  Surgeon: Mary Carlton MD;  Location: [facility] OR;  Service: ENT;  Laterality: Bilateral;    COLONOSCOPY N/A 10/29/2019    Procedure: COLONOSCOPY;  Surgeon: Jules De La Cruz MD;  Location: [facility] ENDO;  Service: Endoscopy;  Laterality: N/A;    CRYOTHERAPY      2002    CYSTOSCOPY N/A 2023    Procedure: CYSTOSCOPY;  Surgeon: Kevin Wright MD;  Location: [facility] OR;  Service: OB/GYN;  Laterality: N/A;    DILATION AND CURETTAGE OF UTERUS  [id number]    ENDOMETRIAL ABLATION      ENDOSCOPIC ULTRASOUND OF UPPER GASTROINTESTINAL TRACT N/A 2023    Procedure: ULTRASOUND, UPPER GI TRACT, ENDOSCOPIC;  Surgeon: Kai Hermosillo III, MD;  Location: [facility] ENDO;  Service: Endoscopy;  Laterality: N/A;    GANGLION CYST EXCISION Left     LAPAROSCOPIC SALPINGECTOMY  Bilateral 9/27/2023    Procedure: SALPINGECTOMY, LAPAROSCOPIC;  Surgeon: Kevin Wright MD;  Location: [facility] OR;  Service: OB/GYN;  Laterality: Bilateral;    LAPAROSCOPIC TOTAL HYSTERECTOMY N/A 9/27/2023    Procedure: HYSTERECTOMY, TOTAL, LAPAROSCOPIC;  Surgeon: Kevin Wright MD;  Location: [facility] OR;  Service: OB/GYN;  Laterality: N/A;    TONSILLECTOMY      TUBAL LIGATION  4/2014    TYMPANOSTOMY TUBE PLACEMENT      x 7       Family History   Problem Relation Age of Onset    Heart disease Father     Diabetes Mother     Cancer Maternal Grandfather         colon    Cancer Paternal Grandfather         colon    Breast cancer Maternal Grandmother     Breast cancer Maternal Aunt     Breast cancer Maternal Aunt     Ovarian cancer Neg Hx        Social History     Socioeconomic History    Marital status: Legally    Tobacco Use    Smoking status: Every Day     Current packs/day: 0.25     Types: Cigarettes    Smokeless tobacco: Never   Substance and Sexual Activity    Alcohol use: Yes     Comment: special occasions    Drug use: Yes     Types: Marijuana     Comment: prescription    Sexual activity: Yes     Partners: Male     Birth control/protection: See Surgical Hx     Social Determinants of Health     Financial Resource Strain: Low Risk  (9/28/2023)    Overall Financial Resource Strain (CARDIA)     Difficulty of Paying Living Expenses: Not hard at all   Recent Concern: Financial Resource Strain - High Risk (9/25/2023)    Overall Financial Resource Strain (CARDIA)     Difficulty of Paying Living Expenses: Hard   Food Insecurity: No Food Insecurity (9/28/2023)    Hunger Vital Sign     Worried About Running Out of Food in the Last Year: Never true     Ran Out of Food in the Last Year: Never true   Transportation Needs: No Transportation Needs (9/28/2023)    PRAPARE - Transportation     Lack of Transportation (Medical): No     Lack of Transportation (Non-Medical): No   Physical Activity: Inactive (9/28/2023)     Exercise Vital Sign     Days of Exercise per Week: 0 days     Minutes of Exercise per Session: 0 min   Stress: No Stress Concern Present (9/28/2023)    [employer] [place] [employer] - Occupational Stress Questionnaire     Feeling of Stress : Not at all   Recent Concern: Stress - Stress Concern Present (9/25/2023)    [employer] [place] [employer] - Occupational Stress Questionnaire     Feeling of Stress : To some extent   Social Connections: Moderately Integrated (9/28/2023)    Social Connection and Isolation Panel [NHANES]     Frequency of Communication with Friends and Family: More than three times a week     Frequency of Social Gatherings with Friends and Family: More than three times a week     Attends Mormon Services: More than 4 times per year     Active Member of Clubs or Organizations: Yes     Attends Club or Organization Meetings: More than 4 times per year     Marital Status:    Recent Concern: Social Connections - Moderately Isolated (9/25/2023)    Social Connection and Isolation Panel [NHANES]     Frequency of Communication with Friends and Family: More than three times a week     Frequency of Social Gatherings with Friends and Family: More than three times a week     Attends Mormon Services: More than 4 times per year     Active Member of Clubs or Organizations: No     Attends Club or Organization Meetings: Never     Marital Status:    Housing Stability: Low Risk  (9/28/2023)    Housing Stability Vital Sign     Unable to Pay for Housing in the Last Year: No     Number of Places Lived in the Last Year: 1     Unstable Housing in the Last Year: No   Recent Concern: Housing Stability - High Risk (9/25/2023)    Housing Stability Vital Sign     Unable to Pay for Housing in the Last Year: Yes     Number of Places Lived in the Last Year: 2     Unstable Housing in the Last Year: No       Current Outpatient Medications   Medication Sig Dispense Refill     butalbital-acetaminophen-caffeine -40 mg (FIORICET, ESGIC) -40 mg per tablet Take 1 tablet by mouth 2 (two) times daily as needed. 30 tablet 0    EMGALITY  mg/mL PnIj Inject 1 mL into the skin every 30 days.      hydrocodone-acetaminophen 10-325mg (NORCO)  mg Tab Take 1 tablet by mouth every 6 (six) hours as needed. 120 tablet 0    ibuprofen (ADVIL,MOTRIN) 800 MG tablet Take 1 tablet (800 mg total) by mouth every 6 (six) hours as needed for Pain. 30 tablet 1    mirtazapine (REMERON) 15 MG tablet Take 1 tablet (15 mg total) by mouth every evening. 30 tablet 2    potassium chloride SA (K-DUR,KLOR-CON) 20 MEQ tablet Take 1 tablet (20 mEq total) by mouth once daily. for 14 days 14 tablet 0    predniSONE (DELTASONE) 5 MG tablet TAKE 1 TABLET (5 MG TOTAL) BY MOUTH DAILY AS NEEDED (JOINT STIFFNESS). 90 tablet 0    tiZANidine (ZANAFLEX) 4 MG tablet Take 4 mg by mouth every 6 (six) hours as needed.      UBROGEPANT 100 mg tablet Take 100 mg by mouth as needed for Migraine.      valACYclovir (VALTREX) 1000 MG tablet Take 1 tablet (1,000 mg total) by mouth every 12 (twelve) hours. for 5 days 10 tablet 3    albuterol (PROAIR HFA) 90 mcg/actuation inhaler Inhale 2 puffs into the lungs every 4 (four) hours as needed for Wheezing. (Patient not taking: Reported on 10/5/2023) 25.5 g 0     No current facility-administered medications for this visit.       Review of patient's allergies indicates:   Allergen Reactions    Cyclobenzaprine Rash and Other (See Comments)     Leg cramps    Pregabalin Itching, Other (See Comments) and Hives     Bruising and headaches    Sulfa (sulfonamide antibiotics) Nausea And Vomiting    Morphine Rash and Hives    Sulfamethoxazole-trimethoprim Rash and Nausea And Vomiting     And headache    Toradol [ketorolac] Rash       Review of System:   General: no chills, fever, night sweats, weight gain or weight loss  Psychological: no depression or suicidal ideation  Breasts: no new or  changing breast lumps, nipple discharge or masses.  Respiratory: no cough, shortness of breath, or wheezing  Cardiovascular: no chest pain or dyspnea on exertion  Gastrointestinal: no abdominal pain, change in bowel habits, or black or bloody stools  Genito-Urinary: no incontinence, urinary frequency/urgency or vulvar/vaginal symptoms, pelvic pain or abnormal vaginal bleeding.  Musculoskeletal: no gait disturbance or muscular weakness     VSS  A and O x 4  Pt in no distress  Abdomen soft incisions well approx No rebound no distenstion noted    I discussed vaginal bleeding PO and bleeding precautions and pelvic rest discussed  Pt will return 4 weeks for final PO eval    I spent a total of 30 minutes on the day of the visit. This includes face to face time and non-face to face time preparing to see the patient (eg, review of tests), obtaining and/or reviewing separately obtained history, documenting clinical information in the electronic or other health record, independently interpreting results and communicating results to the patient/family/caregiver, or care coordinator.

## 2023-10-27 ENCOUNTER — PROCEDURE VISIT (OUTPATIENT)
Dept: HEPATOLOGY | Facility: CLINIC | Age: 43
End: 2023-10-27
Payer: OTHER GOVERNMENT

## 2023-10-27 ENCOUNTER — LAB VISIT (OUTPATIENT)
Dept: LAB | Facility: HOSPITAL | Age: 43
End: 2023-10-27
Attending: NURSE PRACTITIONER
Payer: OTHER GOVERNMENT

## 2023-10-27 DIAGNOSIS — R74.8 ELEVATED LIVER ENZYMES: ICD-10-CM

## 2023-10-27 DIAGNOSIS — R16.0 HEPATOMEGALY: Primary | ICD-10-CM

## 2023-10-27 DIAGNOSIS — R16.0 HEPATOMEGALY: ICD-10-CM

## 2023-10-27 LAB
ALBUMIN SERPL BCP-MCNC: 4.4 G/DL (ref 3.5–5.2)
ALP SERPL-CCNC: 111 U/L (ref 55–135)
ALT SERPL W/O P-5'-P-CCNC: 29 U/L (ref 10–44)
AST SERPL-CCNC: 19 U/L (ref 10–40)
BILIRUB DIRECT SERPL-MCNC: 0.1 MG/DL (ref 0.1–0.3)
BILIRUB SERPL-MCNC: 0.2 MG/DL (ref 0.1–1)
PROT SERPL-MCNC: 8.2 G/DL (ref 6–8.4)

## 2023-10-27 PROCEDURE — 86704 HEP B CORE ANTIBODY TOTAL: CPT | Performed by: NURSE PRACTITIONER

## 2023-10-27 PROCEDURE — 86706 HEP B SURFACE ANTIBODY: CPT | Performed by: NURSE PRACTITIONER

## 2023-10-27 PROCEDURE — 76981 USE PARENCHYMA: CPT | Mod: 26,S$PBB,, | Performed by: NURSE PRACTITIONER

## 2023-10-27 PROCEDURE — 87340 HEPATITIS B SURFACE AG IA: CPT | Performed by: NURSE PRACTITIONER

## 2023-10-27 PROCEDURE — 36415 COLL VENOUS BLD VENIPUNCTURE: CPT | Performed by: NURSE PRACTITIONER

## 2023-10-27 PROCEDURE — 82103 ALPHA-1-ANTITRYPSIN TOTAL: CPT | Performed by: NURSE PRACTITIONER

## 2023-10-27 PROCEDURE — 80076 HEPATIC FUNCTION PANEL: CPT | Performed by: NURSE PRACTITIONER

## 2023-10-27 PROCEDURE — 86708 HEPATITIS A ANTIBODY: CPT | Performed by: NURSE PRACTITIONER

## 2023-10-27 PROCEDURE — 80321 ALCOHOLS BIOMARKERS 1OR 2: CPT | Performed by: NURSE PRACTITIONER

## 2023-10-27 PROCEDURE — 91200 LIVER ELASTOGRAPHY: CPT | Mod: PBBFAC,PN | Performed by: NURSE PRACTITIONER

## 2023-10-27 PROCEDURE — 82390 ASSAY OF CERULOPLASMIN: CPT | Performed by: NURSE PRACTITIONER

## 2023-10-27 PROCEDURE — 76981 FIBROSCAN NEW ORLEANS: ICD-10-PCS | Mod: 26,S$PBB,, | Performed by: NURSE PRACTITIONER

## 2023-10-27 NOTE — PROCEDURES
FibroScan [place]    Date/Time: 10/27/2023 9:00 AM    Performed by: Landy Bueno NP  Authorized by: Landy Bueno NP    Diagnosis:  Other    Probe:  M    Universal Protocol: Patient's identity, procedure and site were verified, confirmatory pause was performed.  Discussed procedure including risks and potential complications.  Questions answered.  Patient verbalizes understanding and wishes to proceed with VCTE.     Procedure: After providing explanations of the procedure, patient was placed in the supine position with right arm in maximum abduction to allow optimal exposure of right lateral abdomen.  Patient was briefly assessed, Testing was performed in the mid-axillary location, 50Hz Shear Wave pulses were applied and the resulting Shear Wave and Propagation Speed detected with a 3.5 MHz ultrasonic signal, using the FibroScan probe, Skin to liver capsule distance and liver parenchyma were accessed during the entire examination with the FibroScan probe, Patient was instructed to breathe normally and to abstain from sudden movements during the procedure, allowing for random measurements of liver stiffness. At least 10 Shear Waves were produced, Individual measurements of each Shear Wave were calculated.  Patient tolerated the procedure well with no complications.  Meets discharge criteria as was dismissed.  Rates pain 0 out of 10.  Patient will follow up with ordering provider to review results.    Findings  Median liver stiffness score:  3.3  CAP Reading: dB/m:  177    IQR/med %:  14  Interpretation  Fibrosis interpretation is based on medial liver stiffness - Kilopascal (kPa).    Fibrosis Stage:  F 0-1  Steatosis interpretation is based on controlled attenuation parameter - (dB/m).    Steatosis Grade:  <S1

## 2023-10-28 LAB
HAV IGG SER QL IA: NEGATIVE
HBV CORE AB SERPL QL IA: POSITIVE
HBV SURFACE AG SERPL QL IA: NEGATIVE

## 2023-10-29 PROBLEM — R76.8 HEPATITIS B CORE ANTIBODY POSITIVE: Status: ACTIVE | Noted: 2023-10-29

## 2023-10-29 LAB — CERULOPLASMIN SERPL-MCNC: 33 MG/DL (ref 20–51)

## 2023-10-30 LAB
A1AT SERPL NEPH-MCNC: 198 MG/DL (ref 100–190)
CLINICAL BIOCHEMIST REVIEW: NORMAL
HBV SURFACE AB SER QL IA: POSITIVE
HBV SURFACE AB SERPL IA-ACNC: 235 MIU/ML
PLPETH BLD-MCNC: <10 NG/ML
POPETH BLD-MCNC: <10 NG/ML

## 2023-11-09 ENCOUNTER — OFFICE VISIT (OUTPATIENT)
Dept: OBSTETRICS AND GYNECOLOGY | Facility: CLINIC | Age: 43
End: 2023-11-09
Payer: OTHER GOVERNMENT

## 2023-11-09 VITALS
HEIGHT: 62 IN | BODY MASS INDEX: 17.24 KG/M2 | WEIGHT: 93.69 LBS | SYSTOLIC BLOOD PRESSURE: 102 MMHG | DIASTOLIC BLOOD PRESSURE: 68 MMHG

## 2023-11-09 DIAGNOSIS — Z09 POSTOPERATIVE EXAMINATION: Primary | ICD-10-CM

## 2023-11-09 PROCEDURE — 99999 PR PBB SHADOW E&M-EST. PATIENT-LVL III: CPT | Mod: PBBFAC,,, | Performed by: SPECIALIST

## 2023-11-09 PROCEDURE — 99213 OFFICE O/P EST LOW 20 MIN: CPT | Mod: PBBFAC,PN | Performed by: SPECIALIST

## 2023-11-09 PROCEDURE — 99024 POSTOP FOLLOW-UP VISIT: CPT | Mod: ,,, | Performed by: SPECIALIST

## 2023-11-09 PROCEDURE — 99024 PR POST-OP FOLLOW-UP VISIT: ICD-10-PCS | Mod: ,,, | Performed by: SPECIALIST

## 2023-11-09 PROCEDURE — 99999 PR PBB SHADOW E&M-EST. PATIENT-LVL III: ICD-10-PCS | Mod: PBBFAC,,, | Performed by: SPECIALIST

## 2023-11-09 NOTE — PROGRESS NOTES
44 yo WF s/p [facility] BS presents for final Po eval No complaints  Past Medical History:   Diagnosis Date    Abnormal Pap smear     Anxiety     Cancer     pre-cancer cells cervical     Cervical disc herniation     Chronic cough     Closed fracture 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) level with anterior cord syndrome     T 12 fracture compression     COVID     Dilated bile duct     Fractures     Genital herpes     GERD (gastroesophageal reflux disease)     Hemorrhoid     Hepatomegaly     History of colposcopy with cervical biopsy     Lumbar disc herniation     on Hydrocodone    Lupus (systemic lupus erythematosus)     chronic thrombocytopenia    Migraine headache     PONV (postoperative nausea and vomiting)     UTI (urinary tract infection)        Past Surgical History:   Procedure Laterality Date    ADENOIDECTOMY      APPENDECTOMY       SECTION, CLASSIC      x2 , last 2014    CHOLECYSTECTOMY      CLOSED REDUCTION OF FRACTURE OF NASAL BONE Bilateral 2022    Procedure: CLOSED REDUCTION, FRACTURE, NASAL BONE;  Surgeon: Mary Carlton MD;  Location: [facility] OR;  Service: ENT;  Laterality: Bilateral;    COLONOSCOPY N/A 10/29/2019    Procedure: COLONOSCOPY;  Surgeon: Jules De La Cruz MD;  Location: [facility];  Service: Endoscopy;  Laterality: N/A;    CRYOTHERAPY      2002    CYSTOSCOPY N/A 2023    Procedure: CYSTOSCOPY;  Surgeon: Kevin Wright MD;  Location: [facility] OR;  Service: OB/GYN;  Laterality: N/A;    DILATION AND CURETTAGE OF UTERUS  [id number]    ENDOMETRIAL ABLATION      ENDOSCOPIC ULTRASOUND OF UPPER GASTROINTESTINAL TRACT N/A 2023    Procedure: ULTRASOUND, UPPER GI TRACT, ENDOSCOPIC;  Surgeon: Kai Hermosillo III, MD;  Location: [facility] ENDO;  Service: Endoscopy;  Laterality: N/A;    GANGLION CYST EXCISION Left     LAPAROSCOPIC SALPINGECTOMY Bilateral 2023    Procedure: SALPINGECTOMY, LAPAROSCOPIC;  Surgeon: Kevin Wright MD;  Location: [facility] OR;  Service: OB/GYN;  Laterality: Bilateral;     LAPAROSCOPIC TOTAL HYSTERECTOMY N/A 9/27/2023    Procedure: HYSTERECTOMY, TOTAL, LAPAROSCOPIC;  Surgeon: Kevin Wright MD;  Location: [facility];  Service: OB/GYN;  Laterality: N/A;    TONSILLECTOMY      TUBAL LIGATION  4/2014    TYMPANOSTOMY TUBE PLACEMENT      x 7       Family History   Problem Relation Age of Onset    Heart disease Father     Diabetes Mother     Cancer Maternal Grandfather         colon    Cancer Paternal Grandfather         colon    Breast cancer Maternal Grandmother     Breast cancer Maternal Aunt     Breast cancer Maternal Aunt     Ovarian cancer Neg Hx        Social History     Socioeconomic History    Marital status: Legally    Tobacco Use    Smoking status: Every Day     Current packs/day: 0.25     Types: Cigarettes    Smokeless tobacco: Never   Substance and Sexual Activity    Alcohol use: Yes     Comment: special occasions    Drug use: Yes     Types: Marijuana     Comment: prescription    Sexual activity: Yes     Partners: Male     Birth control/protection: See Surgical Hx     Social Determinants of Health     Financial Resource Strain: Low Risk  (9/28/2023)    Overall Financial Resource Strain (CARDIA)     Difficulty of Paying Living Expenses: Not hard at all   Recent Concern: Financial Resource Strain - High Risk (9/25/2023)    Overall Financial Resource Strain (CARDIA)     Difficulty of Paying Living Expenses: Hard   Food Insecurity: No Food Insecurity (9/28/2023)    Hunger Vital Sign     Worried About Running Out of Food in the Last Year: Never true     Ran Out of Food in the Last Year: Never true   Transportation Needs: No Transportation Needs (9/28/2023)    PRAPARE - Transportation     Lack of Transportation (Medical): No     Lack of Transportation (Non-Medical): No   Physical Activity: Inactive (9/28/2023)    Exercise Vital Sign     Days of Exercise per Week: 0 days     Minutes of Exercise per Session: 0 min   Stress: No Stress Concern Present (9/28/2023)    [employer]  [place] [employer] - Occupational Stress Questionnaire     Feeling of Stress : Not at all   Recent Concern: Stress - Stress Concern Present (9/25/2023)    [employer] [place] [employer] - Occupational Stress Questionnaire     Feeling of Stress : To some extent   Social Connections: Moderately Integrated (9/28/2023)    Social Connection and Isolation Panel [NHANES]     Frequency of Communication with Friends and Family: More than three times a week     Frequency of Social Gatherings with Friends and Family: More than three times a week     Attends [employer] orthodox Services: More than 4 times per year     Active Member of Clubs or Organizations: Yes     Attends Club or Organization Meetings: More than 4 times per year     Marital Status:    Recent Concern: Social Connections - Moderately Isolated (9/25/2023)    Social Connection and Isolation Panel [NHANES]     Frequency of Communication with Friends and Family: More than three times a week     Frequency of Social Gatherings with Friends and Family: More than three times a week     Attends Oriental orthodox Services: More than 4 times per year     Active Member of Clubs or Organizations: No     Attends Club or Organization Meetings: Never     Marital Status:    Housing Stability: Low Risk  (9/28/2023)    Housing Stability Vital Sign     Unable to Pay for Housing in the Last Year: No     Number of Places Lived in the Last Year: 1     Unstable Housing in the Last Year: No   Recent Concern: Housing Stability - High Risk (9/25/2023)    Housing Stability Vital Sign     Unable to Pay for Housing in the Last Year: Yes     Number of Places Lived in the Last Year: 2     Unstable Housing in the Last Year: No       Current Outpatient Medications   Medication Sig Dispense Refill    butalbital-acetaminophen-caffeine -40 mg (FIORICET, ESGIC) -40 mg per tablet Take 1 tablet by mouth 2 (two) times daily as needed. 30 tablet 0    EMGALITY   mg/mL PnIj Inject 1 mL into the skin every 30 days.      hydrocodone-acetaminophen 10-325mg (NORCO)  mg Tab Take 1 tablet by mouth every 6 (six) hours as needed. 120 tablet 0    mirtazapine (REMERON) 15 MG tablet Take 1 tablet (15 mg total) by mouth every evening. 30 tablet 2    predniSONE (DELTASONE) 5 MG tablet TAKE 1 TABLET (5 MG TOTAL) BY MOUTH DAILY AS NEEDED (JOINT STIFFNESS). 90 tablet 0    tiZANidine (ZANAFLEX) 4 MG tablet Take 4 mg by mouth every 6 (six) hours as needed.      UBROGEPANT 100 mg tablet Take 100 mg by mouth as needed for Migraine.      albuterol (PROAIR HFA) 90 mcg/actuation inhaler Inhale 2 puffs into the lungs every 4 (four) hours as needed for Wheezing. (Patient not taking: Reported on 10/5/2023) 25.5 g 0    valACYclovir (VALTREX) 1000 MG tablet Take 1 tablet (1,000 mg total) by mouth every 12 (twelve) hours. for 5 days 10 tablet 3     No current facility-administered medications for this visit.       Review of patient's allergies indicates:   Allergen Reactions    Cyclobenzaprine Rash and Other (See Comments)     Leg cramps    Pregabalin Itching, Other (See Comments) and Hives     Bruising and headaches    Sulfa (sulfonamide antibiotics) Nausea And Vomiting    Morphine Rash and Hives    Sulfamethoxazole-trimethoprim Rash and Nausea And Vomiting     And headache    Toradol [ketorolac] Rash       Review of System:   General: no chills, fever, night sweats, weight gain or weight loss  Psychological: no depression or suicidal ideation  Breasts: no new or changing breast lumps, nipple discharge or masses.  Respiratory: no cough, shortness of breath, or wheezing  Cardiovascular: no chest pain or dyspnea on exertion  Gastrointestinal: no abdominal pain, change in bowel habits, or black or bloody stools  Genito-Urinary: no incontinence, urinary frequency/urgency or vulvar/vaginal symptoms, pelvic pain or abnormal vaginal bleeding.  Musculoskeletal: no gait disturbance or muscular weakness        NOTE  NURSING PERSONAL PRESENT FOR ENTIRE PHYSICAL EXAM     PE:   APPEARANCE: Well nourished, well developed, in no acute distress.  NECK: Neck symmetric without masses or thyromegaly.  NODES: No inguinal lymph node enlargement.  ABDOMEN: Soft. No tenderness or masses. No hepatosplenomegaly. No hernias.  BREASTS: deferred    PELVIC: Normal external female genitalia without lesions. Normal hair distribution. Adequate perineal body, normal urethral meatus. Vagina moist and well rugated without lesions or discharge. No significant cystocele or rectocele. Uterus and cervix surgically absent. Bimanual exam revealed no masses, tenderness or abnormality.    NOTE  NURSING PERSONAL PRESENT FOR ENTIRE PHYSICAL EXAM     Release restrictions  RTO 1 year/prn    I spent a total of 30 minutes on the day of the visit. This includes face to face time and non-face to face time preparing to see the patient (eg, review of tests), obtaining and/or reviewing separately obtained history, documenting clinical information in the electronic or other health record, independently interpreting results and communicating results to the patient/family/caregiver, or care coordinator.

## 2023-12-04 PROBLEM — N39.0 RECURRENT UTI: Status: RESOLVED | Noted: 2023-06-08 | Resolved: 2023-12-04

## 2023-12-04 PROBLEM — N12 PYELONEPHRITIS: Status: RESOLVED | Noted: 2023-09-02 | Resolved: 2023-12-04

## 2024-01-19 ENCOUNTER — OFFICE VISIT (OUTPATIENT)
Dept: FAMILY MEDICINE | Facility: CLINIC | Age: 44
End: 2024-01-19
Payer: OTHER GOVERNMENT

## 2024-01-19 ENCOUNTER — HOSPITAL ENCOUNTER (OUTPATIENT)
Dept: RADIOLOGY | Facility: HOSPITAL | Age: 44
Discharge: HOME | End: 2024-01-19
Attending: FAMILY MEDICINE
Payer: OTHER GOVERNMENT

## 2024-01-19 VITALS
OXYGEN SATURATION: 98 % | SYSTOLIC BLOOD PRESSURE: 100 MMHG | DIASTOLIC BLOOD PRESSURE: 60 MMHG | BODY MASS INDEX: 17.85 KG/M2 | HEART RATE: 72 BPM | WEIGHT: 97 LBS | HEIGHT: 62 IN

## 2024-01-19 DIAGNOSIS — J18.8 OTHER PNEUMONIA, UNSPECIFIED ORGANISM: Primary | ICD-10-CM

## 2024-01-19 DIAGNOSIS — J18.8 OTHER PNEUMONIA, UNSPECIFIED ORGANISM: ICD-10-CM

## 2024-01-19 PROCEDURE — 99999 PR PBB SHADOW E&M-EST. PATIENT-LVL IV: CPT | Mod: PBBFAC,,, | Performed by: FAMILY MEDICINE

## 2024-01-19 PROCEDURE — 71046 X-RAY EXAM CHEST 2 VIEWS: CPT | Mod: 26,,, | Performed by: RADIOLOGY

## 2024-01-19 PROCEDURE — 99214 OFFICE O/P EST MOD 30 MIN: CPT | Mod: PBBFAC,PO,25 | Performed by: FAMILY MEDICINE

## 2024-01-19 PROCEDURE — 99214 OFFICE O/P EST MOD 30 MIN: CPT | Mod: S$PBB,,, | Performed by: FAMILY MEDICINE

## 2024-01-19 PROCEDURE — 71046 X-RAY EXAM CHEST 2 VIEWS: CPT | Mod: TC,FY,PO

## 2024-01-19 RX ORDER — ALBUTEROL SULFATE 90 UG/1
2 AEROSOL, METERED RESPIRATORY (INHALATION) EVERY 4 HOURS PRN
Qty: 18 G | Refills: 3 | Status: SHIPPED | OUTPATIENT
Start: 2024-01-19 | End: 2025-01-18

## 2024-01-19 RX ORDER — IPRATROPIUM BROMIDE AND ALBUTEROL SULFATE 2.5; .5 MG/3ML; MG/3ML
3 SOLUTION RESPIRATORY (INHALATION) EVERY 6 HOURS PRN
Qty: 75 ML | Refills: 5 | Status: SHIPPED | OUTPATIENT
Start: 2024-01-19 | End: 2025-01-18

## 2024-01-19 RX ORDER — AZITHROMYCIN 250 MG/1
TABLET, FILM COATED ORAL
Qty: 6 TABLET | Refills: 0 | Status: SHIPPED | OUTPATIENT
Start: 2024-01-19 | End: 2024-01-24

## 2024-01-19 RX ORDER — CODEINE PHOSPHATE AND GUAIFENESIN 10; 100 MG/5ML; MG/5ML
5 SOLUTION ORAL 3 TIMES DAILY PRN
Qty: 240 ML | Refills: 1 | Status: SHIPPED | OUTPATIENT
Start: 2024-01-19 | End: 2024-02-20
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
## 2024-03-12 ENCOUNTER — OFFICE VISIT (OUTPATIENT)
Dept: FAMILY MEDICINE | Facility: CLINIC | Age: 44
End: 2024-03-12
Payer: OTHER GOVERNMENT

## 2024-03-12 VITALS
BODY MASS INDEX: 18.46 KG/M2 | HEIGHT: 62 IN | HEART RATE: 82 BPM | TEMPERATURE: 98 F | DIASTOLIC BLOOD PRESSURE: 60 MMHG | SYSTOLIC BLOOD PRESSURE: 92 MMHG | OXYGEN SATURATION: 97 % | WEIGHT: 100.31 LBS

## 2024-03-12 DIAGNOSIS — I95.9 HYPOTENSION, UNSPECIFIED HYPOTENSION TYPE: ICD-10-CM

## 2024-03-12 DIAGNOSIS — R55 VASOVAGAL EPISODE: ICD-10-CM

## 2024-03-12 DIAGNOSIS — R42 VERTIGO: Primary | ICD-10-CM

## 2024-03-12 LAB
OHS QRS DURATION: 76 MS
OHS QTC CALCULATION: 429 MS

## 2024-03-12 PROCEDURE — 99214 OFFICE O/P EST MOD 30 MIN: CPT | Mod: S$PBB,,, | Performed by: FAMILY MEDICINE

## 2024-03-12 PROCEDURE — 99999 PR PBB SHADOW E&M-EST. PATIENT-LVL V: CPT | Mod: PBBFAC,,, | Performed by: FAMILY MEDICINE

## 2024-03-12 PROCEDURE — 93010 ELECTROCARDIOGRAM REPORT: CPT | Mod: S$PBB,,, | Performed by: INTERNAL MEDICINE

## 2024-03-12 PROCEDURE — 99215 OFFICE O/P EST HI 40 MIN: CPT | Mod: PBBFAC,PO,25 | Performed by: FAMILY MEDICINE

## 2024-03-12 PROCEDURE — 93005 ELECTROCARDIOGRAM TRACING: CPT | Mod: PBBFAC,PO | Performed by: INTERNAL MEDICINE

## 2024-03-12 NOTE — PROGRESS NOTES
"Subjective:       Patient ID: Jaye Martinez is a 43 y.o. female.    Chief Complaint: Follow-up, Dizziness, Nausea, and Loss of Consciousness    HPI:  Pleasant 41-year-old patient here with her significant other Ross.  They are very busy with 8 children between them.  The patient is complicated in certain ways.  She has had episodes of vertigo but also episodes of presyncope and syncope as well.  She has constitutionally low blood pressure a normal pulse.  Her cardiac exam is normal today.  EKG pending.  I believe that vertigo can be handled with vestibular rehab in the [place] maneuvers and I believe that Cardiology should take a look just to be sure that we are dealing with normal electrical system of the heart.  She is concerned because he was a cardiac history in her family of several different types of issues but the 1 that makes for the most concern is the congestive heart failure problems of existed.  She will be referred to Cardiology.  See her back in 6 months.    Review of Systems   Constitutional: Negative.    HENT: Negative.     Eyes: Negative.    Respiratory: Negative.     Cardiovascular: Negative.    Gastrointestinal: Negative.    Endocrine: Negative.    Genitourinary: Negative.    Musculoskeletal: Negative.    Skin: Negative.    Allergic/Immunologic: Negative.    Neurological:  Positive for dizziness.        Syncope     Hematological: Negative.    Psychiatric/Behavioral: Negative.         Objective:      Vitals:    03/12/24 1310   BP: 92/60   Pulse: 82   Temp: 98 °F (36.7 °C)   TempSrc: Oral   SpO2: 97%   Weight: 45.5 kg (100 lb 5 oz)   Height: 5' 2" (1.575 m)      Physical Exam    Results for orders placed or performed in visit on 10/27/23   Hepatic Function Panel   Result Value Ref Range    Total Protein 8.2 6.0 - 8.4 g/dL    Albumin 4.4 3.5 - 5.2 g/dL    Total Bilirubin 0.2 0.1 - 1.0 mg/dL    Bilirubin, Direct 0.1 0.1 - 0.3 mg/dL    AST 19 10 - 40 U/L    ALT 29 10 - 44 U/L    Alkaline " Phosphatase 111 55 - 135 U/L   Alpha-1-Antitrypsin   Result Value Ref Range    A-1 Antitrypsin 198 (H) 100 - 190 mg/dL   Ceruloplasmin   Result Value Ref Range    Ceruloplasmin 33.0 20.0 - 51.0 mg/dL   Phosphatidylethanol (PETH)   Result Value Ref Range    PEth 16:0/18.1 (POPEth) <10 Cutoff: 10 ng/mL    PEth 16:0/18.2 (PLPEth) <10 Cutoff: 10 ng/mL    PETH INTERPRETATION Negative.    Hepatitis A antibody, IgG   Result Value Ref Range    Hepatitis A Antibody IgG Negative    Hepatitis B Core Antibody, Total   Result Value Ref Range    Hep B Core Total Ab Positive (A) Negative   Hepatitis B Surface Antibody, Qual/Quant   Result Value Ref Range    Hep. B Surf Ab, Qual POSITIVE     Hep. B Surf Ab, Quant. 235 mIU/mL   Hepatitis B Surface Antigen   Result Value Ref Range    Hepatitis B Surface Ag Negative Negative      Assessment:       1. Vertigo    2. Hypotension, unspecified hypotension type    3. Vasovagal episode        Plan:       Vertigo  -     Ambulatory referral/consult to Physical/Occupational Therapy; Future; Expected date: 03/19/2024    Hypotension, unspecified hypotension type  -     Ambulatory referral/consult to Cardiology; Future; Expected date: 03/19/2024  -     IN OFFICE EKG 12-LEAD (to Muse)    Vasovagal episode  -     Ambulatory referral/consult to Cardiology; Future; Expected date: 03/19/2024  -     IN OFFICE EKG 12-LEAD (to Muse)          Medication List with Changes/Refills   Current Medications    ALBUTEROL (PROAIR HFA) 90 MCG/ACTUATION INHALER    Inhale 2 puffs into the lungs every 4 (four) hours as needed for Wheezing.    ALBUTEROL-IPRATROPIUM (DUO-NEB) 2.5 MG-0.5 MG/3 ML NEBULIZER SOLUTION    Take 3 mLs by nebulization every 6 (six) hours as needed for Wheezing. Rescue    BUTALBITAL-ACETAMINOPHEN-CAFFEINE -40 MG (FIORICET, ESGIC) -40 MG PER TABLET    Take 1 tablet by mouth 2 (two) times daily as needed.    EMGALITY  MG/ML PNIJ    Inject 1 mL into the skin every 30 days.     HYDROCODONE-ACETAMINOPHEN 10-325MG (NORCO)  MG TAB    Take 1 tablet by mouth every 6 (six) hours as needed.    MIRTAZAPINE (REMERON) 15 MG TABLET    Take 1 tablet (15 mg total) by mouth every evening.    PREDNISONE (DELTASONE) 5 MG TABLET    TAKE 1 TABLET (5 MG TOTAL) BY MOUTH DAILY AS NEEDED (JOINT STIFFNESS).    TIZANIDINE (ZANAFLEX) 4 MG TABLET    Take 4 mg by mouth every 6 (six) hours as needed.    UBROGEPANT 100 MG TABLET    Take 100 mg by mouth as needed for Migraine.   Discontinued Medications    VALACYCLOVIR (VALTREX) 1000 MG TABLET    Take 1 tablet (1,000 mg total) by mouth every 12 (twelve) hours. for 5 days

## 2024-04-01 ENCOUNTER — TELEPHONE (OUTPATIENT)
Dept: FAMILY MEDICINE | Facility: CLINIC | Age: 44
End: 2024-04-01

## 2024-04-01 NOTE — TELEPHONE ENCOUNTER
Left message for patient that her referral need to come from [facility] .  Before her insurance will approve.

## 2024-04-23 ENCOUNTER — TELEPHONE (OUTPATIENT)
Dept: CARDIOLOGY | Facility: CLINIC | Age: 44
End: 2024-04-23

## 2024-04-23 ENCOUNTER — OFFICE VISIT (OUTPATIENT)
Dept: CARDIOLOGY | Facility: CLINIC | Age: 44
End: 2024-04-23
Payer: OTHER GOVERNMENT

## 2024-04-23 ENCOUNTER — HOSPITAL ENCOUNTER (OUTPATIENT)
Dept: RADIOLOGY | Facility: HOSPITAL | Age: 44
Discharge: HOME | End: 2024-04-23
Attending: INTERNAL MEDICINE
Payer: OTHER GOVERNMENT

## 2024-04-23 VITALS
BODY MASS INDEX: 18.19 KG/M2 | SYSTOLIC BLOOD PRESSURE: 99 MMHG | HEIGHT: 61 IN | WEIGHT: 96.31 LBS | HEART RATE: 91 BPM | DIASTOLIC BLOOD PRESSURE: 64 MMHG

## 2024-04-23 DIAGNOSIS — R06.02 SOB (SHORTNESS OF BREATH): Chronic | ICD-10-CM

## 2024-04-23 DIAGNOSIS — R55 VASOVAGAL EPISODE: ICD-10-CM

## 2024-04-23 DIAGNOSIS — Z82.49 FAMILY HISTORY OF EARLY CAD: Chronic | ICD-10-CM

## 2024-04-23 DIAGNOSIS — R55 SYNCOPE AND COLLAPSE: Primary | Chronic | ICD-10-CM

## 2024-04-23 DIAGNOSIS — I95.9 HYPOTENSION, UNSPECIFIED HYPOTENSION TYPE: Chronic | ICD-10-CM

## 2024-04-23 DIAGNOSIS — F17.200 TOBACCO USE DISORDER: ICD-10-CM

## 2024-04-23 DIAGNOSIS — R06.02 SOB (SHORTNESS OF BREATH): ICD-10-CM

## 2024-04-23 PROCEDURE — 71046 X-RAY EXAM CHEST 2 VIEWS: CPT | Mod: 26,,, | Performed by: STUDENT IN AN ORGANIZED HEALTH CARE EDUCATION/TRAINING PROGRAM

## 2024-04-23 PROCEDURE — 71046 X-RAY EXAM CHEST 2 VIEWS: CPT | Mod: TC,PO

## 2024-04-23 PROCEDURE — 99204 OFFICE O/P NEW MOD 45 MIN: CPT | Mod: S$PBB,,, | Performed by: INTERNAL MEDICINE

## 2024-04-23 PROCEDURE — 99214 OFFICE O/P EST MOD 30 MIN: CPT | Mod: PBBFAC,25,PO | Performed by: INTERNAL MEDICINE

## 2024-04-23 PROCEDURE — 99999 PR PBB SHADOW E&M-EST. PATIENT-LVL IV: CPT | Mod: PBBFAC,,, | Performed by: INTERNAL MEDICINE

## 2024-04-23 RX ORDER — BUTALBITAL, ACETAMINOPHEN, CAFFEINE AND CODEINE PHOSPHATE 50; 325; 40; 30 MG/1; MG/1; MG/1; MG/1
CAPSULE ORAL
COMMUNITY
Start: 2024-03-12

## 2024-04-23 NOTE — TELEPHONE ENCOUNTER
Per dr ortiz Imaging tests results are within normal parameters.  Keep your follow up appointment.     Pt v/u

## 2024-04-23 NOTE — TELEPHONE ENCOUNTER
----- Message from Leonor Plata MD sent at 4/23/2024  4:49 PM CDT -----  Imaging tests results are within normal parameters.  Keep your follow up appointment.

## 2024-04-23 NOTE — PROGRESS NOTES
Subjective:    Patient ID:  Jaye Martinez is a 43 y.o. female who presents for Establish Care, Loss of Consciousness, and Hypotension        HPI  NEW PATIENT EVALUATION REFERRED FOR [facility]NCOPE HISTORY OF ANXIETY, EKG SINUS RHYTHM WITH SINUS ARRHYTHMIA, LABS FROM OCTOBER, HAS BEEN HAVING SX SINCE COVID IN AUGUST, WAS ON MULTIPLE IV ABX, LUNG INFECTION, BP LOW, WONDERING ABOUT MYOCARDITIS THEN, BP LOW SINCE, FEELS FAINT, PASSES OUT IF DOES NOT SIT DOWN, H/O CYST ON SPINAL CANAL, SINCE , REG MRI'S, MIGRAINE, DECREASED SMOKING, FH OF A FIB, CAD , VALVULAR DISEASE, SEE ROS    Past Medical History:   Diagnosis Date    Abnormal Pap smear     Anxiety     Cancer     pre-cancer cells cervical     Cervical disc herniation     Chronic cough     Closed fracture 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) level with anterior cord syndrome     T 12 fracture compression     COVID     Dilated bile duct     Fractures     Genital herpes     GERD (gastroesophageal reflux disease)     Hemorrhoid     Hepatomegaly     History of colposcopy with cervical biopsy     Lumbar disc herniation     on Hydrocodone    Lupus (systemic lupus erythematosus)     chronic thrombocytopenia    Migraine headache     PONV (postoperative nausea and vomiting)     UTI (urinary tract infection)      Past Surgical History:   Procedure Laterality Date    ADENOIDECTOMY      APPENDECTOMY       SECTION, CLASSIC      x2 , last 2014    CHOLECYSTECTOMY      CLOSED REDUCTION OF FRACTURE OF NASAL BONE Bilateral 2022    Procedure: CLOSED REDUCTION, FRACTURE, NASAL BONE;  Surgeon: Mary Carlton MD;  Location: [facility] OR;  Service: ENT;  Laterality: Bilateral;    COLONOSCOPY N/A 10/29/2019    Procedure: COLONOSCOPY;  Surgeon: Jules De La Cruz MD;  Location: [facility] ENDO;  Service: Endoscopy;  Laterality: N/A;    CRYOTHERAPY      2002    CYSTOSCOPY N/A 2023    Procedure: CYSTOSCOPY;  Surgeon: Kevin Wright MD;  Location: [facility] OR;  Service: OB/GYN;  Laterality: N/A;     DILATION AND CURETTAGE OF UTERUS  [id number]    ENDOMETRIAL ABLATION      ENDOSCOPIC ULTRASOUND OF UPPER GASTROINTESTINAL TRACT N/A 9/5/2023    Procedure: ULTRASOUND, UPPER GI TRACT, ENDOSCOPIC;  Surgeon: Kai Hermosillo III, MD;  Location: [facility];  Service: Endoscopy;  Laterality: N/A;    GANGLION CYST EXCISION Left     LAPAROSCOPIC SALPINGECTOMY Bilateral 9/27/2023    Procedure: SALPINGECTOMY, LAPAROSCOPIC;  Surgeon: Kevin Wright MD;  Location: [facility] OR;  Service: OB/GYN;  Laterality: Bilateral;    LAPAROSCOPIC TOTAL HYSTERECTOMY N/A 9/27/2023    Procedure: HYSTERECTOMY, TOTAL, LAPAROSCOPIC;  Surgeon: Kevin Wright MD;  Location: [facility] OR;  Service: OB/GYN;  Laterality: N/A;    TONSILLECTOMY      TUBAL LIGATION  4/2014    TYMPANOSTOMY TUBE PLACEMENT      x 7     Family History   Problem Relation Name Age of Onset    Heart disease Father      Diabetes Mother      Cancer Maternal Grandfather          colon    Cancer Paternal Grandfather          colon    Breast cancer Maternal Grandmother      Breast cancer Maternal Aunt      Breast cancer Maternal Aunt      Ovarian cancer Neg Hx       Social History     Socioeconomic History    Marital status: Legally    Tobacco Use    Smoking status: Every Day     Current packs/day: 0.25     Types: Cigarettes    Smokeless tobacco: Never   Substance and Sexual Activity    Alcohol use: Yes     Comment: special occasions    Drug use: Yes     Types: Marijuana     Comment: prescription    Sexual activity: Yes     Partners: Male     Birth control/protection: See Surgical Hx     Social Determinants of Health     Financial Resource Strain: High Risk (4/23/2024)    Overall Financial Resource Strain (CARDIA)     Difficulty of Paying Living Expenses: Very hard   Food Insecurity: No Food Insecurity (4/23/2024)    Hunger Vital Sign     Worried About Running Out of Food in the Last Year: Never true     Ran Out of Food in the Last Year: Never true   Transportation Needs:  No Transportation Needs (4/23/2024)    PRAPARE - Transportation     Lack of Transportation (Medical): No     Lack of Transportation (Non-Medical): No   Physical Activity: Sufficiently Active (4/23/2024)    Exercise Vital Sign     Days of Exercise per Week: 7 days     Minutes of Exercise per Session: 30 min   Stress: No Stress Concern Present (4/23/2024)    [employer] [place] [employer] - Occupational Stress Questionnaire     Feeling of Stress : Only a little   Social Connections: Moderately Isolated (4/23/2024)    Social Connection and Isolation Panel [NHANES]     Frequency of Communication with Friends and Family: More than three times a week     Frequency of Social Gatherings with Friends and Family: More than three times a week     Attends [employer]ism Services: More than 4 times per year     Active Member of Clubs or Organizations: No     Attends Club or Organization Meetings: Never     Marital Status:    Housing Stability: High Risk (4/23/2024)    Housing Stability Vital Sign     Unable to Pay for Housing in the Last Year: Yes       Review of patient's allergies indicates:   Allergen Reactions    Cyclobenzaprine Rash and Other (See Comments)     Leg cramps    Pregabalin Itching, Other (See Comments) and Hives     Bruising and headaches    Sulfa (sulfonamide antibiotics) Nausea And Vomiting    Morphine Rash and Hives    Sulfamethoxazole-trimethoprim Rash and Nausea And Vomiting     And headache    Toradol [ketorolac] Rash       Current Outpatient Medications:     albuterol (PROAIR HFA) 90 mcg/actuation inhaler, Inhale 2 puffs into the lungs every 4 (four) hours as needed for Wheezing., Disp: 18 g, Rfl: 3    albuterol-ipratropium (DUO-NEB) 2.5 mg-0.5 mg/3 mL nebulizer solution, Take 3 mLs by nebulization every 6 (six) hours as needed for Wheezing. Rescue, Disp: 75 mL, Rfl: 5    butalbitaL-acetaminop-caf-cod -39-30 mg Cap, , Disp: , Rfl:     butalbital-acetaminophen-caffeine -40 mg  (FIORICET, ESGIC) -40 mg per tablet, Take 1 tablet by mouth 2 (two) times daily as needed., Disp: 30 tablet, Rfl: 0    EMGALITY  mg/mL PnIj, Inject 1 mL into the skin every 30 days., Disp: , Rfl:     hydrocodone-acetaminophen 10-325mg (NORCO)  mg Tab, Take 1 tablet by mouth every 6 (six) hours as needed., Disp: 120 tablet, Rfl: 0    mirtazapine (REMERON) 15 MG tablet, Take 1 tablet (15 mg total) by mouth every evening., Disp: 30 tablet, Rfl: 2    predniSONE (DELTASONE) 5 MG tablet, TAKE 1 TABLET (5 MG TOTAL) BY MOUTH DAILY AS NEEDED (JOINT STIFFNESS)., Disp: 90 tablet, Rfl: 0    tiZANidine (ZANAFLEX) 4 MG tablet, Take 4 mg by mouth every 6 (six) hours as needed., Disp: , Rfl:     UBROGEPANT 100 mg tablet, Take 100 mg by mouth as needed for Migraine., Disp: , Rfl:     Review of Systems   Constitutional: Negative for chills, decreased appetite, diaphoresis and fever.   HENT:  Negative for congestion and nosebleeds.    Eyes:  Negative for blurred vision and vision loss in right eye.   Cardiovascular:  Positive for dyspnea on exertion, near-syncope and syncope. Negative for chest pain, claudication, cyanosis, irregular heartbeat, leg swelling, orthopnea, palpitations and paroxysmal nocturnal dyspnea.   Respiratory:  Positive for shortness of breath. Negative for cough and hemoptysis.    Endocrine: Negative for polyphagia and polyuria.   Musculoskeletal:  Positive for arthritis (LUPUS) and back pain. Negative for falls.   Gastrointestinal:  Negative for abdominal pain and dysphagia.   Genitourinary:  Negative for dysuria and flank pain.   Neurological:  Positive for headaches. Negative for brief paralysis, paresthesias (OCC ARMS), seizures and sensory change.   Psychiatric/Behavioral:  Negative for altered mental status and depression.    Allergic/Immunologic: Negative for hives and persistent infections.        Objective:      Vitals:    04/23/24 1450   BP: 99/64   Pulse: 91   Weight: 43.7 kg (96 lb  "5.5 oz)   Height: 5' 1" (1.549 m)   PainSc: 0-No pain     Body mass index is 18.2 kg/m².    Physical Exam  Constitutional:       Appearance: Normal appearance.   HENT:      Head: Normocephalic and atraumatic.   Eyes:      General: No scleral icterus.     Extraocular Movements: Extraocular movements intact.   Neck:      Vascular: No carotid bruit or JVD.   Cardiovascular:      Rate and Rhythm: Normal rate and regular rhythm. No extrasystoles are present.     Pulses:           Carotid pulses are 2+ on the right side and 2+ on the left side.       Radial pulses are 2+ on the right side and 2+ on the left side.        Posterior tibial pulses are 2+ on the right side and 2+ on the left side.      Heart sounds: Murmur heard.      Systolic murmur is present with a grade of 1/6 at the upper right sternal border.      No friction rub. No gallop.   Pulmonary:      Effort: Pulmonary effort is normal. No respiratory distress.      Breath sounds: Examination of the right-middle field reveals wheezing. Wheezing present. No rales.   Abdominal:      Palpations: Abdomen is soft. There is no hepatomegaly.      Tenderness: There is no abdominal tenderness.   Musculoskeletal:      Cervical back: Neck supple.      Right lower leg: No edema.      Left lower leg: No edema.   Skin:     Capillary Refill: Capillary refill takes less than 2 seconds.   Neurological:      General: No focal deficit present.      Mental Status: She is alert and oriented to person, place, and time.      Cranial Nerves: Cranial nerves 2-12 are intact.   Psychiatric:         Mood and Affect: Mood normal.         Speech: Speech normal.         Behavior: Behavior normal.                 ..    Chemistry        Component Value Date/Time     10/15/2023 1448    K 3.4 (L) 10/15/2023 1448     10/15/2023 1448    CO2 29 10/15/2023 1448    BUN 9 10/15/2023 1448    CREATININE 0.52 10/15/2023 1448    GLU 83 10/15/2023 1448        Component Value Date/Time    CALCIUM " 9.3 10/15/2023 1448    ALKPHOS 111 10/27/2023 0946    AST 19 10/27/2023 0946    ALT 29 10/27/2023 0946    BILITOT 0.2 10/27/2023 0946    ESTGFRAFRICA >60.0 06/12/2017 0824    EGFRNONAA >60.0 06/12/2017 0824            ..  Lab Results   Component Value Date    CHOL 160 06/12/2017    CHOL 158 10/20/2015     Lab Results   Component Value Date    HDL 31 (L) 06/12/2017    HDL 26 (L) 10/20/2015     Lab Results   Component Value Date    LDLCALC 114.4 06/12/2017    LDLCALC 92.0 10/20/2015     Lab Results   Component Value Date    TRIG 73 06/12/2017    TRIG 200 (H) 10/20/2015     Lab Results   Component Value Date    CHOLHDL 19.4 (L) 06/12/2017    CHOLHDL 16.5 (L) 10/20/2015     ..  Lab Results   Component Value Date    WBC 12.01 10/15/2023    HGB 13.8 10/15/2023    HCT 41.0 10/15/2023    MCV 97 10/15/2023     10/15/2023       Test(s) Reviewed  I have reviewed the following in detail:  [] Stress test   [] Angiography   [] Echocardiogram   [x] Labs   [x] Other:       Assessment:         ICD-10-CM ICD-9-CM   1. Syncope and collapse  R55 780.2   2. Hypotension, unspecified hypotension type  I95.9 458.9   3. SOB (shortness of breath)  R06.02 786.05   4. Vasovagal episode  R55 780.2   5. Tobacco use disorder  F17.200 305.1   6. Family history of early CAD  Z82.49 V17.3     Problem List Items Addressed This Visit          Cardiac/Vascular    Hypotension       Other    Tobacco use disorder    Vasovagal episode    Relevant Orders    Echo     Other Visit Diagnoses       Syncope and collapse  (Chronic)   -  Primary    Relevant Orders    Echo    CV Ultrasound Bilateral Doppler Carotid    Exercise Stress - EKG    Holter monitor - 72 hour    SOB (shortness of breath)  (Chronic)       Relevant Orders    Echo    Exercise Stress - EKG    X-Ray Chest PA And Lateral (Completed)    Family history of early CAD  (Chronic)                Plan:     WILL NEED FURTHER EVALUATION CHECK ECHO CAROTID ULTRASOUND EXERCISE STRESS EKG 72 HOUR  HOLTER, TOBACCO CESSATION COUNSELING, NO OVERT HEART FAILURE ASSESS ARRHYTHMIA RETURN TO CLINIC IN FEW WEEKS AFTER TEST STAY WELL HYDRATED      Syncope and collapse  -     Echo  -     CV Ultrasound Bilateral Doppler Carotid; Future  -     Exercise Stress - EKG; Future  -     Holter monitor - 72 hour; Future    Hypotension, unspecified hypotension type  -     Ambulatory referral/consult to Cardiology    SOB (shortness of breath)  -     Echo  -     Exercise Stress - EKG; Future  -     X-Ray Chest PA And Lateral; Future; Expected date: 04/23/2024    Vasovagal episode  -     Ambulatory referral/consult to Cardiology  -     Echo    Tobacco use disorder  Comments:  COUNSELING    Family history of early CAD    RTC Low level/low impact aerobic exercise 5x's/wk. Heart healthy diet and risk factor modification.    See labs and med orders.    Aerobic exercise 5x's/wk. Heart healthy diet and risk factor modification.    See labs and med orders.

## 2024-05-17 ENCOUNTER — HOSPITAL ENCOUNTER (OUTPATIENT)
Dept: CARDIOLOGY | Facility: HOSPITAL | Age: 44
Discharge: HOME | End: 2024-05-17
Attending: INTERNAL MEDICINE
Payer: OTHER GOVERNMENT

## 2024-05-17 DIAGNOSIS — R55 SYNCOPE AND COLLAPSE: Chronic | ICD-10-CM

## 2024-05-17 PROCEDURE — 93243 EXT ECG>48HR<7D SCAN A/R: CPT | Mod: PO

## 2024-05-17 PROCEDURE — 93242 EXT ECG>48HR<7D RECORDING: CPT | Mod: PO

## 2024-05-22 ENCOUNTER — HOSPITAL ENCOUNTER (OUTPATIENT)
Dept: CARDIOLOGY | Facility: HOSPITAL | Age: 44
Discharge: HOME | End: 2024-05-22
Attending: INTERNAL MEDICINE
Payer: MEDICAID

## 2024-05-22 VITALS
BODY MASS INDEX: 18.12 KG/M2 | BODY MASS INDEX: 18.12 KG/M2 | HEIGHT: 61 IN | HEIGHT: 61 IN | WEIGHT: 96 LBS | WEIGHT: 96 LBS

## 2024-05-22 VITALS — BODY MASS INDEX: 18.12 KG/M2 | WEIGHT: 96 LBS | HEIGHT: 61 IN

## 2024-05-22 DIAGNOSIS — R55 SYNCOPE AND COLLAPSE: Chronic | ICD-10-CM

## 2024-05-22 DIAGNOSIS — R06.02 SOB (SHORTNESS OF BREATH): Chronic | ICD-10-CM

## 2024-05-22 LAB
ASCENDING AORTA: 2.33 CM
AV INDEX (PROSTH): 0.74
AV MEAN GRADIENT: 4 MMHG
AV PEAK GRADIENT: 8 MMHG
AV VALVE AREA BY VELOCITY RATIO: 2.39 CM²
AV VALVE AREA: 2.37 CM²
AV VELOCITY RATIO: 0.75
BSA FOR ECHO PROCEDURE: 1.37 M2
CV ECHO LV RWT: 0.41 CM
CV STRESS BASE HR: 83 BPM
DIASTOLIC BLOOD PRESSURE: 70 MMHG
DOP CALC AO PEAK VEL: 1.42 M/S
DOP CALC AO VTI: 30.5 CM
DOP CALC LVOT AREA: 3.2 CM2
DOP CALC LVOT DIAMETER: 2.02 CM
DOP CALC LVOT PEAK VEL: 1.06 M/S
DOP CALC LVOT STROKE VOLUME: 72.39 CM3
DOP CALCLVOT PEAK VEL VTI: 22.6 CM
E WAVE DECELERATION TIME: 184.7 MSEC
E/A RATIO: 2
E/E' RATIO: 7.17 M/S
ECHO LV POSTERIOR WALL: 0.84 CM (ref 0.6–1.1)
EJECTION FRACTION: 60 %
FRACTIONAL SHORTENING: 42 % (ref 28–44)
INTERVENTRICULAR SEPTUM: 0.6 CM (ref 0.6–1.1)
IVRT: 117.03 MSEC
LEFT ATRIUM SIZE: 2.48 CM
LEFT ATRIUM VOLUME INDEX MOD: 26.3 ML/M2
LEFT ATRIUM VOLUME MOD: 36.34 CM3
LEFT CBA DIAS: 29 CM/S
LEFT CBA SYS: 86 CM/S
LEFT CCA DIST DIAS: 30 CM/S
LEFT CCA DIST SYS: 96 CM/S
LEFT CCA MID DIAS: 34 CM/S
LEFT CCA MID SYS: 113 CM/S
LEFT CCA PROX DIAS: 28 CM/S
LEFT CCA PROX SYS: 128 CM/S
LEFT ECA DIAS: 19 CM/S
LEFT ECA SYS: 56 CM/S
LEFT ICA DIST DIAS: 35 CM/S
LEFT ICA DIST SYS: 80 CM/S
LEFT ICA MID DIAS: 43 CM/S
LEFT ICA MID SYS: 90 CM/S
LEFT ICA PROX DIAS: 31 CM/S
LEFT ICA PROX SYS: 81 CM/S
LEFT INTERNAL DIMENSION IN SYSTOLE: 2.39 CM (ref 2.1–4)
LEFT VENTRICLE DIASTOLIC VOLUME INDEX: 53.46 ML/M2
LEFT VENTRICLE DIASTOLIC VOLUME: 73.77 ML
LEFT VENTRICLE MASS INDEX: 61 G/M2
LEFT VENTRICLE SYSTOLIC VOLUME INDEX: 14.4 ML/M2
LEFT VENTRICLE SYSTOLIC VOLUME: 19.9 ML
LEFT VENTRICULAR INTERNAL DIMENSION IN DIASTOLE: 4.09 CM (ref 3.5–6)
LEFT VENTRICULAR MASS: 84.38 G
LEFT VERTEBRAL DIAS: 21 CM/S
LEFT VERTEBRAL SYS: 50 CM/S
LV LATERAL E/E' RATIO: 6.62 M/S
LV SEPTAL E/E' RATIO: 7.82 M/S
LVOT MG: 2.46 MMHG
LVOT MV: 0.73 CM/S
MV PEAK A VEL: 0.43 M/S
MV PEAK E VEL: 0.86 M/S
OHS CV CAROTID RIGHT ICA EDV HIGHEST: 35
OHS CV CAROTID ULTRASOUND LEFT ICA/CCA RATIO: 0.94
OHS CV CAROTID ULTRASOUND RIGHT ICA/CCA RATIO: 0.9
OHS CV CPX 1 MINUTE RECOVERY HEART RATE: 109 BPM
OHS CV CPX 85 PERCENT MAX PREDICTED HEART RATE MALE: 151
OHS CV CPX ESTIMATED METS: 15
OHS CV CPX MAX PREDICTED HEART RATE: 178
OHS CV CPX PATIENT IS FEMALE: 1
OHS CV CPX PATIENT IS MALE: 0
OHS CV CPX PEAK DIASTOLIC BLOOD PRESSURE: 69 MMHG
OHS CV CPX PEAK HEAR RATE: 155 BPM
OHS CV CPX PEAK RATE PRESSURE PRODUCT: NORMAL
OHS CV CPX PEAK SYSTOLIC BLOOD PRESSURE: 156 MMHG
OHS CV CPX PERCENT MAX PREDICTED HEART RATE ACHIEVED: 92
OHS CV CPX RATE PRESSURE PRODUCT PRESENTING: 8881
OHS CV PV CAROTID LEFT HIGHEST CCA: 128
OHS CV PV CAROTID LEFT HIGHEST ICA: 90
OHS CV PV CAROTID RIGHT HIGHEST CCA: 120
OHS CV PV CAROTID RIGHT HIGHEST ICA: 97
OHS CV RV/LV RATIO: 0.85 CM
OHS CV US CAROTID LEFT HIGHEST EDV: 43
PISA TR MAX VEL: 2.01 M/S
PULM VEIN S/D RATIO: 1.36
PV PEAK D VEL: 0.47 M/S
PV PEAK S VEL: 0.64 M/S
RA PRESSURE ESTIMATED: 8 MMHG
RIGHT CBA DIAS: 26 CM/S
RIGHT CBA SYS: 93 CM/S
RIGHT CCA DIST DIAS: 28 CM/S
RIGHT CCA DIST SYS: 108 CM/S
RIGHT CCA MID DIAS: 28 CM/S
RIGHT CCA MID SYS: 113 CM/S
RIGHT CCA PROX DIAS: 23 CM/S
RIGHT CCA PROX SYS: 120 CM/S
RIGHT ECA DIAS: 18 CM/S
RIGHT ECA SYS: 80 CM/S
RIGHT ICA DIST DIAS: 35 CM/S
RIGHT ICA DIST SYS: 77 CM/S
RIGHT ICA MID DIAS: 29 CM/S
RIGHT ICA MID SYS: 97 CM/S
RIGHT ICA PROX DIAS: 26 CM/S
RIGHT ICA PROX SYS: 97 CM/S
RIGHT VENTRICULAR END-DIASTOLIC DIMENSION: 3.47 CM
RIGHT VENTRICULAR LENGTH IN DIASTOLE (APICAL 4-CHAMBER VIEW): 6.88 CM
RIGHT VERTEBRAL DIAS: 10 CM/S
RIGHT VERTEBRAL SYS: 41 CM/S
RV MID DIAMA: 1.56 CM
RV TB RVSP: 10 MMHG
RV TISSUE DOPPLER FREE WALL SYSTOLIC VELOCITY 1 (APICAL 4 CHAMBER VIEW): 13.05 CM/S
SINUS: 2.67 CM
STJ: 2.42 CM
STRESS ECHO POST EXERCISE DUR MIN: 7 MINUTES
STRESS ECHO POST EXERCISE DUR SEC: 39 SECONDS
SYSTOLIC BLOOD PRESSURE: 107 MMHG
TDI LATERAL: 0.13 M/S
TDI SEPTAL: 0.11 M/S
TDI: 0.12 M/S
TR MAX PG: 16 MMHG
TRICUSPID ANNULAR PLANE SYSTOLIC EXCURSION: 2.25 CM
TV REST PULMONARY ARTERY PRESSURE: 24 MMHG
Z-SCORE OF LEFT VENTRICULAR DIMENSION IN END DIASTOLE: -0.57
Z-SCORE OF LEFT VENTRICULAR DIMENSION IN END SYSTOLE: -0.93

## 2024-05-22 PROCEDURE — 93880 EXTRACRANIAL BILAT STUDY: CPT | Mod: PO

## 2024-05-22 PROCEDURE — 93306 TTE W/DOPPLER COMPLETE: CPT | Mod: 26,,, | Performed by: INTERNAL MEDICINE

## 2024-05-22 PROCEDURE — 93306 TTE W/DOPPLER COMPLETE: CPT | Mod: PO

## 2024-05-22 PROCEDURE — 93017 CV STRESS TEST TRACING ONLY: CPT | Mod: PO

## 2024-05-22 PROCEDURE — 93016 CV STRESS TEST SUPVJ ONLY: CPT | Mod: ,,, | Performed by: INTERNAL MEDICINE

## 2024-05-22 PROCEDURE — 93880 EXTRACRANIAL BILAT STUDY: CPT | Mod: 26,,, | Performed by: INTERNAL MEDICINE

## 2024-05-22 PROCEDURE — 93018 CV STRESS TEST I&R ONLY: CPT | Mod: ,,, | Performed by: INTERNAL MEDICINE

## 2024-06-19 LAB
OHS CV EVENT MONITOR DAY: 0
OHS CV HOLTER LENGTH DECIMAL HOURS: 72
OHS CV HOLTER LENGTH HOURS: 72
OHS CV HOLTER LENGTH MINUTES: 0
OHS CV HOLTER SINUS AVERAGE HR: 90
OHS CV HOLTER SINUS MAX HR: 160
OHS CV HOLTER SINUS MIN HR: 54

## 2024-07-22 ENCOUNTER — PATIENT MESSAGE (OUTPATIENT)
Dept: FAMILY MEDICINE | Facility: CLINIC | Age: 44
End: 2024-07-22
Payer: MEDICAID

## 2024-07-26 ENCOUNTER — TELEPHONE (OUTPATIENT)
Dept: FAMILY MEDICINE | Facility: CLINIC | Age: 44
End: 2024-07-26
Payer: MEDICAID

## 2024-07-26 NOTE — TELEPHONE ENCOUNTER
----- Message from Abby An sent at 7/26/2024 11:55 AM CDT -----  Contact: self  Type:  Same Day Appointment Request    Caller is requesting a same day appointment.  Caller declined first available appointment listed below.      Name of Caller:  the patient  When is the first available appointment?  N/a  Symptoms:  fever, sore throat, weak  Best Call Back Number:  [phone]  Additional Information:   pt is looking to be seen today. Please call

## 2024-07-31 NOTE — PLAN OF CARE
Patient has been notified of the note below via [employer]. Reminder set for 3 days to send letter if not read   Spoke to [facility] and sent referral via [facility].  Also sent information to [facility] and [facility].  CM will follow up.

## 2024-08-30 ENCOUNTER — PATIENT MESSAGE (OUTPATIENT)
Dept: FAMILY MEDICINE | Facility: CLINIC | Age: 44
End: 2024-08-30
Payer: OTHER GOVERNMENT

## 2024-08-30 DIAGNOSIS — S60.221A CONTUSION OF RIGHT HAND, INITIAL ENCOUNTER: Primary | ICD-10-CM

## 2024-11-04 NOTE — TELEPHONE ENCOUNTER
10 Please call patient's pharmacy regarding plaquenil.     Which doctor last prescribed this med and what was the dosage?

## 2024-11-05 ENCOUNTER — LAB VISIT (OUTPATIENT)
Dept: LAB | Facility: HOSPITAL | Age: 44
End: 2024-11-05
Attending: NURSE PRACTITIONER
Payer: OTHER GOVERNMENT

## 2024-11-05 ENCOUNTER — OFFICE VISIT (OUTPATIENT)
Dept: FAMILY MEDICINE | Facility: CLINIC | Age: 44
End: 2024-11-05
Payer: OTHER GOVERNMENT

## 2024-11-05 ENCOUNTER — TELEPHONE (OUTPATIENT)
Dept: FAMILY MEDICINE | Facility: CLINIC | Age: 44
End: 2024-11-05

## 2024-11-05 VITALS
DIASTOLIC BLOOD PRESSURE: 76 MMHG | SYSTOLIC BLOOD PRESSURE: 102 MMHG | WEIGHT: 98 LBS | TEMPERATURE: 98 F | BODY MASS INDEX: 18.03 KG/M2 | OXYGEN SATURATION: 99 % | HEIGHT: 62 IN | HEART RATE: 90 BPM

## 2024-11-05 DIAGNOSIS — N39.0 COMPLICATED UTI (URINARY TRACT INFECTION): Primary | ICD-10-CM

## 2024-11-05 DIAGNOSIS — R50.9 FEVER, UNSPECIFIED FEVER CAUSE: ICD-10-CM

## 2024-11-05 DIAGNOSIS — R10.9 ABDOMINAL PAIN, UNSPECIFIED ABDOMINAL LOCATION: ICD-10-CM

## 2024-11-05 DIAGNOSIS — R30.0 DYSURIA: ICD-10-CM

## 2024-11-05 DIAGNOSIS — L93.0 LUPUS ERYTHEMATOSUS, UNSPECIFIED FORM: ICD-10-CM

## 2024-11-05 LAB
ALBUMIN SERPL BCP-MCNC: 3.7 G/DL (ref 3.5–5.2)
ALP SERPL-CCNC: 102 U/L (ref 40–150)
ALT SERPL W/O P-5'-P-CCNC: 9 U/L (ref 10–44)
ANION GAP SERPL CALC-SCNC: 12 MMOL/L (ref 8–16)
AST SERPL-CCNC: 12 U/L (ref 10–40)
BACTERIA #/AREA URNS HPF: ABNORMAL /HPF
BASOPHILS # BLD AUTO: 0.05 K/UL (ref 0–0.2)
BASOPHILS NFR BLD: 0.7 % (ref 0–1.9)
BILIRUB SERPL-MCNC: 0.3 MG/DL (ref 0.1–1)
BILIRUB SERPL-MCNC: ABNORMAL MG/DL
BLOOD URINE, POC: ABNORMAL
BUN SERPL-MCNC: 9 MG/DL (ref 6–20)
CALCIUM SERPL-MCNC: 9.1 MG/DL (ref 8.7–10.5)
CHLORIDE SERPL-SCNC: 103 MMOL/L (ref 95–110)
CLARITY, POC UA: ABNORMAL
CO2 SERPL-SCNC: 26 MMOL/L (ref 23–29)
COLOR, POC UA: ABNORMAL
CREAT SERPL-MCNC: 0.7 MG/DL (ref 0.5–1.4)
DIFFERENTIAL METHOD BLD: ABNORMAL
EOSINOPHIL # BLD AUTO: 0.1 K/UL (ref 0–0.5)
EOSINOPHIL NFR BLD: 0.9 % (ref 0–8)
ERYTHROCYTE [DISTWIDTH] IN BLOOD BY AUTOMATED COUNT: 13.9 % (ref 11.5–14.5)
EST. GFR  (NO RACE VARIABLE): >60 ML/MIN/1.73 M^2
GLUCOSE SERPL-MCNC: 79 MG/DL (ref 70–110)
GLUCOSE UR QL STRIP: NEGATIVE
HCT VFR BLD AUTO: 42.2 % (ref 37–48.5)
HGB BLD-MCNC: 13.9 G/DL (ref 12–16)
IMM GRANULOCYTES # BLD AUTO: 0.02 K/UL (ref 0–0.04)
IMM GRANULOCYTES NFR BLD AUTO: 0.3 % (ref 0–0.5)
KETONES UR QL STRIP: NEGATIVE
LEUKOCYTE ESTERASE URINE, POC: ABNORMAL
LYMPHOCYTES # BLD AUTO: 2 K/UL (ref 1–4.8)
LYMPHOCYTES NFR BLD: 25.4 % (ref 18–48)
MCH RBC QN AUTO: 32.7 PG (ref 27–31)
MCHC RBC AUTO-ENTMCNC: 32.9 G/DL (ref 32–36)
MCV RBC AUTO: 99 FL (ref 82–98)
MICROSCOPIC COMMENT: ABNORMAL
MONOCYTES # BLD AUTO: 1.3 K/UL (ref 0.3–1)
MONOCYTES NFR BLD: 17.4 % (ref 4–15)
NEUTROPHILS # BLD AUTO: 4.3 K/UL (ref 1.8–7.7)
NEUTROPHILS NFR BLD: 55.3 % (ref 38–73)
NITRITE, POC UA: POSITIVE
NRBC BLD-RTO: 0 /100 WBC
PH, POC UA: 6
PLATELET # BLD AUTO: 155 K/UL (ref 150–450)
PMV BLD AUTO: 13 FL (ref 9.2–12.9)
POTASSIUM SERPL-SCNC: 3.6 MMOL/L (ref 3.5–5.1)
PROT SERPL-MCNC: 7.4 G/DL (ref 6–8.4)
PROTEIN, POC: 100
RBC # BLD AUTO: 4.25 M/UL (ref 4–5.4)
RBC #/AREA URNS HPF: 25 /HPF (ref 0–4)
SODIUM SERPL-SCNC: 141 MMOL/L (ref 136–145)
SPECIFIC GRAVITY, POC UA: 1.03
SQUAMOUS #/AREA URNS HPF: 2 /HPF
UROBILINOGEN, POC UA: 1
WBC # BLD AUTO: 7.68 K/UL (ref 3.9–12.7)
WBC #/AREA URNS HPF: 30 /HPF (ref 0–5)

## 2024-11-05 PROCEDURE — 99999 PR PBB SHADOW E&M-EST. PATIENT-LVL IV: CPT | Mod: PBBFAC,,, | Performed by: NURSE PRACTITIONER

## 2024-11-05 PROCEDURE — 87186 SC STD MICRODIL/AGAR DIL: CPT | Performed by: NURSE PRACTITIONER

## 2024-11-05 PROCEDURE — 99999PBSHW PR PBB SHADOW TECHNICAL ONLY FILED TO HB: Mod: PBBFAC,,,

## 2024-11-05 PROCEDURE — 99999PBSHW POCT URINE DIPSTICK WITHOUT MICROSCOPE: Mod: PBBFAC,,,

## 2024-11-05 PROCEDURE — 87088 URINE BACTERIA CULTURE: CPT | Performed by: NURSE PRACTITIONER

## 2024-11-05 PROCEDURE — 87086 URINE CULTURE/COLONY COUNT: CPT | Performed by: NURSE PRACTITIONER

## 2024-11-05 PROCEDURE — 80053 COMPREHEN METABOLIC PANEL: CPT | Performed by: NURSE PRACTITIONER

## 2024-11-05 PROCEDURE — 81000 URINALYSIS NONAUTO W/SCOPE: CPT | Mod: PO | Performed by: NURSE PRACTITIONER

## 2024-11-05 PROCEDURE — 81002 URINALYSIS NONAUTO W/O SCOPE: CPT | Mod: PBBFAC,PO | Performed by: NURSE PRACTITIONER

## 2024-11-05 PROCEDURE — 99214 OFFICE O/P EST MOD 30 MIN: CPT | Mod: PBBFAC,PO | Performed by: NURSE PRACTITIONER

## 2024-11-05 PROCEDURE — 96372 THER/PROPH/DIAG INJ SC/IM: CPT | Mod: PBBFAC,PO

## 2024-11-05 PROCEDURE — 99214 OFFICE O/P EST MOD 30 MIN: CPT | Mod: S$PBB,,, | Performed by: NURSE PRACTITIONER

## 2024-11-05 PROCEDURE — 85025 COMPLETE CBC W/AUTO DIFF WBC: CPT | Performed by: NURSE PRACTITIONER

## 2024-11-05 PROCEDURE — 36415 COLL VENOUS BLD VENIPUNCTURE: CPT | Mod: PO | Performed by: NURSE PRACTITIONER

## 2024-11-05 RX ORDER — CEFTRIAXONE 1 G/1
1 INJECTION, POWDER, FOR SOLUTION INTRAMUSCULAR; INTRAVENOUS
Status: COMPLETED | OUTPATIENT
Start: 2024-11-05 | End: 2024-11-05

## 2024-11-05 RX ORDER — AMOXICILLIN AND CLAVULANATE POTASSIUM 875; 125 MG/1; MG/1
1 TABLET, FILM COATED ORAL EVERY 12 HOURS
Qty: 14 TABLET | Refills: 0 | Status: SHIPPED | OUTPATIENT
Start: 2024-11-05 | End: 2024-11-12

## 2024-11-05 RX ADMIN — CEFTRIAXONE SODIUM 1 G: 1 INJECTION, POWDER, FOR SOLUTION INTRAMUSCULAR; INTRAVENOUS at 02:11

## 2024-11-05 NOTE — PROGRESS NOTES
"Subjective     Patient ID: Jaye Martinez is a 44 y.o. female.    Chief Complaint: Abdominal Pain (Pt states she has had pain for the last 5 days and has been worse in the last 24 hrs), Back Pain, Fever, Shaking, Emesis (Pt states she threw up and had foam coming out of her mouth.), and Cystitis      Abdominal Pain  Associated symptoms include a fever and vomiting.   Back Pain  Associated symptoms include abdominal pain and a fever.   Fever   Associated symptoms include abdominal pain and vomiting.   Emesis   Associated symptoms include abdominal pain and a fever.         Patient is new to me. PCP is Dr. Gonzalez.     45 y/o F with migraines, anxiety, Depression, Lupus, chronic lumbar pain presents to clinic for c/o abdominal pain, back pain, fever, vomiting. Started with abd pain 5 days ago, symptoms worsened in the last 24 hours.     Review of Systems   Constitutional:  Positive for fever.   Gastrointestinal:  Positive for abdominal pain and vomiting.   Musculoskeletal:  Positive for back pain.          Objective   Vitals:    11/05/24 1308   BP: 102/76   Pulse: 90   Temp: 98.4 °F (36.9 °C)   TempSrc: Oral   SpO2: 99%   Weight: 44.4 kg (97 lb 15.9 oz)   Height: 5' 2" (1.575 m)      Physical Exam  Constitutional:       General: She is not in acute distress.     Appearance: Normal appearance. She is obese. She is not ill-appearing, toxic-appearing or diaphoretic.   HENT:      Head: Normocephalic and atraumatic.   Cardiovascular:      Rate and Rhythm: Normal rate and regular rhythm.      Heart sounds: Normal heart sounds. No murmur heard.     No friction rub. No gallop.   Pulmonary:      Effort: No respiratory distress.      Breath sounds: Normal breath sounds. No stridor. No wheezing, rhonchi or rales.   Chest:      Chest wall: No tenderness.   Abdominal:      General: Abdomen is flat. There is no distension.      Palpations: Abdomen is soft. There is no mass.      Tenderness: There is abdominal tenderness (right " upper and lower quadrant). There is right CVA tenderness. There is no left CVA tenderness, guarding or rebound.      Hernia: No hernia is present.   Musculoskeletal:      Right lower leg: No edema.      Left lower leg: No edema.   Skin:     General: Skin is warm and dry.      Capillary Refill: Capillary refill takes less than 2 seconds.   Neurological:      General: No focal deficit present.      Mental Status: She is alert and oriented to person, place, and time. Mental status is at baseline.   Psychiatric:         Mood and Affect: Mood normal.         Behavior: Behavior normal.         Thought Content: Thought content normal.         Judgment: Judgment normal.         1. Complicated UTI (urinary tract infection)  - cefTRIAXone injection 1 g  - amoxicillin-clavulanate 875-125mg (AUGMENTIN) 875-125 mg per tablet; Take 1 tablet by mouth every 12 (twelve) hours. for 7 days  Dispense: 14 tablet; Refill: 0  Will cover for pyelo, discussed if symptoms worsen or do not improve to followup within 72 hours. Increase water intake.     2. Fever, unspecified fever cause  - Urine culture  - COMPREHENSIVE METABOLIC PANEL; Future  - CBC W/ AUTO DIFFERENTIAL; Future    3. Lupus erythematosus, unspecified form  - Ambulatory referral/consult to Rheumatology; Future    4. Abdominal pain, unspecified abdominal location    5. Dysuria  - POCT URINE DIPSTICK WITHOUT MICROSCOPE  - Urinalysis Microscopic       RTC/ER precautions given. F/U if symptoms worsen or do not improve.     Counseled on regular exercise, maintenance of a healthy weight, balanced diet rich in fruits/vegetables and lean protein, and avoidance of unhealthy habits like smoking and excessive alcohol intake.    AD Kenney

## 2024-11-06 ENCOUNTER — TELEPHONE (OUTPATIENT)
Dept: FAMILY MEDICINE | Facility: CLINIC | Age: 44
End: 2024-11-06
Payer: OTHER GOVERNMENT

## 2024-11-06 NOTE — TELEPHONE ENCOUNTER
Rheumatology does not have anything sooner than 06/18/2025. Pt has been placed on cancellation list.

## 2024-11-08 DIAGNOSIS — M32.9 HISTORY OF SYSTEMIC LUPUS ERYTHEMATOSUS: ICD-10-CM

## 2024-11-08 LAB — BACTERIA UR CULT: ABNORMAL

## 2024-11-08 RX ORDER — PREDNISONE 5 MG/1
5 TABLET ORAL DAILY PRN
Qty: 90 TABLET | Refills: 0 | Status: SHIPPED | OUTPATIENT
Start: 2024-11-08

## 2024-11-13 ENCOUNTER — OFFICE VISIT (OUTPATIENT)
Dept: CARDIOLOGY | Facility: CLINIC | Age: 44
End: 2024-11-13
Payer: OTHER GOVERNMENT

## 2024-11-13 VITALS
BODY MASS INDEX: 18.41 KG/M2 | HEART RATE: 69 BPM | SYSTOLIC BLOOD PRESSURE: 93 MMHG | HEIGHT: 62 IN | DIASTOLIC BLOOD PRESSURE: 69 MMHG | WEIGHT: 100.06 LBS

## 2024-11-13 DIAGNOSIS — I95.9 HYPOTENSION, UNSPECIFIED HYPOTENSION TYPE: ICD-10-CM

## 2024-11-13 DIAGNOSIS — R55 VASOVAGAL EPISODE: Primary | ICD-10-CM

## 2024-11-13 DIAGNOSIS — F17.200 TOBACCO USE DISORDER: ICD-10-CM

## 2024-11-13 PROCEDURE — 99999 PR PBB SHADOW E&M-EST. PATIENT-LVL III: CPT | Mod: PBBFAC,,,

## 2024-11-13 PROCEDURE — 99213 OFFICE O/P EST LOW 20 MIN: CPT | Mod: PBBFAC,PO

## 2024-11-13 PROCEDURE — 99214 OFFICE O/P EST MOD 30 MIN: CPT | Mod: S$PBB,,,

## 2024-11-13 NOTE — PROGRESS NOTES
[facility]  Date: 24    Patient: Jaye Martinez, 1980, [id number]  Primary Care Provider: Steve Gonzalez MD     Chief Complaint: Follow up     Subjective:       Jaye Martinez is a 44 y.o. female who presents for follow up. They follow with Dr. Plata.    CBC, CMP, lipid panel stable. At last office visit, patient with history of syncope for which tests ordered. Results below. Also with low BP since COVID infection in 2023 for which hydration recommended.    Since then, patient reports improved symptoms. Believes previous symptoms were secondary to infection. Average SBP 100s at home. She is here to review her test results. Denies chest discomfort, dyspnea, palpitations, dizziness, syncope, orthopnea, PND, edema.    Focused Past History includes:  Vasovagal syncope   Carotid US 2024: normal extracranial carotid artery system bilaterally   72 hr Holter 2024: predominantly sinus rhythm with 0.04% SVE, <0.01% PVC  TTE 2024: LVEF 60%, mild MR, mild TR, PASP 24  Stress EKG 2024: no ischemia   Hypotension  Tobacco abuse   Family history of cardiac disease  Father  in 40s with bypass   Mother with MVP and MI     Current Outpatient Medications   Medication Sig    albuterol (PROAIR HFA) 90 mcg/actuation inhaler Inhale 2 puffs into the lungs every 4 (four) hours as needed for Wheezing.    albuterol-ipratropium (DUO-NEB) 2.5 mg-0.5 mg/3 mL nebulizer solution Take 3 mLs by nebulization every 6 (six) hours as needed for Wheezing. Rescue    butalbitaL-acetaminop-caf-cod -45-30 mg Cap     butalbital-acetaminophen-caffeine -40 mg (FIORICET, ESGIC) -40 mg per tablet Take 1 tablet by mouth 2 (two) times daily as needed.    diclofenac sodium (VOLTAREN) 1 % Gel Apply topically 4 (four) times daily.    EMGALITY  mg/mL PnIj Inject 1 mL into the skin every 30 days.    hydrocodone-acetaminophen 10-325mg (NORCO)  mg Tab Take 1 tablet by mouth  every 6 (six) hours as needed.    predniSONE (DELTASONE) 5 MG tablet Take 1 tablet (5 mg total) by mouth daily as needed (Joint stiffness).    tiZANidine (ZANAFLEX) 4 MG tablet Take 4 mg by mouth every 6 (six) hours as needed.    UBROGEPANT 100 mg tablet Take 100 mg by mouth as needed for Migraine.    mirtazapine (REMERON) 15 MG tablet Take 1 tablet (15 mg total) by mouth every evening.     No current facility-administered medications for this visit.            Objective       Review of Systems  Constitutional: negative for fevers, night sweats, and weight loss  Eyes: negative for visual disturbance, diplopia  Respiratory: negative for cough, hemoptysis, sputum, and wheezing  Cardiovascular: see HPI  Gastrointestinal: negative for abdominal pain, bright red blood per rectum, change in bowel habits, dysphagia, melena, and reflux symptoms  Genitourinary:negative for dysuria, frequency, and hematuria  Hematologic/lymphatic: negative for bleeding, easy bruising, and lymphadenopathy  Musculoskeletal:negative for arthralgias, back pain, and myalgias  Neurological: negative for gait problems, paresthesia, speech problems, vertigo, and weakness  Behavioral/Psych: negative for excessive alcohol consumption, illegal drug usage, and sleep disturbance    -------------------------------------    Abnormal Pap smear    Anxiety    Cancer    pre-cancer cells cervical     Cervical disc herniation    Chronic cough    Closed fracture 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) level with anterior cord syndrome    T 12 fracture compression     COVID    Dilated bile duct    Fractures    Genital herpes    GERD (gastroesophageal reflux disease)    Hemorrhoid    Hepatomegaly    History of colposcopy with cervical biopsy    Lumbar disc herniation    on Hydrocodone    Lupus (systemic lupus erythematosus)    chronic thrombocytopenia    Migraine headache    PONV (postoperative nausea and vomiting)    UTI (urinary tract infection)     ----------------------------     Adenoidectomy    Appendectomy     section, classic    x2 , last 2014    Cholecystectomy    Closed reduction of fracture of nasal bone    Procedure: CLOSED REDUCTION, FRACTURE, NASAL BONE;  Surgeon: Mary Carlton MD;  Location: [facility] OR;  Service: ENT;  Laterality: Bilateral;    Colonoscopy    Procedure: COLONOSCOPY;  Surgeon: Jules De La Cruz MD;  Location: [facility];  Service: Endoscopy;  Laterality: N/A;    Cryotherapy    2002    Cystoscopy    Procedure: CYSTOSCOPY;  Surgeon: Kevin Wright MD;  Location: [facility] OR;  Service: OB/GYN;  Laterality: N/A;    Dilation and curettage of uterus    Endometrial ablation    Endoscopic ultrasound of upper gastrointestinal tract    Procedure: ULTRASOUND, UPPER GI TRACT, ENDOSCOPIC;  Surgeon: Kai Hermosillo III, MD;  Location: [facility];  Service: Endoscopy;  Laterality: N/A;    Ganglion cyst excision    Laparoscopic salpingectomy    Procedure: SALPINGECTOMY, LAPAROSCOPIC;  Surgeon: Kevin Wright MD;  Location: [facility] OR;  Service: OB/GYN;  Laterality: Bilateral;    Laparoscopic total hysterectomy    Procedure: HYSTERECTOMY, TOTAL, LAPAROSCOPIC;  Surgeon: Kevin Wright MD;  Location: [facility] OR;  Service: OB/GYN;  Laterality: N/A;    Tonsillectomy    Tubal ligation    Tympanostomy tube placement    x 7        Family History   Problem Relation Name Age of Onset    Heart disease Father      Diabetes Mother      Cancer Maternal Grandfather          colon    Cancer Paternal Grandfather          colon    Breast cancer Maternal Grandmother      Breast cancer Maternal Aunt      Breast cancer Maternal Aunt      Ovarian cancer Neg Hx       Social History     Tobacco Use    Smoking status: Every Day     Current packs/day: 0.25     Types: Cigarettes    Smokeless tobacco: Never   Substance Use Topics    Alcohol use: Yes     Comment: special occasions    Drug use: Yes     Types: Marijuana     Comment: prescription       Physical Exam  BP 93/69 (BP Location: Left  "arm, Patient Position: Sitting)   Pulse 69   Ht 5' 2" (1.575 m)   Wt 45.4 kg (100 lb 1.4 oz)   LMP  (LMP Unknown)   BMI 18.31 kg/m²   Body surface area is 1.41 meters squared.  Body mass index is 18.31 kg/m².    General appearance: alert, appears stated age, cooperative, and no distress  Head: Normocephalic, without obvious abnormality, atraumatic  Neck: no carotid bruit, no JVD, and supple, symmetrical, trachea midline  Lungs: clear to auscultation bilaterally  Heart: regular rate and rhythm; S1, S2 normal, no murmur, click, rub or gallop  Abdomen: soft, non-tender, no distended  Extremities: extremities atraumatic, no pitting edema  Skin: warm, no cyanosis, no pathologic ecchymosis in exposed portions  Neurologic: Grossly normal. A&O x3      Lab Review   Lab Results   Component Value Date    WBC 7.68 11/05/2024    HGB 13.9 11/05/2024    HCT 42.2 11/05/2024    MCV 99 (H) 11/05/2024     11/05/2024         BMP  Lab Results   Component Value Date     11/05/2024    K 3.6 11/05/2024     11/05/2024    CO2 26 11/05/2024    BUN 9 11/05/2024    CREATININE 0.7 11/05/2024    CALCIUM 9.1 11/05/2024    ANIONGAP 12 11/05/2024    ESTGFRAFRICA >60.0 06/12/2017    EGFRNONAA >60.0 06/12/2017       Lab Results   Component Value Date    ALBUMIN 3.7 11/05/2024       Lab Results   Component Value Date    ALT 9 (L) 11/05/2024    AST 12 11/05/2024    ALKPHOS 102 11/05/2024    BILITOT 0.3 11/05/2024       Lab Results   Component Value Date    TSH 0.360 09/24/2023       Lab Results   Component Value Date    CHOL 160 06/12/2017    CHOL 158 10/20/2015     Lab Results   Component Value Date    HDL 31 (L) 06/12/2017    HDL 26 (L) 10/20/2015     Lab Results   Component Value Date    LDLCALC 114.4 06/12/2017    LDLCALC 92.0 10/20/2015     Lab Results   Component Value Date    TRIG 73 06/12/2017    TRIG 200 (H) 10/20/2015     Lab Results   Component Value Date    CHOLHDL 19.4 (L) 06/12/2017    CHOLHDL 16.5 (L) 10/20/2015 "                Assessment & Plan:     This is a 44 y.o. female with syncope, hypotension, tobacco abuse. Reports improvement in symptoms since last visit.      1. Vasovagal episode (Primary)  - No known recurrence   - Continue to monitor    2. Hypotension  - Average SBP 100s at home  - Maintain adequate hydration     3. Tobacco use disorder  - Counseled on smoking cessation      Emphasized the importance of modifying lifestyle related risk factors including smoking cessation, limiting alcohol intake, exercise, diet most resembling a Mediterranean diet.    Please follow up in 6 months or sooner if needed.      Aide Mitchell PA-C  [facility]  Office: [phone]

## 2024-11-19 ENCOUNTER — PATIENT MESSAGE (OUTPATIENT)
Dept: GASTROENTEROLOGY | Facility: CLINIC | Age: 44
End: 2024-11-19
Payer: OTHER GOVERNMENT

## 2024-11-27 ENCOUNTER — HOSPITAL ENCOUNTER (OUTPATIENT)
Dept: RADIOLOGY | Facility: HOSPITAL | Age: 44
Discharge: HOME | End: 2024-11-27
Attending: FAMILY MEDICINE
Payer: OTHER GOVERNMENT

## 2024-11-27 DIAGNOSIS — Z12.31 ENCOUNTER FOR SCREENING MAMMOGRAM FOR BREAST CANCER: ICD-10-CM

## 2024-12-02 ENCOUNTER — TELEPHONE (OUTPATIENT)
Dept: RADIOLOGY | Facility: HOSPITAL | Age: 44
End: 2024-12-02
Payer: OTHER GOVERNMENT

## 2024-12-02 NOTE — TELEPHONE ENCOUNTER
Patient had an appointment for a mammogram screen. She reported a new left breast lump. Orders were changed to diagnostic breast imaging and she is scheduled on 12-6-24

## 2024-12-06 ENCOUNTER — HOSPITAL ENCOUNTER (OUTPATIENT)
Dept: RADIOLOGY | Facility: HOSPITAL | Age: 44
Discharge: HOME | End: 2024-12-06
Attending: FAMILY MEDICINE
Payer: OTHER GOVERNMENT

## 2024-12-06 DIAGNOSIS — N63.20 LEFT BREAST LUMP: ICD-10-CM

## 2024-12-06 PROCEDURE — 76642 ULTRASOUND BREAST LIMITED: CPT | Mod: TC,PO,LT

## 2024-12-06 PROCEDURE — 76642 ULTRASOUND BREAST LIMITED: CPT | Mod: 26,LT,, | Performed by: RADIOLOGY

## 2024-12-06 PROCEDURE — 77062 BREAST TOMOSYNTHESIS BI: CPT | Mod: TC,PO

## 2024-12-06 PROCEDURE — 77062 BREAST TOMOSYNTHESIS BI: CPT | Mod: 26,,, | Performed by: RADIOLOGY

## 2024-12-06 PROCEDURE — 77066 DX MAMMO INCL CAD BI: CPT | Mod: 26,,, | Performed by: RADIOLOGY

## 2025-02-16 DIAGNOSIS — M32.9 HISTORY OF SYSTEMIC LUPUS ERYTHEMATOSUS: ICD-10-CM

## 2025-02-17 RX ORDER — PREDNISONE 5 MG/1
5 TABLET ORAL DAILY PRN
Qty: 90 TABLET | Refills: 0 | Status: SHIPPED | OUTPATIENT
Start: 2025-02-17

## 2025-03-14 ENCOUNTER — OFFICE VISIT (OUTPATIENT)
Dept: FAMILY MEDICINE | Facility: CLINIC | Age: 45
End: 2025-03-14
Payer: MEDICAID

## 2025-03-14 VITALS
HEART RATE: 86 BPM | BODY MASS INDEX: 19.02 KG/M2 | DIASTOLIC BLOOD PRESSURE: 78 MMHG | HEIGHT: 62 IN | OXYGEN SATURATION: 95 % | SYSTOLIC BLOOD PRESSURE: 110 MMHG | WEIGHT: 103.38 LBS

## 2025-03-14 DIAGNOSIS — Z80.0 FAMILY HISTORY OF COLON CANCER IN MOTHER: ICD-10-CM

## 2025-03-14 DIAGNOSIS — L93.0 LUPUS ERYTHEMATOSUS, UNSPECIFIED FORM: Primary | ICD-10-CM

## 2025-03-14 DIAGNOSIS — G89.29 CHRONIC RIGHT-SIDED LOW BACK PAIN WITH RIGHT-SIDED SCIATICA: ICD-10-CM

## 2025-03-14 DIAGNOSIS — M50.30 DDD (DEGENERATIVE DISC DISEASE), CERVICAL: ICD-10-CM

## 2025-03-14 DIAGNOSIS — M54.41 CHRONIC RIGHT-SIDED LOW BACK PAIN WITH RIGHT-SIDED SCIATICA: ICD-10-CM

## 2025-03-14 DIAGNOSIS — G43.009 MIGRAINE WITHOUT AURA AND WITHOUT STATUS MIGRAINOSUS, NOT INTRACTABLE: ICD-10-CM

## 2025-03-14 PROCEDURE — 99999 PR PBB SHADOW E&M-EST. PATIENT-LVL IV: CPT | Mod: PBBFAC,,, | Performed by: FAMILY MEDICINE

## 2025-03-14 PROCEDURE — 99214 OFFICE O/P EST MOD 30 MIN: CPT | Mod: PBBFAC,PO | Performed by: FAMILY MEDICINE

## 2025-03-14 RX ORDER — HYDROXYZINE PAMOATE 25 MG/1
CAPSULE ORAL
COMMUNITY

## 2025-03-14 RX ORDER — FLUOXETINE HYDROCHLORIDE 20 MG/1
20 CAPSULE ORAL EVERY MORNING
COMMUNITY

## 2025-03-14 NOTE — PROGRESS NOTES
Patient ID: Jaye Martinez is a 44 y.o. female.    Chief Complaint: lupus concerns (Infection concerns, and disability eval)    History of Present Illness    CHIEF COMPLAINT:  Patient presents today to discuss social security disability evaluation.    AUTOIMMUNE DISEASE:  She has systemic lupus erythematosus (SLE) with liver involvement requiring hepatology evaluation. She also reports associated arthritis. She is followed by rheumatology and maintains chronic prednisone therapy.    NEUROLOGIC:  She experiences migraine headaches without aura, currently managed by neurology with Emgality, Ubrelvy, and low dose Fioricet.    MUSCULOSKELETAL:  She has degenerative spine disease affecting cervical and lumbar regions with persistent pain and intermittent sensory radiculopathy. She denies motor weakness. She is on chronic opioid therapy with pain contract in place.    SOCIAL HISTORY:  She denies alcohol use.      ROS:  General: -fever, -chills, -fatigue, -weight gain, -weight loss  Eyes: -vision changes, -redness, -discharge  ENT: -ear pain, -nasal congestion, -sore throat  Cardiovascular: -chest pain, -palpitations, -lower extremity edema  Respiratory: -cough, -shortness of breath  Gastrointestinal: -abdominal pain, -nausea, -vomiting, -diarrhea, -constipation, -blood in stool  Genitourinary: -dysuria, -hematuria, -frequency  Musculoskeletal: +joint pain, -muscle pain  Skin: -rash, -lesion  Neurological: -headache, -dizziness, -numbness, +tingling, +migraines, +decreased sensation in extremities, +nerve pain  Psychiatric: -anxiety, -depression, -sleep difficulty         Physical Exam    General: No acute distress. Well-developed. Well-nourished.  Eyes: EOMI. Sclerae anicteric.  HENT: Normocephalic. Atraumatic. Nares patent. Moist oral mucosa.  Ears: Bilateral TMs clear. Bilateral EACs clear.  Cardiovascular: Regular rate. Regular rhythm. No murmurs. No rubs. No gallops. Normal S1, S2.  Respiratory: Normal respiratory  effort. Clear to auscultation bilaterally. No rales. No rhonchi. No wheezing.  Abdomen: Soft. Non-tender. Non-distended. Normoactive bowel sounds.  Musculoskeletal: No  obvious deformity.  Extremities: No lower extremity edema.  Neurological: Alert & oriented x3. No slurred speech. Normal gait.  Psychiatric: Normal mood. Normal affect. Good insight. Good judgment.  Skin: Warm. Dry. No rash.         Assessment & Plan    IMPRESSION:  - Diagnosed with lupus erythematosus (SLE) affecting liver, being evaluated by hepatology.  - Chronic prednisone use for SLE management.  - Migraine headaches without aura managed with Emgality, Ubrelvy, and low-dose Fioricet.  - Chronic opioid therapy in place, no evidence of opioid use disorder.  - Degenerative spine disease in neck and low back with sensory radiculopathy, no motor weakness.  - Consider re-evaluation of spine condition due to outdated imaging.    SYSTEMIC LUPUS ERYTHEMATOSUS (SLE) WITH LIVER INVOLVEMENT:  - Diagnosed the patient with lupus erythematosus, primarily affecting the liver.  - Referred the patient to hepatology for evaluation of liver involvement in lupus.  - Encouraged the patient to schedule an appointment with a rheumatologist for potential disability evaluation.  - Continued chronic prednisone therapy for SLE management.  - Confirmed diagnosis of lupus erythematosus with associated arthritis.  - Noted that the patient is being followed by rheumatology.    CERVICAL SPONDYLOSIS:  - Diagnosed the patient with degenerative spine disease involving the neck.  - Noted persistent pain without any indication of motor weakness.  - Recommend re-evaluation as the last MRI was performed several years ago.    LUMBAR SPONDYLOSIS:  - Diagnosed the patient with degenerative spine disease involving the low back.  - Noted persistent pain without any indication of motor weakness.  - Recommend re-evaluation as the last MRI was performed several years ago.    CERVICAL AND  LUMBAR RADICULOPATHY:  - Evaluated the patient's predominantly sensory radiculopathy occurring intermittently.  - Recommend re-evaluation as the last MRI was performed several years ago.    MIGRAINE WITHOUT AURA:  - Diagnosed the patient with migraine headaches without aura.  - Noted that the patient is being followed by a neurologist.  - Prescribed Emgality, Ubrelvy, and low-dose Fioricet for migraine management.    CHRONIC PAIN MANAGEMENT:  - Implemented a pain contract with the neurologist.  - Continued chronic opioid analgesic therapy without evidence of opioid use disorder.    LONG-TERM MEDICATION USE:  - Continued chronic prednisone therapy for SLE management.            Follow up in about 1 year (around 3/14/2026).    This note was generated with the assistance of ambient listening technology. Verbal consent was obtained by the patient and accompanying visitor(s) for the recording of patient appointment to facilitate this note. I attest to having reviewed and edited the generated note for accuracy, though some syntax or spelling errors may persist. Please contact the author of this note for any clarification.

## 2025-03-19 ENCOUNTER — TELEPHONE (OUTPATIENT)
Dept: GASTROENTEROLOGY | Facility: CLINIC | Age: 45
End: 2025-03-19
Payer: MEDICAID

## 2025-04-16 ENCOUNTER — HOSPITAL ENCOUNTER (OUTPATIENT)
Facility: HOSPITAL | Age: 45
Discharge: HOME | End: 2025-04-16
Attending: INTERNAL MEDICINE | Admitting: INTERNAL MEDICINE
Payer: OTHER GOVERNMENT

## 2025-04-16 ENCOUNTER — ANESTHESIA (OUTPATIENT)
Dept: ENDOSCOPY | Facility: HOSPITAL | Age: 45
End: 2025-04-16
Payer: OTHER GOVERNMENT

## 2025-04-16 ENCOUNTER — ANESTHESIA EVENT (OUTPATIENT)
Dept: ENDOSCOPY | Facility: HOSPITAL | Age: 45
End: 2025-04-16
Payer: OTHER GOVERNMENT

## 2025-04-16 DIAGNOSIS — Z86.0100 HX OF COLONIC POLYPS: ICD-10-CM

## 2025-04-16 DIAGNOSIS — Z86.0100 HISTORY OF COLON POLYPS: ICD-10-CM

## 2025-04-16 DIAGNOSIS — Z13.9 ENCOUNTER FOR HEALTH-RELATED SCREENING: ICD-10-CM

## 2025-04-16 DIAGNOSIS — Z13.9 SCREENING: ICD-10-CM

## 2025-04-16 PROCEDURE — 63600175 PHARM REV CODE 636 W HCPCS: Mod: PO | Performed by: INTERNAL MEDICINE

## 2025-04-16 PROCEDURE — 88305 TISSUE EXAM BY PATHOLOGIST: CPT | Mod: TC | Performed by: INTERNAL MEDICINE

## 2025-04-16 PROCEDURE — 27202363 HC INJECTION AGENT, SUBMUCOSAL, ANY: Mod: PO | Performed by: INTERNAL MEDICINE

## 2025-04-16 PROCEDURE — 63600175 PHARM REV CODE 636 W HCPCS: Mod: PO | Performed by: NURSE ANESTHETIST, CERTIFIED REGISTERED

## 2025-04-16 PROCEDURE — 45385 COLONOSCOPY W/LESION REMOVAL: CPT | Mod: ,,, | Performed by: INTERNAL MEDICINE

## 2025-04-16 PROCEDURE — 43236 UPPR GI SCOPE W/SUBMUC INJ: CPT | Mod: PO | Performed by: INTERNAL MEDICINE

## 2025-04-16 PROCEDURE — 27201028 HC NEEDLE, SCLERO: Mod: PO | Performed by: INTERNAL MEDICINE

## 2025-04-16 PROCEDURE — 27201089 HC SNARE, DISP (ANY): Mod: PO | Performed by: INTERNAL MEDICINE

## 2025-04-16 PROCEDURE — 45385 COLONOSCOPY W/LESION REMOVAL: CPT | Mod: PO | Performed by: INTERNAL MEDICINE

## 2025-04-16 PROCEDURE — 37000008 HC ANESTHESIA 1ST 15 MINUTES: Mod: PO | Performed by: INTERNAL MEDICINE

## 2025-04-16 PROCEDURE — 37000009 HC ANESTHESIA EA ADD 15 MINS: Mod: PO | Performed by: INTERNAL MEDICINE

## 2025-04-16 PROCEDURE — 45381 COLONOSCOPY SUBMUCOUS NJX: CPT | Mod: 51,,, | Performed by: INTERNAL MEDICINE

## 2025-04-16 RX ORDER — SODIUM CHLORIDE 0.9 % (FLUSH) 0.9 %
10 SYRINGE (ML) INJECTION
Status: DISCONTINUED | OUTPATIENT
Start: 2025-04-16 | End: 2025-04-16 | Stop reason: HOSPADM

## 2025-04-16 RX ORDER — ACETAMINOPHEN 325 MG/1
325 TABLET ORAL EVERY 6 HOURS PRN
COMMUNITY

## 2025-04-16 RX ORDER — SODIUM CHLORIDE, SODIUM LACTATE, POTASSIUM CHLORIDE, CALCIUM CHLORIDE 600; 310; 30; 20 MG/100ML; MG/100ML; MG/100ML; MG/100ML
INJECTION, SOLUTION INTRAVENOUS CONTINUOUS
Status: DISCONTINUED | OUTPATIENT
Start: 2025-04-16 | End: 2025-04-16 | Stop reason: HOSPADM

## 2025-04-16 RX ORDER — LIDOCAINE HYDROCHLORIDE 20 MG/ML
INJECTION INTRAVENOUS
Status: DISCONTINUED | OUTPATIENT
Start: 2025-04-16 | End: 2025-04-16

## 2025-04-16 RX ORDER — PROPOFOL 10 MG/ML
VIAL (ML) INTRAVENOUS
Status: DISCONTINUED | OUTPATIENT
Start: 2025-04-16 | End: 2025-04-16

## 2025-04-16 RX ADMIN — LIDOCAINE HYDROCHLORIDE 100 MG: 20 INJECTION INTRAVENOUS at 07:04

## 2025-04-16 RX ADMIN — PROPOFOL 50 MG: 10 INJECTION, EMULSION INTRAVENOUS at 08:04

## 2025-04-16 RX ADMIN — SODIUM CHLORIDE, POTASSIUM CHLORIDE, SODIUM LACTATE AND CALCIUM CHLORIDE: 600; 310; 30; 20 INJECTION, SOLUTION INTRAVENOUS at 07:04

## 2025-04-16 RX ADMIN — PROPOFOL 100 MG: 10 INJECTION, EMULSION INTRAVENOUS at 07:04

## 2025-04-16 RX ADMIN — PROPOFOL 50 MG: 10 INJECTION, EMULSION INTRAVENOUS at 07:04

## 2025-04-16 NOTE — TRANSFER OF CARE
"Anesthesia Transfer of Care Note    Patient: Jaye Martinez    Procedure(s) Performed: Procedure(s) (LRB):  COLONOSCOPY, SCREENING, HIGH RISK PATIENT (N/A)    Patient location: PACU    Anesthesia Type: general    Transport from OR: Transported from OR on room air with adequate spontaneous ventilation    Post pain: adequate analgesia    Post assessment: no apparent anesthetic complications and tolerated procedure well    Post vital signs: stable    Level of consciousness: sedated    Nausea/Vomiting: no nausea/vomiting    Complications: none    Transfer of care protocol was followed      Last vitals: Visit Vitals  BP 92/61   Pulse 81   Temp 36.5 °C (97.7 °F)   Resp 18   Ht 5' 2" (1.575 m)   Wt 46.7 kg (103 lb)   LMP  (LMP Unknown)   SpO2 95%   Breastfeeding No   BMI 18.84 kg/m²     "

## 2025-04-16 NOTE — ANESTHESIA POSTPROCEDURE EVALUATION
Anesthesia Post Evaluation    Patient: Jaye Martinez    Procedure(s) Performed: Procedure(s) (LRB):  COLONOSCOPY, SCREENING, HIGH RISK PATIENT (N/A)    Final Anesthesia Type: general      Patient location during evaluation: PACU  Patient participation: Yes- Able to Participate  Level of consciousness: awake  Post-procedure vital signs: reviewed and stable  Pain management: adequate  Airway patency: patent    PONV status at discharge: No PONV  Anesthetic complications: no      Cardiovascular status: blood pressure returned to baseline  Respiratory status: unassisted  Hydration status: euvolemic  Follow-up not needed.              Vitals Value Taken Time   /62 04/16/25 08:45   Temp 36.4 °C (97.5 °F) 04/16/25 08:15   Pulse 72 04/16/25 08:45   Resp 16 04/16/25 08:45   SpO2 98 % 04/16/25 08:45         Event Time   Out of Recovery 08:50:00         Pain/Shiva Score: Shiva Score: 10 (4/16/2025  8:45 AM)

## 2025-04-16 NOTE — ANESTHESIA PREPROCEDURE EVALUATION
04/16/2025  Jaye Martinez is a 44 y.o., female.      Pre-op Assessment    I have reviewed the Patient Summary Reports.    I have reviewed the NPO Status.   I have reviewed the Medications.     Review of Systems  Anesthesia Hx:  No problems with previous Anesthesia                Cardiovascular:  Cardiovascular Normal                                              Pulmonary:  Pulmonary Normal                       Hepatic/GI:     GERD Liver Disease,        Gerd       Liver Disease        Musculoskeletal:         Spine Disorders: lumbar and cervical            Neurological:      Headaches      Dx of Headaches                           Psych:  Psychiatric History anxiety depression                Physical Exam  General: Well nourished    Airway:  Mallampati: II   Mouth Opening: Normal  TM Distance: Normal  Neck ROM: Normal ROM        Anesthesia Plan  Type of Anesthesia, risks & benefits discussed:    Anesthesia Type: Gen Natural Airway  Intra-op Monitoring Plan: Standard ASA Monitors  Induction:  IV  Informed Consent: Informed consent signed with the Patient and all parties understand the risks and agree with anesthesia plan.  All questions answered.   ASA Score: 2    Ready For Surgery From Anesthesia Perspective.     .

## 2025-04-16 NOTE — H&P
History & Physical - Short Stay  Gastroenterology      SUBJECTIVE:     Procedure: Colonoscopy    Chief Complaint/Indication for Procedure: Previous Polyps    PTA Medications   Medication Sig    acetaminophen (TYLENOL) 325 MG tablet Take 325 mg by mouth every 6 (six) hours as needed for Pain.    albuterol (PROAIR HFA) 90 mcg/actuation inhaler Inhale 2 puffs into the lungs every 4 (four) hours as needed for Wheezing.    albuterol-ipratropium (DUO-NEB) 2.5 mg-0.5 mg/3 mL nebulizer solution Take 3 mLs by nebulization every 6 (six) hours as needed for Wheezing. Rescue    butalbitaL-acetaminop-caf-cod -54-30 mg Cap     butalbital-acetaminophen-caffeine -40 mg (FIORICET, ESGIC) -40 mg per tablet Take 1 tablet by mouth 2 (two) times daily as needed.    diclofenac sodium (VOLTAREN) 1 % Gel Apply topically 4 (four) times daily.    EMGALITY  mg/mL PnIj Inject 1 mL into the skin every 30 days.    FLUoxetine 20 MG capsule Take 20 mg by mouth every morning.    hydrocodone-acetaminophen 10-325mg (NORCO)  mg Tab Take 1 tablet by mouth every 6 (six) hours as needed.    hydrOXYzine pamoate (VISTARIL) 25 MG Cap Take by mouth.    predniSONE (DELTASONE) 5 MG tablet TAKE 1 TABLET (5 MG TOTAL) BY MOUTH DAILY AS NEEDED (JOINT STIFFNESS).    tiZANidine (ZANAFLEX) 4 MG tablet Take 4 mg by mouth every 6 (six) hours as needed.    UBROGEPANT 100 mg tablet Take 100 mg by mouth as needed for Migraine.       Review of patient's allergies indicates:   Allergen Reactions    Cyclobenzaprine Rash and Other (See Comments)     Leg cramps    Pregabalin Itching, Other (See Comments) and Hives     Bruising and headaches    Sulfa (sulfonamide antibiotics) Nausea And Vomiting    Amoxicillin trihydrate Rash    Morphine Rash and Hives    Potassium clavulanate Rash    Sulfamethoxazole-trimethoprim Rash and Nausea And Vomiting     And headache    Toradol [ketorolac] Rash        Past Medical History:   Diagnosis Date    Abnormal Pap  smear     Anxiety     Cancer     pre-cancer cells cervical     Cervical disc herniation     Chronic cough     Closed fracture 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) level with anterior cord syndrome     T 12 fracture compression     COVID     Dilated bile duct     Fractures     Genital herpes     GERD (gastroesophageal reflux disease)     Hemorrhoid     Hepatomegaly     History of colposcopy with cervical biopsy     Lumbar disc herniation     on Hydrocodone    Lupus (systemic lupus erythematosus)     chronic thrombocytopenia    Migraine headache     PONV (postoperative nausea and vomiting)     UTI (urinary tract infection)      Past Surgical History:   Procedure Laterality Date    ADENOIDECTOMY      APPENDECTOMY       SECTION, CLASSIC      x2 , last 2014    CHOLECYSTECTOMY      CLOSED REDUCTION OF FRACTURE OF NASAL BONE Bilateral 2022    Procedure: CLOSED REDUCTION, FRACTURE, NASAL BONE;  Surgeon: Mary Carlton MD;  Location: [facility] OR;  Service: ENT;  Laterality: Bilateral;    COLONOSCOPY N/A 10/29/2019    Procedure: COLONOSCOPY;  Surgeon: Jules De La Cruz MD;  Location: [facility] ENDO;  Service: Endoscopy;  Laterality: N/A;    CRYOTHERAPY      2002    CYSTOSCOPY N/A 2023    Procedure: CYSTOSCOPY;  Surgeon: Kevin Wright MD;  Location: [facility] OR;  Service: OB/GYN;  Laterality: N/A;    DILATION AND CURETTAGE OF UTERUS  2014    ENDOMETRIAL ABLATION      ENDOSCOPIC ULTRASOUND OF UPPER GASTROINTESTINAL TRACT N/A 2023    Procedure: ULTRASOUND, UPPER GI TRACT, ENDOSCOPIC;  Surgeon: Kai Hermosillo III, MD;  Location: [facility] ENDO;  Service: Endoscopy;  Laterality: N/A;    GANGLION CYST EXCISION Left     HYSTERECTOMY      LAPAROSCOPIC SALPINGECTOMY Bilateral 2023    Procedure: SALPINGECTOMY, LAPAROSCOPIC;  Surgeon: Kevin Wright MD;  Location: [facility] OR;  Service: OB/GYN;  Laterality: Bilateral;    LAPAROSCOPIC TOTAL HYSTERECTOMY N/A 2023    Procedure: HYSTERECTOMY, TOTAL, LAPAROSCOPIC;   Surgeon: Kevin Wright MD;  Location: [facility];  Service: OB/GYN;  Laterality: N/A;    TONSILLECTOMY      TUBAL LIGATION  04/2014    TYMPANOSTOMY TUBE PLACEMENT      x 7     Family History   Problem Relation Name Age of Onset    Heart disease Father      Diabetes Mother      Cancer Maternal Grandfather          colon    Cancer Paternal Grandfather          colon    Breast cancer Maternal Grandmother      Breast cancer Maternal Aunt      Breast cancer Maternal Aunt      Ovarian cancer Neg Hx       Social History[1]      OBJECTIVE:     Vital Signs (Most Recent)  Temp: 97.7 °F (36.5 °C) (04/16/25 0714)  Pulse: 81 (04/16/25 0720)  Resp: 18 (04/16/25 0720)  BP: 92/61 (04/16/25 0720)  SpO2: 95 % (04/16/25 0720)    Physical Exam:                                                       GENERAL:  Comfortable, in no acute distress.                                 HEENT EXAM:  Nonicteric.  No adenopathy.  Oropharynx is clear.               NECK:  Supple.                                                               LUNGS:  Clear.                                                               CARDIAC:  Regular rate and rhythm.  S1, S2.  No murmur.                      ABDOMEN:  Soft, positive bowel sounds, nontender.  No hepatosplenomegaly or masses.  No rebound or guarding.                                             EXTREMITIES:  No edema.     MENTAL STATUS:  Normal, alert and oriented.      ASSESSMENT/PLAN:     Assessment: Previous Polyps    Plan: Colonoscopy    Anesthesia Plan: General    ASA Grade: ASA 2 - Patient with mild systemic disease with no functional limitations    MALLAMPATI SCORE:  I (soft palate, uvula, fauces, and tonsillar pillars visible)           [1]   Social History  Tobacco Use    Smoking status: Every Day     Current packs/day: 0.25     Types: Cigarettes    Smokeless tobacco: Never   Substance Use Topics    Alcohol use: Yes     Comment: special occasions    Drug use: Yes     Types: Marijuana      Comment: prescription

## 2025-04-16 NOTE — PROVATION PATIENT INSTRUCTIONS
Discharge Summary/Instructions after an Endoscopic Procedure  Patient Name: Jaye Martinez  Patient MRN: [id number]  Patient YOB: 1980 Wednesday, April 16, 2025  Jules De La Cruz MD  Dear patient,  As a result of recent federal legislation (The Federal Cures Act), you may   receive lab or pathology results from your procedure in your [facility]   account before your physician is able to contact you. Your physician or   their representative will relay the results to you with their   recommendations at their soonest availability.  Thank you,  RESTRICTIONS:  During your procedure today, you received medications for sedation.  These   medications may affect your judgment, balance and coordination.  Therefore,   for 24 hours, you have the following restrictions:   - DO NOT drive a car, operate machinery, make legal/financial decisions,   sign important papers or drink alcohol.    ACTIVITY:  Today: no heavy lifting, straining or running due to procedural   sedation/anesthesia.  The following day: return to full activity including work.  DIET:  Eat and drink normally unless instructed otherwise.     TREATMENT FOR COMMON SIDE EFFECTS:  - Mild abdominal pain, nausea, belching, bloating or excessive gas:  rest,   eat lightly and use a heating pad.  - Sore Throat: treat with throat lozenges and/or gargle with warm salt   water.  - Because air was used during the procedure, expelling large amounts of air   from your rectum or belching is normal.  - If a bowel prep was taken, you may not have a bowel movement for 1-3 days.    This is normal.  SYMPTOMS TO WATCH FOR AND REPORT TO YOUR PHYSICIAN:  1. Abdominal pain or bloating, other than gas cramps.  2. Chest pain.  3. Back pain.  4. Signs of infection such as: chills or fever occurring within 24 hours   after the procedure.  5. Rectal bleeding, which would show as bright red, maroon, or black stools.   (A tablespoon of blood from the rectum is not serious, especially  if   hemorrhoids are present.)  6. Vomiting.  7. Weakness or dizziness.  GO DIRECTLY TO THE NEAREST EMERGENCY ROOM IF YOU HAVE ANY OF THE FOLLOWING:      Difficulty breathing              Chills and/or fever over 101 F   Persistent vomiting and/or vomiting blood   Severe abdominal pain   Severe chest pain   Black, tarry stools   Bleeding- more than one tablespoon   Any other symptom or condition that you feel may need urgent attention  Your doctor recommends these additional instructions:  If any biopsies were taken, your doctors clinic will contact you in 1 to 2   weeks with any results.  We are waiting for your pathology results.   Your physician has recommended a repeat colonoscopy in five years for   surveillance based on pathology results.   You are being discharged to home.  For questions, problems or results please call your physician - Jules De La Cruz MD at Work:  [phone].  EMERGENCY PHONE NUMBER: [phone], LAB RESULTS: [phone]  IF A COMPLICATION OR EMERGENCY SITUATION ARISES AND YOU ARE UNABLE TO REACH   YOUR PHYSICIAN - GO DIRECTLY TO THE EMERGENCY ROOM.  ___________________________________________  Nurse Signature  ___________________________________________  Patient/Designated Responsible Party Signature  Jules De La Cruz MD  4/16/2025 8:16:53 AM  This report has been verified and signed electronically.  Dear patient,  As a result of recent federal legislation (The Federal Cures Act), you may   receive lab or pathology results from your procedure in your [facility]   account before your physician is able to contact you. Your physician or   their representative will relay the results to you with their   recommendations at their soonest availability.  Thank you.  PROVATION

## 2025-04-16 NOTE — DISCHARGE SUMMARY
[facility] - Endoscopy  Discharge Note  Short Stay  Discharge Note  Short Stay      SUMMARY     Admit Date: 4/16/2025    Attending Physician: Jules De La Cruz MD     Discharge Physician: Jules De La Cruz MD    Discharge Date: 4/16/2025 8:17 AM    Final Diagnosis: Screening [Z13.9]    Disposition: HOME OR SELF CARE    Patient Instructions:   Current Discharge Medication List        CONTINUE these medications which have NOT CHANGED    Details   acetaminophen (TYLENOL) 325 MG tablet Take 325 mg by mouth every 6 (six) hours as needed for Pain.      albuterol (PROAIR HFA) 90 mcg/actuation inhaler Inhale 2 puffs into the lungs every 4 (four) hours as needed for Wheezing.  Qty: 18 g, Refills: 3    Associated Diagnoses: Other pneumonia, unspecified organism      albuterol-ipratropium (DUO-NEB) 2.5 mg-0.5 mg/3 mL nebulizer solution Take 3 mLs by nebulization every 6 (six) hours as needed for Wheezing. Rescue  Qty: 75 mL, Refills: 5    Associated Diagnoses: Other pneumonia, unspecified organism      butalbitaL-acetaminop-caf-cod -20-30 mg Cap       butalbital-acetaminophen-caffeine -40 mg (FIORICET, ESGIC) -40 mg per tablet Take 1 tablet by mouth 2 (two) times daily as needed.  Qty: 30 tablet, Refills: 0    Associated Diagnoses: Migraine      diclofenac sodium (VOLTAREN) 1 % Gel Apply topically 4 (four) times daily.      EMGALITY  mg/mL PnIj Inject 1 mL into the skin every 30 days.      FLUoxetine 20 MG capsule Take 20 mg by mouth every morning.      hydrocodone-acetaminophen 10-325mg (NORCO)  mg Tab Take 1 tablet by mouth every 6 (six) hours as needed.  Qty: 120 tablet, Refills: 0      hydrOXYzine pamoate (VISTARIL) 25 MG Cap Take by mouth.      predniSONE (DELTASONE) 5 MG tablet TAKE 1 TABLET (5 MG TOTAL) BY MOUTH DAILY AS NEEDED (JOINT STIFFNESS).  Qty: 90 tablet, Refills: 0    Associated Diagnoses: History of systemic lupus erythematosus      tiZANidine (ZANAFLEX) 4 MG tablet Take 4  mg by mouth every 6 (six) hours as needed.      UBROGEPANT 100 mg tablet Take 100 mg by mouth as needed for Migraine.             Discharge Procedure Orders (must include Diet, Follow-up, Activity)    Follow Up:  Follow up with PCP as previously scheduled  Resume routine diet.  Activity as tolerated.    No driving day of procedure.

## 2025-04-17 VITALS
DIASTOLIC BLOOD PRESSURE: 62 MMHG | BODY MASS INDEX: 18.95 KG/M2 | HEART RATE: 72 BPM | RESPIRATION RATE: 16 BRPM | HEIGHT: 62 IN | WEIGHT: 103 LBS | SYSTOLIC BLOOD PRESSURE: 105 MMHG | TEMPERATURE: 98 F | OXYGEN SATURATION: 98 %

## 2025-04-21 LAB
ESTROGEN SERPL-MCNC: NORMAL PG/ML
INSULIN SERPL-ACNC: NORMAL U[IU]/ML
LAB AP CLINICAL INFORMATION: NORMAL
LAB AP GROSS DESCRIPTION: NORMAL
LAB AP PERFORMING LOCATION(S): NORMAL
LAB AP REPORT FOOTNOTES: NORMAL

## 2025-05-29 DIAGNOSIS — M32.9 HISTORY OF SYSTEMIC LUPUS ERYTHEMATOSUS: ICD-10-CM

## 2025-05-29 RX ORDER — PREDNISONE 5 MG/1
5 TABLET ORAL DAILY PRN
Qty: 90 TABLET | Refills: 0 | Status: SHIPPED | OUTPATIENT
Start: 2025-05-29

## 2025-06-12 ENCOUNTER — LAB VISIT (OUTPATIENT)
Dept: LAB | Facility: HOSPITAL | Age: 45
End: 2025-06-12
Attending: SPECIALIST
Payer: MEDICAID

## 2025-06-12 ENCOUNTER — OFFICE VISIT (OUTPATIENT)
Dept: OBSTETRICS AND GYNECOLOGY | Facility: CLINIC | Age: 45
End: 2025-06-12
Payer: MEDICAID

## 2025-06-12 VITALS — BODY MASS INDEX: 18.55 KG/M2 | SYSTOLIC BLOOD PRESSURE: 92 MMHG | DIASTOLIC BLOOD PRESSURE: 64 MMHG | WEIGHT: 101.44 LBS

## 2025-06-12 DIAGNOSIS — Z11.3 SCREEN FOR STD (SEXUALLY TRANSMITTED DISEASE): ICD-10-CM

## 2025-06-12 DIAGNOSIS — Z11.3 SCREEN FOR STD (SEXUALLY TRANSMITTED DISEASE): Primary | ICD-10-CM

## 2025-06-12 LAB
HAV IGM SERPL QL IA: NORMAL
HBV CORE IGM SERPL QL IA: NORMAL
HBV SURFACE AG SERPL QL IA: NORMAL
HCV AB SERPL QL IA: NORMAL
HIV 1+2 AB+HIV1 P24 AG SERPL QL IA: NORMAL
T PALLIDUM IGG+IGM SER QL: NORMAL

## 2025-06-12 PROCEDURE — 3078F DIAST BP <80 MM HG: CPT | Mod: CPTII,,, | Performed by: SPECIALIST

## 2025-06-12 PROCEDURE — 36415 COLL VENOUS BLD VENIPUNCTURE: CPT | Mod: PN

## 2025-06-12 PROCEDURE — 1159F MED LIST DOCD IN RCRD: CPT | Mod: CPTII,,, | Performed by: SPECIALIST

## 2025-06-12 PROCEDURE — 99213 OFFICE O/P EST LOW 20 MIN: CPT | Mod: S$PBB,,, | Performed by: SPECIALIST

## 2025-06-12 PROCEDURE — 86593 SYPHILIS TEST NON-TREP QUANT: CPT

## 2025-06-12 PROCEDURE — 87389 HIV-1 AG W/HIV-1&-2 AB AG IA: CPT

## 2025-06-12 PROCEDURE — 80074 ACUTE HEPATITIS PANEL: CPT

## 2025-06-12 PROCEDURE — 99213 OFFICE O/P EST LOW 20 MIN: CPT | Mod: PBBFAC,PN | Performed by: SPECIALIST

## 2025-06-12 PROCEDURE — 86695 HERPES SIMPLEX TYPE 1 TEST: CPT

## 2025-06-12 PROCEDURE — 3074F SYST BP LT 130 MM HG: CPT | Mod: CPTII,,, | Performed by: SPECIALIST

## 2025-06-12 PROCEDURE — 3008F BODY MASS INDEX DOCD: CPT | Mod: CPTII,,, | Performed by: SPECIALIST

## 2025-06-12 PROCEDURE — 99999 PR PBB SHADOW E&M-EST. PATIENT-LVL III: CPT | Mod: PBBFAC,,, | Performed by: SPECIALIST

## 2025-06-12 NOTE — PROGRESS NOTES
45 yo WF, history hyst presents for desired STD screening Denies high risk behavior and denies PP, d/c, dysuria   Past Medical History:   Diagnosis Date    Abnormal Pap smear     Anxiety     Cancer     pre-cancer cells cervical     Cervical disc herniation     Chronic cough     Closed fracture 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) level with anterior cord syndrome     T 12 fracture compression     COVID     Dilated bile duct     Fractures     Genital herpes     GERD (gastroesophageal reflux disease)     Hemorrhoid     Hepatomegaly     History of colposcopy with cervical biopsy     Lumbar disc herniation     on Hydrocodone    Lupus (systemic lupus erythematosus)     chronic thrombocytopenia    Migraine headache     PONV (postoperative nausea and vomiting)     UTI (urinary tract infection)        Past Surgical History:   Procedure Laterality Date    ADENOIDECTOMY      APPENDECTOMY       SECTION, CLASSIC      x2 , last 2014    CHOLECYSTECTOMY      CLOSED REDUCTION OF FRACTURE OF NASAL BONE Bilateral 2022    Procedure: CLOSED REDUCTION, FRACTURE, NASAL BONE;  Surgeon: Mary Carlton MD;  Location: [facility] OR;  Service: ENT;  Laterality: Bilateral;    COLONOSCOPY N/A 10/29/2019    Procedure: COLONOSCOPY;  Surgeon: Jules De La Cruz MD;  Location: [facility];  Service: Endoscopy;  Laterality: N/A;    COLONOSCOPY, SCREENING, HIGH RISK PATIENT N/A 2025    Procedure: COLONOSCOPY, SCREENING, HIGH RISK PATIENT;  Surgeon: Jules De La Cruz MD;  Location: [facility];  Service: Endoscopy;  Laterality: N/A;    CRYOTHERAPY      2002    CYSTOSCOPY N/A 2023    Procedure: CYSTOSCOPY;  Surgeon: Kevin Wright MD;  Location: [facility] OR;  Service: OB/GYN;  Laterality: N/A;    DILATION AND CURETTAGE OF UTERUS  2014    ENDOMETRIAL ABLATION      ENDOSCOPIC ULTRASOUND OF UPPER GASTROINTESTINAL TRACT N/A 2023    Procedure: ULTRASOUND, UPPER GI TRACT, ENDOSCOPIC;  Surgeon: Kai Hermosillo III, MD;  Location: [facility];   Service: Endoscopy;  Laterality: N/A;    GANGLION CYST EXCISION Left     HYSTERECTOMY      LAPAROSCOPIC SALPINGECTOMY Bilateral 09/27/2023    Procedure: SALPINGECTOMY, LAPAROSCOPIC;  Surgeon: Kevin Wright MD;  Location: [facility] OR;  Service: OB/GYN;  Laterality: Bilateral;    LAPAROSCOPIC TOTAL HYSTERECTOMY N/A 09/27/2023    Procedure: HYSTERECTOMY, TOTAL, LAPAROSCOPIC;  Surgeon: Kevin Wright MD;  Location: [facility] OR;  Service: OB/GYN;  Laterality: N/A;    TONSILLECTOMY      TUBAL LIGATION  04/2014    TYMPANOSTOMY TUBE PLACEMENT      x 7       Family History   Problem Relation Name Age of Onset    Heart disease Father      Diabetes Mother      Cancer Maternal Grandfather          colon    Cancer Paternal Grandfather          colon    Breast cancer Maternal Grandmother      Breast cancer Maternal Aunt      Breast cancer Maternal Aunt      Ovarian cancer Neg Hx         Social History     Socioeconomic History    Marital status:    Tobacco Use    Smoking status: Every Day     Current packs/day: 0.25     Types: Cigarettes    Smokeless tobacco: Never   Substance and Sexual Activity    Alcohol use: Not Currently     Comment: special occasions    Drug use: Not Currently     Types: Marijuana     Comment: prescription    Sexual activity: Yes     Partners: Male     Birth control/protection: See Surgical Hx     Social Drivers of Health     Financial Resource Strain: High Risk (4/23/2024)    Overall Financial Resource Strain (CARDIA)     Difficulty of Paying Living Expenses: Very hard   Food Insecurity: No Food Insecurity (4/23/2024)    Hunger Vital Sign     Worried About Running Out of Food in the Last Year: Never true     Ran Out of Food in the Last Year: Never true   Transportation Needs: No Transportation Needs (4/23/2024)    PRAPARE - Transportation     Lack of Transportation (Medical): No     Lack of Transportation (Non-Medical): No   Physical Activity: Sufficiently Active (4/23/2024)    Exercise Vital Sign      Days of Exercise per Week: 7 days     Minutes of Exercise per Session: 30 min   Stress: No Stress Concern Present (4/23/2024)    [employer] [place] [employer] - Occupational Stress Questionnaire     Feeling of Stress : Only a little   Housing Stability: High Risk (4/23/2024)    Housing Stability Vital Sign     Unable to Pay for Housing in the Last Year: Yes       Current Medications[1]    Review of patient's allergies indicates:   Allergen Reactions    Cyclobenzaprine Rash and Other (See Comments)     Leg cramps    Pregabalin Itching, Other (See Comments) and Hives     Bruising and headaches    Sulfa (sulfonamide antibiotics) Nausea And Vomiting    Amoxicillin trihydrate Rash    Morphine Rash and Hives    Potassium clavulanate Rash    Sulfamethoxazole-trimethoprim Rash and Nausea And Vomiting     And headache    Toradol [ketorolac] Rash       Review of System:   General: no chills, fever, night sweats, weight gain or weight loss  Psychological: no depression or suicidal ideation  Breasts: no new or changing breast lumps, nipple discharge or masses.  Respiratory: no cough, shortness of breath, or wheezing  Cardiovascular: no chest pain or dyspnea on exertion  Gastrointestinal: no abdominal pain, change in bowel habits, or black or bloody stools  Genito-Urinary: no incontinence, urinary frequency/urgency or vulvar/vaginal symptoms, pelvic pain or abnormal vaginal bleeding.  Musculoskeletal: no gait disturbance or muscular weakness     PE:   APPEARANCE: Well nourished, well developed, in no acute distress.  NECK: Neck symmetric without masses or thyromegaly.  NODES: No inguinal lymph node enlargement.  ABDOMEN: Soft. No tenderness or masses. No hepatosplenomegaly. No hernias.  BREASTS: deferred  PELVIC: Normal external female genitalia without lesions. Normal hair distribution. Adequate perineal body, normal urethral meatus. Vagina moist and well rugated without lesions or discharge. No significant  cystocele or rectocele. Uterus and cervix surgically absent. Bimanual exam revealed no masses, tenderness or abnormality.    NOTE  NURSING PERSONAL PRESENT FOR ENTIRE PHYSICAL EXAM     Vaginal cultures submitted  Will obtain STD panel and follow    I spent a total of 30 minutes on the day of the visit. This includes face to face time and non-face to face time preparing to see the patient (eg, review of tests), obtaining and/or reviewing separately obtained history, documenting clinical information in the electronic or other health record, independently interpreting results and communicating results to the patient/family/caregiver, or care coordinator.          [1]   Current Outpatient Medications   Medication Sig Dispense Refill    acetaminophen (TYLENOL) 325 MG tablet Take 325 mg by mouth every 6 (six) hours as needed for Pain.      butalbitaL-acetaminop-caf-cod -01-30 mg Cap       butalbital-acetaminophen-caffeine -40 mg (FIORICET, ESGIC) -40 mg per tablet Take 1 tablet by mouth 2 (two) times daily as needed. 30 tablet 0    diclofenac sodium (VOLTAREN) 1 % Gel Apply topically 4 (four) times daily.      EMGALITY  mg/mL PnIj Inject 1 mL into the skin every 30 days.      FLUoxetine 20 MG capsule Take 20 mg by mouth every morning.      hydrocodone-acetaminophen 10-325mg (NORCO)  mg Tab Take 1 tablet by mouth every 6 (six) hours as needed. 120 tablet 0    hydrOXYzine pamoate (VISTARIL) 25 MG Cap Take by mouth.      predniSONE (DELTASONE) 5 MG tablet TAKE 1 TABLET (5 MG TOTAL) BY MOUTH DAILY AS NEEDED (JOINT STIFFNESS). 90 tablet 0    tiZANidine (ZANAFLEX) 4 MG tablet Take 4 mg by mouth every 6 (six) hours as needed.      UBROGEPANT 100 mg tablet Take 100 mg by mouth as needed for Migraine.      albuterol (PROAIR HFA) 90 mcg/actuation inhaler Inhale 2 puffs into the lungs every 4 (four) hours as needed for Wheezing. 18 g 3    albuterol-ipratropium (DUO-NEB) 2.5 mg-0.5 mg/3 mL nebulizer  solution Take 3 mLs by nebulization every 6 (six) hours as needed for Wheezing. Rescue 75 mL 5     No current facility-administered medications for this visit.

## 2025-06-13 LAB
HSV1 IGG SERPL QL IA: POSITIVE
HSV2 IGG SERPL QL IA: POSITIVE

## 2025-06-17 ENCOUNTER — RESULTS FOLLOW-UP (OUTPATIENT)
Dept: OBSTETRICS AND GYNECOLOGY | Facility: CLINIC | Age: 45
End: 2025-06-17

## 2025-06-18 ENCOUNTER — OFFICE VISIT (OUTPATIENT)
Dept: RHEUMATOLOGY | Facility: CLINIC | Age: 45
End: 2025-06-18
Payer: MEDICAID

## 2025-06-18 ENCOUNTER — LAB VISIT (OUTPATIENT)
Dept: LAB | Facility: HOSPITAL | Age: 45
End: 2025-06-18
Attending: INTERNAL MEDICINE
Payer: MEDICAID

## 2025-06-18 VITALS
HEIGHT: 62 IN | DIASTOLIC BLOOD PRESSURE: 64 MMHG | BODY MASS INDEX: 19.19 KG/M2 | SYSTOLIC BLOOD PRESSURE: 97 MMHG | HEART RATE: 87 BPM | WEIGHT: 104.25 LBS

## 2025-06-18 DIAGNOSIS — L93.0 LUPUS ERYTHEMATOSUS, UNSPECIFIED FORM: Primary | ICD-10-CM

## 2025-06-18 DIAGNOSIS — L93.0 LUPUS ERYTHEMATOSUS, UNSPECIFIED FORM: ICD-10-CM

## 2025-06-18 DIAGNOSIS — M18.0 OSTEOARTHRITIS OF CARPOMETACARPAL (CMC) JOINT OF BOTH THUMBS: ICD-10-CM

## 2025-06-18 LAB
ABSOLUTE EOSINOPHIL (OHS): 0.13 K/UL
ABSOLUTE MONOCYTE (OHS): 0.89 K/UL (ref 0.3–1)
ABSOLUTE NEUTROPHIL COUNT (OHS): 8.17 K/UL (ref 1.8–7.7)
ALBUMIN SERPL BCP-MCNC: 4.2 G/DL (ref 3.5–5.2)
ALP SERPL-CCNC: 123 UNIT/L (ref 40–150)
ALT SERPL W/O P-5'-P-CCNC: 13 UNIT/L (ref 10–44)
ANION GAP (OHS): 7 MMOL/L (ref 8–16)
AST SERPL-CCNC: 12 UNIT/L (ref 11–45)
BASOPHILS # BLD AUTO: 0.05 K/UL
BASOPHILS NFR BLD AUTO: 0.4 %
BILIRUB SERPL-MCNC: 0.2 MG/DL (ref 0.1–1)
BUN SERPL-MCNC: 11 MG/DL (ref 6–20)
CALCIUM SERPL-MCNC: 9.9 MG/DL (ref 8.7–10.5)
CHLORIDE SERPL-SCNC: 107 MMOL/L (ref 95–110)
CO2 SERPL-SCNC: 23 MMOL/L (ref 23–29)
CREAT SERPL-MCNC: 0.8 MG/DL (ref 0.5–1.4)
CRP SERPL-MCNC: 12.5 MG/L
ERYTHROCYTE [DISTWIDTH] IN BLOOD BY AUTOMATED COUNT: 14.2 % (ref 11.5–14.5)
ERYTHROCYTE [SEDIMENTATION RATE] IN BLOOD: <2 MM/HR
GFR SERPLBLD CREATININE-BSD FMLA CKD-EPI: >60 ML/MIN/1.73/M2
GLUCOSE SERPL-MCNC: 116 MG/DL (ref 70–110)
HCT VFR BLD AUTO: 44.7 % (ref 37–48.5)
HGB BLD-MCNC: 14.7 GM/DL (ref 12–16)
IMM GRANULOCYTES # BLD AUTO: 0.03 K/UL (ref 0–0.04)
IMM GRANULOCYTES NFR BLD AUTO: 0.2 % (ref 0–0.5)
LYMPHOCYTES # BLD AUTO: 2.79 K/UL (ref 1–4.8)
MCH RBC QN AUTO: 32.6 PG (ref 27–31)
MCHC RBC AUTO-ENTMCNC: 32.9 G/DL (ref 32–36)
MCV RBC AUTO: 99 FL (ref 82–98)
NUCLEATED RBC (/100WBC) (OHS): 0 /100 WBC
PLATELET # BLD AUTO: 204 K/UL (ref 150–450)
PMV BLD AUTO: 10.9 FL (ref 9.2–12.9)
POTASSIUM SERPL-SCNC: 5.6 MMOL/L (ref 3.5–5.1)
PROT SERPL-MCNC: 7.9 GM/DL (ref 6–8.4)
RBC # BLD AUTO: 4.51 M/UL (ref 4–5.4)
RELATIVE EOSINOPHIL (OHS): 1.1 %
RELATIVE LYMPHOCYTE (OHS): 23.1 % (ref 18–48)
RELATIVE MONOCYTE (OHS): 7.4 % (ref 4–15)
RELATIVE NEUTROPHIL (OHS): 67.8 % (ref 38–73)
SODIUM SERPL-SCNC: 137 MMOL/L (ref 136–145)
WBC # BLD AUTO: 12.06 K/UL (ref 3.9–12.7)

## 2025-06-18 PROCEDURE — 85652 RBC SED RATE AUTOMATED: CPT

## 2025-06-18 PROCEDURE — 86140 C-REACTIVE PROTEIN: CPT

## 2025-06-18 PROCEDURE — 85025 COMPLETE CBC W/AUTO DIFF WBC: CPT

## 2025-06-18 PROCEDURE — 3074F SYST BP LT 130 MM HG: CPT | Mod: CPTII,,, | Performed by: INTERNAL MEDICINE

## 2025-06-18 PROCEDURE — 1160F RVW MEDS BY RX/DR IN RCRD: CPT | Mod: CPTII,,, | Performed by: INTERNAL MEDICINE

## 2025-06-18 PROCEDURE — G2211 COMPLEX E/M VISIT ADD ON: HCPCS | Mod: ,,, | Performed by: INTERNAL MEDICINE

## 2025-06-18 PROCEDURE — 3078F DIAST BP <80 MM HG: CPT | Mod: CPTII,,, | Performed by: INTERNAL MEDICINE

## 2025-06-18 PROCEDURE — 3008F BODY MASS INDEX DOCD: CPT | Mod: CPTII,,, | Performed by: INTERNAL MEDICINE

## 2025-06-18 PROCEDURE — 80053 COMPREHEN METABOLIC PANEL: CPT

## 2025-06-18 PROCEDURE — 99999 PR PBB SHADOW E&M-EST. PATIENT-LVL IV: CPT | Mod: PBBFAC,,, | Performed by: INTERNAL MEDICINE

## 2025-06-18 PROCEDURE — 99214 OFFICE O/P EST MOD 30 MIN: CPT | Mod: PBBFAC | Performed by: INTERNAL MEDICINE

## 2025-06-18 PROCEDURE — 1159F MED LIST DOCD IN RCRD: CPT | Mod: CPTII,,, | Performed by: INTERNAL MEDICINE

## 2025-06-18 PROCEDURE — 36415 COLL VENOUS BLD VENIPUNCTURE: CPT

## 2025-06-18 PROCEDURE — 86038 ANTINUCLEAR ANTIBODIES: CPT

## 2025-06-18 PROCEDURE — 99214 OFFICE O/P EST MOD 30 MIN: CPT | Mod: S$PBB,,, | Performed by: INTERNAL MEDICINE

## 2025-06-18 RX ORDER — PREDNISONE 2.5 MG/1
TABLET ORAL
Qty: 29 TABLET | Refills: 0 | Status: SHIPPED | OUTPATIENT
Start: 2025-06-18 | End: 2025-08-01

## 2025-06-18 NOTE — PROGRESS NOTES
RHEUMATOLOGY CLINIC FOLLOW-UP VISIT    Chief complaints, HPI, ROS, EXAM, Assessment & Plans:-  Jaye Guerrier a 44 y.o. pleasant female comes in FOLLOW-UP VISIT..  She was diagnosed with lupus by her pediatrician 25 years ago.  Her lupus was diagnosed when she had blood count abnormalities and joint symptoms.  Her other symptoms includes malaise, fatigue and intermittent rash.  She was on Plaquenil until 2022.  When she had intolerance to Plaquenil it was discontinued..  Last seen here in 2023 and is here to reestablish care.  Have been taking intermittent low-dose steroids 5 mg daily for joint pain.  Primarily the joint pain is in the base of her thumbs.  Recently she has been noticing mild swelling in her legs .  Last flare of lupus arthritis was after her recent pregnancy 10 years ago.  Rheumatological review of system negative otherwise.  Physical examination shows no synovitis, dactylitis, enthesitis, sclerodactyly, telangiectasia.  Multiple soft tissue tender points.  Mild squaring of bilateral CMC joints.  1. Lupus erythematosus, unspecified form    2. Osteoarthritis of carpometacarpal (CMC) joint of both thumbs      Problem List Items Addressed This Visit       Lupus erythematosus - Primary    Relevant Medications    predniSONE (DELTASONE) 2.5 MG tablet    Other Relevant Orders    LYSSA Screen w/Reflex    Sedimentation rate    C-Reactive Protein    CBC Auto Differential    Comprehensive Metabolic Panel    Osteoarthritis of carpometacarpal (CMC) joint of both thumbs   Your   Latest Reference Range & Units Most Recent   WBC 3.90 - 12.70 K/uL 9.18  6/2/23 20:38   RBC 4.00 - 5.40 M/uL 4.53  6/2/23 20:38   Hemoglobin 12.0 - 16.0 g/dL 15.2  6/2/23 20:38   Hematocrit 37.0 - 48.5 % 45.2  6/2/23 20:38   MCV 82 - 98 fL 100 (H)  6/2/23 20:38   MCH 27.0 - 31.0 pg 33.6 (H)  6/2/23 20:38   MCHC 32.0 - 36.0 g/dL 33.6  6/2/23 20:38   RDW 11.5 - 14.5 % 14.0  6/2/23 20:38   Platelets 150 - 450 K/uL 242  6/2/23 20:38   MPV  9.2 - 12.9 fL 12.1  6/2/23 20:38   Platelet Estimate  Decreased !  7/28/14 16:12   Gran % 38.0 - 73.0 % 55.8  6/2/23 20:38   Lymph % 18.0 - 48.0 % 35.2  6/2/23 20:38   Mono % 4.0 - 15.0 % 6.6  6/2/23 20:38   Eosinophil % 0.0 - 8.0 % 1.4  6/2/23 20:38   Basophil % 0.0 - 1.9 % 0.7  6/2/23 20:38   Immature Granulocytes 0.0 - 0.5 % 0.3  6/2/23 20:38   Gran # (ANC) 1.8 - 7.7 K/uL 5.1  6/2/23 20:38   Lymph # 1.0 - 4.8 K/uL 3.2  6/2/23 20:38   Mono # 0.3 - 1.0 K/uL 0.6  6/2/23 20:38   Eos # 0.0 - 0.5 K/uL 0.1  6/2/23 20:38   Baso # 0.00 - 0.20 K/uL 0.06  6/2/23 20:38   Immature Grans (Abs) 0.00 - 0.04 K/uL 0.03  6/2/23 20:38   Bands % 4.0  1/21/14 11:55   nRBC 0 /100 WBC 0  6/2/23 20:38   Differential Method  Automated  6/2/23 20:38   Ovalocytes  Occasional  9/6/13 16:34   Poikilocytosis  Slight  9/6/13 16:34   Giant Platelets  Present  7/28/14 16:12   Sgae/Echinocytes  Occasional  9/6/13 16:34   Sed Rate 0 - 36 mm/Hr <2  11/16/22 14:52   Sodium 136 - 145 mmol/L 140  6/2/23 20:38   Potassium 3.5 - 5.1 mmol/L 3.3 (L)  6/2/23 20:38   Chloride 95 - 110 mmol/L 102  6/2/23 20:38   CO2 23 - 29 mmol/L 28  6/2/23 20:38   Anion Gap 8 - 16 mmol/L 10  6/2/23 20:38   BUN 6 - 20 mg/dL 7  6/2/23 20:38   Creatinine 0.5 - 1.4 mg/dL 0.6  6/2/23 20:38   eGFR >60 mL/min/1.73 m^2 >60.0  6/2/23 20:38   eGFR if non African American >60 mL/min/1.73 m^2 >60.0  6/12/17 08:24   eGFR if African American >60 mL/min/1.73 m^2 >60.0  6/12/17 08:24   Glucose 70 - 110 mg/dL 100  6/2/23 20:38   Calcium 8.7 - 10.5 mg/dL 9.3  6/2/23 20:38   Magnesium 1.6 - 2.6 mg/dL 2.0  6/2/23 20:38   Alkaline Phosphatase 55 - 135 U/L 108  6/2/23 20:38   PROTEIN TOTAL 6.0 - 8.4 g/dL 8.4  6/2/23 20:38   Albumin 3.5 - 5.2 g/dL 5.1  6/2/23 20:38   BILIRUBIN TOTAL 0.1 - 1.0 mg/dL 0.6  6/2/23 20:38   AST 10 - 40 U/L 15  6/2/23 20:38   ALT 10 - 44 U/L 17  6/2/23 20:38   Lipase 4 - 60 U/L 24  6/2/23 20:38   CRP 0.0 - 8.2 mg/L 0.8  6/8/23 15:39   Cholesterol 120 - 199 mg/dL  160  6/12/17 08:24   HDL 40 - 75 mg/dL 31 (L)  6/12/17 08:24   HDL/Cholesterol Ratio 20.0 - 50.0 % 19.4 (L)  6/12/17 08:24   LDL Cholesterol External 63.0 - 159.0 mg/dL 114.4  6/12/17 08:24   Non-HDL Cholesterol mg/dL 129  6/12/17 08:24   Total Cholesterol/HDL Ratio 2.0 - 5.0  5.2 (H)  6/12/17 08:24   Triglycerides 30 - 150 mg/dL 73  6/12/17 08:24   OB Glucose Screen 70 - 140 mg/dL 121  1/22/14 09:48   TSH 0.400 - 4.000 uIU/mL 0.549  11/17/16 15:03   Free T4 0.71 - 1.51 ng/dL 1.04  11/17/16 15:03   AFP Interpretation  SEE BELOW  11/5/13 16:36   Alpha Fetoprotein Maternal ng/mL 46.3  11/5/13 16:36   Date of Birth  [id number] 11/5/13 16:36   Ethnic Origin  WHITE  11/5/13 16:36   Inhibin A pg/mL 440.00  11/5/13 16:36   Insulin Depend. Diabetes  NO  11/5/13 16:36   M.O.M. Alpha Fetoprotein  1.10  11/5/13 16:36   M.O.M. HCG  0.67  11/5/13 16:36   M.O.M. Inhibin A  2.29  11/5/13 16:36   M.O.M. Unconj. Estriol  0.65  11/5/13 16:36   Maternal Age at TANESHA (Yrs)  33  11/5/13 16:36   Maternal Weight (lbs)  106  11/5/13 16:36   Multiple Gestations  NO  11/5/13 16:36   Neural Tube Risk(<2.20)  N/A  11/5/13 16:36   Unconjugated Estriol ng/mL 0.67  11/5/13 16:36   Gestational Age (Weeks)  16  11/5/13 16:36   Gestational Age (Days)  4  11/5/13 16:36   Quad Screen NEGATIVE  NEGATIVE  11/5/13 16:36   Down Risk (<1:270)  1:750  11/5/13 16:36   HCG IU/mL 20.7  11/5/13 16:36   HCG Quant See Text mIU/mL <1.2  12/29/16 13:57   Preg Test, Ur Negative  Negative  11/18/22 10:32   FSH See Text mIU/mL 13.20  11/17/16 15:03   Prolactin 5.2 - 26.5 ng/mL 25.6  11/17/16 15:03   LYSSA Screen Negative <1:160  Negative <1:160  9/6/13 16:34   CCP Antibodies <5.0 U/mL <0.5  9/6/13 16:34   Complement (C-3) 50 - 180 mg/dL 148  9/6/13 16:34   Complement (C-4) 11 - 44 mg/dL 27  9/6/13 16:34   Cystic Fibrosis Mutation PNL  NEGATIVE  9/10/13 15:18   Rheumatoid Factor 0.0 - 15.0 IU/mL <10.0  9/9/13 12:16   Group & Rh  O POS  9/10/13 15:18   INDIRECT PRANAY   NEG  9/10/13 15:18   Hep B Core Total Ab  Positive !  9/6/13 16:34   Hep B S Ab  Positive !  9/6/13 16:34   Hepatitis B Surface Ag  Negative  9/10/13 15:18   Hepatitis C Ab Non-reactive  Non-reactive  11/11/22 06:22   HPV High Risk type 16, PCR Negative  Negative  10/1/18 11:07   HPV High Risk type 18, PCR Negative  Negative  10/1/18 11:07   HPV other High Risk types, PCR Negative  Negative  10/1/18 11:07   HSV PCR Specimen Source  See Below (C)  4/2/15 16:00   HSV1 PCR Result Not detected  Not detected  4/2/15 16:00   HSV2 PCR Result Not detected  DETECTED !  4/2/15 16:00   HIV 1/2 Ag/Ab Non-reactive  Non-reactive  11/11/22 06:22   Chlamydia, Amplified DNA Not detected  Not Detected  7/10/13 16:30   N gonorrhoeae, amplified DNA Not detected  Not Detected  7/10/13 16:30   RPR Non-reactive  Non-reactive  9/10/13 15:18   Rubella IgG Antibodies 0.0 - 4.9 IU/mL 41.2 (H)  9/10/13 15:19   Rubella Immune Status  Reactive !  9/10/13 15:19   Specimen UA  Urine, Clean Catch  6/2/23 21:39   Color, UA Yellow, Straw, Fidelia  Yellow  6/2/23 21:39   Appearance, UA Clear  Hazy !  6/2/23 21:39   Specific Gravity, UA 1.005 - 1.030  1.020  6/2/23 21:39   pH, UA 5.0 - 8.0  7.0  6/2/23 21:39   Protein, UA Negative  Trace !  6/2/23 21:39   Glucose, UA Negative  Negative  6/2/23 21:39   Ketones, UA Negative  Negative  6/2/23 21:39   Occult Blood UA Negative  Trace !  6/2/23 21:39   NITRITE UA Negative  Negative  6/2/23 21:39   UROBILINOGEN UA Negative EU/dL 4.0-6.0 !  6/2/23 21:39   Bilirubin (UA) Negative  Negative  6/2/23 21:39   Leukocytes, UA Negative  Negative  6/2/23 21:39   RBC, UA 0 - 4 /hpf 4  9/6/13 14:23   WBC, UA 0 - 5 /hpf 80 (H)  9/6/13 14:23   Bacteria, UA None-Occ /hpf Many !  9/6/13 14:23   Squam Epithel, UA /hpf 15  9/6/13 14:23   Microscopic Comment  see below  9/6/13 14:23   (H): Data is abnormally high  !: Data is abnormal  (L): Data is abnormally low  (C): Corrected      Mild activity-related CMC joint osteoarthritis  without any synovitis to suspect recurrence of lupus.  Lupus in remission.  Start tapering prednisone as advised.   Advised anti-inflammatory supplements and joint supplements.  I have explained all of the above in detail and the patient understands all of the current recommendation(s). I have answered all questions to the best of my ability and to their complete satisfaction.         # Follow up in about 48 weeks (around 2026).      Past Medical History:   Diagnosis Date    Abnormal Pap smear     Anxiety     Cancer     pre-cancer cells cervical     Cervical disc herniation     Chronic cough     Closed fracture 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) level with anterior cord syndrome     T 12 fracture compression     COVID     Dilated bile duct     Fractures     Genital herpes     GERD (gastroesophageal reflux disease)     Hemorrhoid     Hepatomegaly     History of colposcopy with cervical biopsy     Lumbar disc herniation     on Hydrocodone    Lupus (systemic lupus erythematosus)     chronic thrombocytopenia    Migraine headache     PONV (postoperative nausea and vomiting)     UTI (urinary tract infection)        Past Surgical History:   Procedure Laterality Date    ADENOIDECTOMY      APPENDECTOMY       SECTION, CLASSIC      x2 , last 2014    CHOLECYSTECTOMY      CLOSED REDUCTION OF FRACTURE OF NASAL BONE Bilateral 2022    Procedure: CLOSED REDUCTION, FRACTURE, NASAL BONE;  Surgeon: Mary Carlton MD;  Location: [facility] OR;  Service: ENT;  Laterality: Bilateral;    COLONOSCOPY N/A 10/29/2019    Procedure: COLONOSCOPY;  Surgeon: Jules De La Cruz MD;  Location: [facility] ENDO;  Service: Endoscopy;  Laterality: N/A;    COLONOSCOPY, SCREENING, HIGH RISK PATIENT N/A 2025    Procedure: COLONOSCOPY, SCREENING, HIGH RISK PATIENT;  Surgeon: Jules De La Cruz MD;  Location: [facility] ENDO;  Service: Endoscopy;  Laterality: N/A;    CRYOTHERAPY      2002    CYSTOSCOPY N/A 2023    Procedure: CYSTOSCOPY;  Surgeon: Kyle  Kevin GRAFF MD;  Location: [facility] OR;  Service: OB/GYN;  Laterality: N/A;    DILATION AND CURETTAGE OF UTERUS  07/30/2014    ENDOMETRIAL ABLATION      ENDOSCOPIC ULTRASOUND OF UPPER GASTROINTESTINAL TRACT N/A 09/05/2023    Procedure: ULTRASOUND, UPPER GI TRACT, ENDOSCOPIC;  Surgeon: Kai Hermosillo III, MD;  Location: [facility];  Service: Endoscopy;  Laterality: N/A;    GANGLION CYST EXCISION Left     HYSTERECTOMY      LAPAROSCOPIC SALPINGECTOMY Bilateral 09/27/2023    Procedure: SALPINGECTOMY, LAPAROSCOPIC;  Surgeon: Kevin Wright MD;  Location: [facility] OR;  Service: OB/GYN;  Laterality: Bilateral;    LAPAROSCOPIC TOTAL HYSTERECTOMY N/A 09/27/2023    Procedure: HYSTERECTOMY, TOTAL, LAPAROSCOPIC;  Surgeon: Kevin Wright MD;  Location: [facility] OR;  Service: OB/GYN;  Laterality: N/A;    TONSILLECTOMY      TUBAL LIGATION  04/2014    TYMPANOSTOMY TUBE PLACEMENT      x 7        Social History     Tobacco Use    Smoking status: Every Day     Current packs/day: 0.25     Types: Cigarettes    Smokeless tobacco: Never   Substance Use Topics    Alcohol use: Not Currently     Comment: special occasions    Drug use: Not Currently     Types: Marijuana     Comment: prescription       Family History   Problem Relation Name Age of Onset    Heart disease Father      Diabetes Mother      Cancer Maternal Grandfather          colon    Cancer Paternal Grandfather          colon    Breast cancer Maternal Grandmother      Breast cancer Maternal Aunt      Breast cancer Maternal Aunt      Ovarian cancer Neg Hx         Review of patient's allergies indicates:   Allergen Reactions    Cyclobenzaprine Rash and Other (See Comments)     Leg cramps    Pregabalin Itching, Other (See Comments) and Hives     Bruising and headaches    Sulfa (sulfonamide antibiotics) Nausea And Vomiting    Amoxicillin trihydrate Rash    Morphine Rash and Hives    Potassium clavulanate Rash    Sulfamethoxazole-trimethoprim Rash and Nausea And Vomiting     And  headache    Toradol [ketorolac] Rash       Medication List with Changes/Refills   Current Medications    ACETAMINOPHEN (TYLENOL) 325 MG TABLET    Take 325 mg by mouth every 6 (six) hours as needed for Pain.    ALBUTEROL (PROAIR HFA) 90 MCG/ACTUATION INHALER    Inhale 2 puffs into the lungs every 4 (four) hours as needed for Wheezing.    ALBUTEROL-IPRATROPIUM (DUO-NEB) 2.5 MG-0.5 MG/3 ML NEBULIZER SOLUTION    Take 3 mLs by nebulization every 6 (six) hours as needed for Wheezing. Rescue    BUTALBITAL-ACETAMINOP-CAF-COD -05-30 MG CAP        BUTALBITAL-ACETAMINOPHEN-CAFFEINE -40 MG (FIORICET, ESGIC) -40 MG PER TABLET    Take 1 tablet by mouth 2 (two) times daily as needed.    DICLOFENAC SODIUM (VOLTAREN) 1 % GEL    Apply topically 4 (four) times daily.    EMGALITY  MG/ML PNIJ    Inject 1 mL into the skin every 30 days.    FLUOXETINE 20 MG CAPSULE    Take 20 mg by mouth every morning.    HYDROCODONE-ACETAMINOPHEN 10-325MG (NORCO)  MG TAB    Take 1 tablet by mouth every 6 (six) hours as needed.    HYDROXYZINE PAMOATE (VISTARIL) 25 MG CAP    Take by mouth.    TIZANIDINE (ZANAFLEX) 4 MG TABLET    Take 4 mg by mouth every 6 (six) hours as needed.    UBROGEPANT 100 MG TABLET    Take 100 mg by mouth as needed for Migraine.   Changed and/or Refilled Medications    Modified Medication Previous Medication    PREDNISONE (DELTASONE) 2.5 MG TABLET predniSONE (DELTASONE) 5 MG tablet       Take 1 tablet (2.5 mg total) by mouth once daily for 14 days, THEN 1 tablet (2.5 mg total) every other day.    TAKE 1 TABLET (5 MG TOTAL) BY MOUTH DAILY AS NEEDED (JOINT STIFFNESS).         Thank you for allowing me to participate in the care ofJaye Martinez.    Disclaimer: This note was prepared using voice recognition system and is likely to have sound alike errors and is not proof read.  Please call me with any questions.

## 2025-06-19 ENCOUNTER — RESULTS FOLLOW-UP (OUTPATIENT)
Dept: RHEUMATOLOGY | Facility: CLINIC | Age: 45
End: 2025-06-19

## 2025-06-19 LAB — ANA (OHS): NORMAL

## (undated) DEVICE — GLOVE SURGICAL LATEX SZ 7

## (undated) DEVICE — BLADE SURG CARBON STEEL SZ11

## (undated) DEVICE — UNDERGLOVES BIOGEL PI SZ 6 LF

## (undated) DEVICE — DRAPE HALF SURGICAL 40X58IN

## (undated) DEVICE — CUP MEDICINE STERILE 2OZ

## (undated) DEVICE — FIBER KTP/YAG .6 MM ENDOSTAT

## (undated) DEVICE — HANDPIECE KTP

## (undated) DEVICE — LABEL FOR UTILITY MARKER

## (undated) DEVICE — SEE L#120831

## (undated) DEVICE — SPONGE GAUZE 16PLY 4X4

## (undated) DEVICE — SEE MEDLINE ITEM 152622

## (undated) DEVICE — SPONGE PATTY SURGICAL .5X3IN

## (undated) DEVICE — TOWEL OR DISP STRL BLUE 4/PK

## (undated) DEVICE — NDL HYPO A BEVEL 30X1/2

## (undated) DEVICE — SEE MEDLINE ITEM 157128

## (undated) DEVICE — CONTAINER SPECIMEN OR STER 4OZ

## (undated) DEVICE — DRESSING EYE OVAL LF

## (undated) DEVICE — MARKER SKIN STND TIP BLUE BARR

## (undated) DEVICE — SEE MEDLINE ITEM 157125

## (undated) DEVICE — SEE MEDLINE ITEM 152487